# Patient Record
Sex: FEMALE | Race: BLACK OR AFRICAN AMERICAN | NOT HISPANIC OR LATINO
[De-identification: names, ages, dates, MRNs, and addresses within clinical notes are randomized per-mention and may not be internally consistent; named-entity substitution may affect disease eponyms.]

---

## 2017-04-24 ENCOUNTER — RX RENEWAL (OUTPATIENT)
Age: 52
End: 2017-04-24

## 2017-11-29 ENCOUNTER — APPOINTMENT (OUTPATIENT)
Dept: INTERNAL MEDICINE | Facility: CLINIC | Age: 52
End: 2017-11-29
Payer: SELF-PAY

## 2017-11-29 VITALS
DIASTOLIC BLOOD PRESSURE: 84 MMHG | HEIGHT: 63 IN | SYSTOLIC BLOOD PRESSURE: 122 MMHG | WEIGHT: 206 LBS | BODY MASS INDEX: 36.5 KG/M2 | TEMPERATURE: 97.6 F

## 2017-11-29 DIAGNOSIS — R81 GLYCOSURIA: ICD-10-CM

## 2017-11-29 DIAGNOSIS — I10 ESSENTIAL (PRIMARY) HYPERTENSION: ICD-10-CM

## 2017-11-29 DIAGNOSIS — Z78.0 ASYMPTOMATIC MENOPAUSAL STATE: ICD-10-CM

## 2017-11-29 DIAGNOSIS — L29.8 OTHER PRURITUS: ICD-10-CM

## 2017-11-29 PROCEDURE — 81002 URINALYSIS NONAUTO W/O SCOPE: CPT

## 2017-11-29 PROCEDURE — 93000 ELECTROCARDIOGRAM COMPLETE: CPT

## 2017-11-29 PROCEDURE — 99396 PREV VISIT EST AGE 40-64: CPT | Mod: 25

## 2017-11-29 PROCEDURE — 36415 COLL VENOUS BLD VENIPUNCTURE: CPT

## 2017-11-30 ENCOUNTER — NON-APPOINTMENT (OUTPATIENT)
Age: 52
End: 2017-11-30

## 2017-11-30 LAB
ALBUMIN SERPL ELPH-MCNC: 4.4 G/DL
ALP BLD-CCNC: 133 U/L
ALT SERPL-CCNC: 19 U/L
ANION GAP SERPL CALC-SCNC: 21 MMOL/L
AST SERPL-CCNC: 18 U/L
BASOPHILS # BLD AUTO: 0 K/UL
BASOPHILS NFR BLD AUTO: 0 %
BILIRUB SERPL-MCNC: 0.7 MG/DL
BILIRUB UR QL STRIP: NEGATIVE
BUN SERPL-MCNC: 14 MG/DL
CALCIUM SERPL-MCNC: 9.8 MG/DL
CHLORIDE SERPL-SCNC: 92 MMOL/L
CO2 SERPL-SCNC: 20 MMOL/L
CREAT SERPL-MCNC: 1.08 MG/DL
EOSINOPHIL # BLD AUTO: 0.04 K/UL
EOSINOPHIL NFR BLD AUTO: 0.6 %
GLUCOSE SERPL-MCNC: 383 MG/DL
GLUCOSE UR-MCNC: ABNORMAL
HBA1C MFR BLD HPLC: 11.4 %
HCG UR QL: 0.2 EU/DL
HCT VFR BLD CALC: 40.4 %
HGB BLD-MCNC: 13.9 G/DL
HGB UR QL STRIP.AUTO: ABNORMAL
IMM GRANULOCYTES NFR BLD AUTO: 0 %
KETONES UR-MCNC: ABNORMAL
LEUKOCYTE ESTERASE UR QL STRIP: NEGATIVE
LYMPHOCYTES # BLD AUTO: 2.57 K/UL
LYMPHOCYTES NFR BLD AUTO: 37.4 %
MAN DIFF?: NORMAL
MCHC RBC-ENTMCNC: 29.6 PG
MCHC RBC-ENTMCNC: 34.4 GM/DL
MCV RBC AUTO: 86.1 FL
MONOCYTES # BLD AUTO: 0.42 K/UL
MONOCYTES NFR BLD AUTO: 6.1 %
NEUTROPHILS NFR BLD AUTO: 55.9 %
NITRITE UR QL STRIP: NEGATIVE
PH UR STRIP: 5
PLATELET # BLD AUTO: 161 K/UL
POTASSIUM SERPL-SCNC: 3.6 MMOL/L
PROT SERPL-MCNC: 8.2 G/DL
PROT UR STRIP-MCNC: NEGATIVE
RBC # BLD: 4.69 M/UL
RBC # FLD: 13.4 %
SAVE SPECIMEN: NORMAL
SODIUM SERPL-SCNC: 133 MMOL/L
SP GR UR STRIP: 1.01
WBC # FLD AUTO: 6.87 K/UL

## 2018-03-13 ENCOUNTER — TRANSCRIPTION ENCOUNTER (OUTPATIENT)
Age: 53
End: 2018-03-13

## 2018-04-27 ENCOUNTER — RX RENEWAL (OUTPATIENT)
Age: 53
End: 2018-04-27

## 2018-09-22 ENCOUNTER — TRANSCRIPTION ENCOUNTER (OUTPATIENT)
Age: 53
End: 2018-09-22

## 2019-03-13 ENCOUNTER — LABORATORY RESULT (OUTPATIENT)
Age: 54
End: 2019-03-13

## 2019-03-13 ENCOUNTER — APPOINTMENT (OUTPATIENT)
Dept: INTERNAL MEDICINE | Facility: CLINIC | Age: 54
End: 2019-03-13
Payer: COMMERCIAL

## 2019-03-13 VITALS
WEIGHT: 221 LBS | SYSTOLIC BLOOD PRESSURE: 144 MMHG | BODY MASS INDEX: 39.16 KG/M2 | HEIGHT: 63 IN | DIASTOLIC BLOOD PRESSURE: 86 MMHG | TEMPERATURE: 97.6 F

## 2019-03-13 DIAGNOSIS — M85.89 OTHER SPECIFIED DISORDERS OF BONE DENSITY AND STRUCTURE, MULTIPLE SITES: ICD-10-CM

## 2019-03-13 DIAGNOSIS — L65.9 NONSCARRING HAIR LOSS, UNSPECIFIED: ICD-10-CM

## 2019-03-13 DIAGNOSIS — E53.8 DEFICIENCY OF OTHER SPECIFIED B GROUP VITAMINS: ICD-10-CM

## 2019-03-13 DIAGNOSIS — R92.2 INCONCLUSIVE MAMMOGRAM: ICD-10-CM

## 2019-03-13 PROCEDURE — 94010 BREATHING CAPACITY TEST: CPT

## 2019-03-13 PROCEDURE — 99396 PREV VISIT EST AGE 40-64: CPT | Mod: 25

## 2019-03-13 PROCEDURE — 36415 COLL VENOUS BLD VENIPUNCTURE: CPT

## 2019-03-13 PROCEDURE — 93000 ELECTROCARDIOGRAM COMPLETE: CPT

## 2019-03-13 RX ORDER — CALCIUM CARBONATE/VITAMIN D3 600 MG-10
TABLET ORAL
Refills: 0 | Status: ACTIVE | COMMUNITY

## 2019-03-13 NOTE — HISTORY OF PRESENT ILLNESS
[FreeTextEntry1] : Patient presents today for CPE.\par  [de-identified] : Patient presents for CPE today. She changed jobs insurances over the past 2 years and was lost to followup for some time. She switch back to her old job last year and is now switching back to her previous doctors.\par \par She recently saw her old endocrinologist -Dr. Roberts last month- and he ordered multiple blood tests.\par She has not been monitoring her diabetes for the past few months.\par \par He changed her insulin levels.\par She also was taking losartan 50 mg q.d. but was recently told by her insurance plan that the medication was the culprit. She will like to switch to a different medication. She was also told that her previous medication valsartan was also recalled.\par \par Patient also would like workup for complaints of chronic hair thinning/hair loss. She did have a dermatology evaluation several years ago but would like a new workup of possible.

## 2019-03-13 NOTE — HEALTH RISK ASSESSMENT
[Patient reported colonoscopy was normal] : Patient reported colonoscopy was normal [Good] : ~his/her~  mood as  good [Patient reported mammogram was normal] : Patient reported mammogram was normal [Patient reported PAP Smear was normal] : Patient reported PAP Smear was normal [MammogramDate] : 04/16 [PapSmearDate] : 02/19 [PapSmearComments] : with Dr. Deborah Cotter [BoneDensityComments] : never [ColonoscopyDate] : 01/10 [ColonoscopyComments] : with Dr. Killian in [place]

## 2019-03-18 LAB
24R-OH-CALCIDIOL SERPL-MCNC: 56.5 PG/ML
25(OH)D3 SERPL-MCNC: 21.7 NG/ML
ALBUMIN SERPL ELPH-MCNC: 4.4 G/DL
ALP BLD-CCNC: 111 U/L
ALT SERPL-CCNC: 17 U/L
ANA SER IF-ACNC: NEGATIVE
ANION GAP SERPL CALC-SCNC: 14 MMOL/L
APPEARANCE: CLEAR
AST SERPL-CCNC: 21 U/L
BASOPHILS # BLD AUTO: 0.01 K/UL
BASOPHILS NFR BLD AUTO: 0.2 %
BILIRUB SERPL-MCNC: 0.5 MG/DL
BILIRUBIN URINE: NEGATIVE
BLOOD URINE: NEGATIVE
BUN SERPL-MCNC: 10 MG/DL
CALCIUM SERPL-MCNC: 9.6 MG/DL
CHLORIDE SERPL-SCNC: 101 MMOL/L
CHOLEST SERPL-MCNC: 219 MG/DL
CHOLEST/HDLC SERPL: 5.8 RATIO
CO2 SERPL-SCNC: 24 MMOL/L
COLOR: NORMAL
CREAT SERPL-MCNC: 0.79 MG/DL
CRP SERPL-MCNC: 0.51 MG/DL
EOSINOPHIL # BLD AUTO: 0.15 K/UL
EOSINOPHIL NFR BLD AUTO: 2.8 %
ERYTHROCYTE [SEDIMENTATION RATE] IN BLOOD BY WESTERGREN METHOD: 42 MM/HR
FERRITIN SERPL-MCNC: 100 NG/ML
FOLATE SERPL-MCNC: 15.9 NG/ML
GLUCOSE QUALITATIVE U: NEGATIVE
GLUCOSE SERPL-MCNC: 94 MG/DL
HBA1C MFR BLD HPLC: 10.6 %
HCT VFR BLD CALC: 38.5 %
HDLC SERPL-MCNC: 38 MG/DL
HGB BLD-MCNC: 12.2 G/DL
IMM GRANULOCYTES NFR BLD AUTO: 0.4 %
IRON SATN MFR SERPL: 20 %
IRON SERPL-MCNC: 56 UG/DL
KETONES URINE: NEGATIVE
LDLC SERPL CALC-MCNC: 160 MG/DL
LDLC SERPL DIRECT ASSAY-MCNC: 177 MG/DL
LEUKOCYTE ESTERASE URINE: NEGATIVE
LYMPHOCYTES # BLD AUTO: 2.44 K/UL
LYMPHOCYTES NFR BLD AUTO: 45.4 %
MAN DIFF?: NORMAL
MCHC RBC-ENTMCNC: 28.4 PG
MCHC RBC-ENTMCNC: 31.7 GM/DL
MCV RBC AUTO: 89.5 FL
MONOCYTES # BLD AUTO: 0.29 K/UL
MONOCYTES NFR BLD AUTO: 5.4 %
NEUTROPHILS # BLD AUTO: 2.46 K/UL
NEUTROPHILS NFR BLD AUTO: 45.8 %
NITRITE URINE: NEGATIVE
PH URINE: 5.5
PLATELET # BLD AUTO: 161 K/UL
POTASSIUM SERPL-SCNC: 3.6 MMOL/L
PROT SERPL-MCNC: 7.9 G/DL
PROTEIN URINE: NEGATIVE
RBC # BLD: 4.3 M/UL
RBC # FLD: 12.9 %
SAVE SPECIMEN: NORMAL
SODIUM SERPL-SCNC: 139 MMOL/L
SPECIFIC GRAVITY URINE: 1.01
T3 SERPL-MCNC: 101 NG/DL
T3FREE SERPL-MCNC: 2.83 PG/ML
T3RU NFR SERPL: 1 TBI
T4 FREE SERPL-MCNC: 1.2 NG/DL
T4 SERPL-MCNC: 6.2 UG/DL
TIBC SERPL-MCNC: 274 UG/DL
TRIGL SERPL-MCNC: 107 MG/DL
TSH SERPL-ACNC: 2.27 UIU/ML
UIBC SERPL-MCNC: 218 UG/DL
URATE SERPL-MCNC: 7.3 MG/DL
UROBILINOGEN URINE: NORMAL
VIT B12 SERPL-MCNC: 539 PG/ML
WBC # FLD AUTO: 5.37 K/UL

## 2019-05-04 ENCOUNTER — APPOINTMENT (OUTPATIENT)
Dept: GASTROENTEROLOGY | Facility: CLINIC | Age: 54
End: 2019-05-04
Payer: COMMERCIAL

## 2019-05-04 PROCEDURE — 45378 DIAGNOSTIC COLONOSCOPY: CPT

## 2019-05-11 ENCOUNTER — APPOINTMENT (OUTPATIENT)
Dept: INTERNAL MEDICINE | Facility: CLINIC | Age: 54
End: 2019-05-11

## 2019-09-09 ENCOUNTER — APPOINTMENT (OUTPATIENT)
Dept: INTERNAL MEDICINE | Facility: CLINIC | Age: 54
End: 2019-09-09
Payer: COMMERCIAL

## 2019-09-09 VITALS
WEIGHT: 217 LBS | DIASTOLIC BLOOD PRESSURE: 82 MMHG | SYSTOLIC BLOOD PRESSURE: 136 MMHG | HEIGHT: 63 IN | BODY MASS INDEX: 38.45 KG/M2 | TEMPERATURE: 98 F

## 2019-09-09 DIAGNOSIS — J45.909 UNSPECIFIED ASTHMA, UNCOMPLICATED: ICD-10-CM

## 2019-09-09 PROCEDURE — 99214 OFFICE O/P EST MOD 30 MIN: CPT

## 2019-09-09 RX ORDER — LOSARTAN POTASSIUM 50 MG/1
50 TABLET, FILM COATED ORAL
Refills: 0 | Status: COMPLETED | COMMUNITY
End: 2019-09-09

## 2019-09-09 RX ORDER — IRBESARTAN 150 MG/1
150 TABLET ORAL DAILY
Qty: 90 | Refills: 3 | Status: COMPLETED | COMMUNITY
Start: 2019-03-13 | End: 2019-09-09

## 2019-09-09 NOTE — ASSESSMENT
[FreeTextEntry1] : pt is NOT fasting today. She will do fasting blood work in 1-2 weeks at a [facility] lab.

## 2019-09-09 NOTE — HISTORY OF PRESENT ILLNESS
[FreeTextEntry1] : Patient presents for follow up evaluation for multiple medical concerns including:\par  [de-identified] : \par \par She recently saw her old endocrinologist -Dr. Roberts several months ago.  She is now on Levemir long acting insulin 25 Units qhs and Humalog 5 Units tid before meals.  She admits she often forgets to take the pm dosage of insulin with dinner and her AM fasting glucose levels have been high.- =\par She has not been monitoring her diabetes for the past month because she has been too busy.\par \par She also was taking losartan 50 mg q.d. which was not available and was recently told by her insurance plan that the medication may be causing side effects.  She then changed to Irbersartan 150mg -but this was recently back ordered and not available at her pharmacy.  She has been off BP meds for the past few months. She wants to switch to a different medication.\par \par \par she also complains of recent allergic asthma-flare up with chronic dry cough.  No SOB or mucous or fevers or chills.She uses her rescue inhaler daily with only temporary relief of dry irritative cough.\par

## 2019-09-09 NOTE — REVIEW OF SYSTEMS
[Recent Change In Weight] : ~T recent weight change [Cough] : cough [FreeTextEntry2] : lost 6-7 lbs since last visit

## 2019-09-09 NOTE — PHYSICAL EXAM
[No Acute Distress] : no acute distress [Well Developed] : well developed [Well Nourished] : well nourished [Well-Appearing] : well-appearing [Normal Sclera/Conjunctiva] : normal sclera/conjunctiva [PERRL] : pupils equal round and reactive to light [Normal Outer Ear/Nose] : the outer ears and nose were normal in appearance [EOMI] : extraocular movements intact [Normal Oropharynx] : the oropharynx was normal [No JVD] : no jugular venous distention [No Lymphadenopathy] : no lymphadenopathy [Supple] : supple [Thyroid Normal, No Nodules] : the thyroid was normal and there were no nodules present [No Respiratory Distress] : no respiratory distress  [No Accessory Muscle Use] : no accessory muscle use [Clear to Auscultation] : lungs were clear to auscultation bilaterally [Normal Rate] : normal rate  [Regular Rhythm] : with a regular rhythm [No Murmur] : no murmur heard [Normal S1, S2] : normal S1 and S2 [No Carotid Bruits] : no carotid bruits [No Abdominal Bruit] : a ~M bruit was not heard ~T in the abdomen [No Varicosities] : no varicosities [Pedal Pulses Present] : the pedal pulses are present [No Edema] : there was no peripheral edema [No Extremity Clubbing/Cyanosis] : no extremity clubbing/cyanosis [No Palpable Aorta] : no palpable aorta [Non Tender] : non-tender [Soft] : abdomen soft [Non-distended] : non-distended [No Masses] : no abdominal mass palpated [No HSM] : no HSM [Normal Bowel Sounds] : normal bowel sounds [Normal Posterior Cervical Nodes] : no posterior cervical lymphadenopathy [Normal Anterior Cervical Nodes] : no anterior cervical lymphadenopathy [No CVA Tenderness] : no CVA  tenderness [No Spinal Tenderness] : no spinal tenderness [No Joint Swelling] : no joint swelling [Grossly Normal Strength/Tone] : grossly normal strength/tone [No Rash] : no rash [No Focal Deficits] : no focal deficits [Coordination Grossly Intact] : coordination grossly intact [Normal Gait] : normal gait [Deep Tendon Reflexes (DTR)] : deep tendon reflexes were 2+ and symmetric [Normal Affect] : the affect was normal [Normal Insight/Judgement] : insight and judgment were intact

## 2020-03-19 ENCOUNTER — RX RENEWAL (OUTPATIENT)
Age: 55
End: 2020-03-19

## 2020-03-19 RX ORDER — LANCETS 33 GAUGE
EACH MISCELLANEOUS
Qty: 100 | Refills: 5 | Status: ACTIVE | COMMUNITY
Start: 2020-03-19 | End: 1900-01-01

## 2020-09-18 ENCOUNTER — NON-APPOINTMENT (OUTPATIENT)
Age: 55
End: 2020-09-18

## 2021-01-21 ENCOUNTER — RX RENEWAL (OUTPATIENT)
Age: 56
End: 2021-01-21

## 2021-01-27 ENCOUNTER — NON-APPOINTMENT (OUTPATIENT)
Age: 56
End: 2021-01-27

## 2021-01-27 ENCOUNTER — APPOINTMENT (OUTPATIENT)
Dept: CARDIOLOGY | Facility: CLINIC | Age: 56
End: 2021-01-27
Payer: COMMERCIAL

## 2021-01-27 VITALS
HEART RATE: 74 BPM | DIASTOLIC BLOOD PRESSURE: 83 MMHG | WEIGHT: 223 LBS | TEMPERATURE: 96.5 F | HEIGHT: 63 IN | SYSTOLIC BLOOD PRESSURE: 132 MMHG | BODY MASS INDEX: 39.51 KG/M2 | OXYGEN SATURATION: 100 %

## 2021-01-27 DIAGNOSIS — M25.473 EFFUSION, UNSPECIFIED ANKLE: ICD-10-CM

## 2021-01-27 DIAGNOSIS — R94.31 ABNORMAL ELECTROCARDIOGRAM [ECG] [EKG]: ICD-10-CM

## 2021-01-27 PROCEDURE — 93306 TTE W/DOPPLER COMPLETE: CPT

## 2021-01-27 PROCEDURE — 99072 ADDL SUPL MATRL&STAF TM PHE: CPT

## 2021-01-27 PROCEDURE — 99204 OFFICE O/P NEW MOD 45 MIN: CPT

## 2021-01-27 PROCEDURE — 93000 ELECTROCARDIOGRAM COMPLETE: CPT

## 2021-01-27 NOTE — REVIEW OF SYSTEMS
[Feeling Fatigued] : feeling fatigued [Lower Ext Edema] : lower extremity edema [Joint Pain] : joint pain [Joint Stiffness] : joint stiffness [Negative] : Heme/Lymph

## 2021-01-27 NOTE — DISCUSSION/SUMMARY
[FreeTextEntry1] : In summary, Ms. Cassidy is a 55-year-old female with multiple risk factors for CAD who has been noting some right-sided foot and ankle edema during the past few months.  Her exam shows regular rhythm, normal blood pressure, a BMI of 39, clear lungs, a normal cardiac exam, and about 1-2+ edema in the right ankle and foot.  Her EKG shows some T wave flattening and is unchanged.\par \par An echo was done prior to her visit.  It showed normal systolic function, no significant valve problems, and a normal pulmonary artery systolic pressure.\par \par Ms. Cassidy's edema does not appear to be due to heart disease and her cardiac evaluation looks  unremarkable.  I told her to use a support stocking during the day for the edema.  Also, her LDL cholesterol was 160 when last measured and she is not on a statin so I begin atorvastatin 20 mg/day.  She will be following up with her internist in a month.

## 2021-01-27 NOTE — PHYSICAL EXAM
[General Appearance - Well Developed] : well developed [Normal Appearance] : normal appearance [Well Groomed] : well groomed [General Appearance - Well Nourished] : well nourished [No Deformities] : no deformities [General Appearance - In No Acute Distress] : no acute distress [Normal Conjunctiva] : the conjunctiva exhibited no abnormalities [Eyelids - No Xanthelasma] : the eyelids demonstrated no xanthelasmas [Normal Oral Mucosa] : normal oral mucosa [No Oral Pallor] : no oral pallor [No Oral Cyanosis] : no oral cyanosis [Normal Jugular Venous A Waves Present] : normal jugular venous A waves present [Normal Jugular Venous V Waves Present] : normal jugular venous V waves present [No Jugular Venous Wallis A Waves] : no jugular venous wallis A waves [Heart Rate And Rhythm] : heart rate and rhythm were normal [Heart Sounds] : normal S1 and S2 [Murmurs] : no murmurs present [Respiration, Rhythm And Depth] : normal respiratory rhythm and effort [Exaggerated Use Of Accessory Muscles For Inspiration] : no accessory muscle use [Auscultation Breath Sounds / Voice Sounds] : lungs were clear to auscultation bilaterally [Abdomen Soft] : soft [Abdomen Tenderness] : non-tender [Abdomen Mass (___ Cm)] : no abdominal mass palpated [Abnormal Walk] : normal gait [Gait - Sufficient For Exercise Testing] : the gait was sufficient for exercise testing [Nail Clubbing] : no clubbing of the fingernails [Cyanosis, Localized] : no localized cyanosis [Petechial Hemorrhages (___cm)] : no petechial hemorrhages [Skin Color & Pigmentation] : normal skin color and pigmentation [] : no rash [No Venous Stasis] : no venous stasis [Skin Lesions] : no skin lesions [No Skin Ulcers] : no skin ulcer [No Xanthoma] : no  xanthoma was observed [Oriented To Time, Place, And Person] : oriented to person, place, and time [Affect] : the affect was normal [Mood] : the mood was normal [No Anxiety] : not feeling anxious [FreeTextEntry1] : 2+ edema in the right foot and ankle, instep tender

## 2021-01-27 NOTE — HISTORY OF PRESENT ILLNESS
[FreeTextEntry1] : 55-year-old female referred for echocardiography and cardiac evaluation because of unilateral peripheral edema.  She has no prior history of heart disease, but has multiple risk factors including hypertension, hypercholesterolemia, diabetes, obesity.  For the past few months she has noted some edema involving the right foot and ankle which worsens during the day and is relieved during the night.  Her mobility is poor, but she does not notice any dyspnea with exertion or change in her exercise capacity and has no palpitations or chest pain.  She has pain in the foot and has been using a crutch to minimize weightbearing..\par \par She has never had any cardiac work-up in the past.  Her EKGs have shown some nonspecific T wave changes.  She has been seeing her endocrinologist regularly but has not been seen by her internist in 18 months.

## 2021-02-24 ENCOUNTER — APPOINTMENT (OUTPATIENT)
Dept: INTERNAL MEDICINE | Facility: CLINIC | Age: 56
End: 2021-02-24
Payer: COMMERCIAL

## 2021-02-24 ENCOUNTER — LABORATORY RESULT (OUTPATIENT)
Age: 56
End: 2021-02-24

## 2021-02-24 VITALS
SYSTOLIC BLOOD PRESSURE: 134 MMHG | DIASTOLIC BLOOD PRESSURE: 80 MMHG | BODY MASS INDEX: 37.39 KG/M2 | WEIGHT: 211 LBS | TEMPERATURE: 97.4 F | HEIGHT: 63 IN

## 2021-02-24 DIAGNOSIS — M25.562 PAIN IN LEFT KNEE: ICD-10-CM

## 2021-02-24 DIAGNOSIS — E78.5 HYPERLIPIDEMIA, UNSPECIFIED: ICD-10-CM

## 2021-02-24 DIAGNOSIS — Z11.59 ENCOUNTER FOR SCREENING FOR OTHER VIRAL DISEASES: ICD-10-CM

## 2021-02-24 DIAGNOSIS — E61.2 MAGNESIUM DEFICIENCY: ICD-10-CM

## 2021-02-24 DIAGNOSIS — E11.65 TYPE 2 DIABETES MELLITUS WITH HYPERGLYCEMIA: ICD-10-CM

## 2021-02-24 PROCEDURE — 99072 ADDL SUPL MATRL&STAF TM PHE: CPT

## 2021-02-24 PROCEDURE — 99396 PREV VISIT EST AGE 40-64: CPT | Mod: 25

## 2021-02-24 PROCEDURE — 36415 COLL VENOUS BLD VENIPUNCTURE: CPT

## 2021-02-28 NOTE — HISTORY OF PRESENT ILLNESS
[FreeTextEntry1] : Patient presents for CPE/comprehensive medical evaluation today.\par  [de-identified] : She saw ortho in 12/2020 for left knee sweliing and flare up of DJD--had Xray and had knee drained.\par and may need f/u cortisone shot.\par \par She also saw cardiologist Dr. Kendall last month for edema.\par \par She is insulin dependent type 2 diabetic-currently on Humalaog 5 U in am, 7 Units with lunch and 10 Units with dinner and Levemir 25 U qhs (was changed from Lantus 25 U).  Her glucose levels are variable. She feels Levemir is not working as well for her.\par She gets regular f/u with her endocrinologist Dr. Roberts-last visit was 1/2021.\par \par Last eye exam was more than 2 years ago--needs to find new ophthalmologist.

## 2021-02-28 NOTE — REVIEW OF SYSTEMS
[Negative] : Heme/Lymph [FreeTextEntry9] : right knee pain and swelling and diffuse arthralgias that come and go throughout all joints

## 2021-02-28 NOTE — ASSESSMENT
[FreeTextEntry1] : pt declines flu shot.\par Patient will consider the Shingrix vaccination.  A prescription for Shingrix was given to the patient to be administered by the pharmacist.\par Patient will call us and inform us of date of vaccination.\par

## 2021-02-28 NOTE — PHYSICAL EXAM
[Well Nourished] : well nourished [Well Developed] : well developed [Well-Appearing] : well-appearing [Normal Sclera/Conjunctiva] : normal sclera/conjunctiva [PERRL] : pupils equal round and reactive to light [EOMI] : extraocular movements intact [Normal Outer Ear/Nose] : the outer ears and nose were normal in appearance [No JVD] : no jugular venous distention [No Lymphadenopathy] : no lymphadenopathy [Supple] : supple [Thyroid Normal, No Nodules] : the thyroid was normal and there were no nodules present [No Respiratory Distress] : no respiratory distress  [No Accessory Muscle Use] : no accessory muscle use [Clear to Auscultation] : lungs were clear to auscultation bilaterally [Normal Rate] : normal rate  [Regular Rhythm] : with a regular rhythm [Normal S1, S2] : normal S1 and S2 [No Murmur] : no murmur heard [No Carotid Bruits] : no carotid bruits [No Abdominal Bruit] : a ~M bruit was not heard ~T in the abdomen [No Varicosities] : no varicosities [Pedal Pulses Present] : the pedal pulses are present [No Edema] : there was no peripheral edema [No Palpable Aorta] : no palpable aorta [No Extremity Clubbing/Cyanosis] : no extremity clubbing/cyanosis [Normal Appearance] : normal in appearance [No Axillary Lymphadenopathy] : no axillary lymphadenopathy [Soft] : abdomen soft [Non Tender] : non-tender [Non-distended] : non-distended [No Masses] : no abdominal mass palpated [No HSM] : no HSM [Normal Bowel Sounds] : normal bowel sounds [Normal Posterior Cervical Nodes] : no posterior cervical lymphadenopathy [Normal Anterior Cervical Nodes] : no anterior cervical lymphadenopathy [No CVA Tenderness] : no CVA  tenderness [No Spinal Tenderness] : no spinal tenderness [No Joint Swelling] : no joint swelling [Grossly Normal Strength/Tone] : grossly normal strength/tone [No Rash] : no rash [Coordination Grossly Intact] : coordination grossly intact [No Focal Deficits] : no focal deficits [Normal Gait] : normal gait [Deep Tendon Reflexes (DTR)] : deep tendon reflexes were 2+ and symmetric [Normal Affect] : the affect was normal [Normal Insight/Judgement] : insight and judgment were intact [de-identified] : dense fibrocystic breasts

## 2021-02-28 NOTE — HEALTH RISK ASSESSMENT
[Patient reported PAP Smear was normal] : Patient reported PAP Smear was normal [Patient reported mammogram was normal] : Patient reported mammogram was normal [Patient reported colonoscopy was abnormal] : Patient reported colonoscopy was abnormal [Good] : ~his/her~  mood as  good [Never (0 pts)] : Never (0 points) [No] : In the past 12 months have you used drugs other than those required for medical reasons? No [0] : 2) Feeling down, depressed, or hopeless: Not at all (0) [] : No [de-identified] : orthopedic surgeon for knee pain and swelling and cardiologist [Audit-CScore] : 0 [de-identified] : no routine exercise [de-identified] : overall healthy balanced and varied diabetic diet without any exclusions  [[id number]] : 0 [MammogramDate] : 01/19 [MammogramComments] : at [facility] [PapSmearDate] : 01/19 [PapSmearComments] : with Dr. Deborah Cotter [ColonoscopyDate] : 05/19 [ColonoscopyComments] : poor prep--needs repeat

## 2021-03-01 LAB
25(OH)D3 SERPL-MCNC: 41 NG/ML
ALBUMIN SERPL ELPH-MCNC: 4.2 G/DL
ALP BLD-CCNC: 108 U/L
ALT SERPL-CCNC: 10 U/L
ANA PAT FLD IF-IMP: ABNORMAL
ANA SER IF-ACNC: ABNORMAL
ANION GAP SERPL CALC-SCNC: 15 MMOL/L
APPEARANCE: CLEAR
AST SERPL-CCNC: 17 U/L
B BURGDOR IGG+IGM SER QL IB: NORMAL
BASOPHILS # BLD AUTO: 0 K/UL
BASOPHILS NFR BLD AUTO: 0 %
BILIRUB SERPL-MCNC: 0.6 MG/DL
BILIRUBIN URINE: NEGATIVE
BLOOD URINE: NEGATIVE
BUN SERPL-MCNC: 10 MG/DL
CALCIUM SERPL-MCNC: 9.9 MG/DL
CCP AB SER IA-ACNC: <8 UNITS
CHLORIDE SERPL-SCNC: 104 MMOL/L
CHOLEST SERPL-MCNC: 220 MG/DL
CO2 SERPL-SCNC: 20 MMOL/L
COLOR: YELLOW
CREAT SERPL-MCNC: 0.85 MG/DL
CREAT SPEC-SCNC: 156 MG/DL
CRP SERPL-MCNC: 2.25 MG/DL
EOSINOPHIL # BLD AUTO: 0.1 K/UL
EOSINOPHIL NFR BLD AUTO: 1.8 %
ERYTHROCYTE [SEDIMENTATION RATE] IN BLOOD BY WESTERGREN METHOD: 63 MM/HR
ESTIMATED AVERAGE GLUCOSE: 157 MG/DL
GLUCOSE QUALITATIVE U: NEGATIVE
GLUCOSE SERPL-MCNC: 94 MG/DL
HBA1C MFR BLD HPLC: 7.1 %
HCT VFR BLD CALC: 37.9 %
HDLC SERPL-MCNC: 33 MG/DL
HGB BLD-MCNC: 12.8 G/DL
IMM GRANULOCYTES NFR BLD AUTO: 0.2 %
KETONES URINE: NEGATIVE
LDLC SERPL CALC-MCNC: 162 MG/DL
LDLC SERPL DIRECT ASSAY-MCNC: 168 MG/DL
LEUKOCYTE ESTERASE URINE: NEGATIVE
LYMPHOCYTES # BLD AUTO: 1.66 K/UL
LYMPHOCYTES NFR BLD AUTO: 29.7 %
MAGNESIUM SERPL-MCNC: 1.8 MG/DL
MAN DIFF?: NORMAL
MCHC RBC-ENTMCNC: 28.3 PG
MCHC RBC-ENTMCNC: 33.8 GM/DL
MCV RBC AUTO: 83.8 FL
MICROALBUMIN 24H UR DL<=1MG/L-MCNC: 1.2 MG/DL
MICROALBUMIN/CREAT 24H UR-RTO: NORMAL MG/G
MONOCYTES # BLD AUTO: 0.35 K/UL
MONOCYTES NFR BLD AUTO: 6.3 %
NEUTROPHILS # BLD AUTO: 3.47 K/UL
NEUTROPHILS NFR BLD AUTO: 62 %
NITRITE URINE: NEGATIVE
NONHDLC SERPL-MCNC: 187 MG/DL
PH URINE: 6
PLATELET # BLD AUTO: 205 K/UL
POTASSIUM SERPL-SCNC: 3.3 MMOL/L
PROT SERPL-MCNC: 8.4 G/DL
PROTEIN URINE: NEGATIVE
RBC # BLD: 4.52 M/UL
RBC # FLD: 12.5 %
RF+CCP IGG SER-IMP: NEGATIVE
RHEUMATOID FACT SER QL: <10 IU/ML
SARS-COV-2 IGG SERPL IA-ACNC: <0.1 INDEX
SARS-COV-2 IGG SERPL QL IA: NEGATIVE
SODIUM SERPL-SCNC: 140 MMOL/L
SPECIFIC GRAVITY URINE: 1.01
T3 SERPL-MCNC: 101 NG/DL
T3FREE SERPL-MCNC: 2.84 PG/ML
T3RU NFR SERPL: 1.1 TBI
T4 FREE SERPL-MCNC: 1.3 NG/DL
T4 SERPL-MCNC: 7.4 UG/DL
TRIGL SERPL-MCNC: 124 MG/DL
TSH SERPL-ACNC: 1.88 UIU/ML
URATE SERPL-MCNC: 8.4 MG/DL
UROBILINOGEN URINE: NORMAL
VIT B12 SERPL-MCNC: 534 PG/ML
WBC # FLD AUTO: 5.59 K/UL

## 2021-03-26 ENCOUNTER — LABORATORY RESULT (OUTPATIENT)
Age: 56
End: 2021-03-26

## 2021-03-26 ENCOUNTER — APPOINTMENT (OUTPATIENT)
Dept: RHEUMATOLOGY | Facility: CLINIC | Age: 56
End: 2021-03-26
Payer: COMMERCIAL

## 2021-03-26 VITALS
TEMPERATURE: 96.9 F | DIASTOLIC BLOOD PRESSURE: 85 MMHG | HEART RATE: 96 BPM | SYSTOLIC BLOOD PRESSURE: 127 MMHG | WEIGHT: 211 LBS | BODY MASS INDEX: 37.39 KG/M2 | RESPIRATION RATE: 16 BRPM | HEIGHT: 63 IN | OXYGEN SATURATION: 97 %

## 2021-03-26 DIAGNOSIS — R76.8 OTHER SPECIFIED ABNORMAL IMMUNOLOGICAL FINDINGS IN SERUM: ICD-10-CM

## 2021-03-26 DIAGNOSIS — M25.571 PAIN IN RIGHT ANKLE AND JOINTS OF RIGHT FOOT: ICD-10-CM

## 2021-03-26 DIAGNOSIS — E79.0 HYPERURICEMIA W/OUT SIGNS OF INFLAMMATORY ARTHRITIS AND TOPHACEOUS DISEASE: ICD-10-CM

## 2021-03-26 PROCEDURE — 99072 ADDL SUPL MATRL&STAF TM PHE: CPT

## 2021-03-26 PROCEDURE — 99204 OFFICE O/P NEW MOD 45 MIN: CPT

## 2021-04-05 LAB
ANA PAT FLD IF-IMP: ABNORMAL
ANA SER IF-ACNC: ABNORMAL
APPEARANCE: CLEAR
APTT BLD: 36.6 SEC
B2 GLYCOPROT1 AB SER QL: NEGATIVE
BACTERIA: NEGATIVE
BILIRUBIN URINE: NEGATIVE
BLOOD URINE: NEGATIVE
CARDIOLIPIN AB SER IA-ACNC: POSITIVE
CENTROMERE IGG SER-ACNC: <0.2 CD:130001892
COLOR: NORMAL
CREAT SPEC-SCNC: 103 MG/DL
CREAT/PROT UR: 0.1 RATIO
DSDNA AB SER-ACNC: <12 IU/ML
ENA RNP AB SER IA-ACNC: 0.2 AL
ENA SCL70 IGG SER IA-ACNC: <0.2 AL
ENA SM AB SER IA-ACNC: <0.2 AL
ENA SS-A AB SER IA-ACNC: <0.2 AL
ENA SS-B AB SER IA-ACNC: <0.2 AL
GLUCOSE QUALITATIVE U: NEGATIVE
HYALINE CASTS: 1 /LPF
KETONES URINE: NEGATIVE
LEUKOCYTE ESTERASE URINE: NEGATIVE
MICROSCOPIC-UA: NORMAL
NITRITE URINE: NEGATIVE
PH URINE: 6.5
PROT UR-MCNC: 9 MG/DL
PROTEIN URINE: NEGATIVE
RED BLOOD CELLS URINE: 1 /HPF
RNA POLYMERASE III IGG: 14 UNITS
SPECIFIC GRAVITY URINE: 1.02
SQUAMOUS EPITHELIAL CELLS: 1 /HPF
THYROGLOB AB SERPL-ACNC: <20 IU/ML
THYROPEROXIDASE AB SERPL IA-ACNC: 18 IU/ML
URATE SERPL-MCNC: 8.4 MG/DL
UROBILINOGEN URINE: NORMAL
WHITE BLOOD CELLS URINE: 1 /HPF

## 2021-04-12 ENCOUNTER — NON-APPOINTMENT (OUTPATIENT)
Age: 56
End: 2021-04-12

## 2021-04-17 ENCOUNTER — RX RENEWAL (OUTPATIENT)
Age: 56
End: 2021-04-17

## 2021-04-26 ENCOUNTER — APPOINTMENT (OUTPATIENT)
Dept: RHEUMATOLOGY | Facility: CLINIC | Age: 56
End: 2021-04-26

## 2022-04-11 PROBLEM — Z11.59 SCREENING FOR VIRAL DISEASE: Status: ACTIVE | Noted: 2021-02-24

## 2022-04-21 ENCOUNTER — RX RENEWAL (OUTPATIENT)
Age: 57
End: 2022-04-21

## 2022-04-21 RX ORDER — BLOOD SUGAR DIAGNOSTIC
STRIP MISCELLANEOUS
Qty: 100 | Refills: 11 | Status: ACTIVE | COMMUNITY
Start: 2021-04-17 | End: 1900-01-01

## 2022-06-15 ENCOUNTER — APPOINTMENT (OUTPATIENT)
Dept: INTERNAL MEDICINE | Facility: CLINIC | Age: 57
End: 2022-06-15

## 2022-07-22 ENCOUNTER — APPOINTMENT (OUTPATIENT)
Dept: INTERNAL MEDICINE | Facility: CLINIC | Age: 57
End: 2022-07-22

## 2022-07-22 VITALS
DIASTOLIC BLOOD PRESSURE: 80 MMHG | HEART RATE: 67 BPM | WEIGHT: 208 LBS | HEIGHT: 63 IN | OXYGEN SATURATION: 94 % | BODY MASS INDEX: 36.86 KG/M2 | SYSTOLIC BLOOD PRESSURE: 128 MMHG

## 2022-07-22 DIAGNOSIS — K62.5 HEMORRHAGE OF ANUS AND RECTUM: ICD-10-CM

## 2022-07-22 DIAGNOSIS — L66.9 CICATRICIAL ALOPECIA, UNSPECIFIED: ICD-10-CM

## 2022-07-22 PROCEDURE — 36415 COLL VENOUS BLD VENIPUNCTURE: CPT

## 2022-07-22 PROCEDURE — 99214 OFFICE O/P EST MOD 30 MIN: CPT | Mod: 25

## 2022-07-22 RX ORDER — INSULIN DETEMIR 100 [IU]/ML
100 INJECTION, SOLUTION SUBCUTANEOUS
Qty: 1 | Refills: 3 | Status: DISCONTINUED | COMMUNITY
End: 2022-07-22

## 2022-07-22 NOTE — PHYSICAL EXAM
[No Acute Distress] : no acute distress [Well Nourished] : well nourished [Well Developed] : well developed [No Respiratory Distress] : no respiratory distress  [No Accessory Muscle Use] : no accessory muscle use [Clear to Auscultation] : lungs were clear to auscultation bilaterally [Normal Rate] : normal rate  [Regular Rhythm] : with a regular rhythm [Comprehensive Foot Exam Normal] : Right and left foot were examined and both feet are normal. No ulcers in either foot. Toes are normal and with full ROM.  Normal tactile sensation with monofilament testing throughout both feet [Normal S1, S2] : normal S1 and S2

## 2022-07-22 NOTE — HISTORY OF PRESENT ILLNESS
[Post-hospitalization from ___ Hospital] : Post-hospitalization from [unfilled] Hospital [Admitted on: ___] : The patient was admitted on [unfilled] [Discharged on ___] : discharged on [unfilled] [FreeTextEntry2] : Pt was admitted to hospital for uncontrolled DM.\par Today feels better.She was seen by Dr.Sanjay Gustafson endocrinologist few days ago.On new med for DM- TOUJEO inj.\par Reports having blood on stool noticed  2 days ago.\par Has hemorrhoids. She was constipated after hospital. Last colonoscopy was 2019.  \par Also c/o alopecia progressively worse.

## 2022-07-22 NOTE — PHYSICAL EXAM
[No Acute Distress] : no acute distress [Well Nourished] : well nourished [Well Developed] : well developed [No Respiratory Distress] : no respiratory distress  [No Accessory Muscle Use] : no accessory muscle use [Clear to Auscultation] : lungs were clear to auscultation bilaterally [Normal Rate] : normal rate  [Regular Rhythm] : with a regular rhythm [Normal S1, S2] : normal S1 and S2 [Comprehensive Foot Exam Normal] : Right and left foot were examined and both feet are normal. No ulcers in either foot. Toes are normal and with full ROM.  Normal tactile sensation with monofilament testing throughout both feet

## 2022-08-05 LAB
ALBUMIN SERPL ELPH-MCNC: 4.1 G/DL
ALP BLD-CCNC: 98 U/L
ALT SERPL-CCNC: 10 U/L
ANION GAP SERPL CALC-SCNC: 13 MMOL/L
AST SERPL-CCNC: 15 U/L
BASOPHILS # BLD AUTO: 0.01 K/UL
BASOPHILS NFR BLD AUTO: 0.2 %
BILIRUB SERPL-MCNC: 0.5 MG/DL
BUN SERPL-MCNC: 7 MG/DL
CALCIUM SERPL-MCNC: 9.3 MG/DL
CHLORIDE SERPL-SCNC: 105 MMOL/L
CO2 SERPL-SCNC: 23 MMOL/L
CREAT SERPL-MCNC: 0.77 MG/DL
EGFR: 89 ML/MIN/1.73M2
EOSINOPHIL # BLD AUTO: 0.31 K/UL
EOSINOPHIL NFR BLD AUTO: 6.3 %
ESTIMATED AVERAGE GLUCOSE: 252 MG/DL
GLUCOSE SERPL-MCNC: 81 MG/DL
HBA1C MFR BLD HPLC: 10.4 %
HCT VFR BLD CALC: 35.5 %
HGB BLD-MCNC: 11.8 G/DL
IMM GRANULOCYTES NFR BLD AUTO: 0.2 %
LYMPHOCYTES # BLD AUTO: 1.83 K/UL
LYMPHOCYTES NFR BLD AUTO: 37 %
MAN DIFF?: NORMAL
MCHC RBC-ENTMCNC: 29.1 PG
MCHC RBC-ENTMCNC: 33.2 GM/DL
MCV RBC AUTO: 87.4 FL
MONOCYTES # BLD AUTO: 0.27 K/UL
MONOCYTES NFR BLD AUTO: 5.5 %
NEUTROPHILS # BLD AUTO: 2.51 K/UL
NEUTROPHILS NFR BLD AUTO: 50.8 %
PLATELET # BLD AUTO: 151 K/UL
POTASSIUM SERPL-SCNC: 4 MMOL/L
PROT SERPL-MCNC: 7.2 G/DL
RBC # BLD: 4.06 M/UL
RBC # FLD: 13 %
SODIUM SERPL-SCNC: 141 MMOL/L
WBC # FLD AUTO: 4.94 K/UL

## 2022-12-15 ENCOUNTER — NON-APPOINTMENT (OUTPATIENT)
Age: 57
End: 2022-12-15

## 2022-12-24 ENCOUNTER — RX RENEWAL (OUTPATIENT)
Age: 57
End: 2022-12-24

## 2023-05-06 ENCOUNTER — NON-APPOINTMENT (OUTPATIENT)
Age: 58
End: 2023-05-06

## 2023-05-22 ENCOUNTER — TRANSCRIPTION ENCOUNTER (OUTPATIENT)
Age: 58
End: 2023-05-22

## 2023-05-31 ENCOUNTER — INPATIENT (INPATIENT)
Facility: HOSPITAL | Age: 58
LOS: 8 days | Discharge: HOME HEALTH | End: 2023-06-09
Attending: STUDENT IN AN ORGANIZED HEALTH CARE EDUCATION/TRAINING PROGRAM | Admitting: STUDENT IN AN ORGANIZED HEALTH CARE EDUCATION/TRAINING PROGRAM
Payer: COMMERCIAL

## 2023-05-31 VITALS
TEMPERATURE: 98 F | DIASTOLIC BLOOD PRESSURE: 96 MMHG | OXYGEN SATURATION: 87 % | SYSTOLIC BLOOD PRESSURE: 152 MMHG | HEART RATE: 113 BPM | RESPIRATION RATE: 18 BRPM

## 2023-05-31 DIAGNOSIS — R06.00 DYSPNEA, UNSPECIFIED: ICD-10-CM

## 2023-05-31 DIAGNOSIS — J45.909 UNSPECIFIED ASTHMA, UNCOMPLICATED: ICD-10-CM

## 2023-05-31 DIAGNOSIS — I10 ESSENTIAL (PRIMARY) HYPERTENSION: ICD-10-CM

## 2023-05-31 DIAGNOSIS — J90 PLEURAL EFFUSION, NOT ELSEWHERE CLASSIFIED: ICD-10-CM

## 2023-05-31 DIAGNOSIS — Z98.89 OTHER SPECIFIED POSTPROCEDURAL STATES: Chronic | ICD-10-CM

## 2023-05-31 DIAGNOSIS — R07.89 OTHER CHEST PAIN: ICD-10-CM

## 2023-05-31 DIAGNOSIS — E11.9 TYPE 2 DIABETES MELLITUS WITHOUT COMPLICATIONS: ICD-10-CM

## 2023-05-31 DIAGNOSIS — Z29.9 ENCOUNTER FOR PROPHYLACTIC MEASURES, UNSPECIFIED: ICD-10-CM

## 2023-05-31 LAB
ALBUMIN SERPL ELPH-MCNC: 3.9 G/DL — SIGNIFICANT CHANGE UP (ref 3.3–5)
ALP SERPL-CCNC: 100 U/L — SIGNIFICANT CHANGE UP (ref 40–120)
ALT FLD-CCNC: 16 U/L — SIGNIFICANT CHANGE UP (ref 4–33)
ANION GAP SERPL CALC-SCNC: 11 MMOL/L — SIGNIFICANT CHANGE UP (ref 7–14)
APTT BLD: 31.3 SEC — SIGNIFICANT CHANGE UP (ref 27–36.3)
AST SERPL-CCNC: 26 U/L — SIGNIFICANT CHANGE UP (ref 4–32)
B PERT DNA SPEC QL NAA+PROBE: SIGNIFICANT CHANGE UP
B PERT+PARAPERT DNA PNL SPEC NAA+PROBE: SIGNIFICANT CHANGE UP
BASOPHILS # BLD AUTO: 0.01 K/UL — SIGNIFICANT CHANGE UP (ref 0–0.2)
BASOPHILS NFR BLD AUTO: 0.2 % — SIGNIFICANT CHANGE UP (ref 0–2)
BILIRUB SERPL-MCNC: 0.3 MG/DL — SIGNIFICANT CHANGE UP (ref 0.2–1.2)
BLD GP AB SCN SERPL QL: NEGATIVE — SIGNIFICANT CHANGE UP
BORDETELLA PARAPERTUSSIS (RAPRVP): SIGNIFICANT CHANGE UP
BUN SERPL-MCNC: 9 MG/DL — SIGNIFICANT CHANGE UP (ref 7–23)
C PNEUM DNA SPEC QL NAA+PROBE: SIGNIFICANT CHANGE UP
CALCIUM SERPL-MCNC: 9.5 MG/DL — SIGNIFICANT CHANGE UP (ref 8.4–10.5)
CHLORIDE SERPL-SCNC: 104 MMOL/L — SIGNIFICANT CHANGE UP (ref 98–107)
CO2 SERPL-SCNC: 27 MMOL/L — SIGNIFICANT CHANGE UP (ref 22–31)
CREAT SERPL-MCNC: 0.87 MG/DL — SIGNIFICANT CHANGE UP (ref 0.5–1.3)
EGFR: 77 ML/MIN/1.73M2 — SIGNIFICANT CHANGE UP
EOSINOPHIL # BLD AUTO: 0.12 K/UL — SIGNIFICANT CHANGE UP (ref 0–0.5)
EOSINOPHIL NFR BLD AUTO: 2 % — SIGNIFICANT CHANGE UP (ref 0–6)
FLUAV SUBTYP SPEC NAA+PROBE: SIGNIFICANT CHANGE UP
FLUBV RNA SPEC QL NAA+PROBE: SIGNIFICANT CHANGE UP
GLUCOSE SERPL-MCNC: 78 MG/DL — SIGNIFICANT CHANGE UP (ref 70–99)
HADV DNA SPEC QL NAA+PROBE: SIGNIFICANT CHANGE UP
HCOV 229E RNA SPEC QL NAA+PROBE: SIGNIFICANT CHANGE UP
HCOV HKU1 RNA SPEC QL NAA+PROBE: SIGNIFICANT CHANGE UP
HCOV NL63 RNA SPEC QL NAA+PROBE: SIGNIFICANT CHANGE UP
HCOV OC43 RNA SPEC QL NAA+PROBE: SIGNIFICANT CHANGE UP
HCT VFR BLD CALC: 38.3 % — SIGNIFICANT CHANGE UP (ref 34.5–45)
HGB BLD-MCNC: 12.4 G/DL — SIGNIFICANT CHANGE UP (ref 11.5–15.5)
HMPV RNA SPEC QL NAA+PROBE: SIGNIFICANT CHANGE UP
HPIV1 RNA SPEC QL NAA+PROBE: SIGNIFICANT CHANGE UP
HPIV2 RNA SPEC QL NAA+PROBE: SIGNIFICANT CHANGE UP
HPIV3 RNA SPEC QL NAA+PROBE: SIGNIFICANT CHANGE UP
HPIV4 RNA SPEC QL NAA+PROBE: SIGNIFICANT CHANGE UP
IANC: 4.23 K/UL — SIGNIFICANT CHANGE UP (ref 1.8–7.4)
IMM GRANULOCYTES NFR BLD AUTO: 0.3 % — SIGNIFICANT CHANGE UP (ref 0–0.9)
INR BLD: 1.17 RATIO — HIGH (ref 0.88–1.16)
LYMPHOCYTES # BLD AUTO: 1.14 K/UL — SIGNIFICANT CHANGE UP (ref 1–3.3)
LYMPHOCYTES # BLD AUTO: 19 % — SIGNIFICANT CHANGE UP (ref 13–44)
M PNEUMO DNA SPEC QL NAA+PROBE: SIGNIFICANT CHANGE UP
MCHC RBC-ENTMCNC: 27.3 PG — SIGNIFICANT CHANGE UP (ref 27–34)
MCHC RBC-ENTMCNC: 32.4 GM/DL — SIGNIFICANT CHANGE UP (ref 32–36)
MCV RBC AUTO: 84.2 FL — SIGNIFICANT CHANGE UP (ref 80–100)
MONOCYTES # BLD AUTO: 0.48 K/UL — SIGNIFICANT CHANGE UP (ref 0–0.9)
MONOCYTES NFR BLD AUTO: 8 % — SIGNIFICANT CHANGE UP (ref 2–14)
NEUTROPHILS # BLD AUTO: 4.23 K/UL — SIGNIFICANT CHANGE UP (ref 1.8–7.4)
NEUTROPHILS NFR BLD AUTO: 70.5 % — SIGNIFICANT CHANGE UP (ref 43–77)
NRBC # BLD: 0 /100 WBCS — SIGNIFICANT CHANGE UP (ref 0–0)
NRBC # FLD: 0 K/UL — SIGNIFICANT CHANGE UP (ref 0–0)
NT-PROBNP SERPL-SCNC: 60 PG/ML — SIGNIFICANT CHANGE UP
PLATELET # BLD AUTO: 250 K/UL — SIGNIFICANT CHANGE UP (ref 150–400)
POTASSIUM SERPL-MCNC: 3.8 MMOL/L — SIGNIFICANT CHANGE UP (ref 3.5–5.3)
POTASSIUM SERPL-SCNC: 3.8 MMOL/L — SIGNIFICANT CHANGE UP (ref 3.5–5.3)
PROT SERPL-MCNC: 7.9 G/DL — SIGNIFICANT CHANGE UP (ref 6–8.3)
PROTHROM AB SERPL-ACNC: 13.6 SEC — HIGH (ref 10.5–13.4)
RAPID RVP RESULT: SIGNIFICANT CHANGE UP
RBC # BLD: 4.55 M/UL — SIGNIFICANT CHANGE UP (ref 3.8–5.2)
RBC # FLD: 12.3 % — SIGNIFICANT CHANGE UP (ref 10.3–14.5)
RH IG SCN BLD-IMP: POSITIVE — SIGNIFICANT CHANGE UP
RSV RNA SPEC QL NAA+PROBE: SIGNIFICANT CHANGE UP
RV+EV RNA SPEC QL NAA+PROBE: SIGNIFICANT CHANGE UP
SARS-COV-2 RNA SPEC QL NAA+PROBE: SIGNIFICANT CHANGE UP
SODIUM SERPL-SCNC: 142 MMOL/L — SIGNIFICANT CHANGE UP (ref 135–145)
TROPONIN T, HIGH SENSITIVITY RESULT: 9 NG/L — SIGNIFICANT CHANGE UP
WBC # BLD: 6 K/UL — SIGNIFICANT CHANGE UP (ref 3.8–10.5)
WBC # FLD AUTO: 6 K/UL — SIGNIFICANT CHANGE UP (ref 3.8–10.5)

## 2023-05-31 PROCEDURE — 93010 ELECTROCARDIOGRAM REPORT: CPT

## 2023-05-31 PROCEDURE — 99223 1ST HOSP IP/OBS HIGH 75: CPT | Mod: GC

## 2023-05-31 PROCEDURE — 99285 EMERGENCY DEPT VISIT HI MDM: CPT

## 2023-05-31 PROCEDURE — G1004: CPT

## 2023-05-31 PROCEDURE — 71275 CT ANGIOGRAPHY CHEST: CPT | Mod: 26,MG

## 2023-05-31 PROCEDURE — 71046 X-RAY EXAM CHEST 2 VIEWS: CPT | Mod: 26

## 2023-05-31 RX ORDER — SODIUM CHLORIDE 9 MG/ML
1000 INJECTION, SOLUTION INTRAVENOUS
Refills: 0 | Status: DISCONTINUED | OUTPATIENT
Start: 2023-05-31 | End: 2023-06-09

## 2023-05-31 RX ORDER — INSULIN GLARGINE 100 [IU]/ML
22 INJECTION, SOLUTION SUBCUTANEOUS AT BEDTIME
Refills: 0 | Status: DISCONTINUED | OUTPATIENT
Start: 2023-05-31 | End: 2023-06-05

## 2023-05-31 RX ORDER — DEXTROSE 50 % IN WATER 50 %
15 SYRINGE (ML) INTRAVENOUS ONCE
Refills: 0 | Status: DISCONTINUED | OUTPATIENT
Start: 2023-05-31 | End: 2023-06-09

## 2023-05-31 RX ORDER — ACETAMINOPHEN 500 MG
650 TABLET ORAL EVERY 6 HOURS
Refills: 0 | Status: DISCONTINUED | OUTPATIENT
Start: 2023-05-31 | End: 2023-06-09

## 2023-05-31 RX ORDER — INSULIN LISPRO 100/ML
VIAL (ML) SUBCUTANEOUS
Refills: 0 | Status: DISCONTINUED | OUTPATIENT
Start: 2023-05-31 | End: 2023-06-06

## 2023-05-31 RX ORDER — DEXTROSE 50 % IN WATER 50 %
25 SYRINGE (ML) INTRAVENOUS ONCE
Refills: 0 | Status: DISCONTINUED | OUTPATIENT
Start: 2023-05-31 | End: 2023-06-09

## 2023-05-31 RX ORDER — INSULIN LISPRO 100/ML
VIAL (ML) SUBCUTANEOUS AT BEDTIME
Refills: 0 | Status: DISCONTINUED | OUTPATIENT
Start: 2023-05-31 | End: 2023-06-06

## 2023-05-31 RX ORDER — GLUCAGON INJECTION, SOLUTION 0.5 MG/.1ML
1 INJECTION, SOLUTION SUBCUTANEOUS ONCE
Refills: 0 | Status: DISCONTINUED | OUTPATIENT
Start: 2023-05-31 | End: 2023-06-09

## 2023-05-31 RX ORDER — DEXTROSE 50 % IN WATER 50 %
12.5 SYRINGE (ML) INTRAVENOUS ONCE
Refills: 0 | Status: DISCONTINUED | OUTPATIENT
Start: 2023-05-31 | End: 2023-06-09

## 2023-05-31 RX ORDER — LOSARTAN POTASSIUM 100 MG/1
50 TABLET, FILM COATED ORAL DAILY
Refills: 0 | Status: DISCONTINUED | OUTPATIENT
Start: 2023-05-31 | End: 2023-06-09

## 2023-05-31 RX ORDER — ASPIRIN/CALCIUM CARB/MAGNESIUM 324 MG
162 TABLET ORAL ONCE
Refills: 0 | Status: COMPLETED | OUTPATIENT
Start: 2023-05-31 | End: 2023-05-31

## 2023-05-31 RX ADMIN — INSULIN GLARGINE 22 UNIT(S): 100 INJECTION, SOLUTION SUBCUTANEOUS at 22:13

## 2023-05-31 RX ADMIN — Medication 162 MILLIGRAM(S): at 12:19

## 2023-05-31 NOTE — H&P ADULT - NSHPLABSRESULTS_GEN_ALL_CORE
.  LABS:                         12.4   6.00  )-----------( 250      ( 31 May 2023 12:20 )             38.3     05-31    142  |  104  |  9   ----------------------------<  78  3.8   |  27  |  0.87    Ca    9.5      31 May 2023 12:20    TPro  7.9  /  Alb  3.9  /  TBili  0.3  /  DBili  x   /  AST  26  /  ALT  16  /  AlkPhos  100  05-31    PT/INR - ( 31 May 2023 12:20 )   PT: 13.6 sec;   INR: 1.17 ratio         PTT - ( 31 May 2023 12:20 )  PTT:31.3 sec          RADIOLOGY, EKG & ADDITIONAL TESTS: Reviewed.

## 2023-05-31 NOTE — ED PROVIDER NOTE - PROGRESS NOTE DETAILS
REMI CHIRINOS:  CT with following impression: "No pulmonary embolism. Large right and trace left pleural effusions."  Patient is hemodynamically stable.  No oxygen requirements at rest; SpO2 mid to high 90s.  No increased work of breathing at this time.  There is indication for emergent thoracentesis at this time.  Pt accepted for admission under hospitalist at this time.  MAR text paged at this time.

## 2023-05-31 NOTE — H&P ADULT - ASSESSMENT
58-year-old female with a female with past medical history of diabetes mellitus, hypertension, asthma (no history of intubations) presents with exertional dyspnea and chest pressure x 4 weeks. CTA with no PE, large R pleural effusion, trace L pleural effusion of unclear etiology.  58-year-old female with past medical history of diabetes mellitus, hypertension, asthma (no history of intubations) presents with exertional dyspnea and chest pressure x 4 weeks. CTA with no PE, large R pleural effusion, trace L pleural effusion of unclear etiology.

## 2023-05-31 NOTE — H&P ADULT - NSHPREVIEWOFSYSTEMS_GEN_ALL_CORE
REVIEW OF SYSTEMS:    CONSTITUTIONAL: No weakness, fevers or chills  EYES/ENT: No visual changes;  No vertigo or throat pain   NECK: No pain or stiffness  RESPIRATORY: +sob with exertion  CARDIOVASCULAR: +chest pressure with exertion  GASTROINTESTINAL: No abdominal or epigastric pain. No nausea, vomiting, or hematemesis; No diarrhea or constipation. No melena or hematochezia.  GENITOURINARY: No dysuria, frequency or hematuria  NEUROLOGICAL: No numbness or weakness  SKIN: No itching, burning, rashes, or lesions

## 2023-05-31 NOTE — ED ADULT NURSE NOTE - OBJECTIVE STATEMENT
Received pt to bed 7, A+Ox4, ambulatory. C/O Dyspnia on exertion and chest pressure x 1 month. Respirations even and unlabored, normal work of breathing, no accessory muscle use, speaking in full clear uninterrupted sentences. ABD is soft, non tender, non distended. no swelling noted to extremities. Pt denies any chest pain, headache, dizziness, N+V, diarrhea, fever, chills. awaiting provider orders, will continue to monitor.

## 2023-05-31 NOTE — H&P ADULT - PROBLEM SELECTOR PLAN 2
Exertional cp that is non-radiating  ECG with no acute ischemic changes on admission, Trops negative (9)  BNP of 60- less likely CHF related  - TTE Echo for further eval especially in setting of pleural effusions

## 2023-05-31 NOTE — ED PROVIDER NOTE - OBJECTIVE STATEMENT
Patient is a 58-year-old female with a female with past medical history of diabetes mellitus, hypertension, asthma (no history of intubations) presents with dyspnea x 4 weeks.  Patient reports developing exertional dyspnea associated with exertional chest pressure for past 4 weeks, both of which improve with rest.  Patient reports chest pain is nonradiating, nonpositional, nonpleuritic.  Patient denies any fevers, chills, jaw/arm/neck/back/abdominal pain, hemoptysis, h/o dvt/pe, h/o malignancy, recent surgeries, exogenous hormone use, illicit drug use, ETOH abuse.  Patient reports + family history of heart attack in her father.

## 2023-05-31 NOTE — H&P ADULT - NSICDXPASTMEDICALHX_GEN_ALL_CORE_FT
PAST MEDICAL HISTORY:  Abnormal uterine and vaginal bleeding, unspecified     Asthma     HTN (hypertension)     Hyperlipidemia (diet control)    Morbid obesity with body mass index (BMI) of 40.0 to 44.9 in adult     Type 2 diabetes mellitus

## 2023-05-31 NOTE — ED PROVIDER NOTE - NS ED ATTENDING STATEMENT MOD
This was a shared visit with the [facility]. I reviewed and verified the documentation and independently performed the documented:

## 2023-05-31 NOTE — H&P ADULT - NSVTERISKREFERASSESS_GEN_ALL_CORE
Patient is a 11 year old male with no significant past medical history who presents with left arm weakness for over one week. Last Wednesday the patient woke up from his nap with left arm "weakness" that has been constant since. He then told mom about it again this Wednesday, who decided to call her PMD and directed them here. He states that his left arm feels heavy when he tries to lift it up. It is difficult for him to raise his left arm above his shoulder. He denies decreased sensation in his left arm. He also denies numbness or tingling in his left arm. He denies weakness in any other extremities. He denies fevers, headaches, changes in vision, sore throat, chest pain, abdominal pain, diarrhea, blood in urine or stool, rashes or bug bites. He used to play football but has not been able to this year. No history of trauma. No tick exposures or travel. Mom states her PMD directed her to [facility] who advised she present to ED for evaluation by neuro on call physician.     ED Course: CBC and CMP unremarkable. MRI of cervical spine unremarkable.    PMHx: allergies  SHx: adenoidectomy  FMHx: grandma with atypical HUS, no neurological history in family  Medications: Zyrtec and Flonase    Med 3 Course (10/30-***):  Patient arrived to the floor in stable condition. Patient is a 11 year old male with no significant past medical history who presents with left arm weakness for over one week. Last Wednesday the patient woke up from his nap with left arm "weakness" that has been constant since. He then told mom about it again this Wednesday, who decided to call her PMD and directed them here. He states that his left arm feels heavy when he tries to lift it up. It is difficult for him to raise his left arm above his shoulder. He denies decreased sensation in his left arm. He also denies numbness or tingling in his left arm. He denies weakness in any other extremities. He denies fevers, headaches, changes in vision, sore throat, chest pain, abdominal pain, diarrhea, blood in urine or stool, rashes or bug bites. He used to play football but has not been able to this year. No history of trauma. No tick exposures or travel. Mom states her PMD directed her to [facility] who advised she present to ED for evaluation by neuro on call physician.     ED Course: CBC and CMP unremarkable. MRI of cervical spine unremarkable.    PMHx: allergies  SHx: adenoidectomy  FMHx: grandma with atypical HUS, no neurological history in family  Medications: Zyrtec and Flonase    Med 3 Course (10/30-10/31)  Patient arrived to the floor in stable condition. Results of his MRI brain and spine were unremarkable with no significant findings.   His symptoms continued to improve during the course of his admission, with improvement movement and strength of his left upper extremity and good reflexes at time of discharge.   At time of discharge, he had some residual minimal weakness of the left upper extremity. He was cleared for discharge home with neurology follow up on 11/3/20. Parents also advised to follow up with pediatrician and orthopedic follow up. On day of discharge, VS reviewed and remained wnl. Child continued to tolerate PO with adequate UOP. Child remained well-appearing, with no concerning findings noted on physical exam. Case and care plan d/w PMD. No additional recommendations noted. Care plan d/w caregivers who endorsed understanding. Anticipatory guidance and strict return precautions d/w caregivers in great detail. Child deemed stable for d/c home w/ recommended PMD f/u in 1-2 days of discharge. No medications at time of discharge.      Vitals Last 24 Hrs  T(C): 93 (31 Oct 2020 10:40), Max: 93 (31 Oct 2020 10:40)  T(F): 199.4 (31 Oct 2020 10:40), Max: 199.4 (31 Oct 2020 10:40)  HR: 93 (31 Oct 2020 10:40) (75 - 93)  BP: 117/77 (31 Oct 2020 06:30) (116/77 - 131/82)  RR: 24 (31 Oct 2020 10:40) (16 - 24)  SpO2: 99% (31 Oct 2020 10:40) (98% - 100%)    Discharge Physical Examination   General: Patient alert and well appearing   HEENT: NC/AT, pupils equal, responsive, reactive to light and accomodation, no conjunctivitis or scleral icterus; no nasal discharge or congestion. OP without exudates/erythema.  TMs clear bilaterally.  Neck: FROM, supple, no cervical LAD.  Neck:  Supple, NO LAD, No meningismus  Chest: CTA b/l, no crackles/wheezes, good air entry, no tachypnea or retractions  CV: regular rate and rhythm, no murmurs   Abd: soft, nontender, nondistended, no HSM appreciated, +BS  Back: no vertebral or paraspinal tenderness along entire spine; no CVAT.   Extrem: No joint effusion or tenderness; Some limited range of motion on extension of left upper extremity but FROM of all other joints; no deformities or erythema noted. 2+ peripheral pulses,   Neuro: CN II-XII intact--did not test visual acuity. Strength in B/L LEs 5/5, RUE with 5/5 but some residual weakness of LUE 4/5 sensation intact and equal in b/l LEs and b/l UEs. Gait wnl.< 2sec CR  Skin: No rashes           Refer to the Assessment tab to view/cancel completed assessment. Patient is a 11 year old obese male with no significant past medical history who presented with one week of left arm weakness. Last Wednesday the patient woke up from his nap with left arm weakness that has been constant since. He then told mom about it again this Wednesday, who decided to call her PMD and directed them here. He states that his left arm feels heavy when he tries to lift it up. It is difficult for him to keep his left arm raised above his shoulder. He denies decreased sensation in his left arm. He also denies numbness or tingling in his left arm. He denies weakness in any other extremities.  Patient does tend to sleep on his left side. He denies fevers, headaches, changes in vision, sore throat, chest pain, abdominal pain, diarrhea, blood in urine or stool, rashes or bug bites, or any recent illnesses. He used to play football but has not been able to this year. No history of trauma. No tick exposures or travel. Mom states her PMD directed her to [facility] who advised she present to ED.    ED Course: CBC and CMP unremarkable. MRI of cervical spine w/wo contrast unremarkable.    PMHx: allergies  SHx: adenoidectomy  FMHx: grandma with atypical HUS, no neurological history in family  Medications: Zyrtec and Flonase    Med 3 Course (10/30-10/31)  Patient arrived to the floor in stable condition. Results of his MRI brain and spine were unremarkable with no significant findings. His symptoms continued to improve during the course of his admission, with improvement movement and strength of his left upper extremity. At time of discharge, he had some residual minimal weakness of the left upper extremity. He was able to lift both arms above head but had some difficulty raising left arm. Shoulder strength was 4 on Left. Biceps/triceps, wrist and hand strength 5/5 on left. He was cleared for discharge home with neurology follow up on 11/3/20. Parents also advised to follow up with pediatrician and orthopedic follow up.      Vitals Last 24 Hrs  T(C): 93 (31 Oct 2020 10:40), Max: 93 (31 Oct 2020 10:40)  T(F): 199.4 (31 Oct 2020 10:40), Max: 199.4 (31 Oct 2020 10:40)  HR: 93 (31 Oct 2020 10:40) (75 - 93)  BP: 117/77 (31 Oct 2020 06:30) (116/77 - 131/82)  RR: 24 (31 Oct 2020 10:40) (16 - 24)  SpO2: 99% (31 Oct 2020 10:40) (98% - 100%)    Discharge Physical Examination   General: Patient alert and well appearing. Obese body habitus.  HEENT: NC/AT, pupils equal, responsive, reactive to light and accomodation, no conjunctivitis or scleral icterus; no nasal discharge or congestion. OP without exudates/erythema.    Neck: FROM, supple.  No meningismus  Chest: Clear to auscultation, good air entry, no tachypnea or retractions  CV: regular rate and rhythm, no murmurs   Abd: soft, nontender  Back: no vertebral or paraspinal tenderness along entire spine; no CVAT.   Extrem: No joint effusion or tenderness; Some limited range of motion on extension of left upper extremity but FROM of all other joints; no deformities or erythema noted. 2+ peripheral pulses,     NEUROLOGIC EXAM  Mental Status:     Good eye contact; follows commands, conversant, interactive  Cranial Nerves:    EOMI, no facial asymmetry, V1-V3 intact, symmetric palate, tongue midline.   Muscle Strength:  Shoulder abduction 4/5 on L, 5/5 on right. Biceps, triceps/ wrist/ strength 5/5 on L and remainder of exam normal.   Muscle Tone:       Normal tone  DTR:                    difficult to obtain bicep reflexes bilaterally possibly due to body habitus; 2+/4  triceps, brachioradialus, Patellar, Ankle bilateral. No clonus.  Babinski:              Plantar reflexes flexion bilaterally  Sensation:            Intact to light touch, throughout.  Coordination:       No dysmetria in finger to nose test bilaterally

## 2023-05-31 NOTE — ED ADULT TRIAGE NOTE - SPO2 (%)
"COVID-19 infection    Take Zyrtec 1 tablet daily as needed for nasal congestion & sinus pressure. Use Nasacort 2 sprays in each nostril once daily as needed for nasal congestion & sinus pressure. Monitor for fevers (> 100.4 Fahrenheit) and treat with Tylenol or Motrin. Get plenty of sleep. Drink lots of fluids. Eat good nutrition. Self quarantine for at LEAST 10 days since symptoms began & until fevers have resolved for 24 hours (without the need for fever medication). Follow-up next week with primary care if symptoms are persistent and worsening. Patient Education     Coronavirus Disease 2019 (COVID-19): Overview  Coronavirus disease 2019 (COVID-19) is a respiratory illness. It's caused by a new (novel) coronavirus. There are many types of coronavirus. Coronaviruses are a very common cause of colds and bronchitis. They may sometimes cause lung infection (pneumonia). Symptoms can range from mild to severe. Some people have no symptoms. Â These viruses are also found in some animals. All 50 states in the [place] have reported cases of COVID-19. Most states report ""community spread\"" of COVID-19. This means the source of the illness is not known. Â COVID-19 is a rapidly-emerging infectious disease. This means that scientists are actively researching it. Â There are information updates regularly. Public health officials are working to find the source. How the virus spreads is not yet fully understood, but it seems to spread and infect people fairly easily. Some people who have been infected in an area may not be sure how or where they were infected. The virus may be spread through droplets of fluid that a person coughs or sneezes into the air. It may be spread if you touch a surface with the virus on it, such as a handle or object, and then touch your eyes, nose, or mouth. For the latest information, visit the CDC website at www.cdc.gov/coronavirus/2019-ncov. Or call 455-HAR-EBII (277-422-3784).    What " are the symptoms of COVID-19? Some people have no symptoms or mild symptoms. Symptoms can also vary from person to person. As experts learn more about COVID-19, other symptoms are being reported. Symptoms may appear 2 to 14 days after contact with the virus:   Â· Fever  Â· Coughing  Â· Trouble breathing or feeling short of breath  Â· Sore throat  Â· Runny nose  Â· Headache and body aches  Â· Chills or repeated shaking with chills  Â· Fatigue  Â· Loss of appetite  Â· Nausea, vomiting, diarrhea, or abdominal pain  Â· Loss of sense of smell and taste  You can check your symptoms with the [facility] Coronavirus Self-. What are possible complications from [id number]? In many cases, this virus can cause infection (pneumonia) in both lungs. In some cases, this can cause death. Certain people are at higher risk for complications. This includes older adults and people with serious chronic health conditions such as heart or lung disease, diabetes, or kidney disease. It includes people with health conditions that suppress the immune system. And it includes people taking medicines that suppress the immune system. As experts learn more about [id number], other complications are being reported that may be linked to COVID-19. Rarely, some children have developed severe complications called multisystem inflammatory syndrome in children (MIS-C). MIS-C seems to be similar to Critical access hospital disease, a rare condition causing inflammation of blood vessels and body organs. It's not yet known if MIS-C happens only in children, or if adults are also at risk. It's also not known if it's related to COVID-19, because many children, but not all, have tested positive for the virus. Experts continue to study MIS-C.  The CDC advises healthcare providers to report to local health departments any person under age 24years old who is ill enough to be in the hospital and has all of the following:   Â· A fever over 100.4Â°F (38.0Â°C) for more than 24 hours and a positive SARS-CoV-2 test or exposure to the virus in the last 4 weeks  Â· Inflammation in at least 2 organs such as the heart, lungs, or kidneys with lab tests that show inflammation  Â· No other diagnoses besides COVID-19 explain the child's symptoms  How is COVID-19 diagnosed? Your healthcare provider will ask about your symptoms. He or she will ask where you live, and about your recent travel, and any contact with sick people. If your healthcare provider thinks you may have COVID-19, he or she will consider whether to test you for COVID-19. This depends on the availability of testing in your area, and how sick you are. Follow all instructions from your healthcare provider. Guidelines for testing may change as more information about the virus becomes available. Currently, COVID-19 is diagnosed by:   Â· Nose-throat swab. A swab is wiped inside your nose to the back of your throat. This is a viral test to tell you if you have a current COVID-19 infection. If your healthcare provider thinks or confirms that you have COVID-19, you may have other tests. These tests may include:   Â· Antibody blood test.  Antibody tests are being looked at to find out if a person has previously been infected with the virus and may now have antibodies such as SARS AB IgG in their blood to give some immunity. The accuracy and availability of antibody tests vary. An antibody test may not be able to show if you have a current infection because it can take up to a few weeks after infection to make antibodies. It's not yet known how long immunity lasts after being infected with the virus. Â· Sputum culture. A small sample of mucus coughed from your lungs (sputum) may be collected if you have a moist cough. It may be checked for the virus or to look for pneumonia. Â· Imaging tests. You may have a chest X-ray or CT scan. How is COVID-19 treated? There is currently no medicine proven to prevent or treat the virus.  Some experimental medicines are being tested for COVID-19. Other medicines used to treat other conditions are being looked at for COVID-19, but these are not currently approved to treat it. The most proven treatments right now are those to help your body while it fights the virus. This is known as supportive care. Supportive care may include:   Â· Getting rest.  This helps your body fight the illness. Â· Staying hydrated. Drinking liquids is the best way to prevent dehydration. . Try to drink 6 to 8 glasses of liquids every day, or as advised by your provider. Also check with your provider about which fluids are best for you. Don't drink fluids that contain caffeine or alcohol. Â· Taking over-the-counter (OTC)Â pain medicine. These are used to help ease pain and reduce fever. Follow your healthcare provider's instructions for which OTC medicine to use. For severe illness, you may need to stay in the hospital. Care during severe illness may include:   Â· IV (intravenous) fluids. These are given through a vein to help keep your body hydrated. Â· Oxygen. You may be given supplemental oxygen or ventilation with a breathing machine (ventilator). This is done so you get enough oxygen in your body. Â· Prone positioning. Depending on how sick you are during your hospital stay, your healthcare team may turn you regularly on your stomach. This is called prone positioning. It helps increase the amount of oxygen you get to your lungs. Follow your healthcare team's instructions on position changes while you're in the hospital. Also follow their discharge advice on the best positions to help your breathing once you go home. People who have had COVID-19 and are fully recovered may be asked by their healthcare team to consider donating plasma. This is called COVID-19 convalescent plasma donation. Plasma from people fully recovered from COVID-19 may contain antibodies to help fight COVID-19 in people who are currently seriously ill with the disease.  It's not fully known if the donated plasma will work well as a treatment, but the FDA is looking at it and has asked the [address] to help with plasma donation and collection. Talk with your provider to learn more about convalescent plasma donation and whether you qualify to donate. Are you at risk for COVID-19? You are at risk for COVID-19 if you have had close contact with someone with the virus, or if you live in or traveled to an area with cases of it. Close contact means being within about 6 feet of someone, or living in the same house or visiting a person who has or may have COVID-19. Some recent studies suggest that COVID-19 may be spread by people who are not showing symptoms. Date last modified: 5/20/2020  [employer] last reviewed this educational content on 1/1/2020  Â© 3736-3675 The [address]. All rights reserved. This information is not intended as a substitute for professional medical care. Always follow your healthcare professional's instructions. 87

## 2023-05-31 NOTE — ED ADULT TRIAGE NOTE - CHIEF COMPLAINT QUOTE
Pt. c/o shortness of breath and chest pressure x few weeks. h/o asthma and states she's been out of her inhaler. Seen at urgent care and was told everything looked ok. Denies cough, chest pain, palpitations, dizziness or fevers.

## 2023-05-31 NOTE — H&P ADULT - HISTORY OF PRESENT ILLNESS
58-year-old female with a female with past medical history of diabetes mellitus, hypertension, asthma (no history of intubations) presents with dyspnea x 4 weeks.  Patient reports developing exertional dyspnea associated with exertional chest pressure for past 4 weeks, both of which improve with rest.  Patient reports chest pressure that is nonradiating, nonpositional, nonpleuritic.  Patient denies any fevers, chills, abdominal pain, or hx of malignancy. Patient reports having a cold 2-3 weeks ago. She denies weight loss, night sweats, fatigue, or history of autoimmune conditions.     In the ED, patient received aspirin 162mg.  58-year-old female with past medical history of diabetes mellitus, hypertension, asthma (no history of intubations) presents with dyspnea x 4 weeks.  Patient reports developing exertional dyspnea associated with exertional chest pressure for past 4 weeks, both of which improve with rest.  Patient reports chest pressure that is nonradiating, nonpositional, nonpleuritic.  Patient denies any fevers, chills, abdominal pain, or hx of malignancy. Patient reports having a cold 2-3 weeks ago. She denies weight loss, night sweats, fatigue, or history of autoimmune conditions.     In the ED, patient received aspirin 162mg.

## 2023-05-31 NOTE — ED ADULT NURSE NOTE - NSFALLUNIVINTERV_ED_ALL_ED
Bed/Stretcher in lowest position, wheels locked, appropriate side rails in place/Call bell, personal items and telephone in reach/Instruct patient to call for assistance before getting out of bed/chair/stretcher/Non-slip footwear applied when patient is off stretcher/[place] to call system/Physically safe environment - no spills, clutter or unnecessary equipment/Purposeful proactive rounding/Room/bathroom lighting operational, light cord in reach

## 2023-05-31 NOTE — H&P ADULT - PROBLEM SELECTOR PLAN 6
Diet: CC  DVT: SCDs for now  Dispo: pending clinical improvement Diet: CC  DVT: encourage ambulation, SCDs for now  Dispo: pending clinical improvement

## 2023-05-31 NOTE — H&P ADULT - NSHPPHYSICALEXAM_GEN_ALL_CORE
VITALS:   T(C): 36.8 (05-31-23 @ 15:25), Max: 36.9 (05-31-23 @ 10:24)  HR: 102 (05-31-23 @ 15:25) (102 - 113)  BP: 144/96 (05-31-23 @ 15:25) (144/96 - 152/96)  RR: 16 (05-31-23 @ 15:25) (16 - 18)  SpO2: 98% (05-31-23 @ 15:25) (87% - 98%)    GENERAL: NAD, lying in bed comfortably  HEAD:  Atraumatic, Normocephalic  EYES: EOMI, PERRLA, conjunctiva and sclera clear  ENT: Moist mucous membranes  NECK: Supple, No JVD  CHEST/LUNG: decreased breath sounds over right lung fields, non-wheezing, Unlabored respirations  HEART: Regular rate and rhythm; No murmurs, rubs, or gallops  ABDOMEN: BSx4; Soft, nontender, nondistended  EXTREMITIES:  2+ Peripheral Pulses, brisk capillary refill. No clubbing, cyanosis, or edema  NERVOUS SYSTEM:  A&Ox3, no focal deficits   SKIN: No rashes or lesions  psych: normal behavior, normal affect

## 2023-05-31 NOTE — ED PROVIDER NOTE - ATTENDING APP SHARED VISIT CONTRIBUTION OF CARE
I have evaluated the patient and agree with the documentation and assessment as made by the CHAI. We have discussed plan of care and work up for the patient.   This was a shared visit with the CHAI. I reviewed and verified the documentation and independently performed the documented:   History, Exam and Medical Decision Making.    58F, DM, HTN, asthma who presents with SOB. For the past 4 weeks, reports exertional dyspnea. Improves with rest. This is associated with chest discomfort - non-radiating, non-positional, not pleuritic. Denies hx of VTE. No leg swelling. No PND/orthopnea. No headaches, fevers, chills, cough, sputum, belly pain, nvd, dysuria, hematuria, recent travel, trauma, syncope. Physical: afebrile, non-toxic appearing, rrr, decreased breath sounds on the R side, abdomen soft, no le edema. Plan: labs, CXR, CTPE. Anticipate admission.

## 2023-05-31 NOTE — ED PROVIDER NOTE - CLINICAL SUMMARY MEDICAL DECISION MAKING FREE TEXT BOX
Patient is a 58-year-old female with a female with past medical history of diabetes mellitus, hypertension, asthma (no history of intubations) presents with dyspnea x 4 weeks.  Patient reports developing exertional dyspnea associated with exertional chest pressure for past 4 weeks, both of which improve with rest.  Patient reports chest pain is nonradiating, nonpositional, nonpleuritic.  Patient denies any fevers, chills, jaw/arm/neck/back/abdominal pain, hemoptysis, h/o dvt/pe, h/o malignancy, recent surgeries, exogenous hormone use, illicit drug use, ETOH abuse.  Patient reports + family history of heart attack in her father.    This is a patient with exertional chest pressure and exertional dyspnea; consider ACS, consider possible PE; very low clinical suspicion for diseases processes including but not limited to aortic dissection, tension pneumothorax, esophageal rupture.  Plan to order labs, troponin, CXR, CT angio chest.

## 2023-05-31 NOTE — H&P ADULT - NSHPSOCIALHISTORY_GEN_ALL_CORE
Patient lives with son. Denies alcohol, tobacco, or recreational drug use.     M: Colon Cancer diagnosed at 63, passed at 64  F: Diabetes, MI at 76

## 2023-05-31 NOTE — H&P ADULT - ATTENDING COMMENTS
Patient seen and examined at bedside. In brief, 58-year-old female with history of diabetes mellitus, hypertension, asthma (no history of intubations) presenting with dyspnea for four weeks. Patient found to have large R pleural effusion, trace L pleural effusion on CTA.  - Pulmonary consult for thoracentesis  - F/U TTE   - patient currently on Room air  #DM - continue basal/bolus   #HTN - continue home BP meds   #Asthma - continue inhaler  Rest of plan as above

## 2023-05-31 NOTE — H&P ADULT - NSICDXFAMILYHX_GEN_ALL_CORE_FT
FAMILY HISTORY:  Father  Still living? No  Family history of MI (myocardial infarction), Age at diagnosis: Age Unknown  Type 2 diabetes mellitus, Age at diagnosis: Age Unknown    Mother  Still living? No  Family history of colon cancer in mother, Age at diagnosis: Age Unknown

## 2023-05-31 NOTE — H&P ADULT - PROBLEM SELECTOR PLAN 3
Hx of Type 2DM, on Lantus 30U, Humalog 5-10U TID with meals  will continue 24 Lantus, ISS Hx of Type 2DM, on Lantus 28U, Humalog 5-10U TID with meals  will continue 22 Lantus, ISS

## 2023-05-31 NOTE — ED ADULT NURSE REASSESSMENT NOTE - NS ED NURSE REASSESS COMMENT FT1
Break RN: Pt is A&Ox4, resting in stretcher with no complaints at this time. Respirations even and unlabored, chest rise equal b/l. Sinus tachycardia noted on cardiac monitor. Pt denies chest pain, SOB, fever, cough, chills, abdominal pain, N/V/D, h/a, dizziness, or any other urinary symptoms at this time. VS as noted in flow sheets. Safety maintained throughout. Will continue to monitor.

## 2023-06-01 ENCOUNTER — RESULT REVIEW (OUTPATIENT)
Age: 58
End: 2023-06-01

## 2023-06-01 LAB
A1C WITH ESTIMATED AVERAGE GLUCOSE RESULT: 6.8 % — HIGH (ref 4–5.6)
ALBUMIN FLD-MCNC: 3.1 G/DL — SIGNIFICANT CHANGE UP
ALBUMIN SERPL ELPH-MCNC: 3.9 G/DL — SIGNIFICANT CHANGE UP (ref 3.3–5)
ALP SERPL-CCNC: 93 U/L — SIGNIFICANT CHANGE UP (ref 40–120)
ALT FLD-CCNC: 14 U/L — SIGNIFICANT CHANGE UP (ref 4–33)
ANION GAP SERPL CALC-SCNC: 12 MMOL/L — SIGNIFICANT CHANGE UP (ref 7–14)
APTT BLD: 24.6 SEC — LOW (ref 27–36.3)
AST SERPL-CCNC: 25 U/L — SIGNIFICANT CHANGE UP (ref 4–32)
B PERT IGG+IGM PNL SER: ABNORMAL
BASOPHILS # BLD AUTO: 0.01 K/UL — SIGNIFICANT CHANGE UP (ref 0–0.2)
BASOPHILS NFR BLD AUTO: 0.2 % — SIGNIFICANT CHANGE UP (ref 0–2)
BILIRUB SERPL-MCNC: 0.2 MG/DL — SIGNIFICANT CHANGE UP (ref 0.2–1.2)
BUN SERPL-MCNC: 9 MG/DL — SIGNIFICANT CHANGE UP (ref 7–23)
CALCIUM SERPL-MCNC: 8.9 MG/DL — SIGNIFICANT CHANGE UP (ref 8.4–10.5)
CHLORIDE SERPL-SCNC: 104 MMOL/L — SIGNIFICANT CHANGE UP (ref 98–107)
CHOLEST SERPL-MCNC: 178 MG/DL — SIGNIFICANT CHANGE UP
CO2 SERPL-SCNC: 26 MMOL/L — SIGNIFICANT CHANGE UP (ref 22–31)
COLOR FLD: SIGNIFICANT CHANGE UP
COMMENT - FLUIDS: SIGNIFICANT CHANGE UP
CREAT SERPL-MCNC: 0.81 MG/DL — SIGNIFICANT CHANGE UP (ref 0.5–1.3)
EGFR: 84 ML/MIN/1.73M2 — SIGNIFICANT CHANGE UP
EOSINOPHIL # BLD AUTO: 0.24 K/UL — SIGNIFICANT CHANGE UP (ref 0–0.5)
EOSINOPHIL # FLD: 0 % — SIGNIFICANT CHANGE UP
EOSINOPHIL NFR BLD AUTO: 4.3 % — SIGNIFICANT CHANGE UP (ref 0–6)
ESTIMATED AVERAGE GLUCOSE: 148 — SIGNIFICANT CHANGE UP
FLUID INTAKE SUBSTANCE CLASS: SIGNIFICANT CHANGE UP
FOLATE+VIT B12 SERBLD-IMP: 0 % — SIGNIFICANT CHANGE UP
GLUCOSE FLD-MCNC: 115 MG/DL — SIGNIFICANT CHANGE UP
GLUCOSE SERPL-MCNC: 125 MG/DL — HIGH (ref 70–99)
GRAM STN FLD: SIGNIFICANT CHANGE UP
HAPTOGLOB SERPL-MCNC: 320 MG/DL — HIGH (ref 34–200)
HCT VFR BLD CALC: 37.5 % — SIGNIFICANT CHANGE UP (ref 34.5–45)
HCV AB S/CO SERPL IA: 0.14 S/CO — SIGNIFICANT CHANGE UP (ref 0–0.99)
HCV AB SERPL-IMP: SIGNIFICANT CHANGE UP
HDLC SERPL-MCNC: 34 MG/DL — LOW
HGB BLD-MCNC: 11.9 G/DL — SIGNIFICANT CHANGE UP (ref 11.5–15.5)
IANC: 3.69 K/UL — SIGNIFICANT CHANGE UP (ref 1.8–7.4)
IMM GRANULOCYTES NFR BLD AUTO: 0.9 % — SIGNIFICANT CHANGE UP (ref 0–0.9)
INR BLD: 1.16 RATIO — SIGNIFICANT CHANGE UP (ref 0.88–1.16)
LDH SERPL L TO P-CCNC: 1443 U/L — SIGNIFICANT CHANGE UP
LDH SERPL L TO P-CCNC: 547 U/L — HIGH (ref 135–225)
LIPID PNL WITH DIRECT LDL SERPL: 120 MG/DL — HIGH
LYMPHOCYTES # BLD AUTO: 1.02 K/UL — SIGNIFICANT CHANGE UP (ref 1–3.3)
LYMPHOCYTES # BLD AUTO: 18.4 % — SIGNIFICANT CHANGE UP (ref 13–44)
LYMPHOCYTES # FLD: 70 % — SIGNIFICANT CHANGE UP
MAGNESIUM SERPL-MCNC: 2.2 MG/DL — SIGNIFICANT CHANGE UP (ref 1.6–2.6)
MCHC RBC-ENTMCNC: 27.4 PG — SIGNIFICANT CHANGE UP (ref 27–34)
MCHC RBC-ENTMCNC: 31.7 GM/DL — LOW (ref 32–36)
MCV RBC AUTO: 86.4 FL — SIGNIFICANT CHANGE UP (ref 80–100)
MESOTHL CELL # FLD: 4 % — SIGNIFICANT CHANGE UP
MONOCYTES # BLD AUTO: 0.54 K/UL — SIGNIFICANT CHANGE UP (ref 0–0.9)
MONOCYTES NFR BLD AUTO: 9.7 % — SIGNIFICANT CHANGE UP (ref 2–14)
MONOS+MACROS # FLD: 26 % — SIGNIFICANT CHANGE UP
NEUTROPHILS # BLD AUTO: 3.69 K/UL — SIGNIFICANT CHANGE UP (ref 1.8–7.4)
NEUTROPHILS NFR BLD AUTO: 66.5 % — SIGNIFICANT CHANGE UP (ref 43–77)
NEUTROPHILS-BODY FLUID: 0 % — SIGNIFICANT CHANGE UP
NON HDL CHOLESTEROL: 144 MG/DL — HIGH
NRBC # BLD: 0 /100 WBCS — SIGNIFICANT CHANGE UP (ref 0–0)
NRBC # FLD: 0 K/UL — SIGNIFICANT CHANGE UP (ref 0–0)
OTHER CELLS FLD MANUAL: 0 % — SIGNIFICANT CHANGE UP
PHOSPHATE SERPL-MCNC: 3.3 MG/DL — SIGNIFICANT CHANGE UP (ref 2.5–4.5)
PLATELET # BLD AUTO: 224 K/UL — SIGNIFICANT CHANGE UP (ref 150–400)
POTASSIUM SERPL-MCNC: 4 MMOL/L — SIGNIFICANT CHANGE UP (ref 3.5–5.3)
POTASSIUM SERPL-SCNC: 4 MMOL/L — SIGNIFICANT CHANGE UP (ref 3.5–5.3)
PROT FLD-MCNC: 5.8 G/DL — SIGNIFICANT CHANGE UP
PROT SERPL-MCNC: 7.6 G/DL — SIGNIFICANT CHANGE UP (ref 6–8.3)
PROTHROM AB SERPL-ACNC: 13.5 SEC — HIGH (ref 10.5–13.4)
RBC # BLD: 4.34 M/UL — SIGNIFICANT CHANGE UP (ref 3.8–5.2)
RBC # FLD: 12.4 % — SIGNIFICANT CHANGE UP (ref 10.3–14.5)
RCV VOL RI: HIGH CELLS/UL (ref 0–5)
SODIUM SERPL-SCNC: 142 MMOL/L — SIGNIFICANT CHANGE UP (ref 135–145)
SPECIMEN SOURCE: SIGNIFICANT CHANGE UP
TOTAL CELLS COUNTED, BODY FLUID: 100 CELLS — SIGNIFICANT CHANGE UP
TOTAL NUCLEATED CELL COUNT, BODY FLUID: 1569 CELLS/UL — HIGH (ref 0–5)
TRIGL SERPL-MCNC: 120 MG/DL — SIGNIFICANT CHANGE UP
TUBE TYPE: SIGNIFICANT CHANGE UP
WBC # BLD: 5.55 K/UL — SIGNIFICANT CHANGE UP (ref 3.8–10.5)
WBC # FLD AUTO: 5.55 K/UL — SIGNIFICANT CHANGE UP (ref 3.8–10.5)

## 2023-06-01 PROCEDURE — 99223 1ST HOSP IP/OBS HIGH 75: CPT | Mod: GC,25

## 2023-06-01 PROCEDURE — 99233 SBSQ HOSP IP/OBS HIGH 50: CPT | Mod: GC

## 2023-06-01 PROCEDURE — 88112 CYTOPATH CELL ENHANCE TECH: CPT | Mod: 26,59

## 2023-06-01 PROCEDURE — 88341 IMHCHEM/IMCYTCHM EA ADD ANTB: CPT | Mod: 26

## 2023-06-01 PROCEDURE — 71045 X-RAY EXAM CHEST 1 VIEW: CPT | Mod: 26

## 2023-06-01 PROCEDURE — 88189 FLOWCYTOMETRY/READ 16 & >: CPT

## 2023-06-01 PROCEDURE — 88108 CYTOPATH CONCENTRATE TECH: CPT | Mod: 26

## 2023-06-01 PROCEDURE — 88360 TUMOR IMMUNOHISTOCHEM/MANUAL: CPT | Mod: 26,59

## 2023-06-01 PROCEDURE — 76604 US EXAM CHEST: CPT | Mod: 26,GC

## 2023-06-01 PROCEDURE — 88342 IMHCHEM/IMCYTCHM 1ST ANTB: CPT | Mod: 26,59

## 2023-06-01 PROCEDURE — 93306 TTE W/DOPPLER COMPLETE: CPT | Mod: 26

## 2023-06-01 PROCEDURE — 88305 TISSUE EXAM BY PATHOLOGIST: CPT | Mod: 26

## 2023-06-01 PROCEDURE — 32555 ASPIRATE PLEURA W/ IMAGING: CPT | Mod: GC

## 2023-06-01 RX ORDER — ATORVASTATIN CALCIUM 80 MG/1
40 TABLET, FILM COATED ORAL AT BEDTIME
Refills: 0 | Status: DISCONTINUED | OUTPATIENT
Start: 2023-06-01 | End: 2023-06-09

## 2023-06-01 RX ADMIN — ATORVASTATIN CALCIUM 40 MILLIGRAM(S): 80 TABLET, FILM COATED ORAL at 21:06

## 2023-06-01 RX ADMIN — LOSARTAN POTASSIUM 50 MILLIGRAM(S): 100 TABLET, FILM COATED ORAL at 05:29

## 2023-06-01 RX ADMIN — INSULIN GLARGINE 22 UNIT(S): 100 INJECTION, SOLUTION SUBCUTANEOUS at 21:06

## 2023-06-01 NOTE — CHART NOTE - NSCHARTNOTEFT_GEN_A_CORE
: Carolina Moreland    INDICATION:    PROCEDURE:  [ ] LIMITED ECHO  [x] LIMITED CHEST  [ ] LIMITED RETROPERITONEAL  [ ] LIMITED ABDOMINAL  [ ] LIMITED DVT  [ ] NEEDLE GUIDANCE VASCULAR  [ ] NEEDLE GUIDANCE THORACENTESIS  [ ] NEEDLE GUIDANCE PARACENTESIS  [ ] NEEDLE GUIDANCE PERICARDIOCENTESIS  [ ] OTHER    FINDINGS:  large simple appearing R pleural effusion surrounding consolidated lung      INTERPRETATION:  large effusion, accessible for thoracentesis       Images uploaded on [employer]

## 2023-06-01 NOTE — PROGRESS NOTE ADULT - SUBJECTIVE AND OBJECTIVE BOX
INTERVAL HPI/OVERNIGHT EVENTS:    SUBJECTIVE: Patient seen and examined at bedside.         OBJECTIVE:    VITAL SIGNS:  ICU Vital Signs Last 24 Hrs  T(C): 36.9 (01 Jun 2023 05:29), Max: 37.2 (31 May 2023 19:56)  T(F): 98.5 (01 Jun 2023 05:29), Max: 99 (31 May 2023 19:56)  HR: 87 (01 Jun 2023 05:29) (87 - 113)  BP: 146/81 (01 Jun 2023 05:29) (124/72 - 152/96)  BP(mean): --  ABP: --  ABP(mean): --  RR: 17 (01 Jun 2023 05:29) (16 - 18)  SpO2: 98% (01 Jun 2023 05:29) (87% - 98%)    O2 Parameters below as of 01 Jun 2023 05:29  Patient On (Oxygen Delivery Method): room air            Plateau pressure:   P/F ratio:     CAPILLARY BLOOD GLUCOSE      POCT Blood Glucose.: 128 mg/dL (01 Jun 2023 07:10)    ECG:    PHYSICAL EXAM:    GENERAL: NAD, lying in bed comfortably  HEAD:  Atraumatic, Normocephalic  EYES: EOMI, PERRLA, conjunctiva and sclera clear  ENT: Moist mucous membranes  NECK: Supple, No JVD  CHEST/LUNG: decreased breath sounds over right lung fields, non-wheezing, Unlabored respirations  HEART: Regular rate and rhythm; No murmurs, rubs, or gallops  ABDOMEN: BSx4; Soft, nontender, nondistended  EXTREMITIES:  2+ Peripheral Pulses, brisk capillary refill. No clubbing, cyanosis, or edema  NERVOUS SYSTEM:  A&Ox3, no focal deficits   SKIN: No rashes or lesions  psych: normal behavior, normal affect    MEDICATIONS:  MEDICATIONS  (STANDING):  dextrose 5%. 1000 milliLiter(s) (50 mL/Hr) IV Continuous <Continuous>  dextrose 5%. 1000 milliLiter(s) (100 mL/Hr) IV Continuous <Continuous>  dextrose 50% Injectable 25 Gram(s) IV Push once  dextrose 50% Injectable 12.5 Gram(s) IV Push once  dextrose 50% Injectable 25 Gram(s) IV Push once  glucagon  Injectable 1 milliGRAM(s) IntraMuscular once  insulin glargine Injectable (LANTUS) 22 Unit(s) SubCutaneous at bedtime  insulin lispro (ADMELOG) corrective regimen sliding scale   SubCutaneous three times a day before meals  insulin lispro (ADMELOG) corrective regimen sliding scale   SubCutaneous at bedtime  losartan 50 milliGRAM(s) Oral daily    MEDICATIONS  (PRN):  acetaminophen     Tablet .. 650 milliGRAM(s) Oral every 6 hours PRN Temp greater or equal to 38C (100.4F), Mild Pain (1 - 3), Moderate Pain (4 - 6)  dextrose Oral Gel 15 Gram(s) Oral once PRN Blood Glucose LESS THAN 70 milliGRAM(s)/deciliter      LABS:                        11.9   5.55  )-----------( 224      ( 01 Jun 2023 06:40 )             37.5     05-31    142  |  104  |  9   ----------------------------<  78  3.8   |  27  |  0.87    Ca    9.5      31 May 2023 12:20    TPro  7.9  /  Alb  3.9  /  TBili  0.3  /  DBili  x   /  AST  26  /  ALT  16  /  AlkPhos  100  05-31    PT/INR - ( 01 Jun 2023 06:40 )   PT: 13.5 sec;   INR: 1.16 ratio         PTT - ( 01 Jun 2023 06:40 )  PTT:24.6 sec      RADIOLOGY & ADDITIONAL TESTS: Reviewed.     INTERVAL HPI/OVERNIGHT EVENTS:    SUBJECTIVE: Patient seen and examined at bedside. pt denies acute events. Just states exertional dyspnea. Had TTE Echo in AM.     OBJECTIVE:    VITAL SIGNS:  ICU Vital Signs Last 24 Hrs  T(C): 36.9 (01 Jun 2023 05:29), Max: 37.2 (31 May 2023 19:56)  T(F): 98.5 (01 Jun 2023 05:29), Max: 99 (31 May 2023 19:56)  HR: 87 (01 Jun 2023 05:29) (87 - 113)  BP: 146/81 (01 Jun 2023 05:29) (124/72 - 152/96)  BP(mean): --  ABP: --  ABP(mean): --  RR: 17 (01 Jun 2023 05:29) (16 - 18)  SpO2: 98% (01 Jun 2023 05:29) (87% - 98%)    O2 Parameters below as of 01 Jun 2023 05:29  Patient On (Oxygen Delivery Method): room air            Plateau pressure:   P/F ratio:     CAPILLARY BLOOD GLUCOSE      POCT Blood Glucose.: 128 mg/dL (01 Jun 2023 07:10)    ECG:    PHYSICAL EXAM:    GENERAL: NAD, lying in bed comfortably  HEAD:  Atraumatic, Normocephalic  EYES: EOMI, PERRLA, conjunctiva and sclera clear  ENT: Moist mucous membranes  NECK: Supple, No JVD  CHEST/LUNG: decreased breath sounds over right lung fields, non-wheezing, Unlabored respirations  HEART: Regular rate and rhythm; No murmurs, rubs, or gallops  ABDOMEN: BSx4; Soft, nontender, nondistended  EXTREMITIES:  2+ Peripheral Pulses, brisk capillary refill. No clubbing, cyanosis, or edema  NERVOUS SYSTEM:  A&Ox3, no focal deficits   SKIN: No rashes or lesions  psych: normal behavior, normal affect    MEDICATIONS:  MEDICATIONS  (STANDING):  dextrose 5%. 1000 milliLiter(s) (50 mL/Hr) IV Continuous <Continuous>  dextrose 5%. 1000 milliLiter(s) (100 mL/Hr) IV Continuous <Continuous>  dextrose 50% Injectable 25 Gram(s) IV Push once  dextrose 50% Injectable 12.5 Gram(s) IV Push once  dextrose 50% Injectable 25 Gram(s) IV Push once  glucagon  Injectable 1 milliGRAM(s) IntraMuscular once  insulin glargine Injectable (LANTUS) 22 Unit(s) SubCutaneous at bedtime  insulin lispro (ADMELOG) corrective regimen sliding scale   SubCutaneous three times a day before meals  insulin lispro (ADMELOG) corrective regimen sliding scale   SubCutaneous at bedtime  losartan 50 milliGRAM(s) Oral daily    MEDICATIONS  (PRN):  acetaminophen     Tablet .. 650 milliGRAM(s) Oral every 6 hours PRN Temp greater or equal to 38C (100.4F), Mild Pain (1 - 3), Moderate Pain (4 - 6)  dextrose Oral Gel 15 Gram(s) Oral once PRN Blood Glucose LESS THAN 70 milliGRAM(s)/deciliter      LABS:                        11.9   5.55  )-----------( 224      ( 01 Jun 2023 06:40 )             37.5     05-31    142  |  104  |  9   ----------------------------<  78  3.8   |  27  |  0.87    Ca    9.5      31 May 2023 12:20    TPro  7.9  /  Alb  3.9  /  TBili  0.3  /  DBili  x   /  AST  26  /  ALT  16  /  AlkPhos  100  05-31    PT/INR - ( 01 Jun 2023 06:40 )   PT: 13.5 sec;   INR: 1.16 ratio         PTT - ( 01 Jun 2023 06:40 )  PTT:24.6 sec      RADIOLOGY & ADDITIONAL TESTS: Reviewed.

## 2023-06-01 NOTE — CONSULT NOTE ADULT - ASSESSMENT
59 yo F with PMH DM, HTN, asthma who presents with several weeks of shortness of breath. Found to have R pleural effusion.     # Right pleural effusion  - plan for R thoracentesis today  - f/u pleural fluid studies (protein, ldh, glucose, ph, cytology, flow, gram stain, culture)   - incentive spirometry  - oob to chair, ambulation as tolerated   59 yo F with PMH DM, HTN, asthma who presents with several weeks of shortness of breath. Found to have R pleural effusion.     # Right pleural effusion  Unclear etiology. No signs/symptoms of malignancy autoimmune disease, cardiac disease, infection.  - plan for R thoracentesis today  - f/u pleural fluid studies (protein, ldh, glucose, ph, cytology, flow, gram stain, culture)   - incentive spirometry  - oob to chair, ambulation as tolerated  - will need repeat CT chest non con post thoracentesis to evaluate underlying lung parenchyma

## 2023-06-01 NOTE — PROGRESS NOTE ADULT - PROBLEM SELECTOR PLAN 3
Hx of Type 2DM, on Lantus 28U, Humalog 5-10U TID with meals  will continue 22 Lantus, ISS Hx of Type 2DM, on Lantus 28U, Humalog 5-10U TID with meals  A1c 6.8  will continue 22 Lantus, ISS

## 2023-06-01 NOTE — PROGRESS NOTE ADULT - ASSESSMENT
58-year-old female with past medical history of diabetes mellitus, hypertension, asthma (no history of intubations) presents with exertional dyspnea and chest pressure x 4 weeks. CTA with no PE, large R pleural effusion, trace L pleural effusion of unclear etiology.  58-year-old female with past medical history of diabetes mellitus, hypertension, asthma (no history of intubations), also is [employer], presents with exertional dyspnea and chest pressure x 4 weeks. CTA with no PE, large R pleural effusion, trace L pleural effusion of unclear etiology.

## 2023-06-01 NOTE — PATIENT PROFILE ADULT - NSPROSPHOSPCHAPLAINYN_GEN_A_NUR
No Island Pedicle Flap-Requiring Vessel Identification Text: The defect edges were debeveled with a #15 scalpel blade.  Given the location of the defect, shape of the defect and the proximity to free margins an island pedicle advancement flap was deemed most appropriate.  Using a sterile surgical marker, an appropriate advancement flap was drawn, based on the axial vessel mentioned above, incorporating the defect, outlining the appropriate donor tissue and placing the expected incisions within the relaxed skin tension lines where possible.    The area thus outlined was incised deep to adipose tissue with a #15 scalpel blade.  The skin margins were undermined to an appropriate distance in all directions around the primary defect and laterally outward around the island pedicle utilizing iris scissors.  There was minimal undermining beneath the pedicle flap. no

## 2023-06-01 NOTE — CONSULT NOTE ADULT - SUBJECTIVE AND OBJECTIVE BOX
HPI:  59 yo F with PMH DM, HTN, asthma who presents with several weeks of shortness of breath. Reports sob worsened over past 4 weeks. Previously ET was around 10 blocks, now 1-2 blocks. Reports chest pressure. No pleuritic pain, cough, hemoptysis. Felt she had a viral respiratory infection a few weeks ago. No fever/chills, weight loss, night sweats. No joint pain, rashes. No family hx autoimmune conditions.  Does not see a pulmonologist. Has not had PFTs. Believes she is up to date with mammogram and colonoscopy.   She was born in [place]. Works in medical billing. Never smoker, no secondhand smoke exposure. No known environmental exposures.      Underwent CTA chest in ED. No PE but large R pleural effusion. Pulmonary consulted for evaluation.      PAST MEDICAL & SURGICAL HISTORY:  HTN (hypertension)      Type 2 diabetes mellitus      Hyperlipidemia  (diet control)      Asthma      Morbid obesity with body mass index (BMI) of 40.0 to 44.9 in adult      Abnormal uterine and vaginal bleeding, unspecified      S/P breast biopsy, left          FAMILY HISTORY:  Family history of colon cancer in mother (Mother)    Type 2 diabetes mellitus (Father)    Family history of MI (myocardial infarction) (Father)        SOCIAL HISTORY:  Smoking: [x] Never Smoked [ ] Former Smoker (__ packs x ___ years) [ ] Current Smoker  (__ packs x ___ years)  Substance Use: [x] Never Used [ ] Used ____  EtOH Use:  Marital Status: [ ] Single [ ]  [ ]  [ ]   Sexual History:   Occupation: medical billing  Recent Travel: none  Country of Birth: [place]  Advance Directives:    Allergies    Proventil HFA (Hives)  latex (Hives)    Intolerances        HOME MEDICATIONS:  Home Medications:  acetaminophen 325 mg oral tablet: 3 tab(s) orally every 6 hours, As Needed (07 Oct 2016 15:23)  aspirin 81 mg oral tablet: 1 tab(s) orally once a day in am  last dose 9/30 (07 Oct 2016 14:00)  biotin:  orally  10,000 units orally daily (07 Oct 2016 14:00)  Cinnamon  1000 mg orally  daily  last dose  9/30:    (07 Oct 2016 14:00)  HumaLOG 100 units/mL subcutaneous solution:  subcutaneous   5-7 units 3x/day with meals (07 Oct 2016 12:57)  Lantus 100 units/mL subcutaneous solution: 28 unit(s) subcutaneous once a day (31 May 2023 17:55)  olmesartan 20 mg oral tablet: 1 tab(s) orally once a day (31 May 2023 17:56)  omega-3 polyunsaturated fatty acids oral capsule:  orally once a day  last dose 9/30 (07 Oct 2016 14:00)  Vitamin D3 2000 iu orally daily:    (07 Oct 2016 14:00)      REVIEW OF SYSTEMS:  All systems negative except as documented above.    OBJECTIVE:  ICU Vital Signs Last 24 Hrs  T(C): 36.7 (01 Jun 2023 09:43), Max: 37.2 (31 May 2023 19:56)  T(F): 98.1 (01 Jun 2023 09:43), Max: 99 (31 May 2023 19:56)  HR: 101 (01 Jun 2023 09:43) (87 - 113)  BP: 149/87 (01 Jun 2023 09:43) (124/72 - 152/96)  BP(mean): --  ABP: --  ABP(mean): --  RR: 18 (01 Jun 2023 09:43) (16 - 18)  SpO2: 96% (01 Jun 2023 09:43) (87% - 98%)    O2 Parameters below as of 01 Jun 2023 09:43  Patient On (Oxygen Delivery Method): room air              CAPILLARY BLOOD GLUCOSE      POCT Blood Glucose.: 186 mg/dL (01 Jun 2023 09:32)      PHYSICAL EXAM:  General: NAD  HEENT: EOMI, sclera anicteric  Neck: supple  Cardiovascular: RR  Respiratory: left side clear; decreased breath sounds R  Abdomen: soft  Extremities: warm and well perfused, no edema, no clubbing  Skin: no rashes  Neurological: AOx3, no focal deficits    HOSPITAL MEDICATIONS:  Standing Meds:  atorvastatin 40 milliGRAM(s) Oral at bedtime  dextrose 5%. 1000 milliLiter(s) IV Continuous <Continuous>  dextrose 5%. 1000 milliLiter(s) IV Continuous <Continuous>  dextrose 50% Injectable 25 Gram(s) IV Push once  dextrose 50% Injectable 25 Gram(s) IV Push once  dextrose 50% Injectable 12.5 Gram(s) IV Push once  glucagon  Injectable 1 milliGRAM(s) IntraMuscular once  insulin glargine Injectable (LANTUS) 22 Unit(s) SubCutaneous at bedtime  insulin lispro (ADMELOG) corrective regimen sliding scale   SubCutaneous three times a day before meals  insulin lispro (ADMELOG) corrective regimen sliding scale   SubCutaneous at bedtime  losartan 50 milliGRAM(s) Oral daily      PRN Meds:  acetaminophen     Tablet .. 650 milliGRAM(s) Oral every 6 hours PRN  dextrose Oral Gel 15 Gram(s) Oral once PRN      LABS:                        11.9   5.55  )-----------( 224      ( 01 Jun 2023 06:40 )             37.5     Hgb Trend: 11.9<--, 12.4<--  06-01    142  |  104  |  9   ----------------------------<  125<H>  4.0   |  26  |  0.81    Ca    8.9      01 Jun 2023 06:40  Phos  3.3     06-01  Mg     2.20     06-01    TPro  7.6  /  Alb  3.9  /  TBili  0.2  /  DBili  x   /  AST  25  /  ALT  14  /  AlkPhos  93  06-01    Creatinine Trend: 0.81<--, 0.87<--  PT/INR - ( 01 Jun 2023 06:40 )   PT: 13.5 sec;   INR: 1.16 ratio         PTT - ( 01 Jun 2023 06:40 )  PTT:24.6 sec          MICROBIOLOGY:       RADIOLOGY:  [x] Reviewed and interpreted by me

## 2023-06-01 NOTE — PROGRESS NOTE ADULT - PROBLEM SELECTOR PLAN 1
Presents with 4 weeks of exertional dyspnea   CTA negative for PE, but positive for large R pleural effusion, trace L pleural effusion   Pulm consult for diagnostic and therapeutic thoracentesis evaluation   Oxygen supplementation if needed Presents with 4 weeks of exertional dyspnea   CTA negative for PE, but positive for large R pleural effusion, trace L pleural effusion    very elevated, RVP negative  Pulm consult for diagnostic and therapeutic thoracentesis evaluation   Oxygen supplementation if needed

## 2023-06-01 NOTE — PROGRESS NOTE ADULT - PROBLEM SELECTOR PLAN 5
Hx of HTN on Olmesartan  will hold for now Hx of HTN on Olmesartan, c/w Losartan with hold paramters    LDL elevated, also diabetic, will start Lipitor

## 2023-06-02 ENCOUNTER — TRANSCRIPTION ENCOUNTER (OUTPATIENT)
Age: 58
End: 2023-06-02

## 2023-06-02 LAB
ALBUMIN SERPL ELPH-MCNC: 3.4 G/DL — SIGNIFICANT CHANGE UP (ref 3.3–5)
ALP SERPL-CCNC: 93 U/L — SIGNIFICANT CHANGE UP (ref 40–120)
ALT FLD-CCNC: 15 U/L — SIGNIFICANT CHANGE UP (ref 4–33)
ANION GAP SERPL CALC-SCNC: 13 MMOL/L — SIGNIFICANT CHANGE UP (ref 7–14)
AST SERPL-CCNC: 25 U/L — SIGNIFICANT CHANGE UP (ref 4–32)
BILIRUB SERPL-MCNC: 0.3 MG/DL — SIGNIFICANT CHANGE UP (ref 0.2–1.2)
BUN SERPL-MCNC: 13 MG/DL — SIGNIFICANT CHANGE UP (ref 7–23)
CALCIUM SERPL-MCNC: 9.1 MG/DL — SIGNIFICANT CHANGE UP (ref 8.4–10.5)
CHLORIDE SERPL-SCNC: 103 MMOL/L — SIGNIFICANT CHANGE UP (ref 98–107)
CO2 SERPL-SCNC: 26 MMOL/L — SIGNIFICANT CHANGE UP (ref 22–31)
CREAT SERPL-MCNC: 1.03 MG/DL — SIGNIFICANT CHANGE UP (ref 0.5–1.3)
EGFR: 63 ML/MIN/1.73M2 — SIGNIFICANT CHANGE UP
GLUCOSE SERPL-MCNC: 118 MG/DL — HIGH (ref 70–99)
HCT VFR BLD CALC: 36.2 % — SIGNIFICANT CHANGE UP (ref 34.5–45)
HGB BLD-MCNC: 11.5 G/DL — SIGNIFICANT CHANGE UP (ref 11.5–15.5)
MAGNESIUM SERPL-MCNC: 2.1 MG/DL — SIGNIFICANT CHANGE UP (ref 1.6–2.6)
MCHC RBC-ENTMCNC: 26.7 PG — LOW (ref 27–34)
MCHC RBC-ENTMCNC: 31.8 GM/DL — LOW (ref 32–36)
MCV RBC AUTO: 84 FL — SIGNIFICANT CHANGE UP (ref 80–100)
NRBC # BLD: 0 /100 WBCS — SIGNIFICANT CHANGE UP (ref 0–0)
NRBC # FLD: 0 K/UL — SIGNIFICANT CHANGE UP (ref 0–0)
PH FLD: 7.8 — SIGNIFICANT CHANGE UP
PHOSPHATE SERPL-MCNC: 3.4 MG/DL — SIGNIFICANT CHANGE UP (ref 2.5–4.5)
PLATELET # BLD AUTO: 232 K/UL — SIGNIFICANT CHANGE UP (ref 150–400)
POTASSIUM SERPL-MCNC: 4 MMOL/L — SIGNIFICANT CHANGE UP (ref 3.5–5.3)
POTASSIUM SERPL-SCNC: 4 MMOL/L — SIGNIFICANT CHANGE UP (ref 3.5–5.3)
PROT SERPL-MCNC: 7.2 G/DL — SIGNIFICANT CHANGE UP (ref 6–8.3)
RBC # BLD: 4.31 M/UL — SIGNIFICANT CHANGE UP (ref 3.8–5.2)
RBC # FLD: 12.3 % — SIGNIFICANT CHANGE UP (ref 10.3–14.5)
SODIUM SERPL-SCNC: 142 MMOL/L — SIGNIFICANT CHANGE UP (ref 135–145)
WBC # BLD: 5.7 K/UL — SIGNIFICANT CHANGE UP (ref 3.8–10.5)
WBC # FLD AUTO: 5.7 K/UL — SIGNIFICANT CHANGE UP (ref 3.8–10.5)

## 2023-06-02 PROCEDURE — 74177 CT ABD & PELVIS W/CONTRAST: CPT | Mod: 26

## 2023-06-02 PROCEDURE — 99233 SBSQ HOSP IP/OBS HIGH 50: CPT | Mod: GC

## 2023-06-02 PROCEDURE — 71250 CT THORAX DX C-: CPT | Mod: 26

## 2023-06-02 RX ORDER — INSULIN GLARGINE 100 [IU]/ML
28 INJECTION, SOLUTION SUBCUTANEOUS
Refills: 0 | DISCHARGE

## 2023-06-02 RX ORDER — ATORVASTATIN CALCIUM 80 MG/1
1 TABLET, FILM COATED ORAL
Qty: 0 | Refills: 0 | DISCHARGE
Start: 2023-06-02

## 2023-06-02 RX ORDER — INSULIN GLARGINE 100 [IU]/ML
22 INJECTION, SOLUTION SUBCUTANEOUS
Qty: 0 | Refills: 0 | DISCHARGE
Start: 2023-06-02

## 2023-06-02 RX ORDER — ASPIRIN/CALCIUM CARB/MAGNESIUM 324 MG
1 TABLET ORAL
Qty: 0 | Refills: 0 | DISCHARGE
Start: 2023-06-02

## 2023-06-02 RX ORDER — ASPIRIN/CALCIUM CARB/MAGNESIUM 324 MG
81 TABLET ORAL DAILY
Refills: 0 | Status: DISCONTINUED | OUTPATIENT
Start: 2023-06-02 | End: 2023-06-03

## 2023-06-02 RX ORDER — HEPARIN SODIUM 5000 [USP'U]/ML
5000 INJECTION INTRAVENOUS; SUBCUTANEOUS EVERY 8 HOURS
Refills: 0 | Status: DISCONTINUED | OUTPATIENT
Start: 2023-06-02 | End: 2023-06-05

## 2023-06-02 RX ADMIN — HEPARIN SODIUM 5000 UNIT(S): 5000 INJECTION INTRAVENOUS; SUBCUTANEOUS at 21:24

## 2023-06-02 RX ADMIN — INSULIN GLARGINE 22 UNIT(S): 100 INJECTION, SOLUTION SUBCUTANEOUS at 21:25

## 2023-06-02 RX ADMIN — HEPARIN SODIUM 5000 UNIT(S): 5000 INJECTION INTRAVENOUS; SUBCUTANEOUS at 12:57

## 2023-06-02 RX ADMIN — LOSARTAN POTASSIUM 50 MILLIGRAM(S): 100 TABLET, FILM COATED ORAL at 05:04

## 2023-06-02 RX ADMIN — ATORVASTATIN CALCIUM 40 MILLIGRAM(S): 80 TABLET, FILM COATED ORAL at 21:23

## 2023-06-02 NOTE — DISCHARGE NOTE PROVIDER - CARE PROVIDER_API CALL
[facility]  [facility]  [address] [place] [address], [address]  [place] [address]  Phone: [phone]  Fax: [phone]  Follow Up Time: 1 week   [facility]  [facility]  [address] [place] [address], [address]  [place] [address]  Phone: [phone]  Fax: [phone]  Follow Up Time: 1 week    Luisito Zimmer  Pulmonary Disease  [address] [place] [address]  Phone: [phone]  Fax: [phone]  Established Patient  Scheduled Appointment: 06/05/2023

## 2023-06-02 NOTE — DISCHARGE NOTE PROVIDER - NSDCFUADDINST_GEN_ALL_CORE_FT
Please use the pleurex drain up to 1L up to 3 times a week, as long as you are comfortable, have no chest pressure, shortness of breath, or dizziness

## 2023-06-02 NOTE — PROGRESS NOTE ADULT - PROBLEM SELECTOR PLAN 2
Exertional cp that is non-radiating  ECG with no acute ischemic changes on admission, Trops negative (9)  BNP of 60- less likely CHF related  - TTE Echo for further eval especially in setting of pleural effusions Exertional cp that is non-radiating  ECG with no acute ischemic changes on admission, Trops negative (9)  BNP of 59- less likely CHF related  - TTE Echo for further eval- grossly normal LVSF, effusions unlikely to be CHF related marquise bec exudative

## 2023-06-02 NOTE — PROGRESS NOTE ADULT - PROBLEM SELECTOR PLAN 1
Presents with 4 weeks of exertional dyspnea   CTA negative for PE, but positive for large R pleural effusion, trace L pleural effusion    very elevated, RVP negative  Pulm consult for diagnostic and therapeutic thoracentesis evaluation   Oxygen supplementation if needed Presents with 4 weeks of exertional dyspnea   CTA negative for PE, but positive for large R pleural effusion, trace L pleural effusion    very elevated, RVP negative  Pulm consult for diagnostic and therapeutic thoracentesis evaluation- s/p thoracentesis 6/1 with 1.2L removed, exudative effusion (LDH 1443/547, Protein 5.8/7.2), elevated TNC count and RBC  - f/u pleural fluid gram stain and culture- NGTD  - f/u cytopathology  - repeat CT chest and CTAP pending (further malignancy workup)

## 2023-06-02 NOTE — DISCHARGE NOTE PROVIDER - NSFOLLOWUPCLINICS_GEN_ALL_ED_FT
[facility]  Pulmonology  [address] [place] [address], [place] [address]  Phone: [phone]  Fax:   Follow Up Time: 1 week     [facility]  Hematology/Oncology  [address] [id number]  Phone: [phone]  Fax:   Follow Up Time: 1 week

## 2023-06-02 NOTE — DISCHARGE NOTE PROVIDER - NSDCFUSCHEDAPPT_GEN_ALL_CORE_FT
Daniela Gloria  [facility]  INTMED [address] [facility]  Scheduled Appointment: 06/08/2023     Daniela Gloria  [facility]  INTMED [address] [facility]  Scheduled Appointment: 06/08/2023    Luisito Zimmer  [facility]  PULMMED [address] [facility]  Scheduled Appointment: 06/19/2023     Stephy Mccann  [facility] [address] [facility]  Scheduled Appointment: 06/16/2023    Luisito Zimmer  [facility]  PULMMED [address] [place] [address]  Scheduled Appointment: 06/19/2023    Daniela Gloria  [facility] [address] [facility]  Scheduled Appointment: 07/13/2023     Stephy Mccann  [facility] [address] [facility]  Scheduled Appointment: 06/16/2023    Luisito Zimmer  [facility]  PULMMED [address]  Scheduled Appointment: 06/19/2023    Whit Cuellar  [facility]  [facility]  Scheduled Appointment: 06/20/2023    Daniela Gloria  [facility]  [facility] [address] [facility]  Scheduled Appointment: 07/13/2023

## 2023-06-02 NOTE — PROGRESS NOTE ADULT - PROBLEM SELECTOR PLAN 5
Hx of HTN on Olmesartan, c/w Losartan with hold paramters    LDL elevated, also diabetic, will start Lipitor

## 2023-06-02 NOTE — DISCHARGE NOTE PROVIDER - NSDCFUADDAPPT_GEN_ALL_CORE_FT
APPTS ARE READY TO BE MADE: [ x] YES    Best Family or Patient Contact (if needed):    Additional Information about above appointments (if needed):    1:   2:   3:     Other comments or requests:    APPTS ARE READY TO BE MADE: [ x] YES    Best Family or Patient Contact (if needed):    Additional Information about above appointments (if needed):    1: Please follow up with Dr. Zimmer on Monday 6/5 to discuss the need for a procedure on Wednesday 6/7.  2:   3:     Other comments or requests:    APPTS ARE READY TO BE MADE: [ x] YES    Best Family or Patient Contact (if needed):    Additional Information about above appointments (if needed):    1: Please follow up with Dr. Zimmer on Monday 6/5 to discuss the need for a procedure on Wednesday 6/7.  2:   3:     Other comments or requests:     Patient was scheduled with Dr. Luisito Zimmer on 06/19/23 at 1:00PM [place] [address] Phone: [phone] through the provider's office. Patient advised of appointment details.         APPTS ARE READY TO BE MADE: [ x] YES    Best Family or Patient Contact (if needed):    Additional Information about above appointments (if needed):    1: Please follow up with Dr. Zimmer on Monday 6/5 to discuss the need for a procedure on Wednesday 6/7.  2:   3:     Other comments or requests:     Patient was scheduled with Dr. Luisito Zimmer on 06/19/23 at 1:00PM [place] [address] Phone: [phone] through the provider's office. Patient advised of appointment details.    Patient was scheduled with ROSEMARY Simmons on 6/16/2023 4pm at [address] [facility] [address] through the provider's office. Patient advised of appointment details.         APPTS ARE READY TO BE MADE: [ x] YES    Best Family or Patient Contact (if needed):    Additional Information about above appointments (if needed):    1: Please follow up with Dr. Zimmer (pulmonary) on Monday 6/19   2: Please follow up with Dr. Cuellar (oncology) on Tue 6/20 at 9:30 AM  3:     Other comments or requests:     Patient was scheduled with Dr. Luisito Zimmer on 06/19/23 at 1:00PM at  _[address] [place] [address] Phone: [phone] through the provider's office. Patient advised of appointment details.    Patient was scheduled with ROSEMARY Simmons on 6/16/2023 4pm at [address] [facility] [address] through the provider's office. Patient advised of appointment details.         APPTS ARE READY TO BE MADE: [ x] YES    Best Family or Patient Contact (if needed):    Additional Information about above appointments (if needed):    1: Please follow up with Dr. Zimmer (pulmonary) on Monday 6/19   2: Please follow up with Dr. Cuellar (oncology) on Tue 6/20 at 9:30 AM  3:     Other comments or requests: Gynecological oncology will reach out to you for follow up.     Patient was scheduled with Dr. Luisito Zimmer on 06/19/23 at 1:00PM at  _[address] [place] [address] Phone: [phone] through the provider's office. Patient advised of appointment details.    Patient was scheduled with ROSEMARY Simmons on 6/16/2023 4pm at [address] [facility] [address] through the provider's office. Patient advised of appointment details.         APPTS ARE READY TO BE MADE: [ x] YES    Best Family or Patient Contact (if needed):    Additional Information about above appointments (if needed):    1: Please follow up with Dr. Zimmer (pulmonary) on Monday 6/19   2: Please follow up with Dr. Cuellar (oncology) on Tue 6/20 at 9:30 AM  3:     Other comments or requests: Gynecological oncology will reach out to you for follow up.     Patient was scheduled with Dr. Luisito Zimmer on 06/19/23 at 1:00PM at  [address] [place] [address] Phone: [phone] through the provider's office. Patient advised of appointment details.    Patient was scheduled with ROSEMARY Simmons on 6/16/2023 4pm at [address] [facility] [address] through the provider's office. Patient advised of appointment details.        Patient is scheduled to see Dr. Cuellar at 1:00PM on 6/20/23 at [address] [place] [id number] APPTS ARE READY TO BE MADE: [ x] YES    Best Family or Patient Contact (if needed):    Additional Information about above appointments (if needed):    1: Please follow up with Dr. Zimmer (pulmonary) on Monday 6/19   2: Please follow up with Dr. Cuellar (oncology) on Tue 6/20 at 9:30 AM  3:     Other comments or requests: Gynecological oncology will reach out to you for follow up.     Patient was scheduled with Dr. Luisito Zimmer on 06/19/23 at 1:00PM at  [address] [place] [address] Phone: [phone] through the provider's office. Patient advised of appointment details.    Patient was scheduled with ROSEMARY Simmons on 6/16/2023 4pm at [address] [facility] [address] through the provider's office. Patient advised of appointment details.    Patient is scheduled to see Dr. Cuellar at 9:30AM on 6/20/23 at [address] [place] [id number]

## 2023-06-02 NOTE — PROGRESS NOTE ADULT - SUBJECTIVE AND OBJECTIVE BOX
INTERVAL HPI/OVERNIGHT EVENTS:    SUBJECTIVE: Patient seen and examined at bedside. pt denies acute events. Just states exertional dyspnea. Had TTE Echo in AM.     OBJECTIVE:    VITAL SIGNS:  ICU Vital Signs Last 24 Hrs  T(C): 36.9 (01 Jun 2023 05:29), Max: 37.2 (31 May 2023 19:56)  T(F): 98.5 (01 Jun 2023 05:29), Max: 99 (31 May 2023 19:56)  HR: 87 (01 Jun 2023 05:29) (87 - 113)  BP: 146/81 (01 Jun 2023 05:29) (124/72 - 152/96)  BP(mean): --  ABP: --  ABP(mean): --  RR: 17 (01 Jun 2023 05:29) (16 - 18)  SpO2: 98% (01 Jun 2023 05:29) (87% - 98%)    O2 Parameters below as of 01 Jun 2023 05:29  Patient On (Oxygen Delivery Method): room air            Plateau pressure:   P/F ratio:     CAPILLARY BLOOD GLUCOSE      POCT Blood Glucose.: 128 mg/dL (01 Jun 2023 07:10)    ECG:    PHYSICAL EXAM:    GENERAL: NAD, lying in bed comfortably  HEAD:  Atraumatic, Normocephalic  EYES: EOMI, PERRLA, conjunctiva and sclera clear  ENT: Moist mucous membranes  NECK: Supple, No JVD  CHEST/LUNG: decreased breath sounds over right lung fields, non-wheezing, Unlabored respirations  HEART: Regular rate and rhythm; No murmurs, rubs, or gallops  ABDOMEN: BSx4; Soft, nontender, nondistended  EXTREMITIES:  2+ Peripheral Pulses, brisk capillary refill. No clubbing, cyanosis, or edema  NERVOUS SYSTEM:  A&Ox3, no focal deficits   SKIN: No rashes or lesions  psych: normal behavior, normal affect    MEDICATIONS:  MEDICATIONS  (STANDING):  dextrose 5%. 1000 milliLiter(s) (50 mL/Hr) IV Continuous <Continuous>  dextrose 5%. 1000 milliLiter(s) (100 mL/Hr) IV Continuous <Continuous>  dextrose 50% Injectable 25 Gram(s) IV Push once  dextrose 50% Injectable 12.5 Gram(s) IV Push once  dextrose 50% Injectable 25 Gram(s) IV Push once  glucagon  Injectable 1 milliGRAM(s) IntraMuscular once  insulin glargine Injectable (LANTUS) 22 Unit(s) SubCutaneous at bedtime  insulin lispro (ADMELOG) corrective regimen sliding scale   SubCutaneous three times a day before meals  insulin lispro (ADMELOG) corrective regimen sliding scale   SubCutaneous at bedtime  losartan 50 milliGRAM(s) Oral daily    MEDICATIONS  (PRN):  acetaminophen     Tablet .. 650 milliGRAM(s) Oral every 6 hours PRN Temp greater or equal to 38C (100.4F), Mild Pain (1 - 3), Moderate Pain (4 - 6)  dextrose Oral Gel 15 Gram(s) Oral once PRN Blood Glucose LESS THAN 70 milliGRAM(s)/deciliter      LABS:                        11.9   5.55  )-----------( 224      ( 01 Jun 2023 06:40 )             37.5     05-31    142  |  104  |  9   ----------------------------<  78  3.8   |  27  |  0.87    Ca    9.5      31 May 2023 12:20    TPro  7.9  /  Alb  3.9  /  TBili  0.3  /  DBili  x   /  AST  26  /  ALT  16  /  AlkPhos  100  05-31    PT/INR - ( 01 Jun 2023 06:40 )   PT: 13.5 sec;   INR: 1.16 ratio         PTT - ( 01 Jun 2023 06:40 )  PTT:24.6 sec      RADIOLOGY & ADDITIONAL TESTS: Reviewed.     INTERVAL HPI/OVERNIGHT EVENTS:    SUBJECTIVE: Patient seen and examined at bedside. pt denies acute events. had thoracentesis yesterday, 1.2L removed. Reports improvement in exertional dyspnea.     OBJECTIVE:    VITAL SIGNS:  ICU Vital Signs Last 24 Hrs  T(C): 36.9 (01 Jun 2023 05:29), Max: 37.2 (31 May 2023 19:56)  T(F): 98.5 (01 Jun 2023 05:29), Max: 99 (31 May 2023 19:56)  HR: 87 (01 Jun 2023 05:29) (87 - 113)  BP: 146/81 (01 Jun 2023 05:29) (124/72 - 152/96)  BP(mean): --  ABP: --  ABP(mean): --  RR: 17 (01 Jun 2023 05:29) (16 - 18)  SpO2: 98% (01 Jun 2023 05:29) (87% - 98%)    O2 Parameters below as of 01 Jun 2023 05:29  Patient On (Oxygen Delivery Method): room air            Plateau pressure:   P/F ratio:     CAPILLARY BLOOD GLUCOSE      POCT Blood Glucose.: 128 mg/dL (01 Jun 2023 07:10)    ECG:    PHYSICAL EXAM:    GENERAL: NAD, lying in bed comfortably  HEAD:  Atraumatic, Normocephalic  EYES: EOMI, PERRLA, conjunctiva and sclera clear  ENT: Moist mucous membranes  NECK: Supple, No JVD  CHEST/LUNG: decreased breath sounds over right lung fields, non-wheezing, Unlabored respirations  HEART: Regular rate and rhythm; No murmurs, rubs, or gallops  ABDOMEN: BSx4; Soft, nontender, nondistended  EXTREMITIES:  2+ Peripheral Pulses, brisk capillary refill. No clubbing, cyanosis, or edema  NERVOUS SYSTEM:  A&Ox3, no focal deficits   SKIN: No rashes or lesions  psych: normal behavior, normal affect    MEDICATIONS:  MEDICATIONS  (STANDING):  dextrose 5%. 1000 milliLiter(s) (50 mL/Hr) IV Continuous <Continuous>  dextrose 5%. 1000 milliLiter(s) (100 mL/Hr) IV Continuous <Continuous>  dextrose 50% Injectable 25 Gram(s) IV Push once  dextrose 50% Injectable 12.5 Gram(s) IV Push once  dextrose 50% Injectable 25 Gram(s) IV Push once  glucagon  Injectable 1 milliGRAM(s) IntraMuscular once  insulin glargine Injectable (LANTUS) 22 Unit(s) SubCutaneous at bedtime  insulin lispro (ADMELOG) corrective regimen sliding scale   SubCutaneous three times a day before meals  insulin lispro (ADMELOG) corrective regimen sliding scale   SubCutaneous at bedtime  losartan 50 milliGRAM(s) Oral daily    MEDICATIONS  (PRN):  acetaminophen     Tablet .. 650 milliGRAM(s) Oral every 6 hours PRN Temp greater or equal to 38C (100.4F), Mild Pain (1 - 3), Moderate Pain (4 - 6)  dextrose Oral Gel 15 Gram(s) Oral once PRN Blood Glucose LESS THAN 70 milliGRAM(s)/deciliter      LABS:                        11.9   5.55  )-----------( 224      ( 01 Jun 2023 06:40 )             37.5     05-31    142  |  104  |  9   ----------------------------<  78  3.8   |  27  |  0.87    Ca    9.5      31 May 2023 12:20    TPro  7.9  /  Alb  3.9  /  TBili  0.3  /  DBili  x   /  AST  26  /  ALT  16  /  AlkPhos  100  05-31    PT/INR - ( 01 Jun 2023 06:40 )   PT: 13.5 sec;   INR: 1.16 ratio         PTT - ( 01 Jun 2023 06:40 )  PTT:24.6 sec      RADIOLOGY & ADDITIONAL TESTS: Reviewed.

## 2023-06-02 NOTE — PROGRESS NOTE ADULT - ASSESSMENT
59 yo F with PMH DM, HTN, asthma who presents with several weeks of shortness of breath. Found to have R pleural effusion.     # Right pleural effusion  Unclear etiology. No signs/symptoms of malignancy autoimmune disease, cardiac disease, infection.  - s/p R thoracentesis with -1200 ml serosanguinous fluid on 6/1/23; exudative fluid (LDH 1440, Protein 5.8; glucose 115)  - f/u cytology, flow, final cultures  - repeat CT chest still with limited evaluation of lung parenchyma due to large effusion  - can follow up outpatient for cytology result, if negative will likely need pleuroscopy for pleural biopsy  - incentive spirometry  - oob to chair, ambulation as tolerated  - please check ambulatory spo2 on room air to assess for need for home O2

## 2023-06-02 NOTE — DISCHARGE NOTE PROVIDER - HOSPITAL COURSE
58-year-old female with past medical history of diabetes mellitus, hypertension, asthma (no history of intubations) presents with 4 weeks of exertional dyspnea and chest pressure. CTA with no PE but noted large R pleural effusion with passive atelectasis of the entire right lower and middle lobes. Trace left effusion. Patient underwent diagnostic/therapeutic thoracentesis yielding 1200 cc of serosanguinous fluid on 6/1/2023. Thora studies suggesting exudate. Repeat CT to assess underlying lung parenchyma as well as CT A/P to eval for malignancy.  Repeat CT chest showing loculated pleural effusion. Currently concern for infectious/neoplastic causes. ECG with no acute ischemic changes, and trops negative. Had TTE Echo with grossly normal Ejection Fraction, pleural effusions unlikely cardiac. Patient deemed hemodynamically stable and will need further follow up with pulmonology outpatient. 58-year-old female with past medical history of diabetes mellitus, hypertension, asthma (no history of intubations) presents with 4 weeks of exertional dyspnea and chest pressure. CTA with no PE but noted large R pleural effusion with passive atelectasis of the entire right lower and middle lobes. Trace left effusion. Patient underwent diagnostic/therapeutic thoracentesis yielding 1200 cc of serosanguinous fluid on 6/1/2023. Thora studies suggesting exudate. Repeat CT to assess underlying lung parenchyma as well as CT A/P to eval for malignancy.  Repeat CT chest showing loculated pleural effusion. Currently concern for infectious/neoplastic causes. ECG with no acute ischemic changes, and trops negative. Had TTE Echo with grossly normal Ejection Fraction, pleural effusions unlikely cardiac. Patient deemed hemodynamically stable and will need further follow up with pulmonology (Dr. Zimmer) on 6/5 outpatient to discuss need for procedure on 6/7. 58-year-old female with past medical history of diabetes mellitus, hypertension, asthma (no history of intubations) presents with 4 weeks of exertional dyspnea and chest pressure. CTA with no PE but noted large R pleural effusion with passive atelectasis of the entire right lower and middle lobes. Trace left effusion. Patient underwent diagnostic/therapeutic thoracentesis yielding 1200 cc of serosanguinous fluid on 6/1/2023. Thora studies suggesting exudate. Repeat CT to assess underlying lung parenchyma as well as CT A/P to eval for malignancy. CT A/P found to have Extensive peritoneal carcinomatosis and small to moderate ascites. Fibroid uterus with either a left subserosal fibroid or a possible adnexal mass. Pelvic ultrasound showed leiomyomatous uterus with calcified uterine myomas and calcified pedunculated left adnexal myoma. Patient underwent pleurX on 6/6 and IR omentum core biopsy x3 on 6/8. She needs Oncology and pulmonary follow up outpatient and is hemodynamically stable for discharge at this time. 58-year-old female with past medical history of diabetes mellitus, hypertension, asthma (no history of intubations) presents with 4 weeks of exertional dyspnea and chest pressure. CTA with no PE but noted large R pleural effusion with passive atelectasis of the entire right lower and middle lobes. Trace left effusion. Patient underwent diagnostic/therapeutic thoracentesis yielding 1200 cc of serosanguinous fluid on 6/1/2023. Thora studies suggesting exudate. Repeat CT to assess underlying lung parenchyma as well as CT A/P to eval for malignancy. CT A/P found to have Extensive peritoneal carcinomatosis and small to moderate ascites. Fibroid uterus with either a left subserosal fibroid or a possible adnexal mass. Pelvic ultrasound showed leiomyomatous uterus with calcified uterine myomas and calcified pedunculated left adnexal myoma. Patient underwent pleurX on 6/6 and IR omentum core biopsy x3 on 6/8. She needs Oncology, gyn onc and pulmonary follow up outpatient and is hemodynamically stable for discharge at this time.

## 2023-06-02 NOTE — DISCHARGE NOTE PROVIDER - DATE OF DISCHARGE SERVICE:
09-Jun-2023 Suturegard Intro: Intraoperative tissue expansion was performed, utilizing the SUTUREGARD device, in order to reduce wound tension.

## 2023-06-02 NOTE — PROGRESS NOTE ADULT - SUBJECTIVE AND OBJECTIVE BOX
Interval Events:  - s/p thoracentesis yesterday  - sob improved but not at baseline  - no cough, fever, chills    REVIEW OF SYSTEMS:  Negative except as documented above.      OBJECTIVE:  ICU Vital Signs Last 24 Hrs  T(C): 36.9 (02 Jun 2023 05:00), Max: 37.1 (02 Jun 2023 03:00)  T(F): 98.5 (02 Jun 2023 05:00), Max: 98.8 (02 Jun 2023 03:00)  HR: 96 (02 Jun 2023 05:00) (96 - 101)  BP: 141/94 (02 Jun 2023 05:00) (130/69 - 149/87)  BP(mean): --  ABP: --  ABP(mean): --  RR: 18 (02 Jun 2023 05:00) (18 - 18)  SpO2: 97% (02 Jun 2023 05:00) (96% - 99%)    O2 Parameters below as of 02 Jun 2023 05:00  Patient On (Oxygen Delivery Method): room air              06-01 @ 07:01  -  06-02 @ 07:00  --------------------------------------------------------  IN: 400 mL / OUT: 0 mL / NET: 400 mL      CAPILLARY BLOOD GLUCOSE      POCT Blood Glucose.: 119 mg/dL (02 Jun 2023 08:57)      PHYSICAL EXAM:  General: NAD  HEENT: PERRL, EOMI, sclera anicteric, moist mucus membranes  Neck: supple, no JVD  Cardiovascular: RRR, no murmurs, rubs, or gallops  Respiratory: L CTAB, R decreased breath sounds  Abdomen: soft, nontender, nondistended, normoactive bowel sounds  Extremities: warm and well perfused, no edema, no clubbing  Skin: no rashes  Neurological: Aox3, no focal deficits    HOSPITAL MEDICATIONS:  MEDICATIONS  (STANDING):  atorvastatin 40 milliGRAM(s) Oral at bedtime  dextrose 5%. 1000 milliLiter(s) (50 mL/Hr) IV Continuous <Continuous>  dextrose 5%. 1000 milliLiter(s) (100 mL/Hr) IV Continuous <Continuous>  dextrose 50% Injectable 25 Gram(s) IV Push once  dextrose 50% Injectable 12.5 Gram(s) IV Push once  dextrose 50% Injectable 25 Gram(s) IV Push once  glucagon  Injectable 1 milliGRAM(s) IntraMuscular once  insulin glargine Injectable (LANTUS) 22 Unit(s) SubCutaneous at bedtime  insulin lispro (ADMELOG) corrective regimen sliding scale   SubCutaneous three times a day before meals  insulin lispro (ADMELOG) corrective regimen sliding scale   SubCutaneous at bedtime  losartan 50 milliGRAM(s) Oral daily    MEDICATIONS  (PRN):  acetaminophen     Tablet .. 650 milliGRAM(s) Oral every 6 hours PRN Temp greater or equal to 38C (100.4F), Mild Pain (1 - 3), Moderate Pain (4 - 6)  dextrose Oral Gel 15 Gram(s) Oral once PRN Blood Glucose LESS THAN 70 milliGRAM(s)/deciliter      LABS:                        11.5   5.70  )-----------( 232      ( 02 Jun 2023 03:10 )             36.2     Hgb Trend: 11.5<--, 11.9<--, 12.4<--  06-02    142  |  103  |  13  ----------------------------<  118<H>  4.0   |  26  |  1.03    Ca    9.1      02 Jun 2023 03:10  Phos  3.4     06-02  Mg     2.10     06-02    TPro  7.2  /  Alb  3.4  /  TBili  0.3  /  DBili  x   /  AST  25  /  ALT  15  /  AlkPhos  93  06-02    Creatinine Trend: 1.03<--, 0.81<--, 0.87<--  PT/INR - ( 01 Jun 2023 06:40 )   PT: 13.5 sec;   INR: 1.16 ratio         PTT - ( 01 Jun 2023 06:40 )  PTT:24.6 sec          MICROBIOLOGY:     Culture - Fungal, Body Fluid (collected 01 Jun 2023 18:34)  Source: Pleural Fl Pleural Fluid  Preliminary Report (02 Jun 2023 07:53):    Testing in progress    Culture - Body Fluid with Gram Stain (collected 01 Jun 2023 18:34)  Source: Pleural Fl Pleural Fluid  Gram Stain (01 Jun 2023 23:24):    polymorphonuclear leukocytes seen    No organisms seen    by cytocentrifuge        RADIOLOGY:  [x] Reviewed and interpreted by me

## 2023-06-02 NOTE — DISCHARGE NOTE PROVIDER - NSDCMRMEDTOKEN_GEN_ALL_CORE_FT
aspirin 81 mg oral delayed release tablet: 1 tab(s) orally once a day  atorvastatin 40 mg oral tablet: 1 tab(s) orally once a day (at bedtime)  biotin:  orally  10,000 units orally daily  Cinnamon  1000 mg orally  daily  last dose  9/30:     insulin glargine 100 units/mL subcutaneous solution: 22 unit(s) subcutaneous once a day (at bedtime)  Insulin Sliding Scale: Sliding Scale three times a day before meals:  1 Unit if Glucose 151-200  2 Units if Glucose 201-250  3 Units if Glucose 251-300  4 Units if glucose 301-350  5 units if glucose 350-400  Contact MD if Glucose 400+, three times a day before meals    Sliding scale before bedtime  0 units if glucose 0-250  1 Unit if Glucose 250-300  2 unit if glucose 301-350  3 units if glucose 351-400  Contact MD if Glucose 400+, at bedtime  olmesartan 20 mg oral tablet: 1 tab(s) orally once a day  omega-3 polyunsaturated fatty acids oral capsule:  orally once a day  last dose 9/30  Vitamin D3 2000 iu orally daily:      Admelog 100 units/mL injectable solution: 1 unit(s) injectable 3 times a day (with meals) Please follow sliding scale protocol detailed below and only take if eating  alcohol swabs: Apply topically to affected area 4 times a day  aspirin 81 mg oral delayed release tablet: 1 tab(s) orally once a day  atorvastatin 40 mg oral tablet: 1 tab(s) orally once a day (at bedtime)  biotin:  orally  10,000 units orally daily  Cinnamon  1000 mg orally  daily  last dose  9/30:     Freestyle [place] 2 [place]: Dispense 1 [place]  glucometer (per patient&#x27;s insurance): Test blood sugars four times a day. Dispense #1 glucometer.  Insulin Pen Needles, 4mm: 1 application subcutaneously 4 times a day. ** Use with insulin pen **  Insulin Sliding Scale: Sliding Scale three times a day before meals:  1 Unit if Glucose 151-200  2 Units if Glucose 201-250  3 Units if Glucose 251-300  4 Units if glucose 301-350  5 units if glucose 350-400  Contact MD if Glucose 400+, three times a day before meals    Sliding scale before bedtime  0 units if glucose 0-250  1 Unit if Glucose 250-300  2 unit if glucose 301-350  3 units if glucose 351-400  Contact MD if Glucose 400+, at bedtime  lancets: 1 application subcutaneously 4 times a day  olmesartan 20 mg oral tablet: 1 tab(s) orally once a day  omega-3 polyunsaturated fatty acids oral capsule:  orally once a day  last dose 9/30  polyethylene glycol 3350 oral powder for reconstitution: 17 gram(s) orally once a day  senna leaf extract oral tablet: 2 tab(s) orally once a day (at bedtime)  test strips (per patient&#x27;s insurance): 1 application subcutaneously 4 times a day. ** Compatible with patient&#x27;s glucometer **  Vitamin D3 2000 iu orally daily:      Admelog 100 units/mL injectable solution: 1 unit(s) injectable 3 times a day (with meals) Please follow sliding scale protocol detailed below and only take if eating  alcohol swabs: Apply topically to affected area 4 times a day  aspirin 81 mg oral delayed release tablet: 1 tab(s) orally once a day  atorvastatin 40 mg oral tablet: 1 tab(s) orally once a day (at bedtime)  biotin:  orally  10,000 units orally daily  Cinnamon  1000 mg orally  daily  last dose  9/30:     glucometer (per patient&#x27;s insurance): Test blood sugars four times a day. Dispense #1 glucometer.  Insulin Pen Needles, 4mm: 1 application subcutaneously 4 times a day. ** Use with insulin pen **  Insulin Sliding Scale: Sliding Scale three times a day before meals:  1 Unit if Glucose 151-200  2 Units if Glucose 201-250  3 Units if Glucose 251-300  4 Units if glucose 301-350  5 units if glucose 350-400  Contact MD if Glucose 400+, three times a day before meals    Sliding scale before bedtime  0 units if glucose 0-250  1 Unit if Glucose 250-300  2 unit if glucose 301-350  3 units if glucose 351-400  Contact MD if Glucose 400+, at bedtime  lancets: 1 application subcutaneously 4 times a day  Lantus Solostar Pen 100 units/mL subcutaneous solution: 22 unit(s) subcutaneous once a day (at bedtime)  olmesartan 20 mg oral tablet: 1 tab(s) orally once a day  omega-3 polyunsaturated fatty acids oral capsule:  orally once a day  last dose 9/30  polyethylene glycol 3350 oral powder for reconstitution: 17 gram(s) orally once a day  senna leaf extract oral tablet: 2 tab(s) orally once a day (at bedtime)  test strips (per patient&#x27;s insurance): 1 application subcutaneously 4 times a day. ** Compatible with patient&#x27;s glucometer **  Vitamin D3 2000 iu orally daily:

## 2023-06-02 NOTE — DISCHARGE NOTE PROVIDER - NSDCCPCAREPLAN_GEN_ALL_CORE_FT
PRINCIPAL DISCHARGE DIAGNOSIS  Diagnosis: Pleural effusion, right  Assessment and Plan of Treatment: You presented with 4 weeks of exertional dyspnea and chest pressure. CTA showed no PE but noted large right pleural effusion over the entire right lower and middle lobes. You underwent diagnostic/therapeutic thoracentesis yielding 1200 cc of serosanguinous fluid on 6/1/2023. The studies showed an exudate. Repeat CT chest showing loculated pleural effusion. CT abdoemn is pending. Currently, there is concern for infectious/neoplastic causes for this effusion. You had an Echocardiogram showing a mostly normal heart function meaning the pleural effusion is unlikely cardiac. You need to follow up with pulmonary for the rest of the results from this thoracentesis and also may need additional imaging and procedures outpatient as deemed by the pulmonary team.      SECONDARY DISCHARGE DIAGNOSES  Diagnosis: Type 2 diabetes mellitus  Assessment and Plan of Treatment: Please continue your diabetes medications and check your sugars. Please follow up with your primary doctor outpatient.     PRINCIPAL DISCHARGE DIAGNOSIS  Diagnosis: Pleural effusion, right  Assessment and Plan of Treatment: You presented with 4 weeks of exertional dyspnea and chest pressure. CTA showed no PE but noted large right pleural effusion over the entire right lower and middle lobes. You underwent diagnostic/therapeutic thoracentesis yielding 1200 cc of serosanguinous fluid on 6/1/2023. The studies showed an exudate. Repeat CT chest showing loculated pleural effusion. CT abdoemn is pending. Currently, there is concern for infectious/neoplastic causes for this effusion. You had an Echocardiogram showing a mostly normal heart function meaning the pleural effusion is unlikely cardiac. You need to follow up with Dr. Zimmer on Monday 6/5 to discuss the need for a procedure on Wednesday 6/7, for the rest of the results from this thoracentesis and also may need additional imaging and procedures outpatient as deemed by the pulmonary team.   Please follow up with your primary care doctor to follow up with the results from your repeat CT chest image taken while you were hospitalized.      SECONDARY DISCHARGE DIAGNOSES  Diagnosis: Type 2 diabetes mellitus  Assessment and Plan of Treatment: Please continue your diabetes medications and check your sugars. Please follow up with your primary doctor outpatient.     PRINCIPAL DISCHARGE DIAGNOSIS  Diagnosis: Pleural effusion, right  Assessment and Plan of Treatment: You presented with 4 weeks of exertional dyspnea and chest pressure. CTA showed no PE but noted large right pleural effusion over the entire right lower and middle lobes. You underwent diagnostic/therapeutic thoracentesis yielding 1200 cc of serosanguinous fluid on 6/1/2023. The studies showed an exudate and malignant cells. Repeat CT chest showing loculated pleural effusion. CT abdoemn showed extensive periotoneal carcinomatosis. You had an Echocardiogram showing a mostly normal heart function meaning the pleural effusion is unlikely cardiac. You need to follow up with Dr. Zimmer your lung doctor and continue with the plerex drainage up to 3x/weekly and up to 1L as long as you're comfortable and not dizzy.  You had an omental mass core biopsy done by the interventional radiology team on 6/8. Please follow up with the oncology team for the [facility] for further workup and treatment.      SECONDARY DISCHARGE DIAGNOSES  Diagnosis: Peritoneal carcinomatosis  Assessment and Plan of Treatment: You presented with 4 weeks of exertional dyspnea and chest pressure. CTA showed no PE but noted large right pleural effusion over the entire right lower and middle lobes. You underwent diagnostic/therapeutic thoracentesis yielding 1200 cc of serosanguinous fluid on 6/1/2023. The studies showed an exudate and malignant cells. Repeat CT chest showing loculated pleural effusion. CT abdoemn showed extensive periotoneal carcinomatosis. You had an Echocardiogram showing a mostly normal heart function meaning the pleural effusion is unlikely cardiac. You need to follow up with Dr. Zimmer your lung doctor and continue with the plerex drainage up to 3x/weekly and up to 1L as long as you're comfortable and not dizzy.  You had an omental mass core biopsy done by the interventional radiology team on 6/8. Please follow up with the oncology team for the [facility] for further workup and treatment.    Diagnosis: Type 2 diabetes mellitus  Assessment and Plan of Treatment: Please continue your diabetes medications and check your sugars. Please follow up with your primary doctor outpatient.     PRINCIPAL DISCHARGE DIAGNOSIS  Diagnosis: Pleural effusion, right  Assessment and Plan of Treatment: You presented with 4 weeks of exertional dyspnea and chest pressure. CTA showed no PE but noted large right pleural effusion over the entire right lower and middle lobes. You underwent diagnostic/therapeutic thoracentesis yielding 1200 cc of serosanguinous fluid on 6/1/2023. The studies showed an exudate and malignant cells. Repeat CT chest showing loculated pleural effusion. CT abdoemn showed extensive periotoneal carcinomatosis. You had an Echocardiogram showing a mostly normal heart function meaning the pleural effusion is unlikely cardiac. You need to follow up with Dr. Zimmer your lung doctor and continue with the plerex drainage up to 3x/weekly and up to 1L as long as you're comfortable and not dizzy.  You had an omental mass core biopsy done by the interventional radiology team on 6/8. Please follow up with the oncology team for the [facility] for further workup and treatment.      SECONDARY DISCHARGE DIAGNOSES  Diagnosis: Peritoneal carcinomatosis  Assessment and Plan of Treatment: You presented with 4 weeks of exertional dyspnea and chest pressure. CTA showed no PE but noted large right pleural effusion over the entire right lower and middle lobes. You underwent diagnostic/therapeutic thoracentesis yielding 1200 cc of serosanguinous fluid on 6/1/2023. The studies showed an exudate and malignant cells. Repeat CT chest showing loculated pleural effusion. CT abdoemn showed extensive periotoneal carcinomatosis. You had an Echocardiogram showing a mostly normal heart function meaning the pleural effusion is unlikely cardiac. You need to follow up with Dr. Zimmer your lung doctor and continue with the plerex drainage up to 3x/weekly and up to 1L as long as you're comfortable and not dizzy.  You had an omental mass core biopsy done by the interventional radiology team on 6/8. Please follow up with the oncology team for the [facility] for further workup and treatment.    Diagnosis: Type 2 diabetes mellitus  Assessment and Plan of Treatment: Please continue your diabetes medications and check your sugars. Please continue 22 units of lantus at bedtime and insulin sliding scale only as needed with meals, [facility] Please follow up with your primary doctor outpatient.

## 2023-06-02 NOTE — PROGRESS NOTE ADULT - ASSESSMENT
58-year-old female with past medical history of diabetes mellitus, hypertension, asthma (no history of intubations), also is Jehova's Witness, presents with exertional dyspnea and chest pressure x 4 weeks. CTA with no PE, large R pleural effusion, trace L pleural effusion of unclear etiology.  58-year-old female with past medical history of diabetes mellitus, hypertension, asthma (no history of intubations), also is Jehova's Witness, presents with exertional dyspnea and chest pressure x 4 weeks. CTA with no PE, large R pleural effusion, trace L pleural effusion of unclear etiology. S/p thoracentesis 6/1 with studies showing exudative effusion, elevated TNC and RBC count. Pending malignant/infectious workup

## 2023-06-02 NOTE — DISCHARGE NOTE PROVIDER - PROVIDER TOKENS
PROVIDER:[TOKEN:[[id number]],FOLLOWUP:[1 week]] PROVIDER:[TOKEN:[[id number]],FOLLOWUP:[1 week]],PROVIDER:[TOKEN:[[id number]],SCHEDULEDAPPT:[06/05/2023],ESTABLISHEDPATIENT:[T]]

## 2023-06-02 NOTE — DISCHARGE NOTE PROVIDER - NSDCCPTREATMENT_GEN_ALL_CORE_FT
PRINCIPAL PROCEDURE  Procedure: CT chest wo con  Findings and Treatment: NO PE visualized,   Moderate loculated right pleural effusion decreased from 5/31/2023.  Small left pleural effusion unchanged.       PRINCIPAL PROCEDURE  Procedure: CT chest wo con  Findings and Treatment: NO PE visualized,   Moderate loculated right pleural effusion decreased from 5/31/2023.  Small left pleural effusion unchanged.        SECONDARY PROCEDURE  Procedure: Abdomen CT  Findings and Treatment: Extensive peritoneal carcinomatosis and small to moderate ascites.  Fibroid uterus with either a left subserosal fibroid or a possible   adnexal mass.  Loculated moderate right pleural effusion and small left pleural   effusion, unchanged.      Procedure: Ultrasound of pelvic vessels  Findings and Treatment: Leiomyomatous uterus with calcified uterine myomas and calcified   pedunculated left adnexal myoma.  The endometrium and left ovary are not visualized.

## 2023-06-03 LAB
ALBUMIN SERPL ELPH-MCNC: 3.4 G/DL — SIGNIFICANT CHANGE UP (ref 3.3–5)
ALP SERPL-CCNC: 94 U/L — SIGNIFICANT CHANGE UP (ref 40–120)
ALT FLD-CCNC: 14 U/L — SIGNIFICANT CHANGE UP (ref 4–33)
ANION GAP SERPL CALC-SCNC: 12 MMOL/L — SIGNIFICANT CHANGE UP (ref 7–14)
AST SERPL-CCNC: 24 U/L — SIGNIFICANT CHANGE UP (ref 4–32)
BILIRUB SERPL-MCNC: 0.3 MG/DL — SIGNIFICANT CHANGE UP (ref 0.2–1.2)
BUN SERPL-MCNC: 13 MG/DL — SIGNIFICANT CHANGE UP (ref 7–23)
CALCIUM SERPL-MCNC: 9.2 MG/DL — SIGNIFICANT CHANGE UP (ref 8.4–10.5)
CHLORIDE SERPL-SCNC: 102 MMOL/L — SIGNIFICANT CHANGE UP (ref 98–107)
CO2 SERPL-SCNC: 24 MMOL/L — SIGNIFICANT CHANGE UP (ref 22–31)
CREAT SERPL-MCNC: 0.91 MG/DL — SIGNIFICANT CHANGE UP (ref 0.5–1.3)
EGFR: 73 ML/MIN/1.73M2 — SIGNIFICANT CHANGE UP
GLUCOSE SERPL-MCNC: 110 MG/DL — HIGH (ref 70–99)
HCT VFR BLD CALC: 36.5 % — SIGNIFICANT CHANGE UP (ref 34.5–45)
HGB BLD-MCNC: 11.8 G/DL — SIGNIFICANT CHANGE UP (ref 11.5–15.5)
MAGNESIUM SERPL-MCNC: 2.1 MG/DL — SIGNIFICANT CHANGE UP (ref 1.6–2.6)
MCHC RBC-ENTMCNC: 26.8 PG — LOW (ref 27–34)
MCHC RBC-ENTMCNC: 32.3 GM/DL — SIGNIFICANT CHANGE UP (ref 32–36)
MCV RBC AUTO: 83 FL — SIGNIFICANT CHANGE UP (ref 80–100)
NRBC # BLD: 0 /100 WBCS — SIGNIFICANT CHANGE UP (ref 0–0)
NRBC # FLD: 0 K/UL — SIGNIFICANT CHANGE UP (ref 0–0)
PHOSPHATE SERPL-MCNC: 3.3 MG/DL — SIGNIFICANT CHANGE UP (ref 2.5–4.5)
PLATELET # BLD AUTO: 222 K/UL — SIGNIFICANT CHANGE UP (ref 150–400)
POTASSIUM SERPL-MCNC: 4 MMOL/L — SIGNIFICANT CHANGE UP (ref 3.5–5.3)
POTASSIUM SERPL-SCNC: 4 MMOL/L — SIGNIFICANT CHANGE UP (ref 3.5–5.3)
PROT SERPL-MCNC: 7.3 G/DL — SIGNIFICANT CHANGE UP (ref 6–8.3)
RBC # BLD: 4.4 M/UL — SIGNIFICANT CHANGE UP (ref 3.8–5.2)
RBC # FLD: 12.2 % — SIGNIFICANT CHANGE UP (ref 10.3–14.5)
SODIUM SERPL-SCNC: 138 MMOL/L — SIGNIFICANT CHANGE UP (ref 135–145)
WBC # BLD: 5.45 K/UL — SIGNIFICANT CHANGE UP (ref 3.8–10.5)
WBC # FLD AUTO: 5.45 K/UL — SIGNIFICANT CHANGE UP (ref 3.8–10.5)

## 2023-06-03 PROCEDURE — 99233 SBSQ HOSP IP/OBS HIGH 50: CPT | Mod: GC

## 2023-06-03 RX ADMIN — ATORVASTATIN CALCIUM 40 MILLIGRAM(S): 80 TABLET, FILM COATED ORAL at 22:15

## 2023-06-03 RX ADMIN — HEPARIN SODIUM 5000 UNIT(S): 5000 INJECTION INTRAVENOUS; SUBCUTANEOUS at 05:45

## 2023-06-03 RX ADMIN — INSULIN GLARGINE 22 UNIT(S): 100 INJECTION, SOLUTION SUBCUTANEOUS at 22:14

## 2023-06-03 RX ADMIN — LOSARTAN POTASSIUM 50 MILLIGRAM(S): 100 TABLET, FILM COATED ORAL at 05:45

## 2023-06-03 RX ADMIN — HEPARIN SODIUM 5000 UNIT(S): 5000 INJECTION INTRAVENOUS; SUBCUTANEOUS at 22:15

## 2023-06-03 RX ADMIN — HEPARIN SODIUM 5000 UNIT(S): 5000 INJECTION INTRAVENOUS; SUBCUTANEOUS at 14:27

## 2023-06-03 NOTE — PROGRESS NOTE ADULT - PROBLEM SELECTOR PLAN 5
Hx of HTN on Olmesartan, c/w Losartan with hold paramters    LDL elevated, also diabetic, will start Lipitor Hx of HTN on Olmesartan, c/w Losartan with hold paramters  elevated LDL     c/w lipitor

## 2023-06-03 NOTE — PROGRESS NOTE ADULT - PROBLEM SELECTOR PLAN 1
Presents with 4 weeks of exertional dyspnea   CTA negative for PE, but positive for large R pleural effusion, trace L pleural effusion    very elevated, RVP negative  Pulm consult for diagnostic and therapeutic thoracentesis evaluation- s/p thoracentesis 6/1 with 1.2L removed, exudative effusion (LDH 1443/547, Protein 5.8/7.2), elevated TNC count and RBC  - f/u pleural fluid gram stain and culture- NGTD  - f/u cytopathology  - repeat CT chest and CTAP pending (further malignancy workup) Presents with 4 weeks of exertional dyspnea   CTA negative for PE, but positive for large R pleural effusion, trace L pleural effusion    very elevated, RVP negative  Pulm consult for diagnostic and therapeutic thoracentesis evaluation- s/p thoracentesis 6/1 with 1.2L removed, exudative effusion (LDH 1443/547, Protein 5.8/7.2), elevated TNC count and RBC  CT AP: Extensive peritoneal carcinomatosis and small to moderate ascites. Fibroid uterus with either a left subserosal fibroid or a possible adnexal mass. Loculated moderate right pleural effusion and small left pleural effusion, unchanged.    - f/u pleural fluid gram stain and culture- NGTD  - f/u cytopathology

## 2023-06-03 NOTE — PHYSICAL THERAPY INITIAL EVALUATION ADULT - PERTINENT HX OF CURRENT PROBLEM, REHAB EVAL
58-year-old female with past medical history of diabetes mellitus, hypertension, asthma (no history of intubations) presents with exertional dyspnea and chest pressure x 4 weeks. CTA with no PE, large R pleural effusion, trace L pleural effusion of unclear etiology.

## 2023-06-03 NOTE — CONSULT NOTE ADULT - SUBJECTIVE AND OBJECTIVE BOX
Vascular & Interventional Radiology Consult Note    Evaluate for Procedure: omental carcinomatosis biopsy    HPI: 58y Female with shortness of breath found to have pleural effusion s/p diagnostic thoracentesis by pulmonology 6/1. patient found to have extensive omental carcinomatosis. patient on ASA 81mg.    Allergies: Proventil HFA (Hives)  latex (Hives)    Medications (Abx/Cardiac/Anticoagulation/Blood Products)    heparin   Injectable: 5000 Unit(s) SubCutaneous (06-03 @ 05:45)  losartan: 50 milliGRAM(s) Oral (06-03 @ 05:45)    Data:    T(C): 37.1  HR: 95  BP: 132/78  RR: 18  SpO2: 96%    -WBC 5.45 / HgB 11.8 / Hct 36.5 / Plt 222  -Na 138 / Cl 102 / BUN 13 / Glucose 110  -K 4.0 / CO2 24 / Cr 0.91  -ALT 14 / Alk Phos 94 / T.Bili 0.3  -INR 1.16 / PTT 24.6      Imaging: CT A/P reviewed Vascular & Interventional Radiology Consult Note    Evaluate for Procedure: omental carcinomatosis biopsy    HPI: 58y Female with shortness of breath found to have pleural effusion s/p diagnostic thoracentesis by pulmonology 6/1. Patient found to have extensive omental carcinomatosis. Patient on ASA 81mg.    Allergies: Proventil HFA (Hives)  latex (Hives)    Medications (Abx/Cardiac/Anticoagulation/Blood Products)    heparin   Injectable: 5000 Unit(s) SubCutaneous (06-03 @ 05:45)  losartan: 50 milliGRAM(s) Oral (06-03 @ 05:45)    Data:    T(C): 37.1  HR: 95  BP: 132/78  RR: 18  SpO2: 96%    -WBC 5.45 / HgB 11.8 / Hct 36.5 / Plt 222  -Na 138 / Cl 102 / BUN 13 / Glucose 110  -K 4.0 / CO2 24 / Cr 0.91  -ALT 14 / Alk Phos 94 / T.Bili 0.3  -INR 1.16 / PTT 24.6      Imaging: CT A/P reviewed

## 2023-06-03 NOTE — CONSULT NOTE ADULT - ASSESSMENT
Assessment: 58y Female with shortness of breath found to have pleural effusion s/p diagnostic thoracentesis (cytopathology pending) and found to have extensive omental carcinomatosis. patient on ASA 81mg.    Plan: IR to tentatively perform omental carcinomatosis biopsy Thursday, 6/8  -hold ASA 81mg now for 5 days preprocedurally  -f/u cytopathology results of thoracentesis. if cyto is positive for malignancy, will cancel biopsy as it will not bee needed. if cyto is negative, will proceed with planned biopsy.  -Please place order for IR Procedure, approving attending Dr. Powell  -NPO past midnight prior to procedure  -hold A/C, as discussed above  -AM CBC, BMP, and coags  -IR Pre-procedure note  -discussed with primary team    Thomas Conner MD  PGY-V, Interventional Radiology    -Available on [employer] TEAMS for all non-urgent questions  -Emergent issues: [facility]-p.[phone]; [facility]-p.[phone] ([phone])  -Non-emergent consults: Please place a [facility] order "IR Consult" with an appropriate callback number  -Scheduling questions: [facility]: [phone]; [facility]: [phone]  -Clinic/Outpatient booking: [facility]: [phone]; [facility]: [phone] Assessment: 58y Female with shortness of breath found to have pleural effusion s/p diagnostic thoracentesis (cytopathology pending) and found to have extensive omental carcinomatosis. patient on ASA 81mg. Biopsy of omental carcinomatosis was requested.     Plan: IR to tentatively perform omental carcinomatosis biopsy Thursday, 6/8  -hold ASA 81mg now for 5 days pre-procedurally.  -f/u cytopathology results of thoracentesis. If cyto is positive for malignancy, will cancel biopsy as it will not be needed. If cyto is negative, will proceed with planned biopsy.  -Please place order for IR Procedure, approving attending Dr. Powell  -NPO past midnight prior to procedure  -hold A/C morning of procedure day, as well as antiplatelets as discussed above  -AM CBC, BMP, and coags  -IR Pre-procedure note  -discussed with primary team    Thomas Conner MD  PGY-V, Interventional Radiology    -Available on [employer] TEAMS for all non-urgent questions  -Emergent issues: [facility]-p.[phone]; [facility]-p.[phone] ([phone])  -Non-emergent consults: Please place a [place] order "IR Consult" with an appropriate callback number  -Scheduling questions: [facility]: [phone]; [facility]: [phone]  -Clinic/Outpatient booking: [facility]: [phone]; [facility]: [phone]

## 2023-06-03 NOTE — PHYSICAL THERAPY INITIAL EVALUATION ADULT - NSPTDISCHREC_GEN_A_CORE
Home with no skilled PT needs. Pt is at her functional baseline and will not be placed on PT program. Please reconsult if indicated./No skilled PT needs

## 2023-06-03 NOTE — PROGRESS NOTE ADULT - PROBLEM SELECTOR PLAN 6
Diet: CC  DVT: encourage ambulation, SCDs for now  Dispo: pending clinical improvement Diet: CC  DVT: heprin subq   Dispo: pending malignancy workup

## 2023-06-03 NOTE — PROGRESS NOTE ADULT - PROBLEM SELECTOR PLAN 2
Exertional cp that is non-radiating  ECG with no acute ischemic changes on admission, Trops negative (9)  BNP of 60- less likely CHF related  - TTE Echo for further eval- grossly normal LVSF, effusions unlikely to be CHF related marquise bec exudative

## 2023-06-03 NOTE — PROGRESS NOTE ADULT - ASSESSMENT
58-year-old female with past medical history of diabetes mellitus, hypertension, asthma (no history of intubations), also is Jehova's Witness, presents with exertional dyspnea and chest pressure x 4 weeks. CTA with no PE, large R pleural effusion, trace L pleural effusion of unclear etiology. S/p thoracentesis 6/1 with studies showing exudative effusion, elevated TNC and RBC count. Pending malignant/infectious workup

## 2023-06-03 NOTE — PHYSICAL THERAPY INITIAL EVALUATION ADULT - ADDITIONAL COMMENTS
Pt states she lives alone in a house with 3 steps to enter. Pt states her son comes to visit often. Prior to admission, pt ambulated independently without any assistive devices.  Post PT evaluation, pt left seated at edge of bed, alarm on, call bell and remote within reach, all precautions maintained, NAD. RN aware.

## 2023-06-03 NOTE — PROGRESS NOTE ADULT - SUBJECTIVE AND OBJECTIVE BOX
Patient is a 58y old  Female who presents with a chief complaint of Exertional chest pressure and dyspnea (02 Jun 2023 14:17)      SUBJECTIVE :      MEDICATIONS  (STANDING):  aspirin enteric coated 81 milliGRAM(s) Oral daily  atorvastatin 40 milliGRAM(s) Oral at bedtime  dextrose 5%. 1000 milliLiter(s) (50 mL/Hr) IV Continuous <Continuous>  dextrose 5%. 1000 milliLiter(s) (100 mL/Hr) IV Continuous <Continuous>  dextrose 50% Injectable 25 Gram(s) IV Push once  dextrose 50% Injectable 12.5 Gram(s) IV Push once  dextrose 50% Injectable 25 Gram(s) IV Push once  glucagon  Injectable 1 milliGRAM(s) IntraMuscular once  heparin   Injectable 5000 Unit(s) SubCutaneous every 8 hours  insulin glargine Injectable (LANTUS) 22 Unit(s) SubCutaneous at bedtime  insulin lispro (ADMELOG) corrective regimen sliding scale   SubCutaneous three times a day before meals  insulin lispro (ADMELOG) corrective regimen sliding scale   SubCutaneous at bedtime  losartan 50 milliGRAM(s) Oral daily    MEDICATIONS  (PRN):  acetaminophen     Tablet .. 650 milliGRAM(s) Oral every 6 hours PRN Temp greater or equal to 38C (100.4F), Mild Pain (1 - 3), Moderate Pain (4 - 6)  dextrose Oral Gel 15 Gram(s) Oral once PRN Blood Glucose LESS THAN 70 milliGRAM(s)/deciliter      CAPILLARY BLOOD GLUCOSE      POCT Blood Glucose.: 183 mg/dL (02 Jun 2023 21:22)  POCT Blood Glucose.: 150 mg/dL (02 Jun 2023 17:43)  POCT Blood Glucose.: 122 mg/dL (02 Jun 2023 13:20)  POCT Blood Glucose.: 119 mg/dL (02 Jun 2023 08:57)    I&O's Summary    01 Jun 2023 07:01  -  02 Jun 2023 07:00  --------------------------------------------------------  IN: 400 mL / OUT: 0 mL / NET: 400 mL    02 Jun 2023 07:01  -  03 Jun 2023 06:28  --------------------------------------------------------  IN: 500 mL / OUT: 0 mL / NET: 500 mL        PHYSICAL EXAM:  Vital Signs Last 24 Hrs  T(C): 37.1 (03 Jun 2023 05:42), Max: 37.1 (02 Jun 2023 21:20)  T(F): 98.7 (03 Jun 2023 05:42), Max: 98.7 (02 Jun 2023 21:20)  HR: 95 (03 Jun 2023 05:42) (94 - 97)  BP: 132/78 (03 Jun 2023 05:42) (119/76 - 138/88)  BP(mean): --  RR: 18 (03 Jun 2023 05:42) (16 - 18)  SpO2: 96% (03 Jun 2023 05:42) (92% - 100%)    Parameters below as of 03 Jun 2023 05:42  Patient On (Oxygen Delivery Method): room air        GENERAL: NAD, lying in bed comfortably  HEAD:  Atraumatic, Normocephalic  EYES: EOMI, PERRLA, conjunctiva and sclera clear  ENT: Moist mucous membranes  NECK: Supple, No JVD  CHEST/LUNG: decreased breath sounds over right lung fields, non-wheezing, Unlabored respirations  HEART: Regular rate and rhythm; No murmurs, rubs, or gallops  ABDOMEN: BSx4; Soft, nontender, nondistended  EXTREMITIES:  2+ Peripheral Pulses, brisk capillary refill. No clubbing, cyanosis, or edema  NERVOUS SYSTEM:  A&Ox3, no focal deficits   SKIN: No rashes or lesions  psych: normal behavior, normal affect      LABS:                        11.8   5.45  )-----------( 222      ( 03 Jun 2023 03:40 )             36.5     06-03    138  |  102  |  13  ----------------------------<  110<H>  4.0   |  24  |  0.91    Ca    9.2      03 Jun 2023 03:40  Phos  3.3     06-03  Mg     2.10     06-03    TPro  7.3  /  Alb  3.4  /  TBili  0.3  /  DBili  x   /  AST  24  /  ALT  14  /  AlkPhos  94  06-03    PT/INR - ( 01 Jun 2023 06:40 )   PT: 13.5 sec;   INR: 1.16 ratio         PTT - ( 01 Jun 2023 06:40 )  PTT:24.6 sec          Culture - Fungal, Body Fluid (collected 01 Jun 2023 18:34)  Source: Pleural Fl Pleural Fluid  Preliminary Report (02 Jun 2023 07:53):    Testing in progress    Culture - Body Fluid with Gram Stain (collected 01 Jun 2023 18:34)  Source: Pleural Fl Pleural Fluid  Gram Stain (01 Jun 2023 23:24):    polymorphonuclear leukocytes seen    No organisms seen    by cytocentrifuge  Preliminary Report (02 Jun 2023 17:38):    No growth        RADIOLOGY & ADDITIONAL TESTS:  Results Reviewed:   Imaging Personally Reviewed:  Electrocardiogram Personally Reviewed:    COORDINATION OF CARE:  Care Discussed with Consultants/Other Providers [Y/N]:  Prior or Outpatient Records Reviewed [Y/N]:   Patient is a 58y old  Female who presents with a chief complaint of Exertional chest pressure and dyspnea (02 Jun 2023 14:17)      SUBJECTIVE : NAEO. Pt seen and examined at bedside. Reports significant improvement in symptoms. Anxious about further workup. No bm since admission -- wants to try on her own today, Denies CP, SOB, fever/chills, dysuria, hematuria, nausea/vomiting,       MEDICATIONS  (STANDING):  aspirin enteric coated 81 milliGRAM(s) Oral daily  atorvastatin 40 milliGRAM(s) Oral at bedtime  dextrose 5%. 1000 milliLiter(s) (50 mL/Hr) IV Continuous <Continuous>  dextrose 5%. 1000 milliLiter(s) (100 mL/Hr) IV Continuous <Continuous>  dextrose 50% Injectable 25 Gram(s) IV Push once  dextrose 50% Injectable 12.5 Gram(s) IV Push once  dextrose 50% Injectable 25 Gram(s) IV Push once  glucagon  Injectable 1 milliGRAM(s) IntraMuscular once  heparin   Injectable 5000 Unit(s) SubCutaneous every 8 hours  insulin glargine Injectable (LANTUS) 22 Unit(s) SubCutaneous at bedtime  insulin lispro (ADMELOG) corrective regimen sliding scale   SubCutaneous three times a day before meals  insulin lispro (ADMELOG) corrective regimen sliding scale   SubCutaneous at bedtime  losartan 50 milliGRAM(s) Oral daily    MEDICATIONS  (PRN):  acetaminophen     Tablet .. 650 milliGRAM(s) Oral every 6 hours PRN Temp greater or equal to 38C (100.4F), Mild Pain (1 - 3), Moderate Pain (4 - 6)  dextrose Oral Gel 15 Gram(s) Oral once PRN Blood Glucose LESS THAN 70 milliGRAM(s)/deciliter      CAPILLARY BLOOD GLUCOSE      POCT Blood Glucose.: 183 mg/dL (02 Jun 2023 21:22)  POCT Blood Glucose.: 150 mg/dL (02 Jun 2023 17:43)  POCT Blood Glucose.: 122 mg/dL (02 Jun 2023 13:20)  POCT Blood Glucose.: 119 mg/dL (02 Jun 2023 08:57)    I&O's Summary    01 Jun 2023 07:01  -  02 Jun 2023 07:00  --------------------------------------------------------  IN: 400 mL / OUT: 0 mL / NET: 400 mL    02 Jun 2023 07:01  -  03 Jun 2023 06:28  --------------------------------------------------------  IN: 500 mL / OUT: 0 mL / NET: 500 mL        PHYSICAL EXAM:  Vital Signs Last 24 Hrs  T(C): 37.1 (03 Jun 2023 05:42), Max: 37.1 (02 Jun 2023 21:20)  T(F): 98.7 (03 Jun 2023 05:42), Max: 98.7 (02 Jun 2023 21:20)  HR: 95 (03 Jun 2023 05:42) (94 - 97)  BP: 132/78 (03 Jun 2023 05:42) (119/76 - 138/88)  BP(mean): --  RR: 18 (03 Jun 2023 05:42) (16 - 18)  SpO2: 96% (03 Jun 2023 05:42) (92% - 100%)    Parameters below as of 03 Jun 2023 05:42  Patient On (Oxygen Delivery Method): room air        GENERAL: NAD, lying in bed comfortably  HEAD:  Atraumatic, Normocephalic  EYES: EOMI, PERRLA, conjunctiva and sclera clear  ENT: Moist mucous membranes  NECK: Supple, No JVD  CHEST/LUNG: decreased breath sounds over right lung fields, non-wheezing, Unlabored respirations  HEART: Regular rate and rhythm; No murmurs, rubs, or gallops  ABDOMEN: BSx4; Soft, nontender, nondistended  EXTREMITIES:  2+ Peripheral Pulses, brisk capillary refill. No clubbing, cyanosis, or edema  NERVOUS SYSTEM:  A&Ox3, no focal deficits   SKIN: No rashes or lesions  psych: normal behavior, normal affect      LABS:                        11.8   5.45  )-----------( 222      ( 03 Jun 2023 03:40 )             36.5     06-03    138  |  102  |  13  ----------------------------<  110<H>  4.0   |  24  |  0.91    Ca    9.2      03 Jun 2023 03:40  Phos  3.3     06-03  Mg     2.10     06-03    TPro  7.3  /  Alb  3.4  /  TBili  0.3  /  DBili  x   /  AST  24  /  ALT  14  /  AlkPhos  94  06-03    PT/INR - ( 01 Jun 2023 06:40 )   PT: 13.5 sec;   INR: 1.16 ratio         PTT - ( 01 Jun 2023 06:40 )  PTT:24.6 sec          Culture - Fungal, Body Fluid (collected 01 Jun 2023 18:34)  Source: Pleural Fl Pleural Fluid  Preliminary Report (02 Jun 2023 07:53):    Testing in progress    Culture - Body Fluid with Gram Stain (collected 01 Jun 2023 18:34)  Source: Pleural Fl Pleural Fluid  Gram Stain (01 Jun 2023 23:24):    polymorphonuclear leukocytes seen    No organisms seen    by cytocentrifuge  Preliminary Report (02 Jun 2023 17:38):    No growth        RADIOLOGY & ADDITIONAL TESTS:  Results Reviewed:   Imaging Personally Reviewed:  Electrocardiogram Personally Reviewed:    COORDINATION OF CARE:  Care Discussed with Consultants/Other Providers [Y/N]:  Prior or Outpatient Records Reviewed [Y/N]:

## 2023-06-04 LAB
ALBUMIN SERPL ELPH-MCNC: 3.3 G/DL — SIGNIFICANT CHANGE UP (ref 3.3–5)
ALP SERPL-CCNC: 96 U/L — SIGNIFICANT CHANGE UP (ref 40–120)
ALT FLD-CCNC: 14 U/L — SIGNIFICANT CHANGE UP (ref 4–33)
ANION GAP SERPL CALC-SCNC: 5 MMOL/L — LOW (ref 7–14)
AST SERPL-CCNC: 19 U/L — SIGNIFICANT CHANGE UP (ref 4–32)
BILIRUB SERPL-MCNC: 0.3 MG/DL — SIGNIFICANT CHANGE UP (ref 0.2–1.2)
BUN SERPL-MCNC: 11 MG/DL — SIGNIFICANT CHANGE UP (ref 7–23)
CALCIUM SERPL-MCNC: 9.1 MG/DL — SIGNIFICANT CHANGE UP (ref 8.4–10.5)
CHLORIDE SERPL-SCNC: 109 MMOL/L — HIGH (ref 98–107)
CO2 SERPL-SCNC: 26 MMOL/L — SIGNIFICANT CHANGE UP (ref 22–31)
CREAT SERPL-MCNC: 0.88 MG/DL — SIGNIFICANT CHANGE UP (ref 0.5–1.3)
EGFR: 76 ML/MIN/1.73M2 — SIGNIFICANT CHANGE UP
GLUCOSE SERPL-MCNC: 90 MG/DL — SIGNIFICANT CHANGE UP (ref 70–99)
HCT VFR BLD CALC: 37.6 % — SIGNIFICANT CHANGE UP (ref 34.5–45)
HGB BLD-MCNC: 12.1 G/DL — SIGNIFICANT CHANGE UP (ref 11.5–15.5)
MAGNESIUM SERPL-MCNC: 2.4 MG/DL — SIGNIFICANT CHANGE UP (ref 1.6–2.6)
MCHC RBC-ENTMCNC: 27 PG — SIGNIFICANT CHANGE UP (ref 27–34)
MCHC RBC-ENTMCNC: 32.2 GM/DL — SIGNIFICANT CHANGE UP (ref 32–36)
MCV RBC AUTO: 83.9 FL — SIGNIFICANT CHANGE UP (ref 80–100)
NRBC # BLD: 0 /100 WBCS — SIGNIFICANT CHANGE UP (ref 0–0)
NRBC # FLD: 0 K/UL — SIGNIFICANT CHANGE UP (ref 0–0)
PHOSPHATE SERPL-MCNC: 3.3 MG/DL — SIGNIFICANT CHANGE UP (ref 2.5–4.5)
PLATELET # BLD AUTO: 239 K/UL — SIGNIFICANT CHANGE UP (ref 150–400)
POTASSIUM SERPL-MCNC: 4 MMOL/L — SIGNIFICANT CHANGE UP (ref 3.5–5.3)
POTASSIUM SERPL-SCNC: 4 MMOL/L — SIGNIFICANT CHANGE UP (ref 3.5–5.3)
PROT SERPL-MCNC: 7.3 G/DL — SIGNIFICANT CHANGE UP (ref 6–8.3)
RBC # BLD: 4.48 M/UL — SIGNIFICANT CHANGE UP (ref 3.8–5.2)
RBC # FLD: 12.5 % — SIGNIFICANT CHANGE UP (ref 10.3–14.5)
SODIUM SERPL-SCNC: 140 MMOL/L — SIGNIFICANT CHANGE UP (ref 135–145)
WBC # BLD: 5.04 K/UL — SIGNIFICANT CHANGE UP (ref 3.8–10.5)
WBC # FLD AUTO: 5.04 K/UL — SIGNIFICANT CHANGE UP (ref 3.8–10.5)

## 2023-06-04 PROCEDURE — 99232 SBSQ HOSP IP/OBS MODERATE 35: CPT | Mod: GC

## 2023-06-04 PROCEDURE — 76856 US EXAM PELVIC COMPLETE: CPT | Mod: 26

## 2023-06-04 RX ORDER — POLYETHYLENE GLYCOL 3350 17 G/17G
17 POWDER, FOR SOLUTION ORAL DAILY
Refills: 0 | Status: DISCONTINUED | OUTPATIENT
Start: 2023-06-04 | End: 2023-06-09

## 2023-06-04 RX ORDER — SENNA PLUS 8.6 MG/1
2 TABLET ORAL AT BEDTIME
Refills: 0 | Status: DISCONTINUED | OUTPATIENT
Start: 2023-06-04 | End: 2023-06-09

## 2023-06-04 RX ADMIN — HEPARIN SODIUM 5000 UNIT(S): 5000 INJECTION INTRAVENOUS; SUBCUTANEOUS at 06:33

## 2023-06-04 RX ADMIN — Medication 1: at 13:00

## 2023-06-04 RX ADMIN — HEPARIN SODIUM 5000 UNIT(S): 5000 INJECTION INTRAVENOUS; SUBCUTANEOUS at 13:00

## 2023-06-04 RX ADMIN — LOSARTAN POTASSIUM 50 MILLIGRAM(S): 100 TABLET, FILM COATED ORAL at 06:33

## 2023-06-04 RX ADMIN — INSULIN GLARGINE 22 UNIT(S): 100 INJECTION, SOLUTION SUBCUTANEOUS at 21:41

## 2023-06-04 RX ADMIN — SENNA PLUS 2 TABLET(S): 8.6 TABLET ORAL at 21:41

## 2023-06-04 RX ADMIN — HEPARIN SODIUM 5000 UNIT(S): 5000 INJECTION INTRAVENOUS; SUBCUTANEOUS at 21:41

## 2023-06-04 RX ADMIN — ATORVASTATIN CALCIUM 40 MILLIGRAM(S): 80 TABLET, FILM COATED ORAL at 21:41

## 2023-06-04 NOTE — PROGRESS NOTE ADULT - PROBLEM SELECTOR PLAN 6
Diet: CC  DVT: heprin subq   Dispo: pending malignancy workup Diet: CC  DVT: heprin subq   Dispo: pending malignancy workup  PT: no skilled needs. Ambulatory oxygen 92%

## 2023-06-04 NOTE — PROGRESS NOTE ADULT - SUBJECTIVE AND OBJECTIVE BOX
Patient is a 58y old  Female who presents with a chief complaint of Exertional chest pressure and dyspnea (02 Jun 2023 14:17)      SUBJECTIVE : NAEO. Pt seen and examined at bedside. Reports significant improvement in symptoms. Anxious about further workup. No bm since admission -- wants to try on her own today, Denies CP, SOB, fever/chills, dysuria, hematuria, nausea/vomiting,       MEDICATIONS  (STANDING):  aspirin enteric coated 81 milliGRAM(s) Oral daily  atorvastatin 40 milliGRAM(s) Oral at bedtime  dextrose 5%. 1000 milliLiter(s) (50 mL/Hr) IV Continuous <Continuous>  dextrose 5%. 1000 milliLiter(s) (100 mL/Hr) IV Continuous <Continuous>  dextrose 50% Injectable 25 Gram(s) IV Push once  dextrose 50% Injectable 12.5 Gram(s) IV Push once  dextrose 50% Injectable 25 Gram(s) IV Push once  glucagon  Injectable 1 milliGRAM(s) IntraMuscular once  heparin   Injectable 5000 Unit(s) SubCutaneous every 8 hours  insulin glargine Injectable (LANTUS) 22 Unit(s) SubCutaneous at bedtime  insulin lispro (ADMELOG) corrective regimen sliding scale   SubCutaneous three times a day before meals  insulin lispro (ADMELOG) corrective regimen sliding scale   SubCutaneous at bedtime  losartan 50 milliGRAM(s) Oral daily    MEDICATIONS  (PRN):  acetaminophen     Tablet .. 650 milliGRAM(s) Oral every 6 hours PRN Temp greater or equal to 38C (100.4F), Mild Pain (1 - 3), Moderate Pain (4 - 6)  dextrose Oral Gel 15 Gram(s) Oral once PRN Blood Glucose LESS THAN 70 milliGRAM(s)/deciliter      CAPILLARY BLOOD GLUCOSE      POCT Blood Glucose.: 183 mg/dL (02 Jun 2023 21:22)  POCT Blood Glucose.: 150 mg/dL (02 Jun 2023 17:43)  POCT Blood Glucose.: 122 mg/dL (02 Jun 2023 13:20)  POCT Blood Glucose.: 119 mg/dL (02 Jun 2023 08:57)    I&O's Summary    01 Jun 2023 07:01  -  02 Jun 2023 07:00  --------------------------------------------------------  IN: 400 mL / OUT: 0 mL / NET: 400 mL    02 Jun 2023 07:01  -  03 Jun 2023 06:28  --------------------------------------------------------  IN: 500 mL / OUT: 0 mL / NET: 500 mL        PHYSICAL EXAM:  Vital Signs Last 24 Hrs  T(C): 37.1 (03 Jun 2023 05:42), Max: 37.1 (02 Jun 2023 21:20)  T(F): 98.7 (03 Jun 2023 05:42), Max: 98.7 (02 Jun 2023 21:20)  HR: 95 (03 Jun 2023 05:42) (94 - 97)  BP: 132/78 (03 Jun 2023 05:42) (119/76 - 138/88)  BP(mean): --  RR: 18 (03 Jun 2023 05:42) (16 - 18)  SpO2: 96% (03 Jun 2023 05:42) (92% - 100%)    Parameters below as of 03 Jun 2023 05:42  Patient On (Oxygen Delivery Method): room air        GENERAL: NAD, lying in bed comfortably  HEAD:  Atraumatic, Normocephalic  EYES: EOMI, PERRLA, conjunctiva and sclera clear  ENT: Moist mucous membranes  NECK: Supple, No JVD  CHEST/LUNG: decreased breath sounds over right lung fields, non-wheezing, Unlabored respirations  HEART: Regular rate and rhythm; No murmurs, rubs, or gallops  ABDOMEN: BSx4; Soft, nontender, nondistended  EXTREMITIES:  2+ Peripheral Pulses, brisk capillary refill. No clubbing, cyanosis, or edema  NERVOUS SYSTEM:  A&Ox3, no focal deficits   SKIN: No rashes or lesions  psych: normal behavior, normal affect      LABS:                        11.8   5.45  )-----------( 222      ( 03 Jun 2023 03:40 )             36.5     06-03    138  |  102  |  13  ----------------------------<  110<H>  4.0   |  24  |  0.91    Ca    9.2      03 Jun 2023 03:40  Phos  3.3     06-03  Mg     2.10     06-03    TPro  7.3  /  Alb  3.4  /  TBili  0.3  /  DBili  x   /  AST  24  /  ALT  14  /  AlkPhos  94  06-03    PT/INR - ( 01 Jun 2023 06:40 )   PT: 13.5 sec;   INR: 1.16 ratio         PTT - ( 01 Jun 2023 06:40 )  PTT:24.6 sec          Culture - Fungal, Body Fluid (collected 01 Jun 2023 18:34)  Source: Pleural Fl Pleural Fluid  Preliminary Report (02 Jun 2023 07:53):    Testing in progress    Culture - Body Fluid with Gram Stain (collected 01 Jun 2023 18:34)  Source: Pleural Fl Pleural Fluid  Gram Stain (01 Jun 2023 23:24):    polymorphonuclear leukocytes seen    No organisms seen    by cytocentrifuge  Preliminary Report (02 Jun 2023 17:38):    No growth        RADIOLOGY & ADDITIONAL TESTS:  Results Reviewed:   Imaging Personally Reviewed:  Electrocardiogram Personally Reviewed:    COORDINATION OF CARE:  Care Discussed with Consultants/Other Providers [Y/N]:  Prior or Outpatient Records Reviewed [Y/N]:   Patient is a 58y old  Female who presents with a chief complaint of Exertional chest pressure and dyspnea (02 Jun 2023 14:17)      SUBJECTIVE : NAEO. Pt seen and examined at bedside. Reports significant improvement in symptoms. Anxious about further workup. No bm since admission- started bowel regimen. Denies dyspnea, cp, abdominal pain in AM.       MEDICATIONS  (STANDING):  aspirin enteric coated 81 milliGRAM(s) Oral daily  atorvastatin 40 milliGRAM(s) Oral at bedtime  dextrose 5%. 1000 milliLiter(s) (50 mL/Hr) IV Continuous <Continuous>  dextrose 5%. 1000 milliLiter(s) (100 mL/Hr) IV Continuous <Continuous>  dextrose 50% Injectable 25 Gram(s) IV Push once  dextrose 50% Injectable 12.5 Gram(s) IV Push once  dextrose 50% Injectable 25 Gram(s) IV Push once  glucagon  Injectable 1 milliGRAM(s) IntraMuscular once  heparin   Injectable 5000 Unit(s) SubCutaneous every 8 hours  insulin glargine Injectable (LANTUS) 22 Unit(s) SubCutaneous at bedtime  insulin lispro (ADMELOG) corrective regimen sliding scale   SubCutaneous three times a day before meals  insulin lispro (ADMELOG) corrective regimen sliding scale   SubCutaneous at bedtime  losartan 50 milliGRAM(s) Oral daily    MEDICATIONS  (PRN):  acetaminophen     Tablet .. 650 milliGRAM(s) Oral every 6 hours PRN Temp greater or equal to 38C (100.4F), Mild Pain (1 - 3), Moderate Pain (4 - 6)  dextrose Oral Gel 15 Gram(s) Oral once PRN Blood Glucose LESS THAN 70 milliGRAM(s)/deciliter      CAPILLARY BLOOD GLUCOSE      POCT Blood Glucose.: 183 mg/dL (02 Jun 2023 21:22)  POCT Blood Glucose.: 150 mg/dL (02 Jun 2023 17:43)  POCT Blood Glucose.: 122 mg/dL (02 Jun 2023 13:20)  POCT Blood Glucose.: 119 mg/dL (02 Jun 2023 08:57)    I&O's Summary    01 Jun 2023 07:01  -  02 Jun 2023 07:00  --------------------------------------------------------  IN: 400 mL / OUT: 0 mL / NET: 400 mL    02 Jun 2023 07:01  -  03 Jun 2023 06:28  --------------------------------------------------------  IN: 500 mL / OUT: 0 mL / NET: 500 mL        PHYSICAL EXAM:  Vital Signs Last 24 Hrs  T(C): 37.1 (03 Jun 2023 05:42), Max: 37.1 (02 Jun 2023 21:20)  T(F): 98.7 (03 Jun 2023 05:42), Max: 98.7 (02 Jun 2023 21:20)  HR: 95 (03 Jun 2023 05:42) (94 - 97)  BP: 132/78 (03 Jun 2023 05:42) (119/76 - 138/88)  BP(mean): --  RR: 18 (03 Jun 2023 05:42) (16 - 18)  SpO2: 96% (03 Jun 2023 05:42) (92% - 100%)    Parameters below as of 03 Jun 2023 05:42  Patient On (Oxygen Delivery Method): room air        GENERAL: NAD, lying in bed comfortably  HEAD:  Atraumatic, Normocephalic  EYES: EOMI, PERRLA, conjunctiva and sclera clear  ENT: Moist mucous membranes  NECK: Supple, No JVD  CHEST/LUNG: decreased breath sounds over right lung fields, non-wheezing, Unlabored respirations  HEART: Regular rate and rhythm; No murmurs, rubs, or gallops  ABDOMEN: BSx4; Soft, nontender, nondistended  EXTREMITIES:  2+ Peripheral Pulses, brisk capillary refill. No clubbing, cyanosis, or edema  NERVOUS SYSTEM:  A&Ox3, no focal deficits   SKIN: No rashes or lesions  psych: normal behavior, normal affect      LABS:                        11.8   5.45  )-----------( 222      ( 03 Jun 2023 03:40 )             36.5     06-03    138  |  102  |  13  ----------------------------<  110<H>  4.0   |  24  |  0.91    Ca    9.2      03 Jun 2023 03:40  Phos  3.3     06-03  Mg     2.10     06-03    TPro  7.3  /  Alb  3.4  /  TBili  0.3  /  DBili  x   /  AST  24  /  ALT  14  /  AlkPhos  94  06-03    PT/INR - ( 01 Jun 2023 06:40 )   PT: 13.5 sec;   INR: 1.16 ratio         PTT - ( 01 Jun 2023 06:40 )  PTT:24.6 sec          Culture - Fungal, Body Fluid (collected 01 Jun 2023 18:34)  Source: Pleural Fl Pleural Fluid  Preliminary Report (02 Jun 2023 07:53):    Testing in progress    Culture - Body Fluid with Gram Stain (collected 01 Jun 2023 18:34)  Source: Pleural Fl Pleural Fluid  Gram Stain (01 Jun 2023 23:24):    polymorphonuclear leukocytes seen    No organisms seen    by cytocentrifuge  Preliminary Report (02 Jun 2023 17:38):    No growth        RADIOLOGY & ADDITIONAL TESTS:  Results Reviewed:   Imaging Personally Reviewed:  Electrocardiogram Personally Reviewed:    COORDINATION OF CARE:  Care Discussed with Consultants/Other Providers [Y/N]:  Prior or Outpatient Records Reviewed [Y/N]:

## 2023-06-04 NOTE — PROGRESS NOTE ADULT - PROBLEM SELECTOR PLAN 1
Presents with 4 weeks of exertional dyspnea   CTA negative for PE, but positive for large R pleural effusion, trace L pleural effusion    very elevated, RVP negative  Pulm consult for diagnostic and therapeutic thoracentesis evaluation- s/p thoracentesis 6/1 with 1.2L removed, exudative effusion (LDH 1443/547, Protein 5.8/7.2), elevated TNC count and RBC  CT AP: Extensive peritoneal carcinomatosis and small to moderate ascites. Fibroid uterus with either a left subserosal fibroid or a possible adnexal mass. Loculated moderate right pleural effusion and small left pleural effusion, unchanged.    - f/u pleural fluid gram stain and culture- NGTD  - f/u cytopathology Presents with 4 weeks of exertional dyspnea   CTA negative for PE, but positive for large R pleural effusion, trace L pleural effusion    very elevated, RVP negative  Pulm consult for diagnostic and therapeutic thoracentesis evaluation- s/p thoracentesis 6/1 with 1.2L removed, exudative effusion (LDH 1443/547, Protein 5.8/7.2), elevated TNC count and RBC  CT AP: Extensive peritoneal carcinomatosis and small to moderate ascites. Fibroid uterus with either a left subserosal fibroid or a possible adnexal mass. Loculated moderate right pleural effusion and small left pleural effusion, unchanged.    - f/u pleural fluid gram stain and culture- NGTD  - f/u cytopathology  - Onc consulted  - IR plan for omental carcinomatosis Bx 6/8 if cytopath from thoracentesis is unremarkable

## 2023-06-05 ENCOUNTER — NON-APPOINTMENT (OUTPATIENT)
Age: 58
End: 2023-06-05

## 2023-06-05 DIAGNOSIS — C78.6 SECONDARY MALIGNANT NEOPLASM OF RETROPERITONEUM AND PERITONEUM: ICD-10-CM

## 2023-06-05 LAB
ALBUMIN SERPL ELPH-MCNC: 3.4 G/DL — SIGNIFICANT CHANGE UP (ref 3.3–5)
ALP SERPL-CCNC: 96 U/L — SIGNIFICANT CHANGE UP (ref 40–120)
ALT FLD-CCNC: 12 U/L — SIGNIFICANT CHANGE UP (ref 4–33)
ANION GAP SERPL CALC-SCNC: 11 MMOL/L — SIGNIFICANT CHANGE UP (ref 7–14)
AST SERPL-CCNC: 25 U/L — SIGNIFICANT CHANGE UP (ref 4–32)
BILIRUB SERPL-MCNC: 0.3 MG/DL — SIGNIFICANT CHANGE UP (ref 0.2–1.2)
BUN SERPL-MCNC: 11 MG/DL — SIGNIFICANT CHANGE UP (ref 7–23)
CALCIUM SERPL-MCNC: 9.4 MG/DL — SIGNIFICANT CHANGE UP (ref 8.4–10.5)
CHLORIDE SERPL-SCNC: 103 MMOL/L — SIGNIFICANT CHANGE UP (ref 98–107)
CO2 SERPL-SCNC: 28 MMOL/L — SIGNIFICANT CHANGE UP (ref 22–31)
CREAT SERPL-MCNC: 0.91 MG/DL — SIGNIFICANT CHANGE UP (ref 0.5–1.3)
EGFR: 73 ML/MIN/1.73M2 — SIGNIFICANT CHANGE UP
GLUCOSE BLDC GLUCOMTR-MCNC: 114 MG/DL — HIGH (ref 70–99)
GLUCOSE BLDC GLUCOMTR-MCNC: 133 MG/DL — HIGH (ref 70–99)
GLUCOSE SERPL-MCNC: 90 MG/DL — SIGNIFICANT CHANGE UP (ref 70–99)
HAV IGM SER-ACNC: SIGNIFICANT CHANGE UP
HBV CORE IGM SER-ACNC: SIGNIFICANT CHANGE UP
HBV SURFACE AG SER-ACNC: SIGNIFICANT CHANGE UP
HCT VFR BLD CALC: 37.7 % — SIGNIFICANT CHANGE UP (ref 34.5–45)
HCV AB S/CO SERPL IA: 0.15 S/CO — SIGNIFICANT CHANGE UP (ref 0–0.99)
HCV AB SERPL-IMP: SIGNIFICANT CHANGE UP
HGB BLD-MCNC: 11.9 G/DL — SIGNIFICANT CHANGE UP (ref 11.5–15.5)
HIV 1+2 AB+HIV1 P24 AG SERPL QL IA: SIGNIFICANT CHANGE UP
MAGNESIUM SERPL-MCNC: 2.1 MG/DL — SIGNIFICANT CHANGE UP (ref 1.6–2.6)
MCHC RBC-ENTMCNC: 26.7 PG — LOW (ref 27–34)
MCHC RBC-ENTMCNC: 31.6 GM/DL — LOW (ref 32–36)
MCV RBC AUTO: 84.5 FL — SIGNIFICANT CHANGE UP (ref 80–100)
NON-GYNECOLOGICAL CYTOLOGY STUDY: SIGNIFICANT CHANGE UP
NRBC # BLD: 0 /100 WBCS — SIGNIFICANT CHANGE UP (ref 0–0)
NRBC # FLD: 0 K/UL — SIGNIFICANT CHANGE UP (ref 0–0)
PHOSPHATE SERPL-MCNC: 3.4 MG/DL — SIGNIFICANT CHANGE UP (ref 2.5–4.5)
PLATELET # BLD AUTO: 237 K/UL — SIGNIFICANT CHANGE UP (ref 150–400)
POTASSIUM SERPL-MCNC: 4.4 MMOL/L — SIGNIFICANT CHANGE UP (ref 3.5–5.3)
POTASSIUM SERPL-SCNC: 4.4 MMOL/L — SIGNIFICANT CHANGE UP (ref 3.5–5.3)
PROT SERPL-MCNC: 7.5 G/DL — SIGNIFICANT CHANGE UP (ref 6–8.3)
RBC # BLD: 4.46 M/UL — SIGNIFICANT CHANGE UP (ref 3.8–5.2)
RBC # FLD: 12.2 % — SIGNIFICANT CHANGE UP (ref 10.3–14.5)
SODIUM SERPL-SCNC: 142 MMOL/L — SIGNIFICANT CHANGE UP (ref 135–145)
WBC # BLD: 5.76 K/UL — SIGNIFICANT CHANGE UP (ref 3.8–10.5)
WBC # FLD AUTO: 5.76 K/UL — SIGNIFICANT CHANGE UP (ref 3.8–10.5)

## 2023-06-05 PROCEDURE — 99233 SBSQ HOSP IP/OBS HIGH 50: CPT | Mod: GC

## 2023-06-05 PROCEDURE — 99232 SBSQ HOSP IP/OBS MODERATE 35: CPT | Mod: GC

## 2023-06-05 RX ORDER — INSULIN GLARGINE 100 [IU]/ML
22 INJECTION, SOLUTION SUBCUTANEOUS AT BEDTIME
Refills: 0 | Status: DISCONTINUED | OUTPATIENT
Start: 2023-06-06 | End: 2023-06-07

## 2023-06-05 RX ORDER — INSULIN GLARGINE 100 [IU]/ML
11 INJECTION, SOLUTION SUBCUTANEOUS ONCE
Refills: 0 | Status: COMPLETED | OUTPATIENT
Start: 2023-06-05 | End: 2023-06-05

## 2023-06-05 RX ADMIN — ATORVASTATIN CALCIUM 40 MILLIGRAM(S): 80 TABLET, FILM COATED ORAL at 22:30

## 2023-06-05 RX ADMIN — LOSARTAN POTASSIUM 50 MILLIGRAM(S): 100 TABLET, FILM COATED ORAL at 06:52

## 2023-06-05 RX ADMIN — HEPARIN SODIUM 5000 UNIT(S): 5000 INJECTION INTRAVENOUS; SUBCUTANEOUS at 06:52

## 2023-06-05 RX ADMIN — HEPARIN SODIUM 5000 UNIT(S): 5000 INJECTION INTRAVENOUS; SUBCUTANEOUS at 13:08

## 2023-06-05 RX ADMIN — INSULIN GLARGINE 11 UNIT(S): 100 INJECTION, SOLUTION SUBCUTANEOUS at 23:57

## 2023-06-05 NOTE — PROGRESS NOTE ADULT - PROBLEM SELECTOR PLAN 3
Hx of Type 2DM, on Lantus 28U, Humalog 5-10U TID with meals  A1c 6.8  will continue 22 Lantus, ISS Exertional cp that is non-radiating  ECG with no acute ischemic changes on admission, Trops negative (9)  BNP of 60- less likely CHF related  - TTE Echo for further eval- grossly normal LVSF, effusions unlikely to be CHF related marquise bec exudative

## 2023-06-05 NOTE — PROGRESS NOTE ADULT - SUBJECTIVE AND OBJECTIVE BOX
Interval Events:  - remains on room air  - feels sob is significantly improved while ambulating  - pleural fluid cyto w/ malignant cells    REVIEW OF SYSTEMS:  Negative except as documented above.      OBJECTIVE:  ICU Vital Signs Last 24 Hrs  T(C): 36.4 (05 Jun 2023 13:15), Max: 37.3 (04 Jun 2023 22:04)  T(F): 97.5 (05 Jun 2023 13:15), Max: 99.2 (04 Jun 2023 22:04)  HR: 95 (05 Jun 2023 13:15) (79 - 95)  BP: 110/77 (05 Jun 2023 13:15) (110/77 - 144/93)  BP(mean): --  ABP: --  ABP(mean): --  RR: 17 (05 Jun 2023 13:15) (16 - 17)  SpO2: 98% (05 Jun 2023 13:15) (94% - 98%)    O2 Parameters below as of 05 Jun 2023 13:15  Patient On (Oxygen Delivery Method): room air              CAPILLARY BLOOD GLUCOSE      POCT Blood Glucose.: 125 mg/dL (05 Jun 2023 12:19)      PHYSICAL EXAM:  General: NAD  HEENT: PERRL, EOMI, sclera anicteric, moist mucus membranes  Neck: supple, no JVD  Cardiovascular: RRR, no murmurs, rubs, or gallops  Respiratory: L side clear, R decreased breath sounds  Abdomen: soft, nontender, nondistended, normoactive bowel sounds  Extremities: warm and well perfused, no edema, no clubbing  Skin: no rashes  Neurological: Aox3, no focal deficits    HOSPITAL MEDICATIONS:  MEDICATIONS  (STANDING):  atorvastatin 40 milliGRAM(s) Oral at bedtime  dextrose 5%. 1000 milliLiter(s) (100 mL/Hr) IV Continuous <Continuous>  dextrose 5%. 1000 milliLiter(s) (50 mL/Hr) IV Continuous <Continuous>  dextrose 50% Injectable 25 Gram(s) IV Push once  dextrose 50% Injectable 12.5 Gram(s) IV Push once  dextrose 50% Injectable 25 Gram(s) IV Push once  glucagon  Injectable 1 milliGRAM(s) IntraMuscular once  insulin glargine Injectable (LANTUS) 22 Unit(s) SubCutaneous at bedtime  insulin lispro (ADMELOG) corrective regimen sliding scale   SubCutaneous three times a day before meals  insulin lispro (ADMELOG) corrective regimen sliding scale   SubCutaneous at bedtime  losartan 50 milliGRAM(s) Oral daily  polyethylene glycol 3350 17 Gram(s) Oral daily  senna 2 Tablet(s) Oral at bedtime    MEDICATIONS  (PRN):  acetaminophen     Tablet .. 650 milliGRAM(s) Oral every 6 hours PRN Temp greater or equal to 38C (100.4F), Mild Pain (1 - 3), Moderate Pain (4 - 6)  dextrose Oral Gel 15 Gram(s) Oral once PRN Blood Glucose LESS THAN 70 milliGRAM(s)/deciliter      LABS:                        11.9   5.76  )-----------( 237      ( 05 Jun 2023 05:29 )             37.7     Hgb Trend: 11.9<--, 12.1<--, 11.8<--, 11.5<--, 11.9<--  06-05    142  |  103  |  11  ----------------------------<  90  4.4   |  28  |  0.91    Ca    9.4      05 Jun 2023 05:29  Phos  3.4     06-05  Mg     2.10     06-05    TPro  7.5  /  Alb  3.4  /  TBili  0.3  /  DBili  x   /  AST  25  /  ALT  12  /  AlkPhos  96  06-05    Creatinine Trend: 0.91<--, 0.88<--, 0.91<--, 1.03<--, 0.81<--, 0.87<--            MICROBIOLOGY:       RADIOLOGY:  [x] Reviewed and interpreted by me

## 2023-06-05 NOTE — PROGRESS NOTE ADULT - PROBLEM SELECTOR PLAN 6
Diet: CC  DVT: heprin subq   Dispo: pending malignancy workup  PT: no skilled needs. Ambulatory oxygen 92% Hx of HTN on Olmesartan, c/w Losartan with hold paramters  elevated LDL     c/w lipitor

## 2023-06-05 NOTE — PROGRESS NOTE ADULT - PROBLEM SELECTOR PLAN 4
- asthma on Albuterol at home  - Albuterol PRN Hx of Type 2DM, on Lantus 28U, Humalog 5-10U TID with meals  A1c 6.8  will continue 22 Lantus, ISS

## 2023-06-05 NOTE — PROGRESS NOTE ADULT - ASSESSMENT
no 58-year-old female with past medical history of diabetes mellitus, hypertension, asthma (no history of intubations), also is [employer], presents with exertional dyspnea and chest pressure x 4 weeks. CTA with no PE, large R pleural effusion, trace L pleural effusion of unclear etiology. S/p thoracentesis 6/1 with studies showing exudative effusion, elevated TNC and RBC count. Pending malignant/infectious workup

## 2023-06-05 NOTE — PROGRESS NOTE ADULT - PROBLEM SELECTOR PLAN 2
Exertional cp that is non-radiating  ECG with no acute ischemic changes on admission, Trops negative (9)  BNP of 59- less likely CHF related  - TTE Echo for further eval- grossly normal LVSF, effusions unlikely to be CHF related marquise bec exudative Presents with 4 weeks of exertional dyspnea   CTA negative for PE, but positive for large R pleural effusion, trace L pleural effusion    very elevated, RVP negative  Pulm consult for diagnostic and therapeutic thoracentesis evaluation- s/p thoracentesis 6/1 with 1.2L removed, exudative effusion (LDH 1443/547, Protein 5.8/7.2), elevated TNC count and RBC  CT AP: Extensive peritoneal carcinomatosis and small to moderate ascites. Fibroid uterus with either a left subserosal fibroid or a possible adnexal mass. Loculated moderate right pleural effusion and small left pleural effusion, unchanged.    - f/u pleural fluid gram stain and culture- NGTD  - f/u cytopathology  - Onc consulted  - IR plan for omental carcinomatosis Bx 6/8 if cytopath from thoracentesis is unremarkable

## 2023-06-05 NOTE — PROGRESS NOTE ADULT - SUBJECTIVE AND OBJECTIVE BOX
Patient is a 58y old  Female who presents with a chief complaint of Exertional chest pressure and dyspnea (02 Jun 2023 14:17)      SUBJECTIVE : NAEO. Pt seen and examined at bedside. Reports significant improvement in symptoms. Anxious about further workup. No bm since admission- started bowel regimen. Denies dyspnea, cp, abdominal pain in AM.       MEDICATIONS  (STANDING):  aspirin enteric coated 81 milliGRAM(s) Oral daily  atorvastatin 40 milliGRAM(s) Oral at bedtime  dextrose 5%. 1000 milliLiter(s) (50 mL/Hr) IV Continuous <Continuous>  dextrose 5%. 1000 milliLiter(s) (100 mL/Hr) IV Continuous <Continuous>  dextrose 50% Injectable 25 Gram(s) IV Push once  dextrose 50% Injectable 12.5 Gram(s) IV Push once  dextrose 50% Injectable 25 Gram(s) IV Push once  glucagon  Injectable 1 milliGRAM(s) IntraMuscular once  heparin   Injectable 5000 Unit(s) SubCutaneous every 8 hours  insulin glargine Injectable (LANTUS) 22 Unit(s) SubCutaneous at bedtime  insulin lispro (ADMELOG) corrective regimen sliding scale   SubCutaneous three times a day before meals  insulin lispro (ADMELOG) corrective regimen sliding scale   SubCutaneous at bedtime  losartan 50 milliGRAM(s) Oral daily    MEDICATIONS  (PRN):  acetaminophen     Tablet .. 650 milliGRAM(s) Oral every 6 hours PRN Temp greater or equal to 38C (100.4F), Mild Pain (1 - 3), Moderate Pain (4 - 6)  dextrose Oral Gel 15 Gram(s) Oral once PRN Blood Glucose LESS THAN 70 milliGRAM(s)/deciliter      CAPILLARY BLOOD GLUCOSE      POCT Blood Glucose.: 183 mg/dL (02 Jun 2023 21:22)  POCT Blood Glucose.: 150 mg/dL (02 Jun 2023 17:43)  POCT Blood Glucose.: 122 mg/dL (02 Jun 2023 13:20)  POCT Blood Glucose.: 119 mg/dL (02 Jun 2023 08:57)    I&O's Summary    01 Jun 2023 07:01  -  02 Jun 2023 07:00  --------------------------------------------------------  IN: 400 mL / OUT: 0 mL / NET: 400 mL    02 Jun 2023 07:01  -  03 Jun 2023 06:28  --------------------------------------------------------  IN: 500 mL / OUT: 0 mL / NET: 500 mL        PHYSICAL EXAM:  Vital Signs Last 24 Hrs  T(C): 37.1 (03 Jun 2023 05:42), Max: 37.1 (02 Jun 2023 21:20)  T(F): 98.7 (03 Jun 2023 05:42), Max: 98.7 (02 Jun 2023 21:20)  HR: 95 (03 Jun 2023 05:42) (94 - 97)  BP: 132/78 (03 Jun 2023 05:42) (119/76 - 138/88)  BP(mean): --  RR: 18 (03 Jun 2023 05:42) (16 - 18)  SpO2: 96% (03 Jun 2023 05:42) (92% - 100%)    Parameters below as of 03 Jun 2023 05:42  Patient On (Oxygen Delivery Method): room air        GENERAL: NAD, lying in bed comfortably  HEAD:  Atraumatic, Normocephalic  EYES: EOMI, PERRLA, conjunctiva and sclera clear  ENT: Moist mucous membranes  NECK: Supple, No JVD  CHEST/LUNG: decreased breath sounds over right lung fields, non-wheezing, Unlabored respirations  HEART: Regular rate and rhythm; No murmurs, rubs, or gallops  ABDOMEN: BSx4; Soft, nontender, nondistended  EXTREMITIES:  2+ Peripheral Pulses, brisk capillary refill. No clubbing, cyanosis, or edema  NERVOUS SYSTEM:  A&Ox3, no focal deficits   SKIN: No rashes or lesions  psych: normal behavior, normal affect      LABS:                        11.8   5.45  )-----------( 222      ( 03 Jun 2023 03:40 )             36.5     06-03    138  |  102  |  13  ----------------------------<  110<H>  4.0   |  24  |  0.91    Ca    9.2      03 Jun 2023 03:40  Phos  3.3     06-03  Mg     2.10     06-03    TPro  7.3  /  Alb  3.4  /  TBili  0.3  /  DBili  x   /  AST  24  /  ALT  14  /  AlkPhos  94  06-03    PT/INR - ( 01 Jun 2023 06:40 )   PT: 13.5 sec;   INR: 1.16 ratio         PTT - ( 01 Jun 2023 06:40 )  PTT:24.6 sec          Culture - Fungal, Body Fluid (collected 01 Jun 2023 18:34)  Source: Pleural Fl Pleural Fluid  Preliminary Report (02 Jun 2023 07:53):    Testing in progress    Culture - Body Fluid with Gram Stain (collected 01 Jun 2023 18:34)  Source: Pleural Fl Pleural Fluid  Gram Stain (01 Jun 2023 23:24):    polymorphonuclear leukocytes seen    No organisms seen    by cytocentrifuge  Preliminary Report (02 Jun 2023 17:38):    No growth        RADIOLOGY & ADDITIONAL TESTS:  Results Reviewed:   Imaging Personally Reviewed:  Electrocardiogram Personally Reviewed:    COORDINATION OF CARE:  Care Discussed with Consultants/Other Providers [Y/N]:  Prior or Outpatient Records Reviewed [Y/N]:   Patient is a 58y old  Female who presents with a chief complaint of Exertional chest pressure and dyspnea (02 Jun 2023 14:17)      SUBJECTIVE : NAEO. Pt seen and examined at bedside. Reports significant improvement in symptoms. Anxious about further workup. Denies dyspnea, cp, abdominal pain in AM.       MEDICATIONS  (STANDING):  aspirin enteric coated 81 milliGRAM(s) Oral daily  atorvastatin 40 milliGRAM(s) Oral at bedtime  dextrose 5%. 1000 milliLiter(s) (50 mL/Hr) IV Continuous <Continuous>  dextrose 5%. 1000 milliLiter(s) (100 mL/Hr) IV Continuous <Continuous>  dextrose 50% Injectable 25 Gram(s) IV Push once  dextrose 50% Injectable 12.5 Gram(s) IV Push once  dextrose 50% Injectable 25 Gram(s) IV Push once  glucagon  Injectable 1 milliGRAM(s) IntraMuscular once  heparin   Injectable 5000 Unit(s) SubCutaneous every 8 hours  insulin glargine Injectable (LANTUS) 22 Unit(s) SubCutaneous at bedtime  insulin lispro (ADMELOG) corrective regimen sliding scale   SubCutaneous three times a day before meals  insulin lispro (ADMELOG) corrective regimen sliding scale   SubCutaneous at bedtime  losartan 50 milliGRAM(s) Oral daily    MEDICATIONS  (PRN):  acetaminophen     Tablet .. 650 milliGRAM(s) Oral every 6 hours PRN Temp greater or equal to 38C (100.4F), Mild Pain (1 - 3), Moderate Pain (4 - 6)  dextrose Oral Gel 15 Gram(s) Oral once PRN Blood Glucose LESS THAN 70 milliGRAM(s)/deciliter      CAPILLARY BLOOD GLUCOSE      POCT Blood Glucose.: 183 mg/dL (02 Jun 2023 21:22)  POCT Blood Glucose.: 150 mg/dL (02 Jun 2023 17:43)  POCT Blood Glucose.: 122 mg/dL (02 Jun 2023 13:20)  POCT Blood Glucose.: 119 mg/dL (02 Jun 2023 08:57)    I&O's Summary    01 Jun 2023 07:01  -  02 Jun 2023 07:00  --------------------------------------------------------  IN: 400 mL / OUT: 0 mL / NET: 400 mL    02 Jun 2023 07:01  -  03 Jun 2023 06:28  --------------------------------------------------------  IN: 500 mL / OUT: 0 mL / NET: 500 mL        PHYSICAL EXAM:  Vital Signs Last 24 Hrs  T(C): 37.1 (03 Jun 2023 05:42), Max: 37.1 (02 Jun 2023 21:20)  T(F): 98.7 (03 Jun 2023 05:42), Max: 98.7 (02 Jun 2023 21:20)  HR: 95 (03 Jun 2023 05:42) (94 - 97)  BP: 132/78 (03 Jun 2023 05:42) (119/76 - 138/88)  BP(mean): --  RR: 18 (03 Jun 2023 05:42) (16 - 18)  SpO2: 96% (03 Jun 2023 05:42) (92% - 100%)    Parameters below as of 03 Jun 2023 05:42  Patient On (Oxygen Delivery Method): room air        GENERAL: NAD, lying in bed comfortably  HEAD:  Atraumatic, Normocephalic  EYES: EOMI, PERRLA, conjunctiva and sclera clear  ENT: Moist mucous membranes  NECK: Supple, No JVD  CHEST/LUNG: decreased breath sounds over right lung fields, non-wheezing, Unlabored respirations  HEART: Regular rate and rhythm; No murmurs, rubs, or gallops  ABDOMEN: BSx4; Soft, nontender, nondistended  EXTREMITIES:  2+ Peripheral Pulses, brisk capillary refill. No clubbing, cyanosis, or edema  NERVOUS SYSTEM:  A&Ox3, no focal deficits   SKIN: No rashes or lesions  psych: normal behavior, normal affect      LABS:                        11.8   5.45  )-----------( 222      ( 03 Jun 2023 03:40 )             36.5     06-03    138  |  102  |  13  ----------------------------<  110<H>  4.0   |  24  |  0.91    Ca    9.2      03 Jun 2023 03:40  Phos  3.3     06-03  Mg     2.10     06-03    TPro  7.3  /  Alb  3.4  /  TBili  0.3  /  DBili  x   /  AST  24  /  ALT  14  /  AlkPhos  94  06-03    PT/INR - ( 01 Jun 2023 06:40 )   PT: 13.5 sec;   INR: 1.16 ratio         PTT - ( 01 Jun 2023 06:40 )  PTT:24.6 sec          Culture - Fungal, Body Fluid (collected 01 Jun 2023 18:34)  Source: Pleural Fl Pleural Fluid  Preliminary Report (02 Jun 2023 07:53):    Testing in progress    Culture - Body Fluid with Gram Stain (collected 01 Jun 2023 18:34)  Source: Pleural Fl Pleural Fluid  Gram Stain (01 Jun 2023 23:24):    polymorphonuclear leukocytes seen    No organisms seen    by cytocentrifuge  Preliminary Report (02 Jun 2023 17:38):    No growth        RADIOLOGY & ADDITIONAL TESTS:  Results Reviewed:   Imaging Personally Reviewed:  Electrocardiogram Personally Reviewed:    COORDINATION OF CARE:  Care Discussed with Consultants/Other Providers [Y/N]:  Prior or Outpatient Records Reviewed [Y/N]:

## 2023-06-05 NOTE — CHART NOTE - NSCHARTNOTEFT_GEN_A_CORE
Patient was advised of follow up requests and timeframe on 06/05. Patient will return call when ready to schedule.

## 2023-06-05 NOTE — PROGRESS NOTE ADULT - ASSESSMENT
57 yo F with PMH DM, HTN, asthma who presents with several weeks of shortness of breath. Found to have R pleural effusion.     # Right pleural effusion  Unclear etiology. No signs/symptoms of malignancy autoimmune disease, cardiac disease, infection.  - s/p R thoracentesis with -1200 ml serosanguinous fluid on 6/1/23; exudative fluid (LDH 1440, Protein 5.8; glucose 115)  - cytology shows malignant cells;  - repeat CT chest still with limited evaluation of lung parenchyma due to large effusion  - CT abdomen shows peritoneal carcinomatosis, pending bx by IR for additional tissue  - discussed options for R effusion with patient including outpatient f/u vs pleurx placement; she wants to pursue pleurx   - incentive spirometry  - oob to chair, ambulation as tolerated  - please check ambulatory spo2 on room air to assess for need for home O2    Patient is scheduled for pleurx placement tomorrow.  - NPO at midnight  - hold DVT ppx  - please order AM cbc, coags, type and screen   - will need SW assistance for pleurx supplies at home

## 2023-06-05 NOTE — PROGRESS NOTE ADULT - PROBLEM SELECTOR PLAN 5
Hx of HTN on Olmesartan, c/w Losartan with hold paramters  elevated LDL     c/w lipitor - asthma on Albuterol at home  - Albuterol PRN

## 2023-06-05 NOTE — PROGRESS NOTE ADULT - PROBLEM SELECTOR PLAN 1
Presents with 4 weeks of exertional dyspnea   CTA negative for PE, but positive for large R pleural effusion, trace L pleural effusion    very elevated, RVP negative  Pulm consult for diagnostic and therapeutic thoracentesis evaluation- s/p thoracentesis 6/1 with 1.2L removed, exudative effusion (LDH 1443/547, Protein 5.8/7.2), elevated TNC count and RBC  CT AP: Extensive peritoneal carcinomatosis and small to moderate ascites. Fibroid uterus with either a left subserosal fibroid or a possible adnexal mass. Loculated moderate right pleural effusion and small left pleural effusion, unchanged.    - f/u pleural fluid gram stain and culture- NGTD  - f/u cytopathology  - Onc consulted  - IR plan for omental carcinomatosis Bx 6/8 if cytopath from thoracentesis is unremarkable CT AP: Extensive peritoneal carcinomatosis and small to moderate ascites. Fibroid uterus with either a left subserosal fibroid or a possible adnexal mass. Loculated moderate right pleural effusion and small left pleural effusion, unchanged.  US pelvis- fibroid uterus with calc uterine myomas, and calcified pedunculated left adnexal myoma  - HIV negative   - f/u cytopathology  - Onc consulted  - IR plan for omental carcinomatosis Bx 6/8 if cytopath from thoracentesis is unremarkable

## 2023-06-06 LAB
AFP-TM SERPL-MCNC: 2.5 NG/ML — SIGNIFICANT CHANGE UP
ALBUMIN SERPL ELPH-MCNC: 3.3 G/DL — SIGNIFICANT CHANGE UP (ref 3.3–5)
ALP SERPL-CCNC: 104 U/L — SIGNIFICANT CHANGE UP (ref 40–120)
ALT FLD-CCNC: 18 U/L — SIGNIFICANT CHANGE UP (ref 4–33)
ANION GAP SERPL CALC-SCNC: 11 MMOL/L — SIGNIFICANT CHANGE UP (ref 7–14)
APTT BLD: 31.2 SEC — SIGNIFICANT CHANGE UP (ref 27–36.3)
AST SERPL-CCNC: 39 U/L — HIGH (ref 4–32)
BASOPHILS # BLD AUTO: 0.01 K/UL — SIGNIFICANT CHANGE UP (ref 0–0.2)
BASOPHILS NFR BLD AUTO: 0.2 % — SIGNIFICANT CHANGE UP (ref 0–2)
BILIRUB SERPL-MCNC: 0.3 MG/DL — SIGNIFICANT CHANGE UP (ref 0.2–1.2)
BLD GP AB SCN SERPL QL: NEGATIVE — SIGNIFICANT CHANGE UP
BUN SERPL-MCNC: 13 MG/DL — SIGNIFICANT CHANGE UP (ref 7–23)
CALCIUM SERPL-MCNC: 8.9 MG/DL — SIGNIFICANT CHANGE UP (ref 8.4–10.5)
CANCER AG125 SERPL-ACNC: 1506 U/ML — HIGH
CANCER AG19-9 SERPL-ACNC: <2 U/ML — SIGNIFICANT CHANGE UP
CEA SERPL-MCNC: <1 NG/ML — LOW (ref 1–3.8)
CHLORIDE SERPL-SCNC: 102 MMOL/L — SIGNIFICANT CHANGE UP (ref 98–107)
CO2 SERPL-SCNC: 24 MMOL/L — SIGNIFICANT CHANGE UP (ref 22–31)
CREAT SERPL-MCNC: 0.86 MG/DL — SIGNIFICANT CHANGE UP (ref 0.5–1.3)
CULTURE RESULTS: NO GROWTH — SIGNIFICANT CHANGE UP
EGFR: 78 ML/MIN/1.73M2 — SIGNIFICANT CHANGE UP
EOSINOPHIL # BLD AUTO: 0.22 K/UL — SIGNIFICANT CHANGE UP (ref 0–0.5)
EOSINOPHIL NFR BLD AUTO: 4.1 % — SIGNIFICANT CHANGE UP (ref 0–6)
GAMMA INTERFERON BACKGROUND BLD IA-ACNC: 0.01 IU/ML — SIGNIFICANT CHANGE UP
GLUCOSE BLDC GLUCOMTR-MCNC: 104 MG/DL — HIGH (ref 70–99)
GLUCOSE BLDC GLUCOMTR-MCNC: 116 MG/DL — HIGH (ref 70–99)
GLUCOSE BLDC GLUCOMTR-MCNC: 76 MG/DL — SIGNIFICANT CHANGE UP (ref 70–99)
GLUCOSE BLDC GLUCOMTR-MCNC: 81 MG/DL — SIGNIFICANT CHANGE UP (ref 70–99)
GLUCOSE BLDC GLUCOMTR-MCNC: 86 MG/DL — SIGNIFICANT CHANGE UP (ref 70–99)
GLUCOSE SERPL-MCNC: 101 MG/DL — HIGH (ref 70–99)
HCT VFR BLD CALC: 35.1 % — SIGNIFICANT CHANGE UP (ref 34.5–45)
HGB BLD-MCNC: 11.2 G/DL — LOW (ref 11.5–15.5)
IANC: 3.68 K/UL — SIGNIFICANT CHANGE UP (ref 1.8–7.4)
IMM GRANULOCYTES NFR BLD AUTO: 0.6 % — SIGNIFICANT CHANGE UP (ref 0–0.9)
INR BLD: 1.15 RATIO — SIGNIFICANT CHANGE UP (ref 0.88–1.16)
LYMPHOCYTES # BLD AUTO: 0.95 K/UL — LOW (ref 1–3.3)
LYMPHOCYTES # BLD AUTO: 17.7 % — SIGNIFICANT CHANGE UP (ref 13–44)
M TB IFN-G BLD-IMP: NEGATIVE — SIGNIFICANT CHANGE UP
M TB IFN-G CD4+ BCKGRND COR BLD-ACNC: 0 IU/ML — SIGNIFICANT CHANGE UP
M TB IFN-G CD4+CD8+ BCKGRND COR BLD-ACNC: 0 IU/ML — SIGNIFICANT CHANGE UP
MAGNESIUM SERPL-MCNC: 2.1 MG/DL — SIGNIFICANT CHANGE UP (ref 1.6–2.6)
MCHC RBC-ENTMCNC: 26.3 PG — LOW (ref 27–34)
MCHC RBC-ENTMCNC: 31.9 GM/DL — LOW (ref 32–36)
MCV RBC AUTO: 82.4 FL — SIGNIFICANT CHANGE UP (ref 80–100)
MONOCYTES # BLD AUTO: 0.49 K/UL — SIGNIFICANT CHANGE UP (ref 0–0.9)
MONOCYTES NFR BLD AUTO: 9.1 % — SIGNIFICANT CHANGE UP (ref 2–14)
NEUTROPHILS # BLD AUTO: 3.68 K/UL — SIGNIFICANT CHANGE UP (ref 1.8–7.4)
NEUTROPHILS NFR BLD AUTO: 68.3 % — SIGNIFICANT CHANGE UP (ref 43–77)
NRBC # BLD: 0 /100 WBCS — SIGNIFICANT CHANGE UP (ref 0–0)
NRBC # FLD: 0 K/UL — SIGNIFICANT CHANGE UP (ref 0–0)
PHOSPHATE SERPL-MCNC: 3.1 MG/DL — SIGNIFICANT CHANGE UP (ref 2.5–4.5)
PLATELET # BLD AUTO: 235 K/UL — SIGNIFICANT CHANGE UP (ref 150–400)
POTASSIUM SERPL-MCNC: 3.9 MMOL/L — SIGNIFICANT CHANGE UP (ref 3.5–5.3)
POTASSIUM SERPL-SCNC: 3.9 MMOL/L — SIGNIFICANT CHANGE UP (ref 3.5–5.3)
PROT SERPL-MCNC: 7.1 G/DL — SIGNIFICANT CHANGE UP (ref 6–8.3)
PROTHROM AB SERPL-ACNC: 13.4 SEC — SIGNIFICANT CHANGE UP (ref 10.5–13.4)
QUANT TB PLUS MITOGEN MINUS NIL: 7.39 IU/ML — SIGNIFICANT CHANGE UP
RBC # BLD: 4.26 M/UL — SIGNIFICANT CHANGE UP (ref 3.8–5.2)
RBC # FLD: 12.2 % — SIGNIFICANT CHANGE UP (ref 10.3–14.5)
RH IG SCN BLD-IMP: POSITIVE — SIGNIFICANT CHANGE UP
SODIUM SERPL-SCNC: 137 MMOL/L — SIGNIFICANT CHANGE UP (ref 135–145)
SPECIMEN SOURCE: SIGNIFICANT CHANGE UP
WBC # BLD: 5.38 K/UL — SIGNIFICANT CHANGE UP (ref 3.8–10.5)
WBC # FLD AUTO: 5.38 K/UL — SIGNIFICANT CHANGE UP (ref 3.8–10.5)

## 2023-06-06 PROCEDURE — 71045 X-RAY EXAM CHEST 1 VIEW: CPT | Mod: 26

## 2023-06-06 PROCEDURE — 76604 US EXAM CHEST: CPT | Mod: 26,GC

## 2023-06-06 PROCEDURE — 99232 SBSQ HOSP IP/OBS MODERATE 35: CPT | Mod: GC

## 2023-06-06 PROCEDURE — 71045 X-RAY EXAM CHEST 1 VIEW: CPT | Mod: 26,77

## 2023-06-06 PROCEDURE — 99233 SBSQ HOSP IP/OBS HIGH 50: CPT | Mod: GC,25

## 2023-06-06 PROCEDURE — 32550 INSERT PLEURAL CATH: CPT | Mod: GC

## 2023-06-06 PROCEDURE — 75989 ABSCESS DRAINAGE UNDER X-RAY: CPT | Mod: 26,GC

## 2023-06-06 RX ORDER — INSULIN LISPRO 100/ML
VIAL (ML) SUBCUTANEOUS
Refills: 0 | Status: DISCONTINUED | OUTPATIENT
Start: 2023-06-06 | End: 2023-06-09

## 2023-06-06 RX ORDER — INSULIN LISPRO 100/ML
VIAL (ML) SUBCUTANEOUS AT BEDTIME
Refills: 0 | Status: DISCONTINUED | OUTPATIENT
Start: 2023-06-06 | End: 2023-06-09

## 2023-06-06 RX ORDER — INSULIN LISPRO 100/ML
VIAL (ML) SUBCUTANEOUS EVERY 6 HOURS
Refills: 0 | Status: DISCONTINUED | OUTPATIENT
Start: 2023-06-06 | End: 2023-06-06

## 2023-06-06 RX ADMIN — INSULIN GLARGINE 22 UNIT(S): 100 INJECTION, SOLUTION SUBCUTANEOUS at 21:33

## 2023-06-06 RX ADMIN — SENNA PLUS 2 TABLET(S): 8.6 TABLET ORAL at 21:23

## 2023-06-06 RX ADMIN — LOSARTAN POTASSIUM 50 MILLIGRAM(S): 100 TABLET, FILM COATED ORAL at 05:28

## 2023-06-06 RX ADMIN — ATORVASTATIN CALCIUM 40 MILLIGRAM(S): 80 TABLET, FILM COATED ORAL at 21:24

## 2023-06-06 NOTE — PROGRESS NOTE ADULT - PROBLEM SELECTOR PLAN 1
CT AP: Extensive peritoneal carcinomatosis and small to moderate ascites. Fibroid uterus with either a left subserosal fibroid or a possible adnexal mass. Loculated moderate right pleural effusion and small left pleural effusion, unchanged.  US pelvis- fibroid uterus with calc uterine myomas, and calcified pedunculated left adnexal myoma  - HIV negative   - f/u cytopathology  - Onc consulted  - IR plan for omental carcinomatosis Bx 6/8 if cytopath from thoracentesis is unremarkable CT AP: Extensive peritoneal carcinomatosis and small to moderate ascites. Fibroid uterus with either a left subserosal fibroid or a possible adnexal mass. Loculated moderate right pleural effusion and small left pleural effusion, unchanged.  US pelvis- fibroid uterus with calc uterine myomas, and calcified pedunculated left adnexal myoma  - HIV negative   - f/u cytopathology: positive for malignant cells in pleural fluid  - Onc consulted  - onc requested bx given need for molecular profiling and testing  - CEA <1.0, AFP, CA-19, other tumor markers pending  - IR plan for omental carcinomatosis Bx 6/8

## 2023-06-06 NOTE — PROGRESS NOTE ADULT - SUBJECTIVE AND OBJECTIVE BOX
Patient is a 58y old  Female who presents with a chief complaint of Exertional chest pressure and dyspnea (02 Jun 2023 14:17)      SUBJECTIVE : NAEO. Pt seen and examined at bedside. Reports significant improvement in symptoms. Anxious about further workup. Denies dyspnea, cp, abdominal pain in AM.       MEDICATIONS  (STANDING):  aspirin enteric coated 81 milliGRAM(s) Oral daily  atorvastatin 40 milliGRAM(s) Oral at bedtime  dextrose 5%. 1000 milliLiter(s) (50 mL/Hr) IV Continuous <Continuous>  dextrose 5%. 1000 milliLiter(s) (100 mL/Hr) IV Continuous <Continuous>  dextrose 50% Injectable 25 Gram(s) IV Push once  dextrose 50% Injectable 12.5 Gram(s) IV Push once  dextrose 50% Injectable 25 Gram(s) IV Push once  glucagon  Injectable 1 milliGRAM(s) IntraMuscular once  heparin   Injectable 5000 Unit(s) SubCutaneous every 8 hours  insulin glargine Injectable (LANTUS) 22 Unit(s) SubCutaneous at bedtime  insulin lispro (ADMELOG) corrective regimen sliding scale   SubCutaneous three times a day before meals  insulin lispro (ADMELOG) corrective regimen sliding scale   SubCutaneous at bedtime  losartan 50 milliGRAM(s) Oral daily    MEDICATIONS  (PRN):  acetaminophen     Tablet .. 650 milliGRAM(s) Oral every 6 hours PRN Temp greater or equal to 38C (100.4F), Mild Pain (1 - 3), Moderate Pain (4 - 6)  dextrose Oral Gel 15 Gram(s) Oral once PRN Blood Glucose LESS THAN 70 milliGRAM(s)/deciliter      CAPILLARY BLOOD GLUCOSE      POCT Blood Glucose.: 183 mg/dL (02 Jun 2023 21:22)  POCT Blood Glucose.: 150 mg/dL (02 Jun 2023 17:43)  POCT Blood Glucose.: 122 mg/dL (02 Jun 2023 13:20)  POCT Blood Glucose.: 119 mg/dL (02 Jun 2023 08:57)    I&O's Summary    01 Jun 2023 07:01  -  02 Jun 2023 07:00  --------------------------------------------------------  IN: 400 mL / OUT: 0 mL / NET: 400 mL    02 Jun 2023 07:01  -  03 Jun 2023 06:28  --------------------------------------------------------  IN: 500 mL / OUT: 0 mL / NET: 500 mL        PHYSICAL EXAM:  Vital Signs Last 24 Hrs  T(C): 37.1 (03 Jun 2023 05:42), Max: 37.1 (02 Jun 2023 21:20)  T(F): 98.7 (03 Jun 2023 05:42), Max: 98.7 (02 Jun 2023 21:20)  HR: 95 (03 Jun 2023 05:42) (94 - 97)  BP: 132/78 (03 Jun 2023 05:42) (119/76 - 138/88)  BP(mean): --  RR: 18 (03 Jun 2023 05:42) (16 - 18)  SpO2: 96% (03 Jun 2023 05:42) (92% - 100%)    Parameters below as of 03 Jun 2023 05:42  Patient On (Oxygen Delivery Method): room air        GENERAL: NAD, lying in bed comfortably  HEAD:  Atraumatic, Normocephalic  EYES: EOMI, PERRLA, conjunctiva and sclera clear  ENT: Moist mucous membranes  NECK: Supple, No JVD  CHEST/LUNG: decreased breath sounds over right lung fields, non-wheezing, Unlabored respirations  HEART: Regular rate and rhythm; No murmurs, rubs, or gallops  ABDOMEN: BSx4; Soft, nontender, nondistended  EXTREMITIES:  2+ Peripheral Pulses, brisk capillary refill. No clubbing, cyanosis, or edema  NERVOUS SYSTEM:  A&Ox3, no focal deficits   SKIN: No rashes or lesions  psych: normal behavior, normal affect      LABS:                        11.8   5.45  )-----------( 222      ( 03 Jun 2023 03:40 )             36.5     06-03    138  |  102  |  13  ----------------------------<  110<H>  4.0   |  24  |  0.91    Ca    9.2      03 Jun 2023 03:40  Phos  3.3     06-03  Mg     2.10     06-03    TPro  7.3  /  Alb  3.4  /  TBili  0.3  /  DBili  x   /  AST  24  /  ALT  14  /  AlkPhos  94  06-03    PT/INR - ( 01 Jun 2023 06:40 )   PT: 13.5 sec;   INR: 1.16 ratio         PTT - ( 01 Jun 2023 06:40 )  PTT:24.6 sec          Culture - Fungal, Body Fluid (collected 01 Jun 2023 18:34)  Source: Pleural Fl Pleural Fluid  Preliminary Report (02 Jun 2023 07:53):    Testing in progress    Culture - Body Fluid with Gram Stain (collected 01 Jun 2023 18:34)  Source: Pleural Fl Pleural Fluid  Gram Stain (01 Jun 2023 23:24):    polymorphonuclear leukocytes seen    No organisms seen    by cytocentrifuge  Preliminary Report (02 Jun 2023 17:38):    No growth        RADIOLOGY & ADDITIONAL TESTS:  Results Reviewed:   Imaging Personally Reviewed:  Electrocardiogram Personally Reviewed:    COORDINATION OF CARE:  Care Discussed with Consultants/Other Providers [Y/N]:  Prior or Outpatient Records Reviewed [Y/N]:   Patient is a 58y old  Female who presents with a chief complaint of Exertional chest pressure and dyspnea (02 Jun 2023 14:17)      SUBJECTIVE : NAEO. Pt seen and examined at bedside. Reports significant improvement in symptoms. Mild exertional dyspnea reported. Pending Pleurex by pulm.       MEDICATIONS  (STANDING):  aspirin enteric coated 81 milliGRAM(s) Oral daily  atorvastatin 40 milliGRAM(s) Oral at bedtime  dextrose 5%. 1000 milliLiter(s) (50 mL/Hr) IV Continuous <Continuous>  dextrose 5%. 1000 milliLiter(s) (100 mL/Hr) IV Continuous <Continuous>  dextrose 50% Injectable 25 Gram(s) IV Push once  dextrose 50% Injectable 12.5 Gram(s) IV Push once  dextrose 50% Injectable 25 Gram(s) IV Push once  glucagon  Injectable 1 milliGRAM(s) IntraMuscular once  heparin   Injectable 5000 Unit(s) SubCutaneous every 8 hours  insulin glargine Injectable (LANTUS) 22 Unit(s) SubCutaneous at bedtime  insulin lispro (ADMELOG) corrective regimen sliding scale   SubCutaneous three times a day before meals  insulin lispro (ADMELOG) corrective regimen sliding scale   SubCutaneous at bedtime  losartan 50 milliGRAM(s) Oral daily    MEDICATIONS  (PRN):  acetaminophen     Tablet .. 650 milliGRAM(s) Oral every 6 hours PRN Temp greater or equal to 38C (100.4F), Mild Pain (1 - 3), Moderate Pain (4 - 6)  dextrose Oral Gel 15 Gram(s) Oral once PRN Blood Glucose LESS THAN 70 milliGRAM(s)/deciliter      CAPILLARY BLOOD GLUCOSE      POCT Blood Glucose.: 183 mg/dL (02 Jun 2023 21:22)  POCT Blood Glucose.: 150 mg/dL (02 Jun 2023 17:43)  POCT Blood Glucose.: 122 mg/dL (02 Jun 2023 13:20)  POCT Blood Glucose.: 119 mg/dL (02 Jun 2023 08:57)    I&O's Summary    01 Jun 2023 07:01  -  02 Jun 2023 07:00  --------------------------------------------------------  IN: 400 mL / OUT: 0 mL / NET: 400 mL    02 Jun 2023 07:01  -  03 Jun 2023 06:28  --------------------------------------------------------  IN: 500 mL / OUT: 0 mL / NET: 500 mL        PHYSICAL EXAM:  Vital Signs Last 24 Hrs  T(C): 37.1 (03 Jun 2023 05:42), Max: 37.1 (02 Jun 2023 21:20)  T(F): 98.7 (03 Jun 2023 05:42), Max: 98.7 (02 Jun 2023 21:20)  HR: 95 (03 Jun 2023 05:42) (94 - 97)  BP: 132/78 (03 Jun 2023 05:42) (119/76 - 138/88)  BP(mean): --  RR: 18 (03 Jun 2023 05:42) (16 - 18)  SpO2: 96% (03 Jun 2023 05:42) (92% - 100%)    Parameters below as of 03 Jun 2023 05:42  Patient On (Oxygen Delivery Method): room air        GENERAL: NAD, lying in bed comfortably  HEAD:  Atraumatic, Normocephalic  EYES: EOMI, PERRLA, conjunctiva and sclera clear  ENT: Moist mucous membranes  NECK: Supple, No JVD  CHEST/LUNG: decreased breath sounds over right lung fields, non-wheezing, Unlabored respirations  HEART: Regular rate and rhythm; No murmurs, rubs, or gallops  ABDOMEN: BSx4; Soft, nontender, nondistended  EXTREMITIES:  2+ Peripheral Pulses, brisk capillary refill. No clubbing, cyanosis, or edema  NERVOUS SYSTEM:  A&Ox3, no focal deficits   SKIN: No rashes or lesions  psych: normal behavior, normal affect      LABS:                        11.8   5.45  )-----------( 222      ( 03 Jun 2023 03:40 )             36.5     06-03    138  |  102  |  13  ----------------------------<  110<H>  4.0   |  24  |  0.91    Ca    9.2      03 Jun 2023 03:40  Phos  3.3     06-03  Mg     2.10     06-03    TPro  7.3  /  Alb  3.4  /  TBili  0.3  /  DBili  x   /  AST  24  /  ALT  14  /  AlkPhos  94  06-03    PT/INR - ( 01 Jun 2023 06:40 )   PT: 13.5 sec;   INR: 1.16 ratio         PTT - ( 01 Jun 2023 06:40 )  PTT:24.6 sec          Culture - Fungal, Body Fluid (collected 01 Jun 2023 18:34)  Source: Pleural Fl Pleural Fluid  Preliminary Report (02 Jun 2023 07:53):    Testing in progress    Culture - Body Fluid with Gram Stain (collected 01 Jun 2023 18:34)  Source: Pleural Fl Pleural Fluid  Gram Stain (01 Jun 2023 23:24):    polymorphonuclear leukocytes seen    No organisms seen    by cytocentrifuge  Preliminary Report (02 Jun 2023 17:38):    No growth        RADIOLOGY & ADDITIONAL TESTS:  Results Reviewed:   Imaging Personally Reviewed:  Electrocardiogram Personally Reviewed:    COORDINATION OF CARE:  Care Discussed with Consultants/Other Providers [Y/N]:  Prior or Outpatient Records Reviewed [Y/N]:

## 2023-06-06 NOTE — CONSULT NOTE ADULT - ASSESSMENT
58F with PMH DM, HTN, asthma who presents with several weeks of shortness of breath, found to have large right pleural effusion s/p thoracentesis showing exudative fluid and cytology + for malignancy, with reaccumulation of fluid. IP consulted for Pleurx.  58F with PMH DM, HTN, asthma who presents with several weeks of shortness of breath, found to have large right pleural effusion s/p thoracentesis showing exudative fluid and cytology + for malignancy, with reaccumulation of fluid. IP consulted for Pleurx.     #Large right pleural effusion:  - patient with large right and small left pleural effusion; s/p thoracentesis of right effusion with 1200cc removal  - cytopath + for malignant cells  - patient with continued dyspnea and reaccumulation of fluid, now s/p pleurx placement with 1200cc of serosanguinous fluid drained during procedure    Recommendations:  - follow up post operative CXR to evaluate for pneumothorax and placement of pleurx catheter  - while inpatient, please drain pleurx daily up to 1L or patient discomfort  - follow up cytopath    Pleurx catheter supply and instruction form placed in patient's chart. Please follow up with social work to ensure ongoing pleurx care after discharge.    This patient will need to follow up with the pulmonary team after discharge:  Please email: [email or web address] to setup an appointment prior to discharge with Dr. Zimmer. The appointment should be within 3-4 weeks of pleurx placement. Include the patient's name, , MRN and contact information in the email.      [facility]  [address] [place] [address]  [phone]    Please discuss the appointment details with the patient and include the appointment details in the patients discharge summary.

## 2023-06-06 NOTE — CONSULT NOTE ADULT - ATTENDING COMMENTS
Large right pleural effusion s/p diagnostic/therapeutic thoracentesis yielding 1200 cc of serosanguinous fluid.  There is additional fluid in the pleural space but procedure stopped due to chest pressure and concern for non-expandable lung.  Check post-procedural CXR and also repeat CT chest to assess underling lung parenchyma.  Differential included infectious, inflammatory, and malignancy.   Follow up pleural fluid studies.
Patient with recurrent malignant pleural effusion, undergoing diagnostic work up. Underwent large volume thoracentesis last week, with rapid recurrence of the right effusion with symptoms. Imaging reviewed, again has large pleural effusion and thus will need intervention. Interventional Pulmonology consulted for pleurx catheter placement. Alternatives including pleurodesis discussed, patient prefers pleurx catheter at this time. Risks, benefits, indications, contraindications also discussed in detail. Patient agreeable to proceed.

## 2023-06-06 NOTE — PROCEDURE NOTE - NSUS ED ADDITIONAL DETAIL1 FT
Large right pleural effusion. Site identified for pleurx placement. Guidewire visualized during procedure in pleural space. Patient is s/p pleurx placement.

## 2023-06-06 NOTE — CONSULT NOTE ADULT - SUBJECTIVE AND OBJECTIVE BOX
58-year-old female with past medical history of diabetes mellitus, hypertension, asthma (no history of intubations) presents with dyspnea x 4 weeks.  Patient reports developing exertional dyspnea associated with exertional chest pressure for past 4 weeks, both of which improve with rest.  Patient reports chest pressure that is nonradiating, nonpositional, nonpleuritic.  Patient denies any fevers, chills, abdominal pain, or hx of malignancy. Patient reports having a cold 2-3 weeks ago. She denies weight loss, night sweats, fatigue, or history of autoimmune conditions.     Patient found to have large right pleural effusion and peritoneal carcinomatosis. She is s/p thoracentesis with 1200cc of fluid removed, and IP consulted for pleurx placement.     CHIEF COMPLAINT:    HPI:    PAST MEDICAL & SURGICAL HISTORY:  HTN (hypertension)      Type 2 diabetes mellitus      Hyperlipidemia  (diet control)      Asthma      Morbid obesity with body mass index (BMI) of 40.0 to 44.9 in adult      Abnormal uterine and vaginal bleeding, unspecified      S/P breast biopsy, left          FAMILY HISTORY:  Family history of colon cancer in mother (Mother)    Type 2 diabetes mellitus (Father)    Family history of MI (myocardial infarction) (Father)        SOCIAL HISTORY:  Smoking: [ ] Never Smoked [ ] Former Smoker (__ packs x ___ years) [ ] Current Smoker  (__ packs x ___ years)  Substance Use: [ ] Never Used [ ] Used ____  EtOH Use:  Marital Status: [ ] Single [ ]  [ ]  [ ]   Sexual History:   Occupation:  Recent Travel:  Country of Birth:  Advance Directives:    Allergies    Proventil HFA (Hives)  latex (Hives)    Intolerances        HOME MEDICATIONS:    REVIEW OF SYSTEMS:  Constitutional: [X ] negative [ ] fevers [ ] chills [ ] weight loss [ ] weight gain  HEENT: [X ] negative [ ] dry eyes [ ] eye irritation [ ] postnasal drip [ ] nasal congestion  CV: [X ] negative  [ ] chest pain [ ] orthopnea [ ] palpitations [ ] murmur  Resp: [ ] negative [ ] cough [X ] shortness of breath [ ] dyspnea [ ] wheezing [ ] sputum [ ] hemoptysis  GI: X ] negative [ ] nausea [ ] vomiting [ ] diarrhea [ ] constipation [ ] abd pain [ ] dysphagia   : [X ] negative [ ] dysuria [ ] nocturia [ ] hematuria [ ] increased urinary frequency  Musculoskeletal: [ ] negative [ ] back pain [ ] myalgias [ ] arthralgias [ ] fracture  Skin: [ ] negative [ ] rash [ ] itch  Neurological: [ ] negative [ ] headache [ ] dizziness [ ] syncope [ ] weakness [ ] numbness  Psychiatric: [ ] negative [ ] anxiety [ ] depression  Endocrine: [ ] negative [ ] diabetes [ ] thyroid problem  Hematologic/Lymphatic: [ ] negative [ ] anemia [ ] bleeding problem  Allergic/Immunologic: [ ] negative [ ] itchy eyes [ ] nasal discharge [ ] hives [ ] angioedema  [ ] All other systems negative  [ ] Unable to assess ROS because ________    OBJECTIVE:  ICU Vital Signs Last 24 Hrs  T(C): 36.8 (06 Jun 2023 05:01), Max: 37.4 (05 Jun 2023 20:00)  T(F): 98.3 (06 Jun 2023 05:01), Max: 99.4 (05 Jun 2023 20:00)  HR: 75 (06 Jun 2023 05:01) (75 - 100)  BP: 110/62 (06 Jun 2023 05:01) (109/86 - 130/76)  BP(mean): --  ABP: --  ABP(mean): --  RR: 16 (06 Jun 2023 05:01) (16 - 18)  SpO2: 97% (06 Jun 2023 05:01) (95% - 98%)    O2 Parameters below as of 06 Jun 2023 05:01  Patient On (Oxygen Delivery Method): room air              CAPILLARY BLOOD GLUCOSE      POCT Blood Glucose.: 86 mg/dL (06 Jun 2023 11:49)      PHYSICAL EXAM:  General: NAD, sitting in bed  HEENT: no icterus  Neck: supple  Respiratory: decreased breath sounds on rigth  Cardiovascular: s1/s2  Abdomen: soft, nontender  Extremities: no LE edema  Skin: no rashes  Neurological: AxOx3  Psychiatry: normal mood and affect    HOSPITAL MEDICATIONS:      losartan 50 milliGRAM(s) Oral daily    atorvastatin 40 milliGRAM(s) Oral at bedtime  dextrose 50% Injectable 25 Gram(s) IV Push once  dextrose 50% Injectable 12.5 Gram(s) IV Push once  dextrose 50% Injectable 25 Gram(s) IV Push once  dextrose Oral Gel 15 Gram(s) Oral once PRN  glucagon  Injectable 1 milliGRAM(s) IntraMuscular once  insulin glargine Injectable (LANTUS) 22 Unit(s) SubCutaneous at bedtime  insulin lispro (ADMELOG) corrective regimen sliding scale   SubCutaneous every 6 hours      acetaminophen     Tablet .. 650 milliGRAM(s) Oral every 6 hours PRN    polyethylene glycol 3350 17 Gram(s) Oral daily  senna 2 Tablet(s) Oral at bedtime        dextrose 5%. 1000 milliLiter(s) IV Continuous <Continuous>  dextrose 5%. 1000 milliLiter(s) IV Continuous <Continuous>            LABS:                        11.2   5.38  )-----------( 235      ( 06 Jun 2023 03:30 )             35.1     Hgb Trend: 11.2<--, 11.9<--, 12.1<--, 11.8<--, 11.5<--  06-06    137  |  102  |  13  ----------------------------<  101<H>  3.9   |  24  |  0.86    Ca    8.9      06 Jun 2023 03:30  Phos  3.1     06-06  Mg     2.10     06-06    TPro  7.1  /  Alb  3.3  /  TBili  0.3  /  DBili  x   /  AST  39<H>  /  ALT  18  /  AlkPhos  104  06-06    Creatinine Trend: 0.86<--, 0.91<--, 0.88<--, 0.91<--, 1.03<--, 0.81<--  PT/INR - ( 06 Jun 2023 03:30 )   PT: 13.4 sec;   INR: 1.15 ratio         PTT - ( 06 Jun 2023 03:30 )  PTT:31.2 sec          MICROBIOLOGY:     RADIOLOGY:  [X ] Reviewed and interpreted by me    PULMONARY FUNCTION TESTS:    EKG:

## 2023-06-06 NOTE — PROGRESS NOTE ADULT - PROBLEM SELECTOR PLAN 2
Presents with 4 weeks of exertional dyspnea   CTA negative for PE, but positive for large R pleural effusion, trace L pleural effusion    very elevated, RVP negative  Pulm consult for diagnostic and therapeutic thoracentesis evaluation- s/p thoracentesis 6/1 with 1.2L removed, exudative effusion (LDH 1443/547, Protein 5.8/7.2), elevated TNC count and RBC  CT AP: Extensive peritoneal carcinomatosis and small to moderate ascites. Fibroid uterus with either a left subserosal fibroid or a possible adnexal mass. Loculated moderate right pleural effusion and small left pleural effusion, unchanged.    - f/u pleural fluid gram stain and culture- NGTD  - f/u cytopathology  - Onc consulted  - IR plan for omental carcinomatosis Bx 6/8 if cytopath from thoracentesis is unremarkable Presents with 4 weeks of exertional dyspnea   CTA negative for PE, but positive for large R pleural effusion, trace L pleural effusion    very elevated, RVP negative  Pulm consult for diagnostic and therapeutic thoracentesis evaluation- s/p thoracentesis 6/1 with 1.2L removed, exudative effusion (LDH 1443/547, Protein 5.8/7.2), elevated TNC count and RBC  CT AP: Extensive peritoneal carcinomatosis and small to moderate ascites. Fibroid uterus with either a left subserosal fibroid or a possible adnexal mass. Loculated moderate right pleural effusion and small left pleural effusion, unchanged.    - f/u pleural fluid gram stain and culture- NGTD  - cytopathology= positive for malignant cells  - pleurex placed 6/6,  assistance for home supplies

## 2023-06-06 NOTE — CHART NOTE - NSCHARTNOTEFT_GEN_A_CORE
Patient tentatively scheduled for omental carcinomatosis biopsy on Thursday, 6/8, pending cytology from thoracentesis. Though cytology resulted positive for malignancy, [facility] requesting IR to proceed with tissue biopsy for molecular profiling and testing.  -- IR will plan for omental carcinomatosis biopsy on Thursday, 6/8  -- Continue holding ASA 81mg for 5 days pre-procedurally  -- NPO after midnight Wed 6/7  -- hold additional A/C morning of 6/8, as well as antiplatelets as previously discussed   -- Routine morning labs plus coags (PT/PTT/INR) on 6/8  -- Please write IR Pre-procedure note  -- Discussed with provider Dr. Michaels

## 2023-06-07 LAB
ALBUMIN SERPL ELPH-MCNC: 3.4 G/DL — SIGNIFICANT CHANGE UP (ref 3.3–5)
ALP SERPL-CCNC: 107 U/L — SIGNIFICANT CHANGE UP (ref 40–120)
ALT FLD-CCNC: 25 U/L — SIGNIFICANT CHANGE UP (ref 4–33)
ANION GAP SERPL CALC-SCNC: 14 MMOL/L — SIGNIFICANT CHANGE UP (ref 7–14)
AST SERPL-CCNC: 41 U/L — HIGH (ref 4–32)
BASOPHILS # BLD AUTO: 0.02 K/UL — SIGNIFICANT CHANGE UP (ref 0–0.2)
BASOPHILS NFR BLD AUTO: 0.3 % — SIGNIFICANT CHANGE UP (ref 0–2)
BILIRUB SERPL-MCNC: 0.4 MG/DL — SIGNIFICANT CHANGE UP (ref 0.2–1.2)
BUN SERPL-MCNC: 11 MG/DL — SIGNIFICANT CHANGE UP (ref 7–23)
CALCIUM SERPL-MCNC: 9.1 MG/DL — SIGNIFICANT CHANGE UP (ref 8.4–10.5)
CHLORIDE SERPL-SCNC: 100 MMOL/L — SIGNIFICANT CHANGE UP (ref 98–107)
CO2 SERPL-SCNC: 23 MMOL/L — SIGNIFICANT CHANGE UP (ref 22–31)
CREAT SERPL-MCNC: 0.81 MG/DL — SIGNIFICANT CHANGE UP (ref 0.5–1.3)
EGFR: 84 ML/MIN/1.73M2 — SIGNIFICANT CHANGE UP
EOSINOPHIL # BLD AUTO: 0.12 K/UL — SIGNIFICANT CHANGE UP (ref 0–0.5)
EOSINOPHIL NFR BLD AUTO: 1.8 % — SIGNIFICANT CHANGE UP (ref 0–6)
GLUCOSE BLDC GLUCOMTR-MCNC: 106 MG/DL — HIGH (ref 70–99)
GLUCOSE BLDC GLUCOMTR-MCNC: 109 MG/DL — HIGH (ref 70–99)
GLUCOSE BLDC GLUCOMTR-MCNC: 121 MG/DL — HIGH (ref 70–99)
GLUCOSE BLDC GLUCOMTR-MCNC: 93 MG/DL — SIGNIFICANT CHANGE UP (ref 70–99)
GLUCOSE SERPL-MCNC: 150 MG/DL — HIGH (ref 70–99)
HCT VFR BLD CALC: 37.3 % — SIGNIFICANT CHANGE UP (ref 34.5–45)
HGB BLD-MCNC: 12.4 G/DL — SIGNIFICANT CHANGE UP (ref 11.5–15.5)
IANC: 5.09 K/UL — SIGNIFICANT CHANGE UP (ref 1.8–7.4)
IMM GRANULOCYTES NFR BLD AUTO: 0.2 % — SIGNIFICANT CHANGE UP (ref 0–0.9)
LYMPHOCYTES # BLD AUTO: 0.77 K/UL — LOW (ref 1–3.3)
LYMPHOCYTES # BLD AUTO: 11.8 % — LOW (ref 13–44)
MAGNESIUM SERPL-MCNC: 2.1 MG/DL — SIGNIFICANT CHANGE UP (ref 1.6–2.6)
MCHC RBC-ENTMCNC: 27.3 PG — SIGNIFICANT CHANGE UP (ref 27–34)
MCHC RBC-ENTMCNC: 33.2 GM/DL — SIGNIFICANT CHANGE UP (ref 32–36)
MCV RBC AUTO: 82 FL — SIGNIFICANT CHANGE UP (ref 80–100)
MONOCYTES # BLD AUTO: 0.49 K/UL — SIGNIFICANT CHANGE UP (ref 0–0.9)
MONOCYTES NFR BLD AUTO: 7.5 % — SIGNIFICANT CHANGE UP (ref 2–14)
NEUTROPHILS # BLD AUTO: 5.09 K/UL — SIGNIFICANT CHANGE UP (ref 1.8–7.4)
NEUTROPHILS NFR BLD AUTO: 78.4 % — HIGH (ref 43–77)
NRBC # BLD: 0 /100 WBCS — SIGNIFICANT CHANGE UP (ref 0–0)
NRBC # FLD: 0 K/UL — SIGNIFICANT CHANGE UP (ref 0–0)
PHOSPHATE SERPL-MCNC: 3.1 MG/DL — SIGNIFICANT CHANGE UP (ref 2.5–4.5)
PHOSPHATE SERPL-MCNC: 3.1 MG/DL — SIGNIFICANT CHANGE UP (ref 2.5–4.5)
PLATELET # BLD AUTO: 246 K/UL — SIGNIFICANT CHANGE UP (ref 150–400)
POTASSIUM SERPL-MCNC: 4.2 MMOL/L — SIGNIFICANT CHANGE UP (ref 3.5–5.3)
POTASSIUM SERPL-SCNC: 4.2 MMOL/L — SIGNIFICANT CHANGE UP (ref 3.5–5.3)
PROT SERPL-MCNC: 7.2 G/DL — SIGNIFICANT CHANGE UP (ref 6–8.3)
RBC # BLD: 4.55 M/UL — SIGNIFICANT CHANGE UP (ref 3.8–5.2)
RBC # FLD: 12.4 % — SIGNIFICANT CHANGE UP (ref 10.3–14.5)
SODIUM SERPL-SCNC: 137 MMOL/L — SIGNIFICANT CHANGE UP (ref 135–145)
WBC # BLD: 6.5 K/UL — SIGNIFICANT CHANGE UP (ref 3.8–10.5)
WBC # FLD AUTO: 6.5 K/UL — SIGNIFICANT CHANGE UP (ref 3.8–10.5)

## 2023-06-07 PROCEDURE — 99232 SBSQ HOSP IP/OBS MODERATE 35: CPT | Mod: GC

## 2023-06-07 PROCEDURE — 99233 SBSQ HOSP IP/OBS HIGH 50: CPT | Mod: GC

## 2023-06-07 RX ORDER — INSULIN GLARGINE 100 [IU]/ML
11 INJECTION, SOLUTION SUBCUTANEOUS AT BEDTIME
Refills: 0 | Status: DISCONTINUED | OUTPATIENT
Start: 2023-06-07 | End: 2023-06-08

## 2023-06-07 RX ADMIN — ATORVASTATIN CALCIUM 40 MILLIGRAM(S): 80 TABLET, FILM COATED ORAL at 22:34

## 2023-06-07 RX ADMIN — INSULIN GLARGINE 11 UNIT(S): 100 INJECTION, SOLUTION SUBCUTANEOUS at 22:34

## 2023-06-07 RX ADMIN — LOSARTAN POTASSIUM 50 MILLIGRAM(S): 100 TABLET, FILM COATED ORAL at 05:28

## 2023-06-07 NOTE — PROGRESS NOTE ADULT - PROBLEM SELECTOR PLAN 2
Presents with 4 weeks of exertional dyspnea   CTA negative for PE, but positive for large R pleural effusion, trace L pleural effusion    very elevated, RVP negative  Pulm consult for diagnostic and therapeutic thoracentesis evaluation- s/p thoracentesis 6/1 with 1.2L removed, exudative effusion (LDH 1443/547, Protein 5.8/7.2), elevated TNC count and RBC  CT AP: Extensive peritoneal carcinomatosis and small to moderate ascites. Fibroid uterus with either a left subserosal fibroid or a possible adnexal mass. Loculated moderate right pleural effusion and small left pleural effusion, unchanged.    - f/u pleural fluid gram stain and culture- NGTD  - cytopathology= positive for malignant cells  - pleurex placed 6/6,  assistance for home supplies Presents with 4 weeks of exertional dyspnea   CTA negative for PE, but positive for large R pleural effusion, trace L pleural effusion    very elevated, RVP negative  Pulm consult for diagnostic and therapeutic thoracentesis evaluation- s/p thoracentesis 6/1 with 1.2L removed, exudative effusion (LDH 1443/547, Protein 5.8/7.2), elevated TNC count and RBC  CT AP: Extensive peritoneal carcinomatosis and small to moderate ascites. Fibroid uterus with either a left subserosal fibroid or a possible adnexal mass. Loculated moderate right pleural effusion and small left pleural effusion, unchanged.    - f/u pleural fluid gram stain and culture- NGTD  - cytopathology= positive for malignant cells  - pleurex placed 6/6, SW assistance for home supplies  - drain up to 1L daily while inpatient, Cxray improved

## 2023-06-07 NOTE — PROGRESS NOTE ADULT - ASSESSMENT
57 yo F with PMH DM, HTN, asthma who presents with several weeks of shortness of breath. Found to have R pleural effusion.     # Right pleural effusion  Unclear etiology. No signs/symptoms of malignancy autoimmune disease, cardiac disease, infection.  - s/p R thoracentesis with -1200 ml serosanguinous fluid on 6/1/23; exudative fluid (LDH 1440, Protein 5.8; glucose 115)  - cytology shows malignant cells;  - repeat CT chest still with limited evaluation of lung parenchyma due to large effusion  - CT abdomen shows peritoneal carcinomatosis, pending bx by IR for additional tissue  - s/p pleurx placement 6/6 with Dr. Zimmer with 1200 cc drained  - continue to drain pleurx daily, up to 1L (stop if any chest pain), can drain 3x weekly at home (pleurx form for supplies is in chart)   - incentive spirometry  - oob to chair, ambulation as tolerated  - please check ambulatory spo2 on room air to assess for need for home O2

## 2023-06-07 NOTE — PROGRESS NOTE ADULT - SUBJECTIVE AND OBJECTIVE BOX
Patient is a 58y old  Female who presents with a chief complaint of Exertional chest pressure and dyspnea (02 Jun 2023 14:17)      SUBJECTIVE : NAEO. Pt seen and examined at bedside. Reports significant improvement in symptoms. Mild exertional dyspnea reported. Pending Pleurex by pulm.       MEDICATIONS  (STANDING):  aspirin enteric coated 81 milliGRAM(s) Oral daily  atorvastatin 40 milliGRAM(s) Oral at bedtime  dextrose 5%. 1000 milliLiter(s) (50 mL/Hr) IV Continuous <Continuous>  dextrose 5%. 1000 milliLiter(s) (100 mL/Hr) IV Continuous <Continuous>  dextrose 50% Injectable 25 Gram(s) IV Push once  dextrose 50% Injectable 12.5 Gram(s) IV Push once  dextrose 50% Injectable 25 Gram(s) IV Push once  glucagon  Injectable 1 milliGRAM(s) IntraMuscular once  heparin   Injectable 5000 Unit(s) SubCutaneous every 8 hours  insulin glargine Injectable (LANTUS) 22 Unit(s) SubCutaneous at bedtime  insulin lispro (ADMELOG) corrective regimen sliding scale   SubCutaneous three times a day before meals  insulin lispro (ADMELOG) corrective regimen sliding scale   SubCutaneous at bedtime  losartan 50 milliGRAM(s) Oral daily    MEDICATIONS  (PRN):  acetaminophen     Tablet .. 650 milliGRAM(s) Oral every 6 hours PRN Temp greater or equal to 38C (100.4F), Mild Pain (1 - 3), Moderate Pain (4 - 6)  dextrose Oral Gel 15 Gram(s) Oral once PRN Blood Glucose LESS THAN 70 milliGRAM(s)/deciliter      CAPILLARY BLOOD GLUCOSE      POCT Blood Glucose.: 183 mg/dL (02 Jun 2023 21:22)  POCT Blood Glucose.: 150 mg/dL (02 Jun 2023 17:43)  POCT Blood Glucose.: 122 mg/dL (02 Jun 2023 13:20)  POCT Blood Glucose.: 119 mg/dL (02 Jun 2023 08:57)    I&O's Summary    01 Jun 2023 07:01  -  02 Jun 2023 07:00  --------------------------------------------------------  IN: 400 mL / OUT: 0 mL / NET: 400 mL    02 Jun 2023 07:01  -  03 Jun 2023 06:28  --------------------------------------------------------  IN: 500 mL / OUT: 0 mL / NET: 500 mL        PHYSICAL EXAM:  Vital Signs Last 24 Hrs  T(C): 37.1 (03 Jun 2023 05:42), Max: 37.1 (02 Jun 2023 21:20)  T(F): 98.7 (03 Jun 2023 05:42), Max: 98.7 (02 Jun 2023 21:20)  HR: 95 (03 Jun 2023 05:42) (94 - 97)  BP: 132/78 (03 Jun 2023 05:42) (119/76 - 138/88)  BP(mean): --  RR: 18 (03 Jun 2023 05:42) (16 - 18)  SpO2: 96% (03 Jun 2023 05:42) (92% - 100%)    Parameters below as of 03 Jun 2023 05:42  Patient On (Oxygen Delivery Method): room air        GENERAL: NAD, lying in bed comfortably  HEAD:  Atraumatic, Normocephalic  EYES: EOMI, PERRLA, conjunctiva and sclera clear  ENT: Moist mucous membranes  NECK: Supple, No JVD  CHEST/LUNG: decreased breath sounds over right lung fields, non-wheezing, Unlabored respirations  HEART: Regular rate and rhythm; No murmurs, rubs, or gallops  ABDOMEN: BSx4; Soft, nontender, nondistended  EXTREMITIES:  2+ Peripheral Pulses, brisk capillary refill. No clubbing, cyanosis, or edema  NERVOUS SYSTEM:  A&Ox3, no focal deficits   SKIN: No rashes or lesions  psych: normal behavior, normal affect      LABS:                        11.8   5.45  )-----------( 222      ( 03 Jun 2023 03:40 )             36.5     06-03    138  |  102  |  13  ----------------------------<  110<H>  4.0   |  24  |  0.91    Ca    9.2      03 Jun 2023 03:40  Phos  3.3     06-03  Mg     2.10     06-03    TPro  7.3  /  Alb  3.4  /  TBili  0.3  /  DBili  x   /  AST  24  /  ALT  14  /  AlkPhos  94  06-03    PT/INR - ( 01 Jun 2023 06:40 )   PT: 13.5 sec;   INR: 1.16 ratio         PTT - ( 01 Jun 2023 06:40 )  PTT:24.6 sec          Culture - Fungal, Body Fluid (collected 01 Jun 2023 18:34)  Source: Pleural Fl Pleural Fluid  Preliminary Report (02 Jun 2023 07:53):    Testing in progress    Culture - Body Fluid with Gram Stain (collected 01 Jun 2023 18:34)  Source: Pleural Fl Pleural Fluid  Gram Stain (01 Jun 2023 23:24):    polymorphonuclear leukocytes seen    No organisms seen    by cytocentrifuge  Preliminary Report (02 Jun 2023 17:38):    No growth        RADIOLOGY & ADDITIONAL TESTS:  Results Reviewed:   Imaging Personally Reviewed:  Electrocardiogram Personally Reviewed:    COORDINATION OF CARE:  Care Discussed with Consultants/Other Providers [Y/N]:  Prior or Outpatient Records Reviewed [Y/N]:   Patient is a 58y old  Female who presents with a chief complaint of Exertional chest pressure and dyspnea (02 Jun 2023 14:17)      SUBJECTIVE : NAEO. Pt seen and examined at bedside. Reports significant improvement in symptoms. Mild exertional dyspnea reported. Denies other acute symptoms.     MEDICATIONS  (STANDING):  aspirin enteric coated 81 milliGRAM(s) Oral daily  atorvastatin 40 milliGRAM(s) Oral at bedtime  dextrose 5%. 1000 milliLiter(s) (50 mL/Hr) IV Continuous <Continuous>  dextrose 5%. 1000 milliLiter(s) (100 mL/Hr) IV Continuous <Continuous>  dextrose 50% Injectable 25 Gram(s) IV Push once  dextrose 50% Injectable 12.5 Gram(s) IV Push once  dextrose 50% Injectable 25 Gram(s) IV Push once  glucagon  Injectable 1 milliGRAM(s) IntraMuscular once  heparin   Injectable 5000 Unit(s) SubCutaneous every 8 hours  insulin glargine Injectable (LANTUS) 22 Unit(s) SubCutaneous at bedtime  insulin lispro (ADMELOG) corrective regimen sliding scale   SubCutaneous three times a day before meals  insulin lispro (ADMELOG) corrective regimen sliding scale   SubCutaneous at bedtime  losartan 50 milliGRAM(s) Oral daily    MEDICATIONS  (PRN):  acetaminophen     Tablet .. 650 milliGRAM(s) Oral every 6 hours PRN Temp greater or equal to 38C (100.4F), Mild Pain (1 - 3), Moderate Pain (4 - 6)  dextrose Oral Gel 15 Gram(s) Oral once PRN Blood Glucose LESS THAN 70 milliGRAM(s)/deciliter      CAPILLARY BLOOD GLUCOSE      POCT Blood Glucose.: 183 mg/dL (02 Jun 2023 21:22)  POCT Blood Glucose.: 150 mg/dL (02 Jun 2023 17:43)  POCT Blood Glucose.: 122 mg/dL (02 Jun 2023 13:20)  POCT Blood Glucose.: 119 mg/dL (02 Jun 2023 08:57)    I&O's Summary    01 Jun 2023 07:01  -  02 Jun 2023 07:00  --------------------------------------------------------  IN: 400 mL / OUT: 0 mL / NET: 400 mL    02 Jun 2023 07:01  -  03 Jun 2023 06:28  --------------------------------------------------------  IN: 500 mL / OUT: 0 mL / NET: 500 mL        PHYSICAL EXAM:  Vital Signs Last 24 Hrs  T(C): 37.1 (03 Jun 2023 05:42), Max: 37.1 (02 Jun 2023 21:20)  T(F): 98.7 (03 Jun 2023 05:42), Max: 98.7 (02 Jun 2023 21:20)  HR: 95 (03 Jun 2023 05:42) (94 - 97)  BP: 132/78 (03 Jun 2023 05:42) (119/76 - 138/88)  BP(mean): --  RR: 18 (03 Jun 2023 05:42) (16 - 18)  SpO2: 96% (03 Jun 2023 05:42) (92% - 100%)    Parameters below as of 03 Jun 2023 05:42  Patient On (Oxygen Delivery Method): room air        GENERAL: NAD, lying in bed comfortably  HEAD:  Atraumatic, Normocephalic  EYES: EOMI, PERRLA, conjunctiva and sclera clear  ENT: Moist mucous membranes  NECK: Supple, No JVD  CHEST/LUNG: decreased breath sounds over right lung fields, non-wheezing, Unlabored respirations  HEART: Regular rate and rhythm; No murmurs, rubs, or gallops  ABDOMEN: BSx4; Soft, nontender, nondistended  EXTREMITIES:  2+ Peripheral Pulses, brisk capillary refill. No clubbing, cyanosis, or edema  NERVOUS SYSTEM:  A&Ox3, no focal deficits   SKIN: No rashes or lesions  psych: normal behavior, normal affect      LABS:                        11.8   5.45  )-----------( 222      ( 03 Jun 2023 03:40 )             36.5     06-03    138  |  102  |  13  ----------------------------<  110<H>  4.0   |  24  |  0.91    Ca    9.2      03 Jun 2023 03:40  Phos  3.3     06-03  Mg     2.10     06-03    TPro  7.3  /  Alb  3.4  /  TBili  0.3  /  DBili  x   /  AST  24  /  ALT  14  /  AlkPhos  94  06-03    PT/INR - ( 01 Jun 2023 06:40 )   PT: 13.5 sec;   INR: 1.16 ratio         PTT - ( 01 Jun 2023 06:40 )  PTT:24.6 sec          Culture - Fungal, Body Fluid (collected 01 Jun 2023 18:34)  Source: Pleural Fl Pleural Fluid  Preliminary Report (02 Jun 2023 07:53):    Testing in progress    Culture - Body Fluid with Gram Stain (collected 01 Jun 2023 18:34)  Source: Pleural Fl Pleural Fluid  Gram Stain (01 Jun 2023 23:24):    polymorphonuclear leukocytes seen    No organisms seen    by cytocentrifuge  Preliminary Report (02 Jun 2023 17:38):    No growth        RADIOLOGY & ADDITIONAL TESTS:  Results Reviewed:   Imaging Personally Reviewed:  Electrocardiogram Personally Reviewed:    COORDINATION OF CARE:  Care Discussed with Consultants/Other Providers [Y/N]:  Prior or Outpatient Records Reviewed [Y/N]:

## 2023-06-07 NOTE — PROGRESS NOTE ADULT - SUBJECTIVE AND OBJECTIVE BOX
Interval Events:  - pleurx placed yesterday  - planned for IR omental bx   - no sob, fever, chills, chest pain  - has some soreness at pleurx site     REVIEW OF SYSTEMS:  Negative except as documented above.      OBJECTIVE:  ICU Vital Signs Last 24 Hrs  T(C): 36.7 (07 Jun 2023 05:06), Max: 37 (06 Jun 2023 20:39)  T(F): 98.1 (07 Jun 2023 05:06), Max: 98.6 (06 Jun 2023 20:39)  HR: 95 (07 Jun 2023 05:06) (85 - 97)  BP: 123/76 (07 Jun 2023 05:06) (101/84 - 124/62)  BP(mean): 86 (06 Jun 2023 17:30) (69 - 91)  ABP: --  ABP(mean): --  RR: 17 (07 Jun 2023 05:06) (17 - 25)  SpO2: 95% (07 Jun 2023 05:06) (92% - 97%)    O2 Parameters below as of 07 Jun 2023 05:06  Patient On (Oxygen Delivery Method): room air              06-06 @ 07:01  -  06-07 @ 07:00  --------------------------------------------------------  IN: 90 mL / OUT: 0 mL / NET: 90 mL      CAPILLARY BLOOD GLUCOSE      POCT Blood Glucose.: 109 mg/dL (07 Jun 2023 08:26)      PHYSICAL EXAM:  General: NAD  HEENT:  EOMI, sclera anicteric, moist mucus membranes  Neck: supple  Cardiovascular: RRR  Respiratory: decreased R side breath sounds  Abdomen: soft, nontender  Extremities: warm and well perfused, no edema, no clubbing  Skin: no rashes  Neurological: no focal deficits    HOSPITAL MEDICATIONS:  MEDICATIONS  (STANDING):  atorvastatin 40 milliGRAM(s) Oral at bedtime  dextrose 5%. 1000 milliLiter(s) (50 mL/Hr) IV Continuous <Continuous>  dextrose 5%. 1000 milliLiter(s) (100 mL/Hr) IV Continuous <Continuous>  dextrose 50% Injectable 25 Gram(s) IV Push once  dextrose 50% Injectable 12.5 Gram(s) IV Push once  dextrose 50% Injectable 25 Gram(s) IV Push once  glucagon  Injectable 1 milliGRAM(s) IntraMuscular once  insulin glargine Injectable (LANTUS) 22 Unit(s) SubCutaneous at bedtime  insulin lispro (ADMELOG) corrective regimen sliding scale   SubCutaneous three times a day before meals  insulin lispro (ADMELOG) corrective regimen sliding scale   SubCutaneous at bedtime  losartan 50 milliGRAM(s) Oral daily  polyethylene glycol 3350 17 Gram(s) Oral daily  senna 2 Tablet(s) Oral at bedtime    MEDICATIONS  (PRN):  acetaminophen     Tablet .. 650 milliGRAM(s) Oral every 6 hours PRN Temp greater or equal to 38C (100.4F), Mild Pain (1 - 3), Moderate Pain (4 - 6)  dextrose Oral Gel 15 Gram(s) Oral once PRN Blood Glucose LESS THAN 70 milliGRAM(s)/deciliter      LABS:                        12.4   6.50  )-----------( 246      ( 07 Jun 2023 06:45 )             37.3     Hgb Trend: 12.4<--, 11.2<--, 11.9<--, 12.1<--, 11.8<--  06-06    137  |  102  |  13  ----------------------------<  101<H>  3.9   |  24  |  0.86    Ca    8.9      06 Jun 2023 03:30  Phos  3.1     06-07  Mg     2.10     06-06    TPro  7.1  /  Alb  3.3  /  TBili  0.3  /  DBili  x   /  AST  39<H>  /  ALT  18  /  AlkPhos  104  06-06    Creatinine Trend: 0.86<--, 0.91<--, 0.88<--, 0.91<--, 1.03<--, 0.81<--  PT/INR - ( 06 Jun 2023 03:30 )   PT: 13.4 sec;   INR: 1.15 ratio         PTT - ( 06 Jun 2023 03:30 )  PTT:31.2 sec          MICROBIOLOGY:       RADIOLOGY:  [x] Reviewed and interpreted by me

## 2023-06-07 NOTE — CHART NOTE - NSCHARTNOTEFT_GEN_A_CORE
PRE-INTERVENTIONAL RADIOLOGY PROCEDURE NOTE      Patient Age: 58y    Patient Gender: Female    Procedure: Omental mass biopsy by IR    Diagnosis/Indication: Peritoneal Carcionomatosis, pleural effusion s/p pleurex    Interventional Radiology Attending Physician: Dr. Powell    Ordering Attending Physician: Dr. Perales    Pertinent Medical History: DM, HTN, Asthma    Pertinent labs:                      12.4   6.50  )-----------( 246      ( 07 Jun 2023 06:45 )             37.3       06-06    137  |  102  |  13  ----------------------------<  101<H>  3.9   |  24  |  0.86    Ca    8.9      06 Jun 2023 03:30  Phos  3.1     06-06  Mg     2.10     06-06    TPro  7.1  /  Alb  3.3  /  TBili  0.3  /  DBili  x   /  AST  39<H>  /  ALT  18  /  AlkPhos  104  06-06      PT/INR - ( 06 Jun 2023 03:30 )   PT: 13.4 sec;   INR: 1.15 ratio         PTT - ( 06 Jun 2023 03:30 )  PTT:31.2 sec        Patient and Family Aware ? Yes

## 2023-06-07 NOTE — PROGRESS NOTE ADULT - PROBLEM SELECTOR PLAN 1
CT AP: Extensive peritoneal carcinomatosis and small to moderate ascites. Fibroid uterus with either a left subserosal fibroid or a possible adnexal mass. Loculated moderate right pleural effusion and small left pleural effusion, unchanged.  US pelvis- fibroid uterus with calc uterine myomas, and calcified pedunculated left adnexal myoma  - HIV negative   - f/u cytopathology: positive for malignant cells in pleural fluid  - Onc consulted  - onc requested bx given need for molecular profiling and testing  - CEA <1.0, AFP, CA-19, other tumor markers pending  - IR plan for omental carcinomatosis Bx 6/8 CT AP: Extensive peritoneal carcinomatosis and small to moderate ascites. Fibroid uterus with either a left subserosal fibroid or a possible adnexal mass. Loculated moderate right pleural effusion and small left pleural effusion, unchanged.  US pelvis- fibroid uterus with calc uterine myomas, and calcified pedunculated left adnexal myoma  - HIV negative   - f/u cytopathology: positive for malignant cells in pleural fluid  - Onc consulted  - onc requested bx given need for molecular profiling and testing  - CEA <1.0, AFP 2.5, CA-19-9 <2, CA-125 very elevated at 1506, other tumor markers pending  - IR plan for omental carcinomatosis Bx 6/8

## 2023-06-08 LAB
ALBUMIN SERPL ELPH-MCNC: 3.2 G/DL — LOW (ref 3.3–5)
ALP SERPL-CCNC: 99 U/L — SIGNIFICANT CHANGE UP (ref 40–120)
ALT FLD-CCNC: 21 U/L — SIGNIFICANT CHANGE UP (ref 4–33)
ANION GAP SERPL CALC-SCNC: 11 MMOL/L — SIGNIFICANT CHANGE UP (ref 7–14)
APTT BLD: 31.5 SEC — SIGNIFICANT CHANGE UP (ref 27–36.3)
AST SERPL-CCNC: 32 U/L — SIGNIFICANT CHANGE UP (ref 4–32)
BILIRUB SERPL-MCNC: 0.3 MG/DL — SIGNIFICANT CHANGE UP (ref 0.2–1.2)
BLD GP AB SCN SERPL QL: NEGATIVE — SIGNIFICANT CHANGE UP
BUN SERPL-MCNC: 9 MG/DL — SIGNIFICANT CHANGE UP (ref 7–23)
CALCIUM SERPL-MCNC: 9 MG/DL — SIGNIFICANT CHANGE UP (ref 8.4–10.5)
CHLORIDE SERPL-SCNC: 100 MMOL/L — SIGNIFICANT CHANGE UP (ref 98–107)
CO2 SERPL-SCNC: 25 MMOL/L — SIGNIFICANT CHANGE UP (ref 22–31)
CREAT SERPL-MCNC: 0.82 MG/DL — SIGNIFICANT CHANGE UP (ref 0.5–1.3)
EGFR: 82 ML/MIN/1.73M2 — SIGNIFICANT CHANGE UP
GLUCOSE BLDC GLUCOMTR-MCNC: 113 MG/DL — HIGH (ref 70–99)
GLUCOSE BLDC GLUCOMTR-MCNC: 120 MG/DL — HIGH (ref 70–99)
GLUCOSE BLDC GLUCOMTR-MCNC: 129 MG/DL — HIGH (ref 70–99)
GLUCOSE BLDC GLUCOMTR-MCNC: 81 MG/DL — SIGNIFICANT CHANGE UP (ref 70–99)
GLUCOSE BLDC GLUCOMTR-MCNC: 92 MG/DL — SIGNIFICANT CHANGE UP (ref 70–99)
GLUCOSE SERPL-MCNC: 113 MG/DL — HIGH (ref 70–99)
HCT VFR BLD CALC: 36.4 % — SIGNIFICANT CHANGE UP (ref 34.5–45)
HGB BLD-MCNC: 11.7 G/DL — SIGNIFICANT CHANGE UP (ref 11.5–15.5)
INR BLD: 1.2 RATIO — HIGH (ref 0.88–1.16)
MAGNESIUM SERPL-MCNC: 2.1 MG/DL — SIGNIFICANT CHANGE UP (ref 1.6–2.6)
MCHC RBC-ENTMCNC: 26.2 PG — LOW (ref 27–34)
MCHC RBC-ENTMCNC: 32.1 GM/DL — SIGNIFICANT CHANGE UP (ref 32–36)
MCV RBC AUTO: 81.4 FL — SIGNIFICANT CHANGE UP (ref 80–100)
NON-GYNECOLOGICAL CYTOLOGY STUDY: SIGNIFICANT CHANGE UP
NRBC # BLD: 0 /100 WBCS — SIGNIFICANT CHANGE UP (ref 0–0)
NRBC # FLD: 0 K/UL — SIGNIFICANT CHANGE UP (ref 0–0)
PHOSPHATE SERPL-MCNC: 2.8 MG/DL — SIGNIFICANT CHANGE UP (ref 2.5–4.5)
PLATELET # BLD AUTO: 260 K/UL — SIGNIFICANT CHANGE UP (ref 150–400)
POTASSIUM SERPL-MCNC: 4 MMOL/L — SIGNIFICANT CHANGE UP (ref 3.5–5.3)
POTASSIUM SERPL-SCNC: 4 MMOL/L — SIGNIFICANT CHANGE UP (ref 3.5–5.3)
PROT SERPL-MCNC: 6.9 G/DL — SIGNIFICANT CHANGE UP (ref 6–8.3)
PROTHROM AB SERPL-ACNC: 13.9 SEC — HIGH (ref 10.5–13.4)
RBC # BLD: 4.47 M/UL — SIGNIFICANT CHANGE UP (ref 3.8–5.2)
RBC # FLD: 12.1 % — SIGNIFICANT CHANGE UP (ref 10.3–14.5)
RH IG SCN BLD-IMP: POSITIVE — SIGNIFICANT CHANGE UP
SODIUM SERPL-SCNC: 136 MMOL/L — SIGNIFICANT CHANGE UP (ref 135–145)
WBC # BLD: 6.34 K/UL — SIGNIFICANT CHANGE UP (ref 3.8–10.5)
WBC # FLD AUTO: 6.34 K/UL — SIGNIFICANT CHANGE UP (ref 3.8–10.5)

## 2023-06-08 PROCEDURE — 49180 BIOPSY ABDOMINAL MASS: CPT

## 2023-06-08 PROCEDURE — 99232 SBSQ HOSP IP/OBS MODERATE 35: CPT | Mod: GC

## 2023-06-08 PROCEDURE — 88173 CYTOPATH EVAL FNA REPORT: CPT | Mod: 26

## 2023-06-08 PROCEDURE — 88342 IMHCHEM/IMCYTCHM 1ST ANTB: CPT | Mod: 26

## 2023-06-08 PROCEDURE — 88307 TISSUE EXAM BY PATHOLOGIST: CPT | Mod: 26

## 2023-06-08 PROCEDURE — 88341 IMHCHEM/IMCYTCHM EA ADD ANTB: CPT | Mod: 26

## 2023-06-08 PROCEDURE — 88360 TUMOR IMMUNOHISTOCHEM/MANUAL: CPT | Mod: 26,59

## 2023-06-08 PROCEDURE — 77012 CT SCAN FOR NEEDLE BIOPSY: CPT | Mod: 26

## 2023-06-08 RX ORDER — INSULIN GLARGINE 100 [IU]/ML
22 INJECTION, SOLUTION SUBCUTANEOUS AT BEDTIME
Refills: 0 | Status: DISCONTINUED | OUTPATIENT
Start: 2023-06-08 | End: 2023-06-09

## 2023-06-08 RX ADMIN — INSULIN GLARGINE 22 UNIT(S): 100 INJECTION, SOLUTION SUBCUTANEOUS at 22:27

## 2023-06-08 RX ADMIN — ATORVASTATIN CALCIUM 40 MILLIGRAM(S): 80 TABLET, FILM COATED ORAL at 22:28

## 2023-06-08 RX ADMIN — LOSARTAN POTASSIUM 50 MILLIGRAM(S): 100 TABLET, FILM COATED ORAL at 06:31

## 2023-06-08 NOTE — PRE PROCEDURE NOTE - PRE PROCEDURE EVALUATION
Interventional Radiology Pre-Procedure Note    Diagnosis/Indication: Patient is a 58-year-old female with past medical history of diabetes mellitus, hypertension, asthma (no history of intubations), also is [employer], who was found to have peritoneal carcinomatosis. Biopsy of peritoneal mass was requested.      PAST MEDICAL & SURGICAL HISTORY:  HTN (hypertension)  Type 2 diabetes mellitus  Hyperlipidemia  (diet control)  Asthma  Morbid obesity with body mass index (BMI) of 40.0 to 44.9 in adult  Abnormal uterine and vaginal bleeding, unspecified  S/P breast biopsy, left    Allergies: Proventil HFA (Hives)  latex (Hives)    LABS:  CBC Full  -  ( 08 Jun 2023 03:12 )  WBC Count : 6.34 K/uL  RBC Count : 4.47 M/uL  Hemoglobin : 11.7 g/dL  Hematocrit : 36.4 %  Platelet Count - Automated : 260 K/uL  Mean Cell Volume : 81.4 fL  Mean Cell Hemoglobin : 26.2 pg  Mean Cell Hemoglobin Concentration : 32.1 gm/dL    06-08    136  |  100  |  9   ----------------------------<  113<H>  4.0   |  25  |  0.82    Ca    9.0      08 Jun 2023 03:12  Phos  2.8     06-08  Mg     2.10     06-08    TPro  6.9  /  Alb  3.2<L>  /  TBili  0.3  /  DBili  x   /  AST  32  /  ALT  21  /  AlkPhos  99  06-08    PT/INR - ( 08 Jun 2023 03:12 )   PT: 13.9 sec;   INR: 1.20 ratio       PTT - ( 08 Jun 2023 03:12 )  PTT:31.5 sec    Procedure/ risks/ benefits/ alternatives were discussed with the patient, including but not limited to risks of bleeding, infection, and possible injury to bowel. The patient verbalizes understanding, and witnessed informed consent was obtained. A separate consent for Blood Avoidance, Blood Refusal and Blood Management was obtained and placed into patient's chart.

## 2023-06-08 NOTE — DIETITIAN INITIAL EVALUATION ADULT - PROBLEM SELECTOR PLAN 1
Presents with 4 weeks of exertional dyspnea   CTA negative for PE, but positive for large R pleural effusion, trace L pleural effusion   Pulm consult for diagnostic and therapeutic thoracentesis evaluation   Oxygen supplementation if needed

## 2023-06-08 NOTE — PROGRESS NOTE ADULT - PROBLEM SELECTOR PLAN 1
CT AP: Extensive peritoneal carcinomatosis and small to moderate ascites. Fibroid uterus with either a left subserosal fibroid or a possible adnexal mass. Loculated moderate right pleural effusion and small left pleural effusion, unchanged.  US pelvis- fibroid uterus with calc uterine myomas, and calcified pedunculated left adnexal myoma  - HIV negative   - f/u cytopathology: positive for malignant cells in pleural fluid  - Onc consulted  - onc requested bx given need for molecular profiling and testing  - CEA <1.0, AFP 2.5, CA-19-9 <2, CA-125 very elevated at 1506, other tumor markers pending  - IR plan for omental carcinomatosis Bx 6/8

## 2023-06-08 NOTE — PROGRESS NOTE ADULT - SUBJECTIVE AND OBJECTIVE BOX
Patient is a 58y old  Female who presents with a chief complaint of Exertional chest pressure and dyspnea (02 Jun 2023 14:17)      SUBJECTIVE : NAEO. Pt seen and examined at bedside. Reports significant improvement in symptoms. Mild exertional dyspnea reported. Denies other acute symptoms.     MEDICATIONS  (STANDING):  aspirin enteric coated 81 milliGRAM(s) Oral daily  atorvastatin 40 milliGRAM(s) Oral at bedtime  dextrose 5%. 1000 milliLiter(s) (50 mL/Hr) IV Continuous <Continuous>  dextrose 5%. 1000 milliLiter(s) (100 mL/Hr) IV Continuous <Continuous>  dextrose 50% Injectable 25 Gram(s) IV Push once  dextrose 50% Injectable 12.5 Gram(s) IV Push once  dextrose 50% Injectable 25 Gram(s) IV Push once  glucagon  Injectable 1 milliGRAM(s) IntraMuscular once  heparin   Injectable 5000 Unit(s) SubCutaneous every 8 hours  insulin glargine Injectable (LANTUS) 22 Unit(s) SubCutaneous at bedtime  insulin lispro (ADMELOG) corrective regimen sliding scale   SubCutaneous three times a day before meals  insulin lispro (ADMELOG) corrective regimen sliding scale   SubCutaneous at bedtime  losartan 50 milliGRAM(s) Oral daily    MEDICATIONS  (PRN):  acetaminophen     Tablet .. 650 milliGRAM(s) Oral every 6 hours PRN Temp greater or equal to 38C (100.4F), Mild Pain (1 - 3), Moderate Pain (4 - 6)  dextrose Oral Gel 15 Gram(s) Oral once PRN Blood Glucose LESS THAN 70 milliGRAM(s)/deciliter      CAPILLARY BLOOD GLUCOSE      POCT Blood Glucose.: 183 mg/dL (02 Jun 2023 21:22)  POCT Blood Glucose.: 150 mg/dL (02 Jun 2023 17:43)  POCT Blood Glucose.: 122 mg/dL (02 Jun 2023 13:20)  POCT Blood Glucose.: 119 mg/dL (02 Jun 2023 08:57)    I&O's Summary    01 Jun 2023 07:01  -  02 Jun 2023 07:00  --------------------------------------------------------  IN: 400 mL / OUT: 0 mL / NET: 400 mL    02 Jun 2023 07:01  -  03 Jun 2023 06:28  --------------------------------------------------------  IN: 500 mL / OUT: 0 mL / NET: 500 mL        PHYSICAL EXAM:  Vital Signs Last 24 Hrs  T(C): 37.1 (03 Jun 2023 05:42), Max: 37.1 (02 Jun 2023 21:20)  T(F): 98.7 (03 Jun 2023 05:42), Max: 98.7 (02 Jun 2023 21:20)  HR: 95 (03 Jun 2023 05:42) (94 - 97)  BP: 132/78 (03 Jun 2023 05:42) (119/76 - 138/88)  BP(mean): --  RR: 18 (03 Jun 2023 05:42) (16 - 18)  SpO2: 96% (03 Jun 2023 05:42) (92% - 100%)    Parameters below as of 03 Jun 2023 05:42  Patient On (Oxygen Delivery Method): room air        GENERAL: NAD, lying in bed comfortably  HEAD:  Atraumatic, Normocephalic  EYES: EOMI, PERRLA, conjunctiva and sclera clear  ENT: Moist mucous membranes  NECK: Supple, No JVD  CHEST/LUNG: decreased breath sounds over right lung fields, non-wheezing, Unlabored respirations  HEART: Regular rate and rhythm; No murmurs, rubs, or gallops  ABDOMEN: BSx4; Soft, nontender, nondistended  EXTREMITIES:  2+ Peripheral Pulses, brisk capillary refill. No clubbing, cyanosis, or edema  NERVOUS SYSTEM:  A&Ox3, no focal deficits   SKIN: No rashes or lesions  psych: normal behavior, normal affect      LABS:                        11.8   5.45  )-----------( 222      ( 03 Jun 2023 03:40 )             36.5     06-03    138  |  102  |  13  ----------------------------<  110<H>  4.0   |  24  |  0.91    Ca    9.2      03 Jun 2023 03:40  Phos  3.3     06-03  Mg     2.10     06-03    TPro  7.3  /  Alb  3.4  /  TBili  0.3  /  DBili  x   /  AST  24  /  ALT  14  /  AlkPhos  94  06-03    PT/INR - ( 01 Jun 2023 06:40 )   PT: 13.5 sec;   INR: 1.16 ratio         PTT - ( 01 Jun 2023 06:40 )  PTT:24.6 sec          Culture - Fungal, Body Fluid (collected 01 Jun 2023 18:34)  Source: Pleural Fl Pleural Fluid  Preliminary Report (02 Jun 2023 07:53):    Testing in progress    Culture - Body Fluid with Gram Stain (collected 01 Jun 2023 18:34)  Source: Pleural Fl Pleural Fluid  Gram Stain (01 Jun 2023 23:24):    polymorphonuclear leukocytes seen    No organisms seen    by cytocentrifuge  Preliminary Report (02 Jun 2023 17:38):    No growth        RADIOLOGY & ADDITIONAL TESTS:  Results Reviewed:   Imaging Personally Reviewed:  Electrocardiogram Personally Reviewed:    COORDINATION OF CARE:  Care Discussed with Consultants/Other Providers [Y/N]:  Prior or Outpatient Records Reviewed [Y/N]:   Patient is a 58y old  Female who presents with a chief complaint of Exertional chest pressure and dyspnea (02 Jun 2023 14:17)      SUBJECTIVE : NAEO. Pt seen and examined at bedside. Reports significant improvement in symptoms. Mild exertional dyspnea reported. Reports no pleurex drainage yesterday.     MEDICATIONS  (STANDING):  aspirin enteric coated 81 milliGRAM(s) Oral daily  atorvastatin 40 milliGRAM(s) Oral at bedtime  dextrose 5%. 1000 milliLiter(s) (50 mL/Hr) IV Continuous <Continuous>  dextrose 5%. 1000 milliLiter(s) (100 mL/Hr) IV Continuous <Continuous>  dextrose 50% Injectable 25 Gram(s) IV Push once  dextrose 50% Injectable 12.5 Gram(s) IV Push once  dextrose 50% Injectable 25 Gram(s) IV Push once  glucagon  Injectable 1 milliGRAM(s) IntraMuscular once  heparin   Injectable 5000 Unit(s) SubCutaneous every 8 hours  insulin glargine Injectable (LANTUS) 22 Unit(s) SubCutaneous at bedtime  insulin lispro (ADMELOG) corrective regimen sliding scale   SubCutaneous three times a day before meals  insulin lispro (ADMELOG) corrective regimen sliding scale   SubCutaneous at bedtime  losartan 50 milliGRAM(s) Oral daily    MEDICATIONS  (PRN):  acetaminophen     Tablet .. 650 milliGRAM(s) Oral every 6 hours PRN Temp greater or equal to 38C (100.4F), Mild Pain (1 - 3), Moderate Pain (4 - 6)  dextrose Oral Gel 15 Gram(s) Oral once PRN Blood Glucose LESS THAN 70 milliGRAM(s)/deciliter      CAPILLARY BLOOD GLUCOSE      POCT Blood Glucose.: 183 mg/dL (02 Jun 2023 21:22)  POCT Blood Glucose.: 150 mg/dL (02 Jun 2023 17:43)  POCT Blood Glucose.: 122 mg/dL (02 Jun 2023 13:20)  POCT Blood Glucose.: 119 mg/dL (02 Jun 2023 08:57)    I&O's Summary    01 Jun 2023 07:01  -  02 Jun 2023 07:00  --------------------------------------------------------  IN: 400 mL / OUT: 0 mL / NET: 400 mL    02 Jun 2023 07:01  -  03 Jun 2023 06:28  --------------------------------------------------------  IN: 500 mL / OUT: 0 mL / NET: 500 mL        PHYSICAL EXAM:  Vital Signs Last 24 Hrs  T(C): 37.1 (03 Jun 2023 05:42), Max: 37.1 (02 Jun 2023 21:20)  T(F): 98.7 (03 Jun 2023 05:42), Max: 98.7 (02 Jun 2023 21:20)  HR: 95 (03 Jun 2023 05:42) (94 - 97)  BP: 132/78 (03 Jun 2023 05:42) (119/76 - 138/88)  BP(mean): --  RR: 18 (03 Jun 2023 05:42) (16 - 18)  SpO2: 96% (03 Jun 2023 05:42) (92% - 100%)    Parameters below as of 03 Jun 2023 05:42  Patient On (Oxygen Delivery Method): room air        GENERAL: NAD, lying in bed comfortably  HEAD:  Atraumatic, Normocephalic  EYES: EOMI, PERRLA, conjunctiva and sclera clear  ENT: Moist mucous membranes  NECK: Supple, No JVD  CHEST/LUNG: decreased breath sounds over right lung fields, non-wheezing, Unlabored respirations  HEART: Regular rate and rhythm; No murmurs, rubs, or gallops  ABDOMEN: BSx4; Soft, nontender, nondistended  EXTREMITIES:  2+ Peripheral Pulses, brisk capillary refill. No clubbing, cyanosis, or edema  NERVOUS SYSTEM:  A&Ox3, no focal deficits   SKIN: No rashes or lesions  psych: normal behavior, normal affect      LABS:                        11.8   5.45  )-----------( 222      ( 03 Jun 2023 03:40 )             36.5     06-03    138  |  102  |  13  ----------------------------<  110<H>  4.0   |  24  |  0.91    Ca    9.2      03 Jun 2023 03:40  Phos  3.3     06-03  Mg     2.10     06-03    TPro  7.3  /  Alb  3.4  /  TBili  0.3  /  DBili  x   /  AST  24  /  ALT  14  /  AlkPhos  94  06-03    PT/INR - ( 01 Jun 2023 06:40 )   PT: 13.5 sec;   INR: 1.16 ratio         PTT - ( 01 Jun 2023 06:40 )  PTT:24.6 sec          Culture - Fungal, Body Fluid (collected 01 Jun 2023 18:34)  Source: Pleural Fl Pleural Fluid  Preliminary Report (02 Jun 2023 07:53):    Testing in progress    Culture - Body Fluid with Gram Stain (collected 01 Jun 2023 18:34)  Source: Pleural Fl Pleural Fluid  Gram Stain (01 Jun 2023 23:24):    polymorphonuclear leukocytes seen    No organisms seen    by cytocentrifuge  Preliminary Report (02 Jun 2023 17:38):    No growth        RADIOLOGY & ADDITIONAL TESTS:  Results Reviewed:   Imaging Personally Reviewed:  Electrocardiogram Personally Reviewed:    COORDINATION OF CARE:  Care Discussed with Consultants/Other Providers [Y/N]:  Prior or Outpatient Records Reviewed [Y/N]:

## 2023-06-08 NOTE — DIETITIAN INITIAL EVALUATION ADULT - PERTINENT MEDS FT
MEDICATIONS  (STANDING):  atorvastatin 40 milliGRAM(s) Oral at bedtime  dextrose 5%. 1000 milliLiter(s) (50 mL/Hr) IV Continuous <Continuous>  dextrose 5%. 1000 milliLiter(s) (100 mL/Hr) IV Continuous <Continuous>  dextrose 50% Injectable 25 Gram(s) IV Push once  dextrose 50% Injectable 12.5 Gram(s) IV Push once  dextrose 50% Injectable 25 Gram(s) IV Push once  glucagon  Injectable 1 milliGRAM(s) IntraMuscular once  insulin glargine Injectable (LANTUS) 11 Unit(s) SubCutaneous at bedtime  insulin lispro (ADMELOG) corrective regimen sliding scale   SubCutaneous three times a day before meals  insulin lispro (ADMELOG) corrective regimen sliding scale   SubCutaneous at bedtime  losartan 50 milliGRAM(s) Oral daily  polyethylene glycol 3350 17 Gram(s) Oral daily  senna 2 Tablet(s) Oral at bedtime    MEDICATIONS  (PRN):  acetaminophen     Tablet .. 650 milliGRAM(s) Oral every 6 hours PRN Temp greater or equal to 38C (100.4F), Mild Pain (1 - 3), Moderate Pain (4 - 6)  dextrose Oral Gel 15 Gram(s) Oral once PRN Blood Glucose LESS THAN 70 milliGRAM(s)/deciliter

## 2023-06-08 NOTE — PRE-ANESTHESIA EVALUATION ADULT - NSRADCARDRESULTSFT_GEN_ALL_CORE
< from: TTE with Doppler (w/Cont) (06.01.23 @ 12:28) >    CONCLUSIONS:  Technically very difficult study.  1. Normal mitral valve. Minimal mitral regurgitation.  2. Endocardium not well visualized; grossly normal left  ventricular systolic function.  Endocardial visualization  enhanced with intravenous injection of echo contrast  (Definity).  3. Mild diastolic dysfunction (Stage I).  4. The right ventricle is not well visualized.    < end of copied text >

## 2023-06-08 NOTE — DIETITIAN INITIAL EVALUATION ADULT - ADD RECOMMEND
When medically feasible rec advance diet to consistent carbohydrate, DASH/TLC. Defer consistencies to team. Document PO intake to monitor trend.

## 2023-06-08 NOTE — DIETITIAN INITIAL EVALUATION ADULT - ORAL INTAKE PTA/DIET HISTORY
Patient seen for assessment. Reports decreased PO intake x 1 month PTA 2/2 dyspnea. Tries to follow a well balanced diet at home for glycemic control. A1c is 6.8%.

## 2023-06-08 NOTE — DIETITIAN INITIAL EVALUATION ADULT - PERTINENT LABORATORY DATA
06-08    136  |  100  |  9   ----------------------------<  113<H>  4.0   |  25  |  0.82    Ca    9.0      08 Jun 2023 03:12  Phos  2.8     06-08  Mg     2.10     06-08    TPro  6.9  /  Alb  3.2<L>  /  TBili  0.3  /  DBili  x   /  AST  32  /  ALT  21  /  AlkPhos  99  06-08  POCT Blood Glucose.: 120 mg/dL (06-08-23 @ 08:32)  A1C with Estimated Average Glucose Result: 6.8 % (06-01-23 @ 06:40)

## 2023-06-08 NOTE — PRE-OP CHECKLIST - TEMPERATURE IN FAHRENHEIT (DEGREES F)
98.5
Recent Labs     01/27/22  0849 01/27/22  1421 01/28/22  0832 01/28/22  0832 01/28/22  0940 01/29/22  0416 01/30/22  0421   NA   < > 140 143  141  --   --  138 136   K   < > 3.9 3.8  3.7   < >  --  4.4 4.0  4.0   CL   < > 101 103  102  --   --  103 98*   CO2   < > 24 27  28  --   --  22 27   BUN   < > 33* 40*  40*  --   --  16 30*   CREATININE   < > 6.0* 6.8*  6.6*  --   --  4.1* 6.1*   GLUCOSE   < > 190* 90  91  --   --  87 96   CALCIUM   < > 9.8 9.4  9.4  --   --  9.4 9.8   LABALBU   < > 3.6 3.0*   < > 2.9* 2.7* 3.3*   PHOS  --  3.7  --   --   --   --  3.0   MG  --  2.10  --   --   --   --  1.90   ALKPHOS   < > 112 82   < > 85 82 83   ALT   < > 731* 438*   < > 440* 339* 220*   AST   < > 831* 266*   < > 276* 122* 56*    < > = values in this interval not displayed. CBC / Coags Recent Labs     01/27/22  0849 01/27/22  0849 01/28/22  0932 01/28/22  0939 01/28/22  0939 01/28/22  1334 01/29/22  0416 01/30/22  0421   WBC 9.5   < >  --  7.7  --   --  8.3 8.2   RBC 2.47*   < >  --  2.10*  --   --  2.58* 2.47*   HGB 7.4*   < >  --  6.2*   < > 7.8* 7.7* 7.3*   HCT 22.5*   < >  --  18.6*   < > 23.7* 24.4* 21.8*      < >  --  149  --   --  156 150   INR 1.20*  --  1.32*  --   --   --   --   --     < > = values in this interval not displayed.       Other Blood culture #1 gram + cocci   Blood culture #2 gram + cocci       CURRENT SCHEDULED MEDICATIONS   Inpatient Medications     amLODIPine, 10 mg, Oral, Daily    ampicillin-sulbactam, 1,500 mg, IntraVENous, Q12H    vancomycin (VANCOCIN) intermittent dosing (placeholder), , Other, See Admin Instructions    aspirin, 81 mg, Oral, Daily    ferrous sulfate, 325 mg, Oral, Daily with breakfast    pravastatin, 20 mg, Oral, Daily    sodium chloride flush, 5-40 mL, IntraVENous, 2 times per day    famotidine (PEPCID) injection, 20 mg, IntraVENous, Daily    Venelex, , Topical, BID   Infusions    fentaNYL Stopped (01/29/22 1400)    sodium chloride     
norepinephrine Stopped (01/27/22 1115)    sodium chloride      dextrose      heparin (PORCINE) Infusion 15 Units/kg/hr (01/30/22 0600)      Antibiotics   Recent Abx Admin                   ampicillin-sulbactam (UNASYN) 1500 mg IVPB minibag (mg) 1,500 mg New Bag 01/30/22 0434     1,500 mg New Bag 01/29/22 1813    Vancomycin HCl 2,000 mg in dextrose 5 % 500 mL IVPB (mg) 2,000 mg New Bag 01/29/22 1120                  DIAGNOSTICS   IMAGES:  Images personally reviewed and agree w/ radiology interpretation. Head CT w/o Contrast 1/27/22  Atrophy and superimposed small vessel ischemic disease with remote infarct in the lateral left occipital region. No recent infarct, hemorrhage or mass detected. CT-PA: left sided pulmonary emboli    CvEEG:  START: 01/29/2022 @ 17:23pm  END: 01/29/2022 @ 21:00pm        Electroencephalographer: Uli Doyle MD PhD      CLINICAL DETAILS:  This EEG was performed on this 67 y. o.yo male admitted for Altered mental Status and concer for subclinical seizures      TECHNICAL DETAILS:  Continuous video-EEG monitoring was performed with 27 surface scalp electrodes placed according to the International 10-20 electrode placement system, using a 32-channel [employer] headbox. All EEG and video information was acquired digitally, including the use of automated spike and seizure detection software to detect epileptiform activity. An event button was also available to be depressed during clinical events. 01/29/2022 17:23pm - 21:00pm   SEIZURES:  None  INTERICTAL:  None  PUSHBUTTONS:  None  BACKGROUND:  Abundant generalized 7.5-8 Hz theta slowing of the background with frequent superimposed EMG artifact that at times obscures the background.   EKG:  regular     CLINICAL INTERPRETATION:  This was an abnormal tracing for age and state due to a mild generalized slow wave abnormality indicating diffuse cerebral dysfunction which can be of multiple causes, including structural, or vascular
abnormalities, toxic/metabolic conditions, hydrocephalus, or postictal conditions. No epileptiform discharge, focal slowing, or seizures were seen during this recording. Clinical correlation is advised. PHYSICAL EXAMINATION     PHYSICAL EXAM:  Vitals:    01/30/22 0630 01/30/22 0640 01/30/22 0700 01/30/22 0710   BP: (!) 161/86 (!) 170/71 (!) 163/81    Pulse: 88 107 108 100   Resp: 20 19 18 16   Temp:       TempSrc:       SpO2: 97% 98% 97% 98%   Weight:       Height:           General: Unresponsive. Neurologic  Mental status: opens eyes to pain  Cranial nerves:   CN2: Does not blink to threat  CN 3,4,6: Pupils equal, round, minimally (if at all) reactive, pinpoint  EOMs intact with doll's maneuver   CN7: Face symmetric On rest    Motor Exam:  RUE: no response to pain  LUE: no response to pain  RLE: weak withdrawal from proximal skin pinch  LLE: weak withdrawal from proximal skin pinch     OTHER SYSTEMS:  Cardiovascular: Warm, appears well perfused   Respiratory: Easy, non-labored respiratory pattern   Abdominal: Abdomen is without distention   Extremities: Bilateral below knee amputations     ASSESSMENT & RECOMMENDATIONS     Mr. Lorinda Opitz is a 67year old who presented with encephalopathy following 40 minute cardiac arrest and pulmonary embolism. He was on fentanyl for several days and this could be clouding his neurologic exam. Will have better idea of his true exam after he undergoes dialysis tomorrow.     Recommendations   - Treatment of underlying medical and metabolic issues, as you are  - Dialysis as needed  -Keep up on cvEEG until tomorrow  - MRI brain Monday if not waking up    [address] [facility]   Neurology & Neurocritical Care   Neurology Line: [phone]  [employer]: [facility] NPs  1/30/2022 8:48 AM
98.2

## 2023-06-08 NOTE — PRE-ANESTHESIA EVALUATION ADULT - O2 FLOW (L/MIN)
[facility]  [address], [address]  [place], [place]  [address]                                            TEL:[phone]  FAX:[phone]      May 3, 2018    Connor Srinivasan  [address]  [facility] [address]          Dear Connor Srinivasan    We have been unable to reach you by phone. Our records indicate that you are due for a colonoscopy.  Please call our office at [phone] between the hours of 8:00am and 5:00 pm for a message.      Sincerely,          Roverto Edwards MD   Internal Medicine               2

## 2023-06-08 NOTE — DIETITIAN INITIAL EVALUATION ADULT - NS FNS DIET ORDER
Diet, NPO after Midnight:      NPO Start Date: 07-Jun-2023,   NPO Start Time: 23:59  Except Medications (06-07-23 @ 18:42)

## 2023-06-08 NOTE — PROGRESS NOTE ADULT - PROBLEM SELECTOR PLAN 2
Presents with 4 weeks of exertional dyspnea   CTA negative for PE, but positive for large R pleural effusion, trace L pleural effusion    very elevated, RVP negative  Pulm consult for diagnostic and therapeutic thoracentesis evaluation- s/p thoracentesis 6/1 with 1.2L removed, exudative effusion (LDH 1443/547, Protein 5.8/7.2), elevated TNC count and RBC  CT AP: Extensive peritoneal carcinomatosis and small to moderate ascites. Fibroid uterus with either a left subserosal fibroid or a possible adnexal mass. Loculated moderate right pleural effusion and small left pleural effusion, unchanged.    - f/u pleural fluid gram stain and culture- NGTD  - cytopathology= positive for malignant cells  - pleurex placed 6/6, SW assistance for home supplies  - drain up to 1L daily while inpatient, Cxray improved Presents with 4 weeks of exertional dyspnea   CTA negative for PE, but positive for large R pleural effusion, trace L pleural effusion    very elevated, RVP negative  Pulm consult for diagnostic and therapeutic thoracentesis evaluation- s/p thoracentesis 6/1 with 1.2L removed, exudative effusion (LDH 1443/547, Protein 5.8/7.2), elevated TNC count and RBC  CT AP: Extensive peritoneal carcinomatosis and small to moderate ascites. Fibroid uterus with either a left subserosal fibroid or a possible adnexal mass. Loculated moderate right pleural effusion and small left pleural effusion, unchanged.    - f/u pleural fluid gram stain and culture- NGTD  - cytopathology= positive for malignant cells  - pleurex placed 6/6, SW assistance for home supplies  - drain up to 1L daily while inpatient, 3x/week OP, Cxray improved

## 2023-06-08 NOTE — PROCEDURE NOTE - PROCEDURE FINDINGS AND DETAILS
Peritoneal carcinomatosis again identified. Core biopsy of peritoneal mass performed. Core x3 obtained and deemed adequate by the cytopathology technician in attendance. Official report to follow.

## 2023-06-09 ENCOUNTER — TRANSCRIPTION ENCOUNTER (OUTPATIENT)
Age: 58
End: 2023-06-09

## 2023-06-09 VITALS
TEMPERATURE: 98 F | HEART RATE: 96 BPM | RESPIRATION RATE: 17 BRPM | OXYGEN SATURATION: 100 % | DIASTOLIC BLOOD PRESSURE: 84 MMHG | SYSTOLIC BLOOD PRESSURE: 100 MMHG

## 2023-06-09 LAB
ALBUMIN SERPL ELPH-MCNC: 3.2 G/DL — LOW (ref 3.3–5)
ALP SERPL-CCNC: 94 U/L — SIGNIFICANT CHANGE UP (ref 40–120)
ALT FLD-CCNC: 20 U/L — SIGNIFICANT CHANGE UP (ref 4–33)
ANION GAP SERPL CALC-SCNC: 10 MMOL/L — SIGNIFICANT CHANGE UP (ref 7–14)
AST SERPL-CCNC: 35 U/L — HIGH (ref 4–32)
BILIRUB SERPL-MCNC: 0.3 MG/DL — SIGNIFICANT CHANGE UP (ref 0.2–1.2)
BUN SERPL-MCNC: 10 MG/DL — SIGNIFICANT CHANGE UP (ref 7–23)
CALCIUM SERPL-MCNC: 8.7 MG/DL — SIGNIFICANT CHANGE UP (ref 8.4–10.5)
CHLORIDE SERPL-SCNC: 101 MMOL/L — SIGNIFICANT CHANGE UP (ref 98–107)
CO2 SERPL-SCNC: 26 MMOL/L — SIGNIFICANT CHANGE UP (ref 22–31)
CREAT SERPL-MCNC: 0.86 MG/DL — SIGNIFICANT CHANGE UP (ref 0.5–1.3)
EGFR: 78 ML/MIN/1.73M2 — SIGNIFICANT CHANGE UP
GLUCOSE BLDC GLUCOMTR-MCNC: 103 MG/DL — HIGH (ref 70–99)
GLUCOSE BLDC GLUCOMTR-MCNC: 105 MG/DL — HIGH (ref 70–99)
GLUCOSE BLDC GLUCOMTR-MCNC: 91 MG/DL — SIGNIFICANT CHANGE UP (ref 70–99)
GLUCOSE SERPL-MCNC: 91 MG/DL — SIGNIFICANT CHANGE UP (ref 70–99)
HCT VFR BLD CALC: 37.3 % — SIGNIFICANT CHANGE UP (ref 34.5–45)
HGB BLD-MCNC: 11.9 G/DL — SIGNIFICANT CHANGE UP (ref 11.5–15.5)
MAGNESIUM SERPL-MCNC: 2 MG/DL — SIGNIFICANT CHANGE UP (ref 1.6–2.6)
MCHC RBC-ENTMCNC: 26.9 PG — LOW (ref 27–34)
MCHC RBC-ENTMCNC: 31.9 GM/DL — LOW (ref 32–36)
MCV RBC AUTO: 84.4 FL — SIGNIFICANT CHANGE UP (ref 80–100)
NRBC # BLD: 0 /100 WBCS — SIGNIFICANT CHANGE UP (ref 0–0)
NRBC # FLD: 0 K/UL — SIGNIFICANT CHANGE UP (ref 0–0)
PHOSPHATE SERPL-MCNC: 2.9 MG/DL — SIGNIFICANT CHANGE UP (ref 2.5–4.5)
PLATELET # BLD AUTO: 270 K/UL — SIGNIFICANT CHANGE UP (ref 150–400)
POTASSIUM SERPL-MCNC: 4.2 MMOL/L — SIGNIFICANT CHANGE UP (ref 3.5–5.3)
POTASSIUM SERPL-SCNC: 4.2 MMOL/L — SIGNIFICANT CHANGE UP (ref 3.5–5.3)
PROT SERPL-MCNC: 7.2 G/DL — SIGNIFICANT CHANGE UP (ref 6–8.3)
RBC # BLD: 4.42 M/UL — SIGNIFICANT CHANGE UP (ref 3.8–5.2)
RBC # FLD: 12.3 % — SIGNIFICANT CHANGE UP (ref 10.3–14.5)
SODIUM SERPL-SCNC: 137 MMOL/L — SIGNIFICANT CHANGE UP (ref 135–145)
WBC # BLD: 5.48 K/UL — SIGNIFICANT CHANGE UP (ref 3.8–10.5)
WBC # FLD AUTO: 5.48 K/UL — SIGNIFICANT CHANGE UP (ref 3.8–10.5)

## 2023-06-09 PROCEDURE — 99232 SBSQ HOSP IP/OBS MODERATE 35: CPT

## 2023-06-09 PROCEDURE — 99233 SBSQ HOSP IP/OBS HIGH 50: CPT | Mod: GC

## 2023-06-09 PROCEDURE — 99239 HOSP IP/OBS DSCHRG MGMT >30: CPT

## 2023-06-09 RX ORDER — INSULIN GLARGINE 100 [IU]/ML
22 INJECTION, SOLUTION SUBCUTANEOUS
Qty: 3 | Refills: 2
Start: 2023-06-09 | End: 2023-09-06

## 2023-06-09 RX ORDER — ISOPROPYL ALCOHOL, BENZOCAINE .7; .06 ML/ML; ML/ML
0 SWAB TOPICAL
Qty: 100 | Refills: 1
Start: 2023-06-09

## 2023-06-09 RX ORDER — SENNA PLUS 8.6 MG/1
2 TABLET ORAL
Qty: 0 | Refills: 0 | DISCHARGE
Start: 2023-06-09

## 2023-06-09 RX ORDER — INSULIN GLARGINE 100 [IU]/ML
22 INJECTION, SOLUTION SUBCUTANEOUS
Qty: 1 | Refills: 2
Start: 2023-06-09 | End: 2023-09-06

## 2023-06-09 RX ORDER — INSULIN GLARGINE 100 [IU]/ML
22 INJECTION, SOLUTION SUBCUTANEOUS
Qty: 3 | Refills: 0
Start: 2023-06-09 | End: 2023-07-08

## 2023-06-09 RX ORDER — INSULIN GLARGINE 100 [IU]/ML
22 INJECTION, SOLUTION SUBCUTANEOUS
Qty: 2 | Refills: 2
Start: 2023-06-09 | End: 2023-09-06

## 2023-06-09 RX ORDER — INSULIN LISPRO 100/ML
1 VIAL (ML) SUBCUTANEOUS
Qty: 1 | Refills: 2
Start: 2023-06-09 | End: 2023-09-06

## 2023-06-09 RX ORDER — POLYETHYLENE GLYCOL 3350 17 G/17G
17 POWDER, FOR SOLUTION ORAL
Qty: 0 | Refills: 0 | DISCHARGE
Start: 2023-06-09

## 2023-06-09 RX ADMIN — LOSARTAN POTASSIUM 50 MILLIGRAM(S): 100 TABLET, FILM COATED ORAL at 06:05

## 2023-06-09 NOTE — CONSULT NOTE ADULT - REASON FOR ADMISSION
Exertional chest pressure and dyspnea

## 2023-06-09 NOTE — CONSULT NOTE ADULT - PROBLEM SELECTOR RECOMMENDATION 9
GYN ONC Fellow Addendum:    Pt seen and examined at bedside. Agree with above. 57yo w/ newly dx peritoneal carcinomatosis w/ malignant pleural effusions, awaiting results IR bx carcinomatosis.    VS reviewed  Labs reviewed    Await bx results, will confirm dx and determine definitive treatment planning  Given pleural effusions renders dx stage 4 dz. Likely candidate for up front chemotherapy given extent of disease w/ evaluation during tx for interval debulking  Rest of care per primary team  DVT ppx    Colleen Jimenez MD  D/w Dr. Matthews GYN ONC Fellow Addendum:    Pt seen and examined at bedside. Agree with above. 59yo w/ newly dx peritoneal carcinomatosis w/ malignant pleural effusions, awaiting results IR bx carcinomatosis.    VS reviewed  Labs reviewed    Await bx results, will confirm dx and determine definitive treatment planning  Given pleural effusions renders dx stage 4 dz. Likely candidate for up front chemotherapy given extent of disease w/ evaluation during tx for interval debulking  Rest of care per primary team  DVT ppx    Colleen Jimenez MD  D/w Dr. Cassie Jimenez informed me of this patient and her clinical course (she was already discharged prior to me being aware of this consult). Our team will assure she has outpatient follow up with a provider from our Gyn Onc team.     LUISA

## 2023-06-09 NOTE — PROGRESS NOTE ADULT - ATTENDING COMMENTS
57 yo F with PMH DM, HTN, asthma who presents with several weeks of shortness of breath. Found to have R pleural effusion s/p thoracentesis with 1200cc removed, symptomatic improvement but persistent effusion - procedure limited by chest pressure likely due to trapped lung. Exudative effusion with cytology returning positive for malignancy. Abd imaging with adnexal mass and peritoneal carcinomatosis.  Plan for PleurX as difficult thoracentesis, persistent moderate effusion and known to be malignant. Recommend IR guided biopsy of omental soft tissue mass for best yield.    Minerva Yu MD
59 yo F with PMH DM, HTN, asthma who presents with several weeks of shortness of breath. Found to have R pleural effusion s/p thoracentesis with 1200cc removed, symptomatic improvement but persistent effusion - procedure limited by chest pressure likely due to trapped lung. Exudative effusion with cytology returning positive for malignancy. Abd imaging with adnexal mass and peritoneal carcinomatosis. S/p PleurX placement with removal of 1.2L with symptomatic improvement. Another 250cc drained - stopped due to chest pain/discomfort.  S/p omental biopsy.  Can follow-up with Dr. Zimmer as outpatient.    Minerva Yu MD .
Large right pleural effusion s/p diagnostic/therapeutic thoracentesis yielding 1200 cc of serosanguinous fluid which is exudative.  Significant effusion remains but stopped drainage due to chest pressure.  She is reporting symptomatic relief. Follow up repeat CT chest to assess underlying lung parenchyma.  Check ambulatory room air saturation.
57 yo F with PMH DM, HTN, asthma who presents with several weeks of shortness of breath. Found to have R pleural effusion s/p thoracentesis with 1200cc removed, symptomatic improvement but persistent effusion - procedure limited by chest pressure likely due to trapped lung. Exudative effusion with cytology returning positive for malignancy. Abd imaging with adnexal mass and peritoneal carcinomatosis. S/p PleurX placement with removal of 1.2L with symptomatic improvement. Mild soreness today but overall improved.    Minerva Yu MD .
Patient seen and examined at bedside. In brief, 58-year-old female with history of diabetes mellitus, hypertension, asthma (no history of intubations) presenting with dyspnea for four weeks. Patient found to have large R pleural effusion, trace L pleural effusion on CTA. s/p thora- exudative c/f malignanacy. On CT A/P Found to have Extensive peritoneal carcinomatosis and small to moderate ascites. Fibroid uterus with either a left subserosal fibroid or a possible adnexal mass.   #Unclear etiology of pleural effusion concern for malignancy   - Pulmonary consult for thoracentesis - appreciate recs diagnostic/therapeutic ; repeat CT chest post thoracentesis - studies consistent with exudative - unclear etiology will follow up cytology outpatient - uptodate with screening colonoscopy and mammogram   - On CT A/P Found to have Extensive peritoneal carcinomatosis and small to moderate ascites. Fibroid uterus with either a left subserosal fibroid or a possible adnexal mass.   [ ] IR consult for biopsy - tentative plan for 6/8   [ ] Oncology emailed   - D/c tele   - F/U TTE - reviewed and overall normal   - patient currently on Room air - awaiting ambulatory sat   #DM - continue basal/bolus   #HTN - continue home BP meds   #Asthma - continue inhaler  Rest of plan as above .     Dispo: pending IR biopsy .
Patient seen and examined at bedside. In brief, 58-year-old female with history of diabetes mellitus, hypertension, asthma (no history of intubations) presenting with dyspnea for four weeks. Patient found to have large R pleural effusion, trace L pleural effusion on CTA.  #Unclear etiology of pleural effusion  - Pulmonary consult for thoracentesis - appreciate recs diagnostic/therapeutic ; repeat CT chest post thoracentesis - studies consistent with exudative - unclear etiology will follow up cytology outpatient - uptodate with screening colonoscopy and mammogram   - F/U TTE - reviewed and overall normal   - patient currently on Room air - awaiting ambulatory sat   #DM - continue basal/bolus   #HTN - continue home BP meds   #Asthma - continue inhaler  Rest of plan as above .     Dispo: pending ambulatory sat
Patient seen and examined at bedside. In brief, 58-year-old female with history of diabetes mellitus, hypertension, asthma (no history of intubations) presenting with dyspnea for four weeks. Patient found to have large R pleural effusion, trace L pleural effusion on CTA. s/p thora- exudative c/f malignanacy. On CT A/P Found to have Extensive peritoneal carcinomatosis and small to moderate ascites. Fibroid uterus with either a left subserosal fibroid or a possible adnexal mass.   #Unclear etiology of pleural effusion  - Pulmonary consult for thoracentesis - appreciate recs diagnostic/therapeutic ; repeat CT chest post thoracentesis - studies consistent with exudative - unclear etiology will follow up cytology outpatient - uptodate with screening colonoscopy and mammogram   - On CT A/P Found to have Extensive peritoneal carcinomatosis and small to moderate ascites. Fibroid uterus with either a left subserosal fibroid or a possible adnexal mass.   [ ] IR consult for biopsy   [ ] Oncology emailed   - D/c tele   - F/U TTE - reviewed and overall normal   - patient currently on Room air - awaiting ambulatory sat   #DM - continue basal/bolus   #HTN - continue home BP meds   #Asthma - continue inhaler  Rest of plan as above .     Dispo: pending IR biopsy
Patient seen and examined at bedside. In brief, 58-year-old female with history of diabetes mellitus, hypertension, asthma (no history of intubations) presenting with dyspnea for four weeks. Patient found to have large R pleural effusion, trace L pleural effusion on CTA.  #Unclear etiology of pleural effusion  - Pulmonary consult for thoracentesis - diagnostic/therapeutic ; repeat CT chest post thoracentesis  - F/U TTE   - patient currently on Room air  #DM - continue basal/bolus   #HTN - continue home BP meds   #Asthma - continue inhaler  Rest of plan as above
Patient seen and examined at bedside. In brief, 58-year-old female with history of diabetes mellitus, hypertension, asthma (no history of intubations) presenting with dyspnea for four weeks. Patient found to have large R pleural effusion, trace L pleural effusion on CTA. s/p thora- exudative c/f malignancy. On CT A/P Found to have Extensive peritoneal carcinomatosis and small to moderate ascites. Fibroid uterus with either a left subserosal fibroid or a possible adnexal mass.   #Unclear etiology of pleural effusion concern for malignancy   - Pulmonary consult for thoracentesis - appreciate recs diagnostic/therapeutic ; repeat CT chest post thoracentesis - studies consistent with exudative - unclear etiology will follow up cytology outpatient - uptodate with screening colonoscopy and mammogram   - On CT A/P Found to have Extensive peritoneal carcinomatosis and small to moderate ascites. Fibroid uterus with either a left subserosal fibroid or a possible adnexal mass.   [ ] IR consult for omental  biopsy - plan for 6/8   [ ] Oncology emailed - will likely follow up outpatient - will give recs after biopsy   - Plan for pleurex on 6/6,  drain pleurx daily, up to 1L (stop if any chest pain), can drain 3x weekly at home  - D/c tele   - F/U TTE - reviewed and overall normal   - patient currently on Room air - no desating with ambulation   #DM - continue basal/bolus   #HTN - continue home BP meds   #Asthma - continue inhaler  Rest of plan as above .     Dispo: pending IR biopsy . likely friday and saturday pending outpatient services set up       Time-based billing (NON-critical care).     48 minutes spent on total encounter. The necessity of the time spent during the encounter on this date of service was due to:     Review of laboratory data, radiology results, consultants' recommendations, documentation in [place], discussion with patient/house staff and interdisciplinary staff (such as , social workers, etc). Interventions were performed as documented above.
Patient seen and examined at bedside. In brief, 58-year-old female with history of diabetes mellitus, hypertension, asthma (no history of intubations) presenting with dyspnea for four weeks. Patient found to have large R pleural effusion, trace L pleural effusion on CTA. s/p thora- exudative c/f malignancy. On CT A/P Found to have Extensive peritoneal carcinomatosis and small to moderate ascites. Fibroid uterus with either a left subserosal fibroid or a possible adnexal mass.   #Unclear etiology of pleural effusion concern for malignancy   - Pulmonary consult for thoracentesis - appreciate recs diagnostic/therapeutic ; repeat CT chest post thoracentesis - studies consistent with exudative - unclear etiology will follow up cytology outpatient - uptodate with screening colonoscopy and mammogram   - On CT A/P Found to have Extensive peritoneal carcinomatosis and small to moderate ascites. Fibroid uterus with either a left subserosal fibroid or a possible adnexal mass.   [ ] IR consult for biopsy - tentative plan for 6/8   [ ] Oncology emailed - will likely follow up outpatient   - Plan for pleurex on 6/6   - D/c tele   - F/U TTE - reviewed and overall normal   - patient currently on Room air - no desating with ambulation   #DM - continue basal/bolus   #HTN - continue home BP meds   #Asthma - continue inhaler  Rest of plan as above .     Dispo: pending IR biopsy .      Time-based billing (NON-critical care).     48 minutes spent on total encounter. The necessity of the time spent during the encounter on this date of service was due to:     Review of laboratory data, radiology results, consultants' recommendations, documentation in [place], discussion with patient/house staff and interdisciplinary staff (such as , social workers, etc). Interventions were performed as documented above.
Patient seen and examined at bedside. In brief, 58-year-old female with history of diabetes mellitus, hypertension, asthma (no history of intubations) presenting with dyspnea for four weeks. Patient found to have large R pleural effusion, trace L pleural effusion on CTA. s/p thora- exudative c/f malignancy. On CT A/P Found to have Extensive peritoneal carcinomatosis and small to moderate ascites. Fibroid uterus with either a left subserosal fibroid or a possible adnexal mass.   #Unclear etiology of pleural effusion concern for malignancy   - Pulmonary consult for thoracentesis - appreciate recs diagnostic/therapeutic ; repeat CT chest post thoracentesis - studies consistent with exudative - unclear etiology will follow up cytology outpatient - uptodate with screening colonoscopy and mammogram   - On CT A/P Found to have Extensive peritoneal carcinomatosis and small to moderate ascites. Fibroid uterus with either a left subserosal fibroid or a possible adnexal mass.   [ ] IR consult for biopsy - plan for 6/8   [ ] Oncology emailed - will likely follow up outpatient - will give recs after biopsy   - Plan for pleurex on 6/6,  drain pleurx daily, up to 1L (stop if any chest pain), can drain 3x weekly at home  - D/c tele   - F/U TTE - reviewed and overall normal   - patient currently on Room air - no desating with ambulation   #DM - continue basal/bolus   #HTN - continue home BP meds   #Asthma - continue inhaler  Rest of plan as above .     Dispo: pending IR biopsy . likely friday and saturday pending outpatient services set up       Time-based billing (NON-critical care).     48 minutes spent on total encounter. The necessity of the time spent during the encounter on this date of service was due to:     Review of laboratory data, radiology results, consultants' recommendations, documentation in [place], discussion with patient/house staff and interdisciplinary staff (such as , social workers, etc). Interventions were performed as documented above.
Patient seen and examined at bedside. In brief, 58-year-old female with history of diabetes mellitus, hypertension, asthma (no history of intubations) presenting with dyspnea for four weeks. Patient found to have large R pleural effusion, trace L pleural effusion on CTA. s/p thora- exudative c/f malignanacy. On CT A/P Found to have Extensive peritoneal carcinomatosis and small to moderate ascites. Fibroid uterus with either a left subserosal fibroid or a possible adnexal mass.   #Unclear etiology of pleural effusion concern for malignancy   - Pulmonary consult for thoracentesis - appreciate recs diagnostic/therapeutic ; repeat CT chest post thoracentesis - studies consistent with exudative - unclear etiology will follow up cytology outpatient - uptodate with screening colonoscopy and mammogram   - On CT A/P Found to have Extensive peritoneal carcinomatosis and small to moderate ascites. Fibroid uterus with either a left subserosal fibroid or a possible adnexal mass.   [ ] IR consult for biopsy - tentative plan for 6/8   [ ] Oncology emailed   - D/c tele   - F/U TTE - reviewed and overall normal   - patient currently on Room air - no desating with ambulation   #DM - continue basal/bolus   #HTN - continue home BP meds   #Asthma - continue inhaler  Rest of plan as above .     Dispo: pending IR biopsy .

## 2023-06-09 NOTE — PROGRESS NOTE ADULT - PROBLEM SELECTOR PLAN 2
Presents with 4 weeks of exertional dyspnea   CTA negative for PE, but positive for large R pleural effusion, trace L pleural effusion    very elevated, RVP negative  Pulm consult for diagnostic and therapeutic thoracentesis evaluation- s/p thoracentesis 6/1 with 1.2L removed, exudative effusion (LDH 1443/547, Protein 5.8/7.2), elevated TNC count and RBC  CT AP: Extensive peritoneal carcinomatosis and small to moderate ascites. Fibroid uterus with either a left subserosal fibroid or a possible adnexal mass. Loculated moderate right pleural effusion and small left pleural effusion, unchanged.    - f/u pleural fluid gram stain and culture- NGTD  - cytopathology= positive for malignant cells  - pleurex placed 6/6, SW assistance for home supplies  - drain up to 1L daily while inpatient, 3x/week OP, Cxray improved

## 2023-06-09 NOTE — CONSULT NOTE ADULT - ASSESSMENT
59yo  with past medical history of diabetes mellitus, hypertension, asthma (no history of intubations) presents with dyspnea x 4 week. Workup during stay shows elevated , peritoneal carcinomatosis, right pleural effusion w/ +malignant cells. 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 Workup during stay shows elevated , peritoneal carcinomatosis, right pleural effusion w/ +malignant cells. 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  - Patient needs neoadjuvant chemotherapy and will later be evaluated for interval debulking surgery    seen with Dr.Katcher Juarez Malin PGY4

## 2023-06-09 NOTE — PROGRESS NOTE ADULT - SUBJECTIVE AND OBJECTIVE BOX
Interval Events:  - pleurx drained w/ 250 cs yesterday  - no sob, on room air     REVIEW OF SYSTEMS:  Negative except as documented above.      OBJECTIVE:  ICU Vital Signs Last 24 Hrs  T(C): 36.8 (09 Jun 2023 12:43), Max: 37 (09 Jun 2023 05:27)  T(F): 98.3 (09 Jun 2023 12:43), Max: 98.6 (09 Jun 2023 05:27)  HR: 96 (09 Jun 2023 12:43) (92 - 103)  BP: 100/84 (09 Jun 2023 12:43) (100/84 - 115/73)  BP(mean): --  ABP: --  ABP(mean): --  RR: 17 (09 Jun 2023 12:43) (17 - 17)  SpO2: 100% (09 Jun 2023 12:43) (94% - 100%)    O2 Parameters below as of 09 Jun 2023 12:43  Patient On (Oxygen Delivery Method): room air              CAPILLARY BLOOD GLUCOSE      POCT Blood Glucose.: 105 mg/dL (09 Jun 2023 12:19)      PHYSICAL EXAM:  General: NAD  HEENT:  EOMI, sclera anicteric, moist mucus membranes  Neck: supple  Cardiovascular: RRR  Respiratory: dec R side breath sounds  Abdomen: soft, nontender  Extremities: warm and well perfused, no edema, no clubbing  Skin: no rashes  Neurological: no focal deficits    HOSPITAL MEDICATIONS:  MEDICATIONS  (STANDING):  atorvastatin 40 milliGRAM(s) Oral at bedtime  dextrose 5%. 1000 milliLiter(s) (100 mL/Hr) IV Continuous <Continuous>  dextrose 5%. 1000 milliLiter(s) (50 mL/Hr) IV Continuous <Continuous>  dextrose 50% Injectable 25 Gram(s) IV Push once  dextrose 50% Injectable 12.5 Gram(s) IV Push once  dextrose 50% Injectable 25 Gram(s) IV Push once  glucagon  Injectable 1 milliGRAM(s) IntraMuscular once  insulin glargine Injectable (LANTUS) 22 Unit(s) SubCutaneous at bedtime  insulin lispro (ADMELOG) corrective regimen sliding scale   SubCutaneous three times a day before meals  insulin lispro (ADMELOG) corrective regimen sliding scale   SubCutaneous at bedtime  losartan 50 milliGRAM(s) Oral daily  polyethylene glycol 3350 17 Gram(s) Oral daily  senna 2 Tablet(s) Oral at bedtime    MEDICATIONS  (PRN):  acetaminophen     Tablet .. 650 milliGRAM(s) Oral every 6 hours PRN Temp greater or equal to 38C (100.4F), Mild Pain (1 - 3), Moderate Pain (4 - 6)  dextrose Oral Gel 15 Gram(s) Oral once PRN Blood Glucose LESS THAN 70 milliGRAM(s)/deciliter      LABS:                        11.9   5.48  )-----------( 270      ( 09 Jun 2023 07:12 )             37.3     Hgb Trend: 11.9<--, 11.7<--, 12.4<--, 11.2<--, 11.9<--  06-09    137  |  101  |  10  ----------------------------<  91  4.2   |  26  |  0.86    Ca    8.7      09 Jun 2023 07:12  Phos  2.9     06-09  Mg     2.00     06-09    TPro  7.2  /  Alb  3.2<L>  /  TBili  0.3  /  DBili  x   /  AST  35<H>  /  ALT  20  /  AlkPhos  94  06-09    Creatinine Trend: 0.86<--, 0.82<--, 0.81<--, 0.86<--, 0.91<--, 0.88<--  PT/INR - ( 08 Jun 2023 03:12 )   PT: 13.9 sec;   INR: 1.20 ratio         PTT - ( 08 Jun 2023 03:12 )  PTT:31.5 sec          MICROBIOLOGY:       RADIOLOGY:  [x] Reviewed and interpreted by me

## 2023-06-09 NOTE — PROGRESS NOTE ADULT - PROBLEM SELECTOR PLAN 1
CT AP: Extensive peritoneal carcinomatosis and small to moderate ascites. Fibroid uterus with either a left subserosal fibroid or a possible adnexal mass. Loculated moderate right pleural effusion and small left pleural effusion, unchanged.  US pelvis- fibroid uterus with calc uterine myomas, and calcified pedunculated left adnexal myoma  - HIV negative   - f/u cytopathology: positive for malignant cells in pleural fluid  - Onc consulted  - onc requested bx given need for molecular profiling and testing  - CEA <1.0, AFP 2.5, CA-19-9 <2, CA-125 very elevated at 1506, other tumor markers pending  - IR plan for omental carcinomatosis Bx 6/8 CT AP: Extensive peritoneal carcinomatosis and small to moderate ascites. Fibroid uterus with either a left subserosal fibroid or a possible adnexal mass. Loculated moderate right pleural effusion and small left pleural effusion, unchanged.  US pelvis- fibroid uterus with calc uterine myomas, and calcified pedunculated left adnexal myoma  - HIV negative   - f/u cytopathology: positive for malignant cells in pleural fluid  - Onc consulted  - onc requested bx given need for molecular profiling and testing  - CEA <1.0, AFP 2.5, CA-19-9 <2, CA-125 very elevated at 1506, other tumor markers pending  - IR plan for omental carcinomatosis Bx 6/8- obtained multiple core bx, f/u OP with Onc for results and further management

## 2023-06-09 NOTE — PROGRESS NOTE ADULT - PROVIDER SPECIALTY LIST ADULT
Pulmonology
Internal Medicine

## 2023-06-09 NOTE — CHART NOTE - NSCHARTNOTEFT_GEN_A_CORE
I was contacted by oncology this afternoon and advised to obtain gyn/onc eval as imaging and labs were concerning for ovarian/cancer of Mullerian origin given highly elevated  and adnexal mass on CT specifically whether patient would need debulking inpatient. I consulted gyn/onc and patient agreeable to stay for eval.

## 2023-06-09 NOTE — PROGRESS NOTE ADULT - TIME BILLING
Review of medical record and coordination of care. TIme spent did not include teaching.
Review of medical record and coordination of care. Time spent did not include teaching.
Review of medical records and coordination of care. Time spent did not include teaching.
Review of laboratory data, radiology results, consultants' recommendations, documentation in [place], discussion with patient/house staff and interdisciplinary staff (such as , social workers, etc). Interventions were performed as documented above.

## 2023-06-09 NOTE — DISCHARGE NOTE NURSING/CASE MANAGEMENT/SOCIAL WORK - PATIENT PORTAL LINK FT
You can access the FollowMyHealth Patient Portal offered by [facility] by registering at the following website: [email or web address]. By joining [employer]’s FollowMyHealth portal, you will also be able to view your health information using other applications (apps) compatible with our system.

## 2023-06-09 NOTE — DISCHARGE NOTE NURSING/CASE MANAGEMENT/SOCIAL WORK - NSDCPEFALRISK_GEN_ALL_CORE
For information on Fall & Injury Prevention, visit: [email or web address] OR  [email or web address] OR  https://www.cdc.gov/steadi/patient.html

## 2023-06-09 NOTE — CONSULT NOTE ADULT - SUBJECTIVE AND OBJECTIVE BOX
ALO RAMIREZ  58y  Female [id number]    HPI: 57yo  with past medical history of diabetes mellitus, hypertension, asthma (no history of intubations) presents with dyspnea x 4 weeks.  Patient reports developing exertional dyspnea associated with exertional chest pressure for past 4 weeks, both of which improve with rest. Currently denies fevers, chills, CP, SOB, nausea, vomiting or vaginal bleeding. Patient denies any night sweats, unintentional weight loss, or abdominal bloating in recent past.    Name of GYN Physician: Dr.Carmen Cotter  POB:  [id number] ()    [facility] Meds:   MEDICATIONS  (STANDING):  atorvastatin 40 milliGRAM(s) Oral at bedtime  dextrose 5%. 1000 milliLiter(s) (100 mL/Hr) IV Continuous <Continuous>  dextrose 5%. 1000 milliLiter(s) (50 mL/Hr) IV Continuous <Continuous>  dextrose 50% Injectable 25 Gram(s) IV Push once  dextrose 50% Injectable 12.5 Gram(s) IV Push once  dextrose 50% Injectable 25 Gram(s) IV Push once  glucagon  Injectable 1 milliGRAM(s) IntraMuscular once  insulin glargine Injectable (LANTUS) 22 Unit(s) SubCutaneous at bedtime  insulin lispro (ADMELOG) corrective regimen sliding scale   SubCutaneous at bedtime  insulin lispro (ADMELOG) corrective regimen sliding scale   SubCutaneous three times a day before meals  losartan 50 milliGRAM(s) Oral daily  polyethylene glycol 3350 17 Gram(s) Oral daily  senna 2 Tablet(s) Oral at bedtime    MEDICATIONS  (PRN):  acetaminophen     Tablet .. 650 milliGRAM(s) Oral every 6 hours PRN Temp greater or equal to 38C (100.4F), Mild Pain (1 - 3), Moderate Pain (4 - 6)  dextrose Oral Gel 15 Gram(s) Oral once PRN Blood Glucose LESS THAN 70 milliGRAM(s)/deciliter    Allergies  Proventil HFA (Hives)  latex (Hives)      PAST MEDICAL & SURGICAL HISTORY:  HTN (hypertension)  Type 2 diabetes mellitus  Hyperlipidemia  Asthma  Morbid obesity with body mass index (BMI) of 40.0 to 44.9 in adult  S/P breast biopsy, left    Vital Signs Last 24 Hrs  T(C): 36.8 (2023 12:43), Max: 37 (2023 05:27)  T(F): 98.3 (2023 12:43), Max: 98.6 (2023 05:27)  HR: 96 (2023 12:43) (92 - 103)  BP: 100/84 (2023 12:43) (100/84 - 115/73)  BP(mean): --  RR: 17 (2023 12:43) (17 - 17)  SpO2: 100% (2023 12:43) (94% - 100%)    Parameters below as of 2023 12:43  Patient On (Oxygen Delivery Method): room air    Physical Exam:   General: sitting comfortably in bed, NAD   HEENT: neck supple, full ROM  CV: RR S1S2 no m/r/g  Lungs: CTA b/l, good air flow b/l   Back: No CVA tenderness  Abd: Soft, non-tender, non-distended.  Bowel sounds present.    Ext: non-tender b/l, no edema     LABS:             11.9   5.48  )-----------( 270      ( 2023 07:12 )             37.3     06-09    137  |  101  |  10  ----------------------------<  91  4.2   |  26  |  0.86    Ca    8.7      2023 07:12  Phos  2.9     06-09  Mg     2.00     06-09    TPro  7.2  /  Alb  3.2<L>  /  TBili  0.3  /  DBili  x   /  AST  35<H>  /  ALT  20  /  AlkPhos  94  06-09    PT/INR - ( 2023 03:12 )   PT: 13.9 sec;   INR: 1.20 ratio    PTT - ( 2023 03:12 )  PTT:31.5 sec      RADIOLOGY & ADDITIONAL STUDIES:  < from: CT Abdomen and Pelvis w/ IV Cont (23 @ 15:28) >  ACC: [id number] EXAM:  CT ABDOMEN AND PELVIS IC   ORDERED BY: SHEILA JOHNSON     PROCEDURE DATE:  2023          INTERPRETATION:  CLINICAL INFORMATION: Evaluation for malignancy.    COMPARISON: CT chest 2023.    CONTRAST/COMPLICATIONS:  IV Contrast: Omnipaque 350  90 cc administered   10 cc discarded  Oral Contrast: NONE  Complications: None reported at time of study completion    PROCEDURE:  CT of the Abdomen and Pelvis was performed.  Sagittal and coronal reformats were performed.    FINDINGS:  LOWER CHEST: Loculated moderate right pleural effusion and small left   pleural effusion are unchanged.    LIVER: Soft tissue nodularity along the inferior right hepatic lobe.  BILE DUCTS: Normal caliber.  GALLBLADDER: Cholelithiasis and contracted.  SPLEEN: Within normal limits.  PANCREAS: Within normal limits.  ADRENALS: Within normal limits.  KIDNEYS/URETERS: Bilateral cortical scarring.    BLADDER: Within normal limits.  REPRODUCTIVE ORGANS: Likely 2 intramural calcified fibroids. Another  partially calcified mass in the left lower abdomen may represent a   subserosal fibroid or an adnexal mass. The adnexa are not well visualized.    BOWEL: No bowel obstruction. Appendix is normal. Colonic diverticulosis.  PERITONEUM: Small to moderate ascites. Large amount of soft tissue in the   greater omentum  VESSELS: Within normal limits.  RETROPERITONEUM/LYMPH NODES: No lymphadenopathy.  ABDOMINAL WALL: Within normal limits.  BONES: Degenerative changes.    IMPRESSION:  Extensive peritoneal carcinomatosis and small to moderate ascites.  Fibroid uterus with either a left subserosal fibroid or a possible   adnexal mass.  Loculated moderate right pleural effusion and small left pleural   effusion, unchanged.    --- End of Report ---      RAJEEV BURKETT MD; Attending Radiologist  This document has been electronically signed. 2023  4:05PM    < end of copied text >  < from: CT Chest No Cont (23 @ 09:34) >    ACC: [id number] EXAM:  CT CHEST   ORDERED BY: SHEILA JOHNSON     PROCEDURE DATE:  2023          INTERPRETATION:  CLINICAL INFORMATION: Presented with shortness of breath   and chest pain. Status post thoracentesis of large right pleural effusion.    COMPARISON: CTA chest 2023.    CONTRAST/COMPLICATIONS:  IV Contrast: None  Oral Contrast: None  Complications: None reported    PROCEDURE:  CT of the Chest was performed.  Sagittal and coronal reformats were performed.    FINDINGS:    LUNGS/AIRWAYS/PLEURA: Patent central airways. Moderate loculated right   pleural effusion, decreased from 2023, with associated complete   atelectasis of the right middle and lower lobes and partial atelectasis   of the right upper lobe. Small left pleuraleffusion unchanged. 4 mm   subpleural nodule in the left upper lobe unchanged (2:33).  MEDIASTINUM AND MELVINA: No lymphadenopathy.  VESSELS: Within normal limits.  HEART: Heart size is normal. No pericardial effusion.  CHEST WALL AND LOWER NECK: Withinnormal limits.  VISUALIZED UPPER ABDOMEN: Small volume ascites.  BONES: Degenerative changes.    IMPRESSION:  Moderate loculated right pleural effusion decreased from 2023.    Small left pleural effusion unchanged.    --- End of Report ---    ANA CLEMONS MD; Resident Radiologist  This document has been electronically signed.  GEREMIAS LYLE MD; Attending Radiologist  This document has been electronically signed. 2023 11:11AM    < end of copied text >

## 2023-06-09 NOTE — PROGRESS NOTE ADULT - SUBJECTIVE AND OBJECTIVE BOX
Patient is a 58y old  Female who presents with a chief complaint of Exertional chest pressure and dyspnea (02 Jun 2023 14:17)      SUBJECTIVE : NAEO. Pt seen and examined at bedside. Reports significant improvement in symptoms. Mild exertional dyspnea reported. Reports no pleurex drainage yesterday.     MEDICATIONS  (STANDING):  aspirin enteric coated 81 milliGRAM(s) Oral daily  atorvastatin 40 milliGRAM(s) Oral at bedtime  dextrose 5%. 1000 milliLiter(s) (50 mL/Hr) IV Continuous <Continuous>  dextrose 5%. 1000 milliLiter(s) (100 mL/Hr) IV Continuous <Continuous>  dextrose 50% Injectable 25 Gram(s) IV Push once  dextrose 50% Injectable 12.5 Gram(s) IV Push once  dextrose 50% Injectable 25 Gram(s) IV Push once  glucagon  Injectable 1 milliGRAM(s) IntraMuscular once  heparin   Injectable 5000 Unit(s) SubCutaneous every 8 hours  insulin glargine Injectable (LANTUS) 22 Unit(s) SubCutaneous at bedtime  insulin lispro (ADMELOG) corrective regimen sliding scale   SubCutaneous three times a day before meals  insulin lispro (ADMELOG) corrective regimen sliding scale   SubCutaneous at bedtime  losartan 50 milliGRAM(s) Oral daily    MEDICATIONS  (PRN):  acetaminophen     Tablet .. 650 milliGRAM(s) Oral every 6 hours PRN Temp greater or equal to 38C (100.4F), Mild Pain (1 - 3), Moderate Pain (4 - 6)  dextrose Oral Gel 15 Gram(s) Oral once PRN Blood Glucose LESS THAN 70 milliGRAM(s)/deciliter      CAPILLARY BLOOD GLUCOSE      POCT Blood Glucose.: 183 mg/dL (02 Jun 2023 21:22)  POCT Blood Glucose.: 150 mg/dL (02 Jun 2023 17:43)  POCT Blood Glucose.: 122 mg/dL (02 Jun 2023 13:20)  POCT Blood Glucose.: 119 mg/dL (02 Jun 2023 08:57)    I&O's Summary    01 Jun 2023 07:01  -  02 Jun 2023 07:00  --------------------------------------------------------  IN: 400 mL / OUT: 0 mL / NET: 400 mL    02 Jun 2023 07:01  -  03 Jun 2023 06:28  --------------------------------------------------------  IN: 500 mL / OUT: 0 mL / NET: 500 mL        PHYSICAL EXAM:  Vital Signs Last 24 Hrs  T(C): 37.1 (03 Jun 2023 05:42), Max: 37.1 (02 Jun 2023 21:20)  T(F): 98.7 (03 Jun 2023 05:42), Max: 98.7 (02 Jun 2023 21:20)  HR: 95 (03 Jun 2023 05:42) (94 - 97)  BP: 132/78 (03 Jun 2023 05:42) (119/76 - 138/88)  BP(mean): --  RR: 18 (03 Jun 2023 05:42) (16 - 18)  SpO2: 96% (03 Jun 2023 05:42) (92% - 100%)    Parameters below as of 03 Jun 2023 05:42  Patient On (Oxygen Delivery Method): room air        GENERAL: NAD, lying in bed comfortably  HEAD:  Atraumatic, Normocephalic  EYES: EOMI, PERRLA, conjunctiva and sclera clear  ENT: Moist mucous membranes  NECK: Supple, No JVD  CHEST/LUNG: decreased breath sounds over right lung fields, non-wheezing, Unlabored respirations  HEART: Regular rate and rhythm; No murmurs, rubs, or gallops  ABDOMEN: BSx4; Soft, nontender, nondistended  EXTREMITIES:  2+ Peripheral Pulses, brisk capillary refill. No clubbing, cyanosis, or edema  NERVOUS SYSTEM:  A&Ox3, no focal deficits   SKIN: No rashes or lesions  psych: normal behavior, normal affect      LABS:                        11.8   5.45  )-----------( 222      ( 03 Jun 2023 03:40 )             36.5     06-03    138  |  102  |  13  ----------------------------<  110<H>  4.0   |  24  |  0.91    Ca    9.2      03 Jun 2023 03:40  Phos  3.3     06-03  Mg     2.10     06-03    TPro  7.3  /  Alb  3.4  /  TBili  0.3  /  DBili  x   /  AST  24  /  ALT  14  /  AlkPhos  94  06-03    PT/INR - ( 01 Jun 2023 06:40 )   PT: 13.5 sec;   INR: 1.16 ratio         PTT - ( 01 Jun 2023 06:40 )  PTT:24.6 sec          Culture - Fungal, Body Fluid (collected 01 Jun 2023 18:34)  Source: Pleural Fl Pleural Fluid  Preliminary Report (02 Jun 2023 07:53):    Testing in progress    Culture - Body Fluid with Gram Stain (collected 01 Jun 2023 18:34)  Source: Pleural Fl Pleural Fluid  Gram Stain (01 Jun 2023 23:24):    polymorphonuclear leukocytes seen    No organisms seen    by cytocentrifuge  Preliminary Report (02 Jun 2023 17:38):    No growth        RADIOLOGY & ADDITIONAL TESTS:  Results Reviewed:   Imaging Personally Reviewed:  Electrocardiogram Personally Reviewed:    COORDINATION OF CARE:  Care Discussed with Consultants/Other Providers [Y/N]:  Prior or Outpatient Records Reviewed [Y/N]:   Patient is a 58y old  Female who presents with a chief complaint of Exertional chest pressure and dyspnea (02 Jun 2023 14:17)      SUBJECTIVE: Pt seen and examined at bedside. Reports significant improvement in symptoms. Mild exertional dyspnea reported. Reports pleurex drainage yesterday.     MEDICATIONS  (STANDING):  aspirin enteric coated 81 milliGRAM(s) Oral daily  atorvastatin 40 milliGRAM(s) Oral at bedtime  dextrose 5%. 1000 milliLiter(s) (50 mL/Hr) IV Continuous <Continuous>  dextrose 5%. 1000 milliLiter(s) (100 mL/Hr) IV Continuous <Continuous>  dextrose 50% Injectable 25 Gram(s) IV Push once  dextrose 50% Injectable 12.5 Gram(s) IV Push once  dextrose 50% Injectable 25 Gram(s) IV Push once  glucagon  Injectable 1 milliGRAM(s) IntraMuscular once  heparin   Injectable 5000 Unit(s) SubCutaneous every 8 hours  insulin glargine Injectable (LANTUS) 22 Unit(s) SubCutaneous at bedtime  insulin lispro (ADMELOG) corrective regimen sliding scale   SubCutaneous three times a day before meals  insulin lispro (ADMELOG) corrective regimen sliding scale   SubCutaneous at bedtime  losartan 50 milliGRAM(s) Oral daily    MEDICATIONS  (PRN):  acetaminophen     Tablet .. 650 milliGRAM(s) Oral every 6 hours PRN Temp greater or equal to 38C (100.4F), Mild Pain (1 - 3), Moderate Pain (4 - 6)  dextrose Oral Gel 15 Gram(s) Oral once PRN Blood Glucose LESS THAN 70 milliGRAM(s)/deciliter      CAPILLARY BLOOD GLUCOSE      POCT Blood Glucose.: 183 mg/dL (02 Jun 2023 21:22)  POCT Blood Glucose.: 150 mg/dL (02 Jun 2023 17:43)  POCT Blood Glucose.: 122 mg/dL (02 Jun 2023 13:20)  POCT Blood Glucose.: 119 mg/dL (02 Jun 2023 08:57)    I&O's Summary    01 Jun 2023 07:01  -  02 Jun 2023 07:00  --------------------------------------------------------  IN: 400 mL / OUT: 0 mL / NET: 400 mL    02 Jun 2023 07:01  -  03 Jun 2023 06:28  --------------------------------------------------------  IN: 500 mL / OUT: 0 mL / NET: 500 mL        PHYSICAL EXAM:  Vital Signs Last 24 Hrs  T(C): 37.1 (03 Jun 2023 05:42), Max: 37.1 (02 Jun 2023 21:20)  T(F): 98.7 (03 Jun 2023 05:42), Max: 98.7 (02 Jun 2023 21:20)  HR: 95 (03 Jun 2023 05:42) (94 - 97)  BP: 132/78 (03 Jun 2023 05:42) (119/76 - 138/88)  BP(mean): --  RR: 18 (03 Jun 2023 05:42) (16 - 18)  SpO2: 96% (03 Jun 2023 05:42) (92% - 100%)    Parameters below as of 03 Jun 2023 05:42  Patient On (Oxygen Delivery Method): room air        GENERAL: NAD, lying in bed comfortably  HEAD:  Atraumatic, Normocephalic  EYES: EOMI, PERRLA, conjunctiva and sclera clear  ENT: Moist mucous membranes  NECK: Supple, No JVD  CHEST/LUNG: slightly decreased breath sounds over right lung fields, non-wheezing, Unlabored respirations  HEART: Regular rate and rhythm; No murmurs, rubs, or gallops  ABDOMEN: BSx4; Soft, nontender, nondistended  EXTREMITIES:  2+ Peripheral Pulses, brisk capillary refill. No clubbing, cyanosis, or edema  NERVOUS SYSTEM:  A&Ox3, no focal deficits   SKIN: No rashes or lesions  psych: normal behavior, normal affect      LABS:                        11.8   5.45  )-----------( 222      ( 03 Jun 2023 03:40 )             36.5     06-03    138  |  102  |  13  ----------------------------<  110<H>  4.0   |  24  |  0.91    Ca    9.2      03 Jun 2023 03:40  Phos  3.3     06-03  Mg     2.10     06-03    TPro  7.3  /  Alb  3.4  /  TBili  0.3  /  DBili  x   /  AST  24  /  ALT  14  /  AlkPhos  94  06-03    PT/INR - ( 01 Jun 2023 06:40 )   PT: 13.5 sec;   INR: 1.16 ratio         PTT - ( 01 Jun 2023 06:40 )  PTT:24.6 sec          Culture - Fungal, Body Fluid (collected 01 Jun 2023 18:34)  Source: Pleural Fl Pleural Fluid  Preliminary Report (02 Jun 2023 07:53):    Testing in progress    Culture - Body Fluid with Gram Stain (collected 01 Jun 2023 18:34)  Source: Pleural Fl Pleural Fluid  Gram Stain (01 Jun 2023 23:24):    polymorphonuclear leukocytes seen    No organisms seen    by cytocentrifuge  Preliminary Report (02 Jun 2023 17:38):    No growth        RADIOLOGY & ADDITIONAL TESTS:  Results Reviewed:   Imaging Personally Reviewed:  Electrocardiogram Personally Reviewed:    COORDINATION OF CARE:  Care Discussed with Consultants/Other Providers [Y/N]:  Prior or Outpatient Records Reviewed [Y/N]:

## 2023-06-09 NOTE — DISCHARGE NOTE NURSING/CASE MANAGEMENT/SOCIAL WORK - NSDCFUADDAPPT_GEN_ALL_CORE_FT
APPTS ARE READY TO BE MADE: [ x] YES    Best Family or Patient Contact (if needed):    Additional Information about above appointments (if needed):    1: Please follow up with Dr. Zimmer on Monday 6/5 to discuss the need for a procedure on Wednesday 6/7.  2:   3:     Other comments or requests:     Patient was scheduled with Dr. Luisito Zimmer on 06/19/23 at 1:00PM [place] [address] Phone: [phone] through the provider's office. Patient advised of appointment details.    Patient was scheduled with ROSEMARY Simmons on 6/16/2023 4pm at [address] [facility] [address] through the provider's office. Patient advised of appointment details.

## 2023-06-09 NOTE — PROGRESS NOTE ADULT - ASSESSMENT
57 yo F with PMH DM, HTN, asthma who presents with several weeks of shortness of breath. Found to have R pleural effusion.     # Right pleural effusion  Unclear etiology. No signs/symptoms of malignancy autoimmune disease, cardiac disease, infection.  - s/p R thoracentesis with -1200 ml serosanguinous fluid on 23; exudative fluid (LDH 1440, Protein 5.8; glucose 115)  - cytology shows malignant cells;  - repeat CT chest still with limited evaluation of lung parenchyma due to large effusion  - CT abdomen shows peritoneal carcinomatosis, pending bx by IR for additional tissue  - s/p pleurx placement  with Dr. Zimmer with 1200 cc drained  - continue to drain pleurx daily, up to 1L (stop if any chest pain), can drain 3x weekly at home (pleurx form for supplies is in chart)   - incentive spirometry  - oob to chair, ambulation as tolerated    For Home:  Please drain Pleurx 3x weekly, up to 1L (stop if any chest discomfort, chest pain).        Prior to discharge:  Please email: [email or web address] to setup an appointment prior to discharge. The appointment should be within 1-2 weeks of discharge from the hospital. Include the patient's name, , MRN and contact information in the email.      [facility]  [address] [place] [id number] [phone]

## 2023-06-09 NOTE — PROGRESS NOTE ADULT - PROBLEM SELECTOR PLAN 7
Diet: CC  DVT: heprin subq   Dispo: pending malignancy workup  PT: no skilled needs. Ambulatory oxygen 92%

## 2023-06-09 NOTE — PROGRESS NOTE ADULT - REASON FOR ADMISSION
Exertional chest pressure and dyspnea

## 2023-06-10 ENCOUNTER — TRANSCRIPTION ENCOUNTER (OUTPATIENT)
Age: 58
End: 2023-06-10

## 2023-06-12 ENCOUNTER — NON-APPOINTMENT (OUTPATIENT)
Age: 58
End: 2023-06-12

## 2023-06-12 PROBLEM — R19.00 PELVIC MASS: Status: ACTIVE | Noted: 2023-06-12

## 2023-06-13 ENCOUNTER — APPOINTMENT (OUTPATIENT)
Dept: GYNECOLOGIC ONCOLOGY | Facility: CLINIC | Age: 58
End: 2023-06-13
Payer: COMMERCIAL

## 2023-06-13 VITALS
SYSTOLIC BLOOD PRESSURE: 140 MMHG | WEIGHT: 204 LBS | DIASTOLIC BLOOD PRESSURE: 89 MMHG | HEIGHT: 60 IN | BODY MASS INDEX: 40.05 KG/M2 | HEART RATE: 114 BPM

## 2023-06-13 DIAGNOSIS — R19.00 INTRA-ABDOMINAL AND PELVIC SWELLING, MASS AND LUMP, UNSPECIFIED SITE: ICD-10-CM

## 2023-06-13 PROCEDURE — 99205 OFFICE O/P NEW HI 60 MIN: CPT

## 2023-06-13 RX ORDER — MELOXICAM 15 MG/1
15 TABLET ORAL
Qty: 30 | Refills: 1 | Status: DISCONTINUED | COMMUNITY
Start: 2021-02-24 | End: 2023-06-13

## 2023-06-14 LAB — HPV HIGH+LOW RISK DNA PNL CVX: NOT DETECTED

## 2023-06-15 ENCOUNTER — OUTPATIENT (OUTPATIENT)
Dept: OUTPATIENT SERVICES | Facility: HOSPITAL | Age: 58
LOS: 1 days | Discharge: HOME | End: 2023-06-15

## 2023-06-15 DIAGNOSIS — C57.4 MALIGNANT NEOPLASM OF UTERINE ADNEXA, UNSPECIFIED: ICD-10-CM

## 2023-06-15 DIAGNOSIS — Z98.89 OTHER SPECIFIED POSTPROCEDURAL STATES: Chronic | ICD-10-CM

## 2023-06-15 LAB — NON-GYNECOLOGICAL CYTOLOGY STUDY: SIGNIFICANT CHANGE UP

## 2023-06-16 ENCOUNTER — APPOINTMENT (OUTPATIENT)
Dept: INTERNAL MEDICINE | Facility: CLINIC | Age: 58
End: 2023-06-16
Payer: COMMERCIAL

## 2023-06-16 VITALS
SYSTOLIC BLOOD PRESSURE: 132 MMHG | OXYGEN SATURATION: 97 % | WEIGHT: 205 LBS | DIASTOLIC BLOOD PRESSURE: 76 MMHG | HEART RATE: 92 BPM | TEMPERATURE: 98.1 F | HEIGHT: 60 IN | BODY MASS INDEX: 40.25 KG/M2

## 2023-06-16 PROCEDURE — 99213 OFFICE O/P EST LOW 20 MIN: CPT

## 2023-06-16 NOTE — PHYSICAL EXAM
[No Acute Distress] : no acute distress [Well Nourished] : well nourished [Well Developed] : well developed [No Respiratory Distress] : no respiratory distress  [No Accessory Muscle Use] : no accessory muscle use [Clear to Auscultation] : lungs were clear to auscultation bilaterally [Normal Rate] : normal rate  [Regular Rhythm] : with a regular rhythm

## 2023-06-16 NOTE — HISTORY OF PRESENT ILLNESS
[Post-hospitalization from ___ Hospital] : Post-hospitalization from [unfilled] Hospital [Admitted on: ___] : The patient was admitted on [unfilled] [Discharged on ___] : discharged on [unfilled] [Discharge Summary] : discharge summary [Pertinent Labs] : pertinent labs [Radiology Findings] : radiology findings [Discharge Med List] : discharge medication list [Med Reconciliation] : medication reconciliation has been completed [FreeTextEntry2] : Pt was hospitalized for exertional chest pressure,dyspnea and diagnosed with ovarian cancer.\par Pt had fluid in lungs that was drained.\par Today pt feels better. Pt will start chemotherapy next week.\par No SOB,c/p,pal.\par

## 2023-06-19 ENCOUNTER — APPOINTMENT (OUTPATIENT)
Dept: PULMONOLOGY | Facility: CLINIC | Age: 58
End: 2023-06-19
Payer: COMMERCIAL

## 2023-06-19 VITALS
OXYGEN SATURATION: 94 % | HEIGHT: 60 IN | RESPIRATION RATE: 15 BRPM | TEMPERATURE: 97.1 F | WEIGHT: 206 LBS | SYSTOLIC BLOOD PRESSURE: 129 MMHG | HEART RATE: 105 BPM | BODY MASS INDEX: 40.44 KG/M2 | DIASTOLIC BLOOD PRESSURE: 85 MMHG

## 2023-06-19 DIAGNOSIS — R05.3 CHRONIC COUGH: ICD-10-CM

## 2023-06-19 DIAGNOSIS — J91.0 MALIGNANT PLEURAL EFFUSION: ICD-10-CM

## 2023-06-19 LAB — CYTOLOGY CVX/VAG DOC THIN PREP: NORMAL

## 2023-06-19 PROCEDURE — 76604 US EXAM CHEST: CPT

## 2023-06-19 PROCEDURE — 99214 OFFICE O/P EST MOD 30 MIN: CPT | Mod: 25

## 2023-06-19 RX ORDER — OLMESARTAN MEDOXOMIL 20 MG/1
20 TABLET, FILM COATED ORAL
Qty: 90 | Refills: 0 | Status: ACTIVE | COMMUNITY
Start: 2019-03-13 | End: 1900-01-01

## 2023-06-20 ENCOUNTER — NON-APPOINTMENT (OUTPATIENT)
Age: 58
End: 2023-06-20

## 2023-06-20 ENCOUNTER — APPOINTMENT (OUTPATIENT)
Dept: HEMATOLOGY ONCOLOGY | Facility: CLINIC | Age: 58
End: 2023-06-20
Payer: COMMERCIAL

## 2023-06-20 ENCOUNTER — RESULT REVIEW (OUTPATIENT)
Age: 58
End: 2023-06-20

## 2023-06-20 VITALS
OXYGEN SATURATION: 96 % | SYSTOLIC BLOOD PRESSURE: 127 MMHG | HEART RATE: 103 BPM | DIASTOLIC BLOOD PRESSURE: 77 MMHG | WEIGHT: 206 LBS | BODY MASS INDEX: 40.44 KG/M2 | RESPIRATION RATE: 16 BRPM | HEIGHT: 60 IN

## 2023-06-20 LAB
BASOPHILS # BLD AUTO: 0.01 K/UL — SIGNIFICANT CHANGE UP (ref 0–0.2)
BASOPHILS NFR BLD AUTO: 0.2 % — SIGNIFICANT CHANGE UP (ref 0–2)
EOSINOPHIL # BLD AUTO: 0.13 K/UL — SIGNIFICANT CHANGE UP (ref 0–0.5)
EOSINOPHIL NFR BLD AUTO: 2.7 % — SIGNIFICANT CHANGE UP (ref 0–6)
HCT VFR BLD CALC: 35.7 % — SIGNIFICANT CHANGE UP (ref 34.5–45)
HGB BLD-MCNC: 11.8 G/DL — SIGNIFICANT CHANGE UP (ref 11.5–15.5)
IMM GRANULOCYTES NFR BLD AUTO: 0.2 % — SIGNIFICANT CHANGE UP (ref 0–0.9)
LYMPHOCYTES # BLD AUTO: 0.69 K/UL — LOW (ref 1–3.3)
LYMPHOCYTES # BLD AUTO: 14.3 % — SIGNIFICANT CHANGE UP (ref 13–44)
MCHC RBC-ENTMCNC: 27.4 PG — SIGNIFICANT CHANGE UP (ref 27–34)
MCHC RBC-ENTMCNC: 33.1 G/DL — SIGNIFICANT CHANGE UP (ref 32–36)
MCV RBC AUTO: 82.8 FL — SIGNIFICANT CHANGE UP (ref 80–100)
MONOCYTES # BLD AUTO: 0.42 K/UL — SIGNIFICANT CHANGE UP (ref 0–0.9)
MONOCYTES NFR BLD AUTO: 8.7 % — SIGNIFICANT CHANGE UP (ref 2–14)
NEUTROPHILS # BLD AUTO: 3.56 K/UL — SIGNIFICANT CHANGE UP (ref 1.8–7.4)
NEUTROPHILS NFR BLD AUTO: 73.9 % — SIGNIFICANT CHANGE UP (ref 43–77)
NRBC # BLD: 0 /100 WBCS — SIGNIFICANT CHANGE UP (ref 0–0)
PLATELET # BLD AUTO: 235 K/UL — SIGNIFICANT CHANGE UP (ref 150–400)
RBC # BLD: 4.31 M/UL — SIGNIFICANT CHANGE UP (ref 3.8–5.2)
RBC # FLD: 12.6 % — SIGNIFICANT CHANGE UP (ref 10.3–14.5)
WBC # BLD: 4.82 K/UL — SIGNIFICANT CHANGE UP (ref 3.8–10.5)
WBC # FLD AUTO: 4.82 K/UL — SIGNIFICANT CHANGE UP (ref 3.8–10.5)

## 2023-06-20 PROCEDURE — 99205 OFFICE O/P NEW HI 60 MIN: CPT

## 2023-06-20 RX ORDER — PROCHLORPERAZINE MALEATE 10 MG/1
10 TABLET ORAL EVERY 6 HOURS
Qty: 20 | Refills: 6 | Status: ACTIVE | COMMUNITY
Start: 2023-06-20 | End: 1900-01-01

## 2023-06-20 RX ORDER — LIDOCAINE AND PRILOCAINE 25; 25 MG/G; MG/G
2.5-2.5 CREAM TOPICAL
Qty: 1 | Refills: 3 | Status: ACTIVE | COMMUNITY
Start: 2023-06-20 | End: 1900-01-01

## 2023-06-20 NOTE — PHYSICAL EXAM
[Fully active, able to carry on all pre-disease performance without restriction] : Status 0 - Fully active, able to carry on all pre-disease performance without restriction [Normal] : affect appropriate [de-identified] : b/l decreased BS, R >> L [de-identified] : Distended, ? ascites present. NT, BS+. peritoneal carcinomatosis palpable.

## 2023-06-20 NOTE — CONSULT LETTER
[Dear  ___] : Dear  [unfilled], [Consult Letter:] : I had the pleasure of evaluating your patient, [unfilled]. [Consult Closing:] : Thank you very much for allowing me to participate in the care of this patient.  If you have any questions, please do not hesitate to contact me. [Sincerely,] : Sincerely, [DrSharifa  ___] : Dr. ZIMMERMAN

## 2023-06-20 NOTE — HISTORY OF PRESENT ILLNESS
[Disease: _____________________] : Disease: [unfilled] [AJCC Stage: ____] : AJCC Stage: [unfilled] [de-identified] : Ms. Cassidy is a 59 yo  postmenopausal female who presents for initial consultation for NACT  for presumed GYN cancer. She reports a >5 year lapse in GYN care. She presented to [facility] ED on 5/31/23 c/o RECIO a 4 weeks. Imaging revealed a right pleural effusion (s/p thoracentesis positive for malignant cells with IHC of likely Mullerian origin), carcinomatosis, adnexal mass and a markedly elevated CA-125. IR guided core biopsy of peritoneal mass is pending.\par \par Patient is a Religion and does not accept blood products.\par \par LABS on 6/6/23:\par CA-125 was 1,506 (ref<38)\par  was <2 (ref<35)\par CEA was <1.0 (ref<3.8)\par \par CYTOLOGY/PATHOLOGY:\par 1) Right pleural thoracentesis, 6/1/23, To\par  a) positive for malignant cells, PAX-8 +, ER +, CK7 + - favor Mullerian origin\par 2) Core biopsy of peritoneal mass, 6/8/23, To\par  OMENTUM, CT GUIDED CORE BIOPSY\par POSITIVE FOR MALIGNANT CELLS.\par Adenocarcinoma, immunophenotypically consistent with primary mullerian\par origin, favor High Grade Serous Carcinoma.\par The neoplastic cells are positive for Moc31, [place]-8, CK7,\par P53, P16,  WT1 and ER. P40 and CK 20 are negative\par \par IMAGING:\par TVUS on 6/4/23:\par uterus 11 x 5.0 x 7.6 cm. Leiomyomatous uterus with scattered calcified uterine myomas including a 3.5 cm right intramural myoma and 5.4 cm posterior intramural myoma. EML not visualized. \par RTO - 3.2 x 2.2 x 1.8 cm. \par LTO - there is a 7.0 x 5.8 x 5.5 cm calcified mass in the left adnexa likely representing calcified pedunculated myoma. \par Small FF. \par \par \par CT C/A/P on 6/2/23:\par LOWER CHEST: Loculated moderate right pleural effusion and small left pleural effusion are unchanged.\par LIVER: Soft tissue nodularity along the inferior right hepatic lobe.\par BILE DUCTS: Normal caliber.\par GALLBLADDER: Cholelithiasis and contracted.\par SPLEEN: Within normal limits.\par PANCREAS: Within normal limits.\par ADRENALS: Within normal limits.\par KIDNEYS/URETERS: Bilateral cortical scarring.\par BLADDER: Within normal limits.\par REPRODUCTIVE ORGANS: Likely 2 intramural calcified fibroids. Another partially calcified mass in the left lower abdomen may represent a subserosal fibroid or an adnexal mass. The adnexa are not well visualized.\par BOWEL: No bowel obstruction. Appendix is normal. Colonic diverticulosis.\par PERITONEUM: Small to moderate ascites. Large amount of soft tissue in the greater omentum\par VESSELS: Within normal limits.\par RETROPERITONEUM/LYMPH NODES: No lymphadenopathy.\par ABDOMINAL WALL: Within normal limits.\par BONES: Degenerative changes.\par IMPRESSION:\par Extensive peritoneal carcinomatosis and small to moderate ascites.\par Fibroid uterus with either a left subserosal fibroid or a possible adnexal mass.\par Loculated moderate right pleural effusion and small left pleural effusion, unchanged.\par \par Patient saw Dr. Huizar and had right pleurex catheter placed.\par \par \par PMHx: DM, HTN, HLD, asthma\par PSHx: N/A\par Fam Hx: mother (colon cancer)\par \par \par HCM:\par Mammogram: unsure\par Colonoscopy: 5/2019\par  [de-identified] : [place]ze [de-identified] : No bowel or bladder issues, no pain, appetite is good.\par She is on Insulin. for DM.\par Her son and his family lives with the patient.

## 2023-06-21 LAB
ALBUMIN SERPL ELPH-MCNC: 3.6 G/DL
ALP BLD-CCNC: 97 U/L
ALT SERPL-CCNC: 10 U/L
ANION GAP SERPL CALC-SCNC: 13 MMOL/L
APTT BLD: 33.2 SEC
AST SERPL-CCNC: 21 U/L
BILIRUB SERPL-MCNC: 0.3 MG/DL
BUN SERPL-MCNC: 9 MG/DL
CALCIUM SERPL-MCNC: 9.3 MG/DL
CANCER AG125 SERPL-ACNC: 1237 U/ML
CHLORIDE SERPL-SCNC: 103 MMOL/L
CO2 SERPL-SCNC: 26 MMOL/L
CREAT SERPL-MCNC: 0.89 MG/DL
EGFR: 75 ML/MIN/1.73M2
GLUCOSE SERPL-MCNC: 127 MG/DL
HAV IGM SER QL: NONREACTIVE
HBV CORE IGM SER QL: NONREACTIVE
HBV SURFACE AG SER QL: NONREACTIVE
HCV AB SER QL: NONREACTIVE
HCV S/CO RATIO: 0.13 S/CO
INR PPP: 1.08 RATIO
MAGNESIUM SERPL-MCNC: 2 MG/DL
POTASSIUM SERPL-SCNC: 4 MMOL/L
PROT SERPL-MCNC: 7.2 G/DL
PT BLD: 12.7 SEC
SODIUM SERPL-SCNC: 143 MMOL/L

## 2023-06-22 PROBLEM — E61.2 MAGNESIUM DEFICIENCY: Chronic | Status: ACTIVE | Noted: 2023-06-21

## 2023-06-22 PROBLEM — M25.562 PAIN IN LEFT KNEE: Chronic | Status: ACTIVE | Noted: 2023-06-21

## 2023-06-22 PROBLEM — M85.80 OTHER SPECIFIED DISORDERS OF BONE DENSITY AND STRUCTURE, UNSPECIFIED SITE: Chronic | Status: ACTIVE | Noted: 2023-06-21

## 2023-06-22 PROBLEM — E66.9 OBESITY, UNSPECIFIED: Chronic | Status: ACTIVE | Noted: 2023-06-21

## 2023-06-27 ENCOUNTER — RESULT REVIEW (OUTPATIENT)
Age: 58
End: 2023-06-27

## 2023-06-27 ENCOUNTER — NON-APPOINTMENT (OUTPATIENT)
Age: 58
End: 2023-06-27

## 2023-06-27 ENCOUNTER — APPOINTMENT (OUTPATIENT)
Dept: INFUSION THERAPY | Facility: HOSPITAL | Age: 58
End: 2023-06-27

## 2023-06-27 ENCOUNTER — APPOINTMENT (OUTPATIENT)
Dept: HEMATOLOGY ONCOLOGY | Facility: CLINIC | Age: 58
End: 2023-06-27

## 2023-06-27 DIAGNOSIS — R11.2 NAUSEA WITH VOMITING, UNSPECIFIED: ICD-10-CM

## 2023-06-27 DIAGNOSIS — Z51.11 ENCOUNTER FOR ANTINEOPLASTIC CHEMOTHERAPY: ICD-10-CM

## 2023-06-27 PROBLEM — L65.9 NONSCARRING HAIR LOSS, UNSPECIFIED: Chronic | Status: ACTIVE | Noted: 2023-06-21

## 2023-06-27 PROBLEM — Z87.09 PERSONAL HISTORY OF OTHER DISEASES OF THE RESPIRATORY SYSTEM: Chronic | Status: ACTIVE | Noted: 2023-06-21

## 2023-06-27 PROBLEM — J91.0 MALIGNANT PLEURAL EFFUSION: Chronic | Status: ACTIVE | Noted: 2023-06-21

## 2023-06-27 LAB
BASOPHILS # BLD AUTO: 0.01 K/UL — SIGNIFICANT CHANGE UP (ref 0–0.2)
BASOPHILS NFR BLD AUTO: 0.1 % — SIGNIFICANT CHANGE UP (ref 0–2)
EOSINOPHIL # BLD AUTO: 0 K/UL — SIGNIFICANT CHANGE UP (ref 0–0.5)
EOSINOPHIL NFR BLD AUTO: 0 % — SIGNIFICANT CHANGE UP (ref 0–6)
HCT VFR BLD CALC: 35.7 % — SIGNIFICANT CHANGE UP (ref 34.5–45)
HGB BLD-MCNC: 12 G/DL — SIGNIFICANT CHANGE UP (ref 11.5–15.5)
IMM GRANULOCYTES NFR BLD AUTO: 0.8 % — SIGNIFICANT CHANGE UP (ref 0–0.9)
LYMPHOCYTES # BLD AUTO: 0.47 K/UL — LOW (ref 1–3.3)
LYMPHOCYTES # BLD AUTO: 5.9 % — LOW (ref 13–44)
MCHC RBC-ENTMCNC: 27 PG — SIGNIFICANT CHANGE UP (ref 27–34)
MCHC RBC-ENTMCNC: 33.6 G/DL — SIGNIFICANT CHANGE UP (ref 32–36)
MCV RBC AUTO: 80.4 FL — SIGNIFICANT CHANGE UP (ref 80–100)
MONOCYTES # BLD AUTO: 0.05 K/UL — SIGNIFICANT CHANGE UP (ref 0–0.9)
MONOCYTES NFR BLD AUTO: 0.6 % — LOW (ref 2–14)
NEUTROPHILS # BLD AUTO: 7.41 K/UL — HIGH (ref 1.8–7.4)
NEUTROPHILS NFR BLD AUTO: 92.6 % — HIGH (ref 43–77)
NRBC # BLD: 0 /100 WBCS — SIGNIFICANT CHANGE UP (ref 0–0)
PLATELET # BLD AUTO: 237 K/UL — SIGNIFICANT CHANGE UP (ref 150–400)
RBC # BLD: 4.44 M/UL — SIGNIFICANT CHANGE UP (ref 3.8–5.2)
RBC # FLD: 12.6 % — SIGNIFICANT CHANGE UP (ref 10.3–14.5)
WBC # BLD: 8 K/UL — SIGNIFICANT CHANGE UP (ref 3.8–10.5)
WBC # FLD AUTO: 8 K/UL — SIGNIFICANT CHANGE UP (ref 3.8–10.5)

## 2023-06-27 RX ORDER — OLMESARTAN MEDOXOMIL 5 MG/1
1 TABLET, FILM COATED ORAL
Refills: 0 | DISCHARGE

## 2023-06-28 ENCOUNTER — APPOINTMENT (OUTPATIENT)
Dept: ULTRASOUND IMAGING | Facility: IMAGING CENTER | Age: 58
End: 2023-06-28
Payer: COMMERCIAL

## 2023-06-28 ENCOUNTER — RESULT REVIEW (OUTPATIENT)
Age: 58
End: 2023-06-28

## 2023-06-28 ENCOUNTER — OUTPATIENT (OUTPATIENT)
Dept: OUTPATIENT SERVICES | Facility: HOSPITAL | Age: 58
LOS: 1 days | End: 2023-06-28
Payer: COMMERCIAL

## 2023-06-28 DIAGNOSIS — Z98.89 OTHER SPECIFIED POSTPROCEDURAL STATES: Chronic | ICD-10-CM

## 2023-06-28 DIAGNOSIS — C56.9 MALIGNANT NEOPLASM OF UNSPECIFIED OVARY: ICD-10-CM

## 2023-06-28 DIAGNOSIS — Z98.890 OTHER SPECIFIED POSTPROCEDURAL STATES: Chronic | ICD-10-CM

## 2023-06-28 PROCEDURE — 88305 TISSUE EXAM BY PATHOLOGIST: CPT

## 2023-06-28 PROCEDURE — 88112 CYTOPATH CELL ENHANCE TECH: CPT | Mod: 26

## 2023-06-28 PROCEDURE — 49083 ABD PARACENTESIS W/IMAGING: CPT

## 2023-06-28 PROCEDURE — 88112 CYTOPATH CELL ENHANCE TECH: CPT

## 2023-06-28 PROCEDURE — 88305 TISSUE EXAM BY PATHOLOGIST: CPT | Mod: 26

## 2023-06-30 LAB — NON-GYNECOLOGICAL CYTOLOGY STUDY: SIGNIFICANT CHANGE UP

## 2023-07-01 PROBLEM — R05.3 CHRONIC COUGH: Status: ACTIVE | Noted: 2019-09-09

## 2023-07-01 PROBLEM — J91.0 MALIGNANT PLEURAL EFFUSION: Status: ACTIVE | Noted: 2023-06-12

## 2023-07-01 LAB
CULTURE RESULTS: SIGNIFICANT CHANGE UP
SPECIMEN SOURCE: SIGNIFICANT CHANGE UP

## 2023-07-01 NOTE — PROCEDURE
[Thoracic Ultrasound] : Thoracic Ultrasound [Simple] : simple [Pleural Effusion] : Pleural Effusion: No [FreeTextEntry2] : Trace right pleural effusion. [de-identified] : Malignancy

## 2023-07-01 NOTE — PHYSICAL EXAM
[No Acute Distress] : no acute distress [Normal Oropharynx] : normal oropharynx [Normal Appearance] : normal appearance [Normal Rate/Rhythm] : normal rate/rhythm [Normal S1, S2] : normal s1, s2 [No Murmurs] : no murmurs [No Resp Distress] : no resp distress [Clear to Auscultation Bilaterally] : clear to auscultation bilaterally [Not Tender] : not tender [Soft] : soft [No Edema] : no edema [No Focal Deficits] : no focal deficits [TextBox_125] : Pleurx catheter c/d/i. S/p suture removal.

## 2023-07-01 NOTE — QUALITY MEASURES
[Patient has reason to be excluded] : patient has reason to be excluded from screening for tobacco use (eg: limited life expectancy, other medical reason)

## 2023-07-01 NOTE — REASON FOR VISIT
[Follow-Up - From Hospitalization] : a follow-up visit after a recent hospitalization [TextBox_44] : leticia baron.

## 2023-07-01 NOTE — HISTORY OF PRESENT ILLNESS
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 Here for post pleurX management. The patient reports that she is draining about 50 cc TIW which is markedly improved. Prior it was about 150 per drainage. She does not have any chest pain with drainage. Denies fever, chills, cough, or pain at the pleurX site. \par

## 2023-07-01 NOTE — ASSESSMENT
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 Here for post pleurX management. The patient reports that she is draining about 50 cc TIW which is markedly improved. Prior it was about 150 per drainage. She does not have any chest pain with drainage. Denies fever, chills, cough, or pain at the pleurX site. \par \par #Malignant pleural effusion (R). \par -Sutures removed in clinic 6/19\par -Draining 50 cc TIW at this time. Can decrease drainage to daily now. \par -RTC in 3 months.  \par \par Continue drainage once a week. Catheter protocols reviewed. All information provided. Adequate equipment available. Advised to call if experiencing any pain with drainage. Advised to call if any change in color of fluid or erythema or swelling at catheter site or experiencing systemic signs and symptoms of infection such as fever. Advised to call if any sudden worsening of shortness of breath. Catheter care instructions provided.\par

## 2023-07-06 ENCOUNTER — RESULT REVIEW (OUTPATIENT)
Age: 58
End: 2023-07-06

## 2023-07-06 ENCOUNTER — TRANSCRIPTION ENCOUNTER (OUTPATIENT)
Age: 58
End: 2023-07-06

## 2023-07-06 ENCOUNTER — OUTPATIENT (OUTPATIENT)
Dept: OUTPATIENT SERVICES | Facility: HOSPITAL | Age: 58
LOS: 1 days | End: 2023-07-06
Payer: COMMERCIAL

## 2023-07-06 VITALS
HEART RATE: 102 BPM | RESPIRATION RATE: 16 BRPM | SYSTOLIC BLOOD PRESSURE: 137 MMHG | WEIGHT: 203.05 LBS | TEMPERATURE: 98 F | DIASTOLIC BLOOD PRESSURE: 83 MMHG | HEIGHT: 62 IN | OXYGEN SATURATION: 96 %

## 2023-07-06 VITALS
RESPIRATION RATE: 17 BRPM | DIASTOLIC BLOOD PRESSURE: 82 MMHG | SYSTOLIC BLOOD PRESSURE: 133 MMHG | HEART RATE: 86 BPM | OXYGEN SATURATION: 98 %

## 2023-07-06 DIAGNOSIS — C56.9 MALIGNANT NEOPLASM OF UNSPECIFIED OVARY: ICD-10-CM

## 2023-07-06 DIAGNOSIS — Z98.890 OTHER SPECIFIED POSTPROCEDURAL STATES: Chronic | ICD-10-CM

## 2023-07-06 DIAGNOSIS — Z98.89 OTHER SPECIFIED POSTPROCEDURAL STATES: Chronic | ICD-10-CM

## 2023-07-06 LAB — GLUCOSE BLDC GLUCOMTR-MCNC: 171 MG/DL — HIGH (ref 70–99)

## 2023-07-06 PROCEDURE — 77001 FLUOROGUIDE FOR VEIN DEVICE: CPT | Mod: 26

## 2023-07-06 PROCEDURE — 82962 GLUCOSE BLOOD TEST: CPT

## 2023-07-06 PROCEDURE — C1788: CPT

## 2023-07-06 PROCEDURE — 76937 US GUIDE VASCULAR ACCESS: CPT

## 2023-07-06 PROCEDURE — C1769: CPT

## 2023-07-06 PROCEDURE — 76937 US GUIDE VASCULAR ACCESS: CPT | Mod: 26

## 2023-07-06 PROCEDURE — 77001 FLUOROGUIDE FOR VEIN DEVICE: CPT

## 2023-07-06 PROCEDURE — C1894: CPT

## 2023-07-06 PROCEDURE — 36561 INSERT TUNNELED CV CATH: CPT

## 2023-07-06 NOTE — ASU DISCHARGE PLAN (ADULT/PEDIATRIC) - ASU DC SPECIAL INSTRUCTIONSFT
Chest Port Placement    Discharge Instructions  - You have had a chest port implanted in your chest.   - The port is ready for use.  - You may shower in 48 hours. No soaking or swimming for 2 weeks or until the site is completely healed.  - Keep the area covered and dry for the next 2days. It may be removed by a chemotherapy nurse as needed for treatment.  - Do not perform any heavy lifting or put tension on the area for the next week or until the site is healed.  - You may resume your normal diet.  - You may resume your normal medications however you should wait 48 hours before restarting aspirin, plavix, or blood thinners.  - It is normal to experience some pain over the site for the next few days. You may take Tylenol for that pain. Do not take more frequently than every 6 hours and do not exceed more than 3000mg of Tylenol in a 24 hour period.  - You were given conscious sedation which may make you drowsy, therefore you need someone to stay with you until the morning following the procedure.  - Do not drive, engage in heavy lifting or strenuous activity, or drink any alcoholic beverages for the next 24 hours.   - You may resume normal activity in 24 hours.    Notify your primary physician and/or Interventional Radiology IMMEDIATELY if you experience any of the following       - Fever of 101F or 38C       - Chills or Rigors/ Shakes       - Swelling and/or Redness in the area around the port       - Worsening Pain       - Blood soaked bandages or worsening bleeding       - Lightheadedness and/or dizziness upon standing       - Chest Pain/ Tightness       - Shortness of Breath       - Difficulty walking    If you have a problem that you believe requires IMMEDIATE attention, please go to your NEAREST Emergency Room. If you believe your problem can safely wait until you speak to a physician, please call Interventional Radiology for any concerns.        Please feel free to contact us at [phone] if any problems arise. After 6PM, Monday through Friday, on weekends and on holidays, please call [phone] and ask for the radiology resident on call to be paged.

## 2023-07-06 NOTE — PRE PROCEDURE NOTE - PRE PROCEDURE EVALUATION
Interventional Radiology    HPI: 58y Female with past medical history of diabetes mellitus, hypertension, asthma (no history of intubations), large right pleural effusion s/p pleurx catheter placement presents with for scheduled chest wall port placement for chemotherapy patient reports 1st session of chemotherapy was 6/27/23, next session on 7/18/23. Denies SOB, chest pain, fever, chills, n/v.     Denies use of anticoagulation.   Patient refuses blood products for Restorationism reasons.   NPO since midnight last night.     Review of Systems: All negative unless otherwise specified above.     Allergies: latex (Hives)  Proventil HFA (Hives)    Medications (Abx/Cardiac/Anticoagulation/Blood Products)      Data:  157.5  92.1  T(C): 36.8  HR: 102  BP: 137/83  RR: 16  SpO2: 96%      Physical Exam  General: No acute distress, nontoxic, A&Ox3  Chest: Non labored breathing      RADIOLOGY & ADDITIONAL TESTS:    Imaging Reviewed    H & P Note Reviewed from:     Plan: 58y Female presents for Chest wall port placement  -Risks/Benefits/alternatives explained with the patient and/or healthcare proxy and witnessed informed consent obtained.

## 2023-07-06 NOTE — ASU PATIENT PROFILE, ADULT - NSICDXPASTMEDICALHX_GEN_ALL_CORE_FT
PAST MEDICAL HISTORY:  Abnormal uterine and vaginal bleeding, unspecified     Asthma     Hair loss     History of hay fever     HTN (hypertension)     Hyperlipidemia (diet control)    Knee pain, left     Magnesium deficiency     Malignant pleural effusion     Morbid obesity with body mass index (BMI) of 40.0 to 44.9 in adult     Obesity     Osteopenia     Type 2 diabetes mellitus

## 2023-07-11 ENCOUNTER — APPOINTMENT (OUTPATIENT)
Dept: HEMATOLOGY ONCOLOGY | Facility: CLINIC | Age: 58
End: 2023-07-11
Payer: COMMERCIAL

## 2023-07-11 ENCOUNTER — RESULT REVIEW (OUTPATIENT)
Age: 58
End: 2023-07-11

## 2023-07-11 VITALS
DIASTOLIC BLOOD PRESSURE: 84 MMHG | BODY MASS INDEX: 37 KG/M2 | OXYGEN SATURATION: 98 % | HEART RATE: 90 BPM | RESPIRATION RATE: 17 BRPM | SYSTOLIC BLOOD PRESSURE: 124 MMHG | WEIGHT: 188.47 LBS | HEIGHT: 60 IN

## 2023-07-11 LAB
ALBUMIN SERPL ELPH-MCNC: 3.5 G/DL
ALP BLD-CCNC: 95 U/L
ALT SERPL-CCNC: 10 U/L
ANION GAP SERPL CALC-SCNC: 12 MMOL/L
AST SERPL-CCNC: 24 U/L
BASOPHILS # BLD AUTO: 0.01 K/UL — SIGNIFICANT CHANGE UP (ref 0–0.2)
BASOPHILS NFR BLD AUTO: 0.3 % — SIGNIFICANT CHANGE UP (ref 0–2)
BILIRUB SERPL-MCNC: 0.4 MG/DL
BUN SERPL-MCNC: 10 MG/DL
CALCIUM SERPL-MCNC: 9.1 MG/DL
CHLORIDE SERPL-SCNC: 98 MMOL/L
CO2 SERPL-SCNC: 27 MMOL/L
CREAT SERPL-MCNC: 0.87 MG/DL
EGFR: 77 ML/MIN/1.73M2
EOSINOPHIL # BLD AUTO: 0.02 K/UL — SIGNIFICANT CHANGE UP (ref 0–0.5)
EOSINOPHIL NFR BLD AUTO: 0.7 % — SIGNIFICANT CHANGE UP (ref 0–6)
GLUCOSE SERPL-MCNC: 186 MG/DL
HCT VFR BLD CALC: 31.8 % — LOW (ref 34.5–45)
HGB BLD-MCNC: 10.3 G/DL — LOW (ref 11.5–15.5)
IMM GRANULOCYTES NFR BLD AUTO: 2 % — HIGH (ref 0–0.9)
INR PPP: 1.27 RATIO
LYMPHOCYTES # BLD AUTO: 0.84 K/UL — LOW (ref 1–3.3)
LYMPHOCYTES # BLD AUTO: 27.5 % — SIGNIFICANT CHANGE UP (ref 13–44)
MAGNESIUM SERPL-MCNC: 1.6 MG/DL
MCHC RBC-ENTMCNC: 25.8 PG — LOW (ref 27–34)
MCHC RBC-ENTMCNC: 32.4 G/DL — SIGNIFICANT CHANGE UP (ref 32–36)
MCV RBC AUTO: 79.7 FL — LOW (ref 80–100)
MONOCYTES # BLD AUTO: 0.47 K/UL — SIGNIFICANT CHANGE UP (ref 0–0.9)
MONOCYTES NFR BLD AUTO: 15.4 % — HIGH (ref 2–14)
NEUTROPHILS # BLD AUTO: 1.66 K/UL — LOW (ref 1.8–7.4)
NEUTROPHILS NFR BLD AUTO: 54.1 % — SIGNIFICANT CHANGE UP (ref 43–77)
NRBC # BLD: 0 /100 WBCS — SIGNIFICANT CHANGE UP (ref 0–0)
PLATELET # BLD AUTO: 155 K/UL — SIGNIFICANT CHANGE UP (ref 150–400)
POTASSIUM SERPL-SCNC: 3.8 MMOL/L
PROT SERPL-MCNC: 8.1 G/DL
PT BLD: 15 SEC
RBC # BLD: 3.99 M/UL — SIGNIFICANT CHANGE UP (ref 3.8–5.2)
RBC # FLD: 12.4 % — SIGNIFICANT CHANGE UP (ref 10.3–14.5)
SODIUM SERPL-SCNC: 137 MMOL/L
WBC # BLD: 3.06 K/UL — LOW (ref 3.8–10.5)
WBC # FLD AUTO: 3.06 K/UL — LOW (ref 3.8–10.5)

## 2023-07-11 PROCEDURE — 99213 OFFICE O/P EST LOW 20 MIN: CPT

## 2023-07-11 NOTE — HISTORY OF PRESENT ILLNESS
[Disease: _____________________] : Disease: [unfilled] [AJCC Stage: ____] : AJCC Stage: [unfilled] [de-identified] : Ms. Cassidy is a 57 yo  postmenopausal female who presents for initial consultation for NACT  for presumed GYN cancer. She reports a >5 year lapse in GYN care. She presented to [facility] ED on 5/31/23 c/o RECIO a 4 weeks. Imaging revealed a right pleural effusion (s/p thoracentesis positive for malignant cells with IHC of likely Mullerian origin), carcinomatosis, adnexal mass and a markedly elevated CA-125. 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.\par \par LABS on 6/6/23:\par CA-125 was 1,506 (ref<38)\par  was <2 (ref<35)\par CEA was <1.0 (ref<3.8)\par \par CYTOLOGY/PATHOLOGY:\par 1) Right pleural thoracentesis, 6/1/23, To\par  a) positive for malignant cells, PAX-8 +, ER +, CK7 + - favor Mullerian origin\par 2) Core biopsy of peritoneal mass, 6/8/23, To\par  OMENTUM, CT GUIDED CORE BIOPSY\par POSITIVE FOR MALIGNANT CELLS.\par Adenocarcinoma, immunophenotypically consistent with primary mullerian\par origin, favor High Grade Serous Carcinoma.\par The neoplastic cells are positive for Moc31, [place]-8, CK7,\par P53, P16,  WT1 and ER. P40 and CK 20 are negative\par \par IMAGING:\par TVUS on 6/4/23:\par uterus 11 x 5.0 x 7.6 cm. Leiomyomatous uterus with scattered calcified uterine myomas including a 3.5 cm right intramural myoma and 5.4 cm posterior intramural myoma. EML not visualized. \par RTO - 3.2 x 2.2 x 1.8 cm. \par LTO - there is a 7.0 x 5.8 x 5.5 cm calcified mass in the left adnexa likely representing calcified pedunculated myoma. \par Small FF. \par \par \par CT C/A/P on 6/2/23:\par LOWER CHEST: Loculated moderate right pleural effusion and small left pleural effusion are unchanged.\par LIVER: Soft tissue nodularity along the inferior right hepatic lobe.\par BILE DUCTS: Normal caliber.\par GALLBLADDER: Cholelithiasis and contracted.\par SPLEEN: Within normal limits.\par PANCREAS: Within normal limits.\par ADRENALS: Within normal limits.\par KIDNEYS/URETERS: Bilateral cortical scarring.\par BLADDER: Within normal limits.\par REPRODUCTIVE ORGANS: Likely 2 intramural calcified fibroids. Another partially calcified mass in the left lower abdomen may represent a subserosal fibroid or an adnexal mass. The adnexa are not well visualized.\par BOWEL: No bowel obstruction. Appendix is normal. Colonic diverticulosis.\par PERITONEUM: Small to moderate ascites. Large amount of soft tissue in the greater omentum\par VESSELS: Within normal limits.\par RETROPERITONEUM/LYMPH NODES: No lymphadenopathy.\par ABDOMINAL WALL: Within normal limits.\par BONES: Degenerative changes.\par IMPRESSION:\par Extensive peritoneal carcinomatosis and small to moderate ascites.\par Fibroid uterus with either a left subserosal fibroid or a possible adnexal mass.\par Loculated moderate right pleural effusion and small left pleural effusion, unchanged.\par \par Patient saw Dr. Huizar and had right pleurex catheter placed.\par \par \par PMHx: DM, HTN, HLD, asthma\par PSHx: N/A\par Fam Hx: mother (colon cancer)\par \par \par HCM:\par Mammogram: unsure\par Colonoscopy: 5/2019\par  [de-identified] : Zoroastrian\par Josh Hampton germline -negative\par HRD pending [FreeTextEntry1] : Carboplatin and Taxol\par C1 - 6/27/23 [de-identified] : Patient is here for follow up after her first cycle of chemo.\par Overall she tolerated the treatment well.\par She had fatigue and weakness on day 4 but otherwise has been well.\par Appetite has still been lower but she is trying to eat more soft/liquid foods.\par She had a para after her chemo which relieved her abdominal bloating and fullness.\par She has had intermittent nausea, no vomiting, taking Compazine with relief.\par Pleurex is being drained once per week with very little fluid, Dr. Zimmer ordered cxr and she may have catheter removed after cxr.

## 2023-07-11 NOTE — PHYSICAL EXAM
[Fully active, able to carry on all pre-disease performance without restriction] : Status 0 - Fully active, able to carry on all pre-disease performance without restriction [Normal] : affect appropriate [de-identified] : pleurex catheter right lower chest - dressing c/d/i [de-identified] : Distended, soft, nontender

## 2023-07-12 LAB — CANCER AG125 SERPL-ACNC: 1451 U/ML

## 2023-07-13 ENCOUNTER — APPOINTMENT (OUTPATIENT)
Dept: INTERNAL MEDICINE | Facility: CLINIC | Age: 58
End: 2023-07-13
Payer: COMMERCIAL

## 2023-07-13 ENCOUNTER — NON-APPOINTMENT (OUTPATIENT)
Age: 58
End: 2023-07-13

## 2023-07-13 ENCOUNTER — LABORATORY RESULT (OUTPATIENT)
Age: 58
End: 2023-07-13

## 2023-07-13 VITALS
BODY MASS INDEX: 37.3 KG/M2 | DIASTOLIC BLOOD PRESSURE: 70 MMHG | WEIGHT: 190 LBS | HEIGHT: 60 IN | SYSTOLIC BLOOD PRESSURE: 130 MMHG

## 2023-07-13 DIAGNOSIS — Z13.820 ENCOUNTER FOR SCREENING FOR OSTEOPOROSIS: ICD-10-CM

## 2023-07-13 DIAGNOSIS — Z00.00 ENCOUNTER FOR GENERAL ADULT MEDICAL EXAMINATION W/OUT ABNORMAL FINDINGS: ICD-10-CM

## 2023-07-13 PROCEDURE — 93000 ELECTROCARDIOGRAM COMPLETE: CPT

## 2023-07-13 PROCEDURE — 99396 PREV VISIT EST AGE 40-64: CPT | Mod: 25

## 2023-07-13 PROCEDURE — 36415 COLL VENOUS BLD VENIPUNCTURE: CPT

## 2023-07-13 RX ORDER — FLASH GLUCOSE SCANNING READER
EACH MISCELLANEOUS
Qty: 1 | Refills: 0 | Status: ACTIVE | COMMUNITY
Start: 2023-06-09

## 2023-07-13 RX ORDER — DEXAMETHASONE 4 MG/1
4 TABLET ORAL
Qty: 10 | Refills: 0 | Status: COMPLETED | COMMUNITY
Start: 2023-06-20 | End: 2023-07-13

## 2023-07-13 RX ORDER — ISOPROPYL ALCOHOL 0.75 G/1
SWAB TOPICAL
Qty: 100 | Refills: 0 | Status: ACTIVE | COMMUNITY
Start: 2023-06-09

## 2023-07-13 RX ORDER — FLASH GLUCOSE SENSOR
KIT MISCELLANEOUS
Qty: 1 | Refills: 0 | Status: ACTIVE | COMMUNITY
Start: 2023-06-09

## 2023-07-13 RX ORDER — PEN NEEDLE, DIABETIC 32GX 5/32"
32G X 4 MM NEEDLE, DISPOSABLE MISCELLANEOUS
Qty: 100 | Refills: 0 | Status: ACTIVE | COMMUNITY
Start: 2023-06-09

## 2023-07-13 RX ORDER — ATORVASTATIN CALCIUM 20 MG/1
20 TABLET, FILM COATED ORAL DAILY
Qty: 30 | Refills: 9 | Status: COMPLETED | COMMUNITY
Start: 2021-01-27 | End: 2023-07-13

## 2023-07-13 RX ORDER — BLOOD-GLUCOSE METER
W/DEVICE KIT MISCELLANEOUS
Qty: 1 | Refills: 0 | Status: ACTIVE | COMMUNITY
Start: 2023-06-09

## 2023-07-13 RX ORDER — FLUTICASONE PROPIONATE AND SALMETEROL 250; 50 UG/1; UG/1
250-50 POWDER RESPIRATORY (INHALATION)
Qty: 1 | Refills: 1 | Status: COMPLETED | COMMUNITY
Start: 2019-09-09 | End: 2023-07-13

## 2023-07-13 NOTE — DATA REVIEWED
[FreeTextEntry1] : EKG from today revealed NSR 97 BPM with nonspecific  t wave changes and evidence of LVH-no change from previous EKG from 01/27/2021\par \par \par

## 2023-07-13 NOTE — ASSESSMENT
[FreeTextEntry1] : Blood tests were ordered and venipuncture was performed today in the office.\par \par \par Patient is interested in the Shingrix vaccination.She will discuss with heme/oncologist whether she should wait until she completes her chemorx for the vaccine.

## 2023-07-13 NOTE — HEALTH RISK ASSESSMENT
[HIV test declined] : HIV test declined [Patient reported mammogram was normal] : Patient reported mammogram was normal [Patient reported PAP Smear was normal] : Patient reported PAP Smear was normal [Patient reported colonoscopy was normal] : Patient reported colonoscopy was normal [Hepatitis C test declined] : Hepatitis C test declined [Never] : Never [MammogramDate] : 04/16 [PapSmearDate] : 06/23 [PapSmearComments] : with gyn/onc [ColonoscopyDate] : 05/19 [ColonoscopyComments] : ws poor prep--needs to schedule a repeat colonoscopy

## 2023-07-14 ENCOUNTER — APPOINTMENT (OUTPATIENT)
Dept: RADIOLOGY | Facility: CLINIC | Age: 58
End: 2023-07-14
Payer: COMMERCIAL

## 2023-07-14 ENCOUNTER — OUTPATIENT (OUTPATIENT)
Dept: OUTPATIENT SERVICES | Facility: HOSPITAL | Age: 58
LOS: 1 days | End: 2023-07-14
Payer: COMMERCIAL

## 2023-07-14 DIAGNOSIS — Z98.89 OTHER SPECIFIED POSTPROCEDURAL STATES: Chronic | ICD-10-CM

## 2023-07-14 DIAGNOSIS — J91.0 MALIGNANT PLEURAL EFFUSION: ICD-10-CM

## 2023-07-14 DIAGNOSIS — Z98.890 OTHER SPECIFIED POSTPROCEDURAL STATES: Chronic | ICD-10-CM

## 2023-07-14 PROCEDURE — 71046 X-RAY EXAM CHEST 2 VIEWS: CPT | Mod: 26

## 2023-07-14 PROCEDURE — 71046 X-RAY EXAM CHEST 2 VIEWS: CPT

## 2023-07-17 ENCOUNTER — NON-APPOINTMENT (OUTPATIENT)
Age: 58
End: 2023-07-17

## 2023-07-17 DIAGNOSIS — Z45.2 ENCOUNTER FOR ADJUSTMENT AND MANAGEMENT OF VASCULAR ACCESS DEVICE: ICD-10-CM

## 2023-07-17 DIAGNOSIS — C56.9 MALIGNANT NEOPLASM OF UNSPECIFIED OVARY: ICD-10-CM

## 2023-07-17 LAB
25(OH)D3 SERPL-MCNC: 16.9 NG/ML
ALBUMIN SERPL ELPH-MCNC: 3.4 G/DL
ALP BLD-CCNC: 94 U/L
ALT SERPL-CCNC: 7 U/L
ANION GAP SERPL CALC-SCNC: 12 MMOL/L
APPEARANCE: CLEAR
AST SERPL-CCNC: 17 U/L
BILIRUB SERPL-MCNC: 0.2 MG/DL
BILIRUBIN URINE: NEGATIVE
BLOOD URINE: NEGATIVE
BUN SERPL-MCNC: 9 MG/DL
CALCIUM SERPL-MCNC: 9.4 MG/DL
CHLORIDE SERPL-SCNC: 99 MMOL/L
CHOLEST SERPL-MCNC: 175 MG/DL
CO2 SERPL-SCNC: 25 MMOL/L
COLOR: YELLOW
CREAT SERPL-MCNC: 0.8 MG/DL
CREAT SPEC-SCNC: 212 MG/DL
EGFR: 85 ML/MIN/1.73M2
ESTIMATED AVERAGE GLUCOSE: 163 MG/DL
GLUCOSE QUALITATIVE U: NEGATIVE MG/DL
GLUCOSE SERPL-MCNC: 129 MG/DL
HBA1C MFR BLD HPLC: 7.3 %
HDLC SERPL-MCNC: 27 MG/DL
KETONES URINE: NEGATIVE MG/DL
LDLC SERPL CALC-MCNC: 125 MG/DL
LEUKOCYTE ESTERASE URINE: NEGATIVE
MAGNESIUM SERPL-MCNC: 1.6 MG/DL
MICROALBUMIN 24H UR DL<=1MG/L-MCNC: <1.2 MG/DL
MICROALBUMIN/CREAT 24H UR-RTO: NORMAL MG/G
NITRITE URINE: NEGATIVE
NONHDLC SERPL-MCNC: 149 MG/DL
PH URINE: 5.5
POTASSIUM SERPL-SCNC: 3.8 MMOL/L
PROT SERPL-MCNC: 8.6 G/DL
PROTEIN URINE: NORMAL MG/DL
SODIUM SERPL-SCNC: 136 MMOL/L
SPECIFIC GRAVITY URINE: 1.02
T3 SERPL-MCNC: 98 NG/DL
T3FREE SERPL-MCNC: 2.36 PG/ML
T3RU NFR SERPL: 0.9 TBI
T4 FREE SERPL-MCNC: 1.2 NG/DL
T4 SERPL-MCNC: 7.3 UG/DL
TRIGL SERPL-MCNC: 130 MG/DL
TSH SERPL-ACNC: 1.18 UIU/ML
URATE SERPL-MCNC: 7.4 MG/DL
UROBILINOGEN URINE: 1 MG/DL
VIT B12 SERPL-MCNC: 443 PG/ML

## 2023-07-18 ENCOUNTER — INPATIENT (INPATIENT)
Facility: HOSPITAL | Age: 58
LOS: 6 days | Discharge: HOME | End: 2023-07-25
Attending: STUDENT IN AN ORGANIZED HEALTH CARE EDUCATION/TRAINING PROGRAM | Admitting: STUDENT IN AN ORGANIZED HEALTH CARE EDUCATION/TRAINING PROGRAM
Payer: COMMERCIAL

## 2023-07-18 ENCOUNTER — RESULT REVIEW (OUTPATIENT)
Age: 58
End: 2023-07-18

## 2023-07-18 ENCOUNTER — APPOINTMENT (OUTPATIENT)
Dept: HEMATOLOGY ONCOLOGY | Facility: CLINIC | Age: 58
End: 2023-07-18

## 2023-07-18 ENCOUNTER — APPOINTMENT (OUTPATIENT)
Dept: INFUSION THERAPY | Facility: HOSPITAL | Age: 58
End: 2023-07-18

## 2023-07-18 VITALS
HEART RATE: 99 BPM | DIASTOLIC BLOOD PRESSURE: 92 MMHG | TEMPERATURE: 98 F | SYSTOLIC BLOOD PRESSURE: 148 MMHG | RESPIRATION RATE: 16 BRPM | HEIGHT: 62 IN | OXYGEN SATURATION: 98 %

## 2023-07-18 DIAGNOSIS — Z98.890 OTHER SPECIFIED POSTPROCEDURAL STATES: Chronic | ICD-10-CM

## 2023-07-18 DIAGNOSIS — Z98.89 OTHER SPECIFIED POSTPROCEDURAL STATES: Chronic | ICD-10-CM

## 2023-07-18 DIAGNOSIS — T85.9XXA UNSPECIFIED COMPLICATION OF INTERNAL PROSTHETIC DEVICE, IMPLANT AND GRAFT, INITIAL ENCOUNTER: ICD-10-CM

## 2023-07-18 LAB
ALBUMIN SERPL ELPH-MCNC: 3.3 G/DL — SIGNIFICANT CHANGE UP (ref 3.3–5)
ALP SERPL-CCNC: 91 U/L — SIGNIFICANT CHANGE UP (ref 40–120)
ALT FLD-CCNC: 6 U/L — SIGNIFICANT CHANGE UP (ref 4–33)
ANION GAP SERPL CALC-SCNC: 11 MMOL/L — SIGNIFICANT CHANGE UP (ref 7–14)
APPEARANCE UR: CLEAR — SIGNIFICANT CHANGE UP
APTT BLD: 30.2 SEC — SIGNIFICANT CHANGE UP (ref 27–36.3)
AST SERPL-CCNC: 13 U/L — SIGNIFICANT CHANGE UP (ref 4–32)
BASE EXCESS BLDV CALC-SCNC: 5 MMOL/L — HIGH (ref -2–3)
BASOPHILS # BLD AUTO: 0.01 K/UL — SIGNIFICANT CHANGE UP (ref 0–0.2)
BASOPHILS # BLD AUTO: 0.01 K/UL — SIGNIFICANT CHANGE UP (ref 0–0.2)
BASOPHILS NFR BLD AUTO: 0.2 % — SIGNIFICANT CHANGE UP (ref 0–2)
BASOPHILS NFR BLD AUTO: 0.2 % — SIGNIFICANT CHANGE UP (ref 0–2)
BILIRUB SERPL-MCNC: 0.2 MG/DL — SIGNIFICANT CHANGE UP (ref 0.2–1.2)
BILIRUB UR-MCNC: NEGATIVE — SIGNIFICANT CHANGE UP
BLD GP AB SCN SERPL QL: NEGATIVE — SIGNIFICANT CHANGE UP
BLOOD GAS VENOUS COMPREHENSIVE RESULT: SIGNIFICANT CHANGE UP
BUN SERPL-MCNC: 8 MG/DL — SIGNIFICANT CHANGE UP (ref 7–23)
CALCIUM SERPL-MCNC: 9.4 MG/DL — SIGNIFICANT CHANGE UP (ref 8.4–10.5)
CHLORIDE BLDV-SCNC: 103 MMOL/L — SIGNIFICANT CHANGE UP (ref 96–108)
CHLORIDE SERPL-SCNC: 100 MMOL/L — SIGNIFICANT CHANGE UP (ref 98–107)
CO2 BLDV-SCNC: 32.5 MMOL/L — HIGH (ref 22–26)
CO2 SERPL-SCNC: 27 MMOL/L — SIGNIFICANT CHANGE UP (ref 22–31)
COLOR SPEC: YELLOW — SIGNIFICANT CHANGE UP
CREAT SERPL-MCNC: 0.78 MG/DL — SIGNIFICANT CHANGE UP (ref 0.5–1.3)
DIFF PNL FLD: NEGATIVE — SIGNIFICANT CHANGE UP
EGFR: 87 ML/MIN/1.73M2 — SIGNIFICANT CHANGE UP
EOSINOPHIL # BLD AUTO: 0.01 K/UL — SIGNIFICANT CHANGE UP (ref 0–0.5)
EOSINOPHIL # BLD AUTO: 0.01 K/UL — SIGNIFICANT CHANGE UP (ref 0–0.5)
EOSINOPHIL NFR BLD AUTO: 0.2 % — SIGNIFICANT CHANGE UP (ref 0–6)
EOSINOPHIL NFR BLD AUTO: 0.2 % — SIGNIFICANT CHANGE UP (ref 0–6)
GAS PNL BLDV: 138 MMOL/L — SIGNIFICANT CHANGE UP (ref 136–145)
GLUCOSE BLDC GLUCOMTR-MCNC: 114 MG/DL — HIGH (ref 70–99)
GLUCOSE BLDC GLUCOMTR-MCNC: 136 MG/DL — HIGH (ref 70–99)
GLUCOSE BLDV-MCNC: 133 MG/DL — HIGH (ref 70–99)
GLUCOSE SERPL-MCNC: 132 MG/DL — HIGH (ref 70–99)
GLUCOSE UR QL: NEGATIVE MG/DL — SIGNIFICANT CHANGE UP
HCO3 BLDV-SCNC: 31 MMOL/L — HIGH (ref 22–29)
HCT VFR BLD CALC: 30.4 % — LOW (ref 34.5–45)
HCT VFR BLD CALC: 31.7 % — LOW (ref 34.5–45)
HCT VFR BLDA CALC: 33 % — LOW (ref 34.5–46.5)
HGB BLD CALC-MCNC: 10.9 G/DL — LOW (ref 11.7–16.1)
HGB BLD-MCNC: 10.2 G/DL — LOW (ref 11.5–15.5)
HGB BLD-MCNC: 9.8 G/DL — LOW (ref 11.5–15.5)
IANC: 3.86 K/UL — SIGNIFICANT CHANGE UP (ref 1.8–7.4)
IMM GRANULOCYTES NFR BLD AUTO: 0.9 % — SIGNIFICANT CHANGE UP (ref 0–0.9)
IMM GRANULOCYTES NFR BLD AUTO: 0.9 % — SIGNIFICANT CHANGE UP (ref 0–0.9)
INR BLD: 1.21 RATIO — HIGH (ref 0.88–1.16)
KETONES UR-MCNC: NEGATIVE MG/DL — SIGNIFICANT CHANGE UP
LACTATE BLDV-MCNC: 0.9 MMOL/L — SIGNIFICANT CHANGE UP (ref 0.5–2)
LEUKOCYTE ESTERASE UR-ACNC: NEGATIVE — SIGNIFICANT CHANGE UP
LYMPHOCYTES # BLD AUTO: 1.04 K/UL — SIGNIFICANT CHANGE UP (ref 1–3.3)
LYMPHOCYTES # BLD AUTO: 1.1 K/UL — SIGNIFICANT CHANGE UP (ref 1–3.3)
LYMPHOCYTES # BLD AUTO: 20.4 % — SIGNIFICANT CHANGE UP (ref 13–44)
LYMPHOCYTES # BLD AUTO: 22.7 % — SIGNIFICANT CHANGE UP (ref 13–44)
MCHC RBC-ENTMCNC: 25.4 PG — LOW (ref 27–34)
MCHC RBC-ENTMCNC: 25.6 PG — LOW (ref 27–34)
MCHC RBC-ENTMCNC: 32.2 G/DL — SIGNIFICANT CHANGE UP (ref 32–36)
MCHC RBC-ENTMCNC: 32.2 GM/DL — SIGNIFICANT CHANGE UP (ref 32–36)
MCV RBC AUTO: 78.9 FL — LOW (ref 80–100)
MCV RBC AUTO: 79.4 FL — LOW (ref 80–100)
MONOCYTES # BLD AUTO: 0.37 K/UL — SIGNIFICANT CHANGE UP (ref 0–0.9)
MONOCYTES # BLD AUTO: 0.46 K/UL — SIGNIFICANT CHANGE UP (ref 0–0.9)
MONOCYTES NFR BLD AUTO: 10 % — SIGNIFICANT CHANGE UP (ref 2–14)
MONOCYTES NFR BLD AUTO: 6.9 % — SIGNIFICANT CHANGE UP (ref 2–14)
NEUTROPHILS # BLD AUTO: 3.03 K/UL — SIGNIFICANT CHANGE UP (ref 1.8–7.4)
NEUTROPHILS # BLD AUTO: 3.86 K/UL — SIGNIFICANT CHANGE UP (ref 1.8–7.4)
NEUTROPHILS NFR BLD AUTO: 66 % — SIGNIFICANT CHANGE UP (ref 43–77)
NEUTROPHILS NFR BLD AUTO: 71.4 % — SIGNIFICANT CHANGE UP (ref 43–77)
NITRITE UR-MCNC: NEGATIVE — SIGNIFICANT CHANGE UP
NRBC # BLD: 0 /100 WBCS — SIGNIFICANT CHANGE UP (ref 0–0)
NRBC # BLD: 0 /100 WBCS — SIGNIFICANT CHANGE UP (ref 0–0)
NRBC # FLD: 0 K/UL — SIGNIFICANT CHANGE UP (ref 0–0)
PCO2 BLDV: 51 MMHG — SIGNIFICANT CHANGE UP (ref 39–52)
PH BLDV: 7.39 — SIGNIFICANT CHANGE UP (ref 7.32–7.43)
PH UR: 6 — SIGNIFICANT CHANGE UP (ref 5–8)
PLATELET # BLD AUTO: 170 K/UL — SIGNIFICANT CHANGE UP (ref 150–400)
PLATELET # BLD AUTO: 199 K/UL — SIGNIFICANT CHANGE UP (ref 150–400)
PO2 BLDV: 25 MMHG — SIGNIFICANT CHANGE UP (ref 25–45)
POTASSIUM BLDV-SCNC: 3.6 MMOL/L — SIGNIFICANT CHANGE UP (ref 3.5–5.1)
POTASSIUM SERPL-MCNC: 3.5 MMOL/L — SIGNIFICANT CHANGE UP (ref 3.5–5.3)
POTASSIUM SERPL-SCNC: 3.5 MMOL/L — SIGNIFICANT CHANGE UP (ref 3.5–5.3)
PROT SERPL-MCNC: 9 G/DL — HIGH (ref 6–8.3)
PROT UR-MCNC: SIGNIFICANT CHANGE UP MG/DL
PROTHROM AB SERPL-ACNC: 14.1 SEC — HIGH (ref 10.5–13.4)
RBC # BLD: 3.83 M/UL — SIGNIFICANT CHANGE UP (ref 3.8–5.2)
RBC # BLD: 4.02 M/UL — SIGNIFICANT CHANGE UP (ref 3.8–5.2)
RBC # FLD: 13 % — SIGNIFICANT CHANGE UP (ref 10.3–14.5)
RBC # FLD: 13.2 % — SIGNIFICANT CHANGE UP (ref 10.3–14.5)
RH IG SCN BLD-IMP: POSITIVE — SIGNIFICANT CHANGE UP
SAO2 % BLDV: 24.9 % — LOW (ref 67–88)
SODIUM SERPL-SCNC: 138 MMOL/L — SIGNIFICANT CHANGE UP (ref 135–145)
SP GR SPEC: 1.02 — SIGNIFICANT CHANGE UP (ref 1–1.03)
UROBILINOGEN FLD QL: 1 MG/DL — SIGNIFICANT CHANGE UP (ref 0.2–1)
WBC # BLD: 4.59 K/UL — SIGNIFICANT CHANGE UP (ref 3.8–10.5)
WBC # BLD: 5.4 K/UL — SIGNIFICANT CHANGE UP (ref 3.8–10.5)
WBC # FLD AUTO: 4.59 K/UL — SIGNIFICANT CHANGE UP (ref 3.8–10.5)
WBC # FLD AUTO: 5.4 K/UL — SIGNIFICANT CHANGE UP (ref 3.8–10.5)

## 2023-07-18 PROCEDURE — 71045 X-RAY EXAM CHEST 1 VIEW: CPT | Mod: 26

## 2023-07-18 PROCEDURE — 99223 1ST HOSP IP/OBS HIGH 75: CPT

## 2023-07-18 PROCEDURE — 99285 EMERGENCY DEPT VISIT HI MDM: CPT

## 2023-07-18 PROCEDURE — 99223 1ST HOSP IP/OBS HIGH 75: CPT | Mod: GC

## 2023-07-18 RX ORDER — PIPERACILLIN AND TAZOBACTAM 4; .5 G/20ML; G/20ML
3.38 INJECTION, POWDER, LYOPHILIZED, FOR SOLUTION INTRAVENOUS EVERY 8 HOURS
Refills: 0 | Status: DISCONTINUED | OUTPATIENT
Start: 2023-07-18 | End: 2023-07-21

## 2023-07-18 RX ORDER — ONDANSETRON 8 MG/1
4 TABLET, FILM COATED ORAL EVERY 8 HOURS
Refills: 0 | Status: DISCONTINUED | OUTPATIENT
Start: 2023-07-18 | End: 2023-07-25

## 2023-07-18 RX ORDER — VANCOMYCIN HCL 1 G
1000 VIAL (EA) INTRAVENOUS ONCE
Refills: 0 | Status: COMPLETED | OUTPATIENT
Start: 2023-07-18 | End: 2023-07-18

## 2023-07-18 RX ORDER — PIPERACILLIN AND TAZOBACTAM 4; .5 G/20ML; G/20ML
3.38 INJECTION, POWDER, LYOPHILIZED, FOR SOLUTION INTRAVENOUS ONCE
Refills: 0 | Status: COMPLETED | OUTPATIENT
Start: 2023-07-18 | End: 2023-07-18

## 2023-07-18 RX ORDER — SODIUM CHLORIDE 9 MG/ML
1000 INJECTION, SOLUTION INTRAVENOUS
Refills: 0 | Status: DISCONTINUED | OUTPATIENT
Start: 2023-07-18 | End: 2023-07-25

## 2023-07-18 RX ORDER — VANCOMYCIN HCL 1 G
1250 VIAL (EA) INTRAVENOUS EVERY 12 HOURS
Refills: 0 | Status: DISCONTINUED | OUTPATIENT
Start: 2023-07-19 | End: 2023-07-20

## 2023-07-18 RX ORDER — DEXTROSE 50 % IN WATER 50 %
25 SYRINGE (ML) INTRAVENOUS ONCE
Refills: 0 | Status: DISCONTINUED | OUTPATIENT
Start: 2023-07-18 | End: 2023-07-25

## 2023-07-18 RX ORDER — ASCORBIC ACID 60 MG
500 TABLET,CHEWABLE ORAL DAILY
Refills: 0 | Status: DISCONTINUED | OUTPATIENT
Start: 2023-07-18 | End: 2023-07-25

## 2023-07-18 RX ORDER — DEXTROSE 50 % IN WATER 50 %
15 SYRINGE (ML) INTRAVENOUS ONCE
Refills: 0 | Status: DISCONTINUED | OUTPATIENT
Start: 2023-07-18 | End: 2023-07-25

## 2023-07-18 RX ORDER — OMEGA-3 ACID ETHYL ESTERS 1 G
2 CAPSULE ORAL
Refills: 0 | Status: DISCONTINUED | OUTPATIENT
Start: 2023-07-18 | End: 2023-07-25

## 2023-07-18 RX ORDER — INSULIN LISPRO 100/ML
VIAL (ML) SUBCUTANEOUS EVERY 6 HOURS
Refills: 0 | Status: DISCONTINUED | OUTPATIENT
Start: 2023-07-18 | End: 2023-07-20

## 2023-07-18 RX ORDER — ACETAMINOPHEN 500 MG
650 TABLET ORAL EVERY 6 HOURS
Refills: 0 | Status: DISCONTINUED | OUTPATIENT
Start: 2023-07-18 | End: 2023-07-25

## 2023-07-18 RX ORDER — CHOLECALCIFEROL (VITAMIN D3) 125 MCG
2000 CAPSULE ORAL DAILY
Refills: 0 | Status: DISCONTINUED | OUTPATIENT
Start: 2023-07-18 | End: 2023-07-25

## 2023-07-18 RX ORDER — INSULIN GLARGINE 100 [IU]/ML
8 INJECTION, SOLUTION SUBCUTANEOUS AT BEDTIME
Refills: 0 | Status: DISCONTINUED | OUTPATIENT
Start: 2023-07-18 | End: 2023-07-19

## 2023-07-18 RX ORDER — DEXTROSE 50 % IN WATER 50 %
12.5 SYRINGE (ML) INTRAVENOUS ONCE
Refills: 0 | Status: DISCONTINUED | OUTPATIENT
Start: 2023-07-18 | End: 2023-07-25

## 2023-07-18 RX ORDER — FERROUS SULFATE 325(65) MG
325 TABLET ORAL DAILY
Refills: 0 | Status: DISCONTINUED | OUTPATIENT
Start: 2023-07-18 | End: 2023-07-25

## 2023-07-18 RX ORDER — GLUCAGON INJECTION, SOLUTION 0.5 MG/.1ML
1 INJECTION, SOLUTION SUBCUTANEOUS ONCE
Refills: 0 | Status: DISCONTINUED | OUTPATIENT
Start: 2023-07-18 | End: 2023-07-25

## 2023-07-18 RX ORDER — LANOLIN ALCOHOL/MO/W.PET/CERES
3 CREAM (GRAM) TOPICAL AT BEDTIME
Refills: 0 | Status: DISCONTINUED | OUTPATIENT
Start: 2023-07-18 | End: 2023-07-25

## 2023-07-18 RX ADMIN — Medication 250 MILLIGRAM(S): at 16:10

## 2023-07-18 RX ADMIN — PIPERACILLIN AND TAZOBACTAM 200 GRAM(S): 4; .5 INJECTION, POWDER, LYOPHILIZED, FOR SOLUTION INTRAVENOUS at 15:36

## 2023-07-18 NOTE — H&P ADULT - PROBLEM SELECTOR PLAN 3
-pt on 22 units of lantus at bedtime, ISS before meals  -c/w 8 units of lantus as pt will be NPO, low ISS q6h  -trend FS
PAST SURGICAL HISTORY:  No significant past surgical history

## 2023-07-18 NOTE — ED PROVIDER NOTE - OBJECTIVE STATEMENT
58-year-old female with past medical history of diabetes, hypertension, asthma (no history of intubations), ovarian cancer (on chemo, last chemo session was 3 weeks ago), presenting with purulent discharge in her Pleurx catheter noticed today.  Patient states that she was going to her chemo appointment when her team there noticed that there was what appears to be purulent fluid in the catheter.  Patient denies fever, nausea/vomiting, chest pain, difficulty breathing.  Patient does report some soreness around the site but no erythema surrounding the site.

## 2023-07-18 NOTE — H&P ADULT - NSHPPHYSICALEXAM_GEN_ALL_CORE
GENERAL APPEARANCE: Well developed, alert and cooperative. NAD.   HEENT:  PERRL, EOMI. External auditory canals normal, hearing grossly intact.  NECK: Neck supple, non-tender without lymphadenopathy, masses or thyromegaly.  CARDIAC: Normal S1 and S2. No S3, S4 or murmurs. Rhythm is regular.  LUNGS: Clear to auscultation and percussion without rales, rhonchi, wheezing or diminished breath sounds. Pleurx cath to suction, draining scant straw color fluid   ABDOMEN: Positive bowel sounds. Soft, distended, nontender. No guarding or rebound.   MUSCULOSKELETAL: ROM intact spine and extremities. No joint erythema or tenderness.   BACK: normal posture, no spinal deformity, symmetry of spinal muscles, without tenderness, decreased range of motion.  EXTREMITIES: No significant deformity or joint abnormality. No edema. Peripheral pulses intact.  NEUROLOGICAL: CN II-XII intact. Strength and sensation symmetric and intact throughout.   SKIN: Skin normal color, texture and turgor with no lesions or eruptions.  PSYCHIATRIC: AOx3.Normal affect and behavior.

## 2023-07-18 NOTE — ED PROVIDER NOTE - CLINICAL SUMMARY MEDICAL DECISION MAKING FREE TEXT BOX
58-year-old female with past medical history of diabetes, hypertension, asthma (no history of intubations), ovarian cancer (on chemo, last chemo session was 3 weeks ago), presenting with purulent discharge in her Pleurx catheter noticed today. Vitals nonactionable.  Physical exam with heart regular rate and rhythm, lungs clear to auscultation, abdomen soft/nondistended nontender.  The differential diagnosis includes but is not limited to infected catheter, empyema, pleural effusion.  Will get labs, chest x-ray, will consult IP.

## 2023-07-18 NOTE — H&P ADULT - HISTORY OF PRESENT ILLNESS
57 y/o female PMH diabetes, hypertension, asthma (no history of intubations), ovarian cancer (on chemo, last chemo session was 3 weeks ago) w/ peritoneal carcinomatosis and c/b malignant pleural effusion s/p pleurx catheter 1 month ago now presenting with purulent discharge from around and in her Pleurx catheter today.  Patient states that she was going to her chemo appointment when her team there noticed that there was what appears to be purulent fluid in the catheter.  She reports that the frequency and amount of drainage has been decreasing over last 2 weeks and team was evaluating for removing the catheter. She reports a nurse has been helping with management of the catheter and had not noted any purulence in the last week. She reports mild soreness around catheter insertion site and a new dry cough, otherwise denies fevers, chills, chest pain, shortness of breath, abd pain, N/V or LE edema.

## 2023-07-18 NOTE — ED PROVIDER NOTE - PHYSICAL EXAMINATION
Const: not in acute distress  Eyes: no conjunctival injection  HEENT: Head NCAT, Moist MM.  Neck: Trachea midline.   CVS: +S1/S2, Peripheral pulses 2+ and equal in all extremities.  RESP: Unlabored respiratory effort. Clear to auscultation bilaterally. Catheter on right lower chest wall, no erythema surrounding site, purulent discharge appearing in the catheter.  GI: Nontender/Nondistended, No CVA tenderness b/l.   MSK: Normocephalic/Atraumatic, No Lower Extremities edema b/l.   Skin: Intact.   Neuro: CNs II-XII grossly intact. Motor & Sensation grossly intact.  Psych: Awake, Alert, & Cooperative

## 2023-07-18 NOTE — H&P ADULT - NSHPREVIEWOFSYSTEMS_GEN_ALL_CORE
CONSTITUTIONAL:  No weight loss, fever, chills, weakness or fatigue.  HEENT:  Eyes:  No visual loss, blurred vision, double vision or yellow sclerae. Ears, Nose, Throat:  No hearing loss, sneezing, congestion, runny nose or sore throat.  SKIN:  No rash or itching.  CARDIOVASCULAR:  +soreness around catheter site No chest pain, chest pressure or chest discomfort. No palpitations or edema.  RESPIRATORY:  +cough No shortness of breath or sputum.  GASTROINTESTINAL:  No anorexia, nausea, vomiting or diarrhea. No abdominal pain or blood.  GENITOURINARY:  Denies hematuria, dysuria.   NEUROLOGICAL:  No headache, dizziness, syncope, paralysis, ataxia, numbness or tingling in the extremities. No change in bowel or bladder control.  MUSCULOSKELETAL:  No muscle, back pain, joint pain or stiffness.  PSYCHIATRIC:  No history of depression or anxiety.  ENDOCRINOLOGIC:  No reports of sweating, cold or heat intolerance. No polyuria or polydipsia.  ALLERGIES:  No history of asthma, hives, eczema or rhinitis.

## 2023-07-18 NOTE — ED ADULT NURSE REASSESSMENT NOTE - NS ED NURSE REASSESS COMMENT FT1
pt A&ox4, denies chest pain and SOB. no complaints of pain. denies headache, dizziness, lightheadedness, radiating chest pain. breathing is spontaneous and unlabored. bed in lowest position, call bell within reach, siderails up x2. will continue to monitor.

## 2023-07-18 NOTE — H&P ADULT - NSHPLABSRESULTS_GEN_ALL_CORE
( @ 12:33)                      10.2  5.40 )-----------( 199                 31.7    Neutrophils = 3.86 (71.4%)  Lymphocytes = 1.10 (20.4%)  Eosinophils = 0.01 (0.2%)  Basophils = 0.01 (0.2%)  Monocytes = 0.37 (6.9%)  Bands = --%        138  |  100  |  8   ----------------------------<  132<H>  3.5   |  27  |  0.78    Ca    9.4      2023 12:33    TPro  9.0<H>  /  Alb  3.3  /  TBili  0.2  /  DBili  x   /  AST  13  /  ALT  6   /  AlkPhos  91      ( 2023 12:33 )   PT: 14.1 sec;   INR: 1.21 ratio;       PTT:30.2 sec      Venous Blood Gas:   @ 12:33  7.39/51/25/31/24.9  VBG Lactate: 0.9        Urinalysis Basic - ( 2023 16:27 )    Color: Yellow / Appearance: Clear / S.016 / pH: x  Gluc: x / Ketone: Negative mg/dL  / Bili: Negative / Urobili: 1.0 mg/dL   Blood: x / Protein: Trace mg/dL / Nitrite: Negative   Leuk Esterase: Negative / RBC: x / WBC x   Sq Epi: x / Non Sq Epi: x / Bacteria: x    < from: Xray Chest 1 View- PORTABLE-Urgent (23 @ 12:58) >    IMPRESSION: Right-sided central venous catheter as before. Right effusion   with compressive atelectatic change as before. Smaller left effusion with   some compressive atelectatic change. Heart is at the upper limits of   normal in its transthoracic diameter. Regional osseous structures   appropriate for age    < end of copied text >        Labs personally reviewed  Imaging reviewed

## 2023-07-18 NOTE — ED PROVIDER NOTE - ATTENDING CONTRIBUTION TO CARE
The patient is a 58y Obese  Female who has a past medical and surgery history of HTN DM2 HLD Asthma  OsteopeniaAbnormal uterine and vaginal bleeding, unspecified Malignant pleural effusion/ newly dx peritoneal carcinomatosis w/ malignant pleural effusions, awaiting results IR bx carcinomatosis mullerian origin (likely ovarian).breast biopsy, left myomectomy  PTED with pus draining from  left lung pleurovac used to drain hemothorax in June, she was told that she needs to have it cultured and/or removed. Pt has no complaints of new denies SOB, chest pain, cough sputum or fevers.   Vital Signs Last 24 Hrs  T(F): 98.2 HR: 99 BP: 148/92 (18 Jul 2023 11:02) (148/92 - 148/92)  PE: as described; my additions and exceptions are noted in the chart    DATA:  EKG: pending at time of evaluation  LAB: Pending at time of evaluation                        9.8    4.59  )-----------( 170      ( 18 Jul 2023 07:13 )             30.4   IMPRESSION/RISK:  Dx= Infected drain catheter  Consideration include: Seen by pulmonary recommend CTScan to r/o deeper collection or empyema   Plan  as above   Patient TBA

## 2023-07-18 NOTE — ED ADULT NURSE NOTE - OBJECTIVE STATEMENT
pt A&ox4, coming to ED from cancer center after reporting puss like drainage from right chest wall drainage site. pt receiving chemo for ovarian cancer. last treatment was 3 weeks ago. drainage in right chest wall tube appears white and cloudy. pt denies fevers or chills at home. pt denies Chest pain and SOB. pt denies H/A , Dizziness , lightheadedness , and radiating chest pain. breathing is spontaneous and unlabored. sating 99% on RA.  bilateral pedal and radial pulses palpable and strong. right ac 20g IV placed Labs drawn and sent as per ordered. Bed in lowest position, call bell within reach, all other safety and comfort measures provided. awaiting labs results and Xr

## 2023-07-18 NOTE — CONSULT NOTE ADULT - ASSESSMENT
58F ovarian ca c/b malignant pleural effusion s/p pleurx presenting with purulent drainage from around and inside pleurx.    Pulmonary team looks Pleurx up to suction cannister and drained about 10 to 15 cc of purulent and bloody fluid.  Afterwards attached to Pleur-evac for overnight.    most concerned for skin/soft tissue infection however given purulent drainage from within the catheter will have to consider empyema.    Recommendations:  -Chest CT  -keep pleurX to -[id number] suction overnight  -empiric antibiotics  -plan for pleurX removal tomorrow (bedside vs OR) with interventional pulm  -please keep NPO after midnight tonight and obtain preop labs (cbc, cmp, coags, t&s x2) at 4AM    Discussed with Dr Yeung and Dr Karl Everett MD  PGY4 Fellow PCCM  TEAMS preferred  Pager: TIA [phone]/[facility] [phone]  [facility] Pulm consult spectra [id number] (7a-5p)  [facility] MICU consult spectra [id number] (24hrs)  58F asthma, ovarian ca c/b malignant pleural effusion s/p pleurx presenting with purulent drainage from around and inside pleurx.     Pulmonary team hooked Pleurx up to suction cannister and drained about 10 to 15 cc of purulent and bloody fluid.  On POCUS very minimal effusion present afterwards. Attached to Pleur-evac for overnight.    most concerned for skin/soft tissue infection however given purulent drainage from within the catheter will have to consider empyema.    Recommendations:  -Chest CT  -keep pleurX to -[id number] suction overnight  -empiric antibiotics  -plan for pleurX removal tomorrow (bedside vs OR) with interventional pulm  -please keep NPO after midnight tonight and obtain preop labs (cbc, cmp, coags, t&s x2) at 4AM    Discussed with Dr Yeung and Dr Karl Everett MD  PGY4 Fellow PCCM  TEAMS preferred  Pager: TIA [phone]/[facility] [phone]  [facility] Pulm consult spectra [id number] (7a-5p)  [facility] MICU consult spectra [id number] (24hrs)

## 2023-07-18 NOTE — ED ADULT TRIAGE NOTE - CHIEF COMPLAINT QUOTE
Pt with left lung drain, states she had a hemothorax in June, was told the drain had some puss coming out of it today and it needs to be cultured and/or removed. Pt denies SOB, denies pain, denies fevers.

## 2023-07-18 NOTE — ED ADULT NURSE NOTE - NS ED NURSE REPORT GIVEN TO FT
Chest pain started around 2 pm, patient took a muscle relaxer. Pain 9/10.  Patient states she feels its jyothi muscle pain than cardiac
DIDIER Issa

## 2023-07-18 NOTE — ED ADULT NURSE NOTE - NSFALLUNIVINTERV_ED_ALL_ED
Bed/Stretcher in lowest position, wheels locked, appropriate side rails in place/Call bell, personal items and telephone in reach/Instruct patient to call for assistance before getting out of bed/chair/stretcher/Non-slip footwear applied when patient is off stretcher/[place] to call system/Physically safe environment - no spills, clutter or unnecessary equipment/Purposeful proactive rounding/Room/bathroom lighting operational, light cord in reach

## 2023-07-18 NOTE — ED ADULT NURSE REASSESSMENT NOTE - NS ED NURSE REASSESS COMMENT FT1
pt A&ox4, awake and alert, no c/o pain. breathing is spontaneous and unlabored. pt has right chest tube connected to wall suction. no drainage noted from tube. report given CHETAN Issa. pt awaiting transport to unit.

## 2023-07-18 NOTE — H&P ADULT - ASSESSMENT
59 y/o female PMH diabetes, hypertension, asthma (no history of intubations), ovarian cancer (on chemo, last chemo session was 3 weeks ago) w/ peritoneal carcinomatosis and c/b malignant pleural effusion s/p pleurx catheter p/w drainage from around and inside pleurx c/f skin/soft tissue infection vs empyema

## 2023-07-18 NOTE — H&P ADULT - TIME BILLING
I have spent a total of 78 minutes or greater to prepare to see the patient, obtaining and reviewing history, physical examination, explaining the diagnosis, prognosis and treatment plan with the patient/family/caregiver. I also have spent the time ordering studies and testing, interpreting results, medicine reconciliation, subspecialty consultation and documentation as above.

## 2023-07-18 NOTE — H&P ADULT - PROBLEM SELECTOR PLAN 1
-Pt p/w purulent drainage from pleurx catheter in the setting of a right pleural effusion. +cough however no fevers or systemic signs of infection  -appreciate Pulm recs. C/f catheter site infection vs empyema  - Chest CT pending to further evaluate   -keep pleurX to -[id number] suction overnight  -will treat with zosyn and vanc for now given c/f empyema, recent hospitalization   -f/u pleural fluid cx, blood cx   -plan for pleurX removal tomorrow (bedside vs OR) with interventional pulm  -NPO after midnight tonight and obtain preop labs (cbc, cmp, coags, t&s x2) at 4AM

## 2023-07-19 ENCOUNTER — TRANSCRIPTION ENCOUNTER (OUTPATIENT)
Age: 58
End: 2023-07-19

## 2023-07-19 DIAGNOSIS — J90 PLEURAL EFFUSION, NOT ELSEWHERE CLASSIFIED: ICD-10-CM

## 2023-07-19 DIAGNOSIS — Z29.9 ENCOUNTER FOR PROPHYLACTIC MEASURES, UNSPECIFIED: ICD-10-CM

## 2023-07-19 DIAGNOSIS — I10 ESSENTIAL (PRIMARY) HYPERTENSION: ICD-10-CM

## 2023-07-19 DIAGNOSIS — C56.9 MALIGNANT NEOPLASM OF UNSPECIFIED OVARY: ICD-10-CM

## 2023-07-19 DIAGNOSIS — E11.9 TYPE 2 DIABETES MELLITUS WITHOUT COMPLICATIONS: ICD-10-CM

## 2023-07-19 LAB
ALBUMIN SERPL ELPH-MCNC: 2.9 G/DL — LOW (ref 3.3–5)
ALP SERPL-CCNC: 79 U/L — SIGNIFICANT CHANGE UP (ref 40–120)
ALT FLD-CCNC: 7 U/L — SIGNIFICANT CHANGE UP (ref 4–33)
ANION GAP SERPL CALC-SCNC: 12 MMOL/L — SIGNIFICANT CHANGE UP (ref 7–14)
APTT BLD: 30.9 SEC — SIGNIFICANT CHANGE UP (ref 27–36.3)
AST SERPL-CCNC: 11 U/L — SIGNIFICANT CHANGE UP (ref 4–32)
BILIRUB SERPL-MCNC: 0.3 MG/DL — SIGNIFICANT CHANGE UP (ref 0.2–1.2)
BLD GP AB SCN SERPL QL: NEGATIVE — SIGNIFICANT CHANGE UP
BUN SERPL-MCNC: 8 MG/DL — SIGNIFICANT CHANGE UP (ref 7–23)
CALCIUM SERPL-MCNC: 9 MG/DL — SIGNIFICANT CHANGE UP (ref 8.4–10.5)
CHLORIDE SERPL-SCNC: 101 MMOL/L — SIGNIFICANT CHANGE UP (ref 98–107)
CO2 SERPL-SCNC: 24 MMOL/L — SIGNIFICANT CHANGE UP (ref 22–31)
CREAT SERPL-MCNC: 0.82 MG/DL — SIGNIFICANT CHANGE UP (ref 0.5–1.3)
CULTURE RESULTS: NO GROWTH — SIGNIFICANT CHANGE UP
EGFR: 82 ML/MIN/1.73M2 — SIGNIFICANT CHANGE UP
GLUCOSE BLDC GLUCOMTR-MCNC: 112 MG/DL — HIGH (ref 70–99)
GLUCOSE BLDC GLUCOMTR-MCNC: 112 MG/DL — HIGH (ref 70–99)
GLUCOSE BLDC GLUCOMTR-MCNC: 113 MG/DL — HIGH (ref 70–99)
GLUCOSE BLDC GLUCOMTR-MCNC: 130 MG/DL — HIGH (ref 70–99)
GLUCOSE BLDC GLUCOMTR-MCNC: 147 MG/DL — HIGH (ref 70–99)
GLUCOSE BLDC GLUCOMTR-MCNC: 155 MG/DL — HIGH (ref 70–99)
GLUCOSE SERPL-MCNC: 124 MG/DL — HIGH (ref 70–99)
HCT VFR BLD CALC: 27 % — LOW (ref 34.5–45)
HGB BLD-MCNC: 8.8 G/DL — LOW (ref 11.5–15.5)
INR BLD: 1.26 RATIO — HIGH (ref 0.88–1.16)
MAGNESIUM SERPL-MCNC: 1.6 MG/DL — SIGNIFICANT CHANGE UP (ref 1.6–2.6)
MCHC RBC-ENTMCNC: 25.8 PG — LOW (ref 27–34)
MCHC RBC-ENTMCNC: 32.6 GM/DL — SIGNIFICANT CHANGE UP (ref 32–36)
MCV RBC AUTO: 79.2 FL — LOW (ref 80–100)
NRBC # BLD: 0 /100 WBCS — SIGNIFICANT CHANGE UP (ref 0–0)
NRBC # FLD: 0 K/UL — SIGNIFICANT CHANGE UP (ref 0–0)
PHOSPHATE SERPL-MCNC: 3.2 MG/DL — SIGNIFICANT CHANGE UP (ref 2.5–4.5)
PLATELET # BLD AUTO: 159 K/UL — SIGNIFICANT CHANGE UP (ref 150–400)
POTASSIUM SERPL-MCNC: 3.7 MMOL/L — SIGNIFICANT CHANGE UP (ref 3.5–5.3)
POTASSIUM SERPL-SCNC: 3.7 MMOL/L — SIGNIFICANT CHANGE UP (ref 3.5–5.3)
PROCALCITONIN SERPL-MCNC: 0.08 NG/ML — SIGNIFICANT CHANGE UP (ref 0.02–0.1)
PROT SERPL-MCNC: 7.9 G/DL — SIGNIFICANT CHANGE UP (ref 6–8.3)
PROTHROM AB SERPL-ACNC: 14.6 SEC — HIGH (ref 10.5–13.4)
RBC # BLD: 3.41 M/UL — LOW (ref 3.8–5.2)
RBC # FLD: 13.3 % — SIGNIFICANT CHANGE UP (ref 10.3–14.5)
RH IG SCN BLD-IMP: POSITIVE — SIGNIFICANT CHANGE UP
SODIUM SERPL-SCNC: 137 MMOL/L — SIGNIFICANT CHANGE UP (ref 135–145)
SPECIMEN SOURCE: SIGNIFICANT CHANGE UP
WBC # BLD: 4.39 K/UL — SIGNIFICANT CHANGE UP (ref 3.8–10.5)
WBC # FLD AUTO: 4.39 K/UL — SIGNIFICANT CHANGE UP (ref 3.8–10.5)

## 2023-07-19 PROCEDURE — 32552 REMOVE LUNG CATHETER: CPT | Mod: GC

## 2023-07-19 PROCEDURE — 99233 SBSQ HOSP IP/OBS HIGH 50: CPT | Mod: GC

## 2023-07-19 PROCEDURE — 71250 CT THORAX DX C-: CPT | Mod: 26

## 2023-07-19 PROCEDURE — 99221 1ST HOSP IP/OBS SF/LOW 40: CPT | Mod: 25

## 2023-07-19 RX ORDER — ENOXAPARIN SODIUM 100 MG/ML
40 INJECTION SUBCUTANEOUS EVERY 24 HOURS
Refills: 0 | Status: DISCONTINUED | OUTPATIENT
Start: 2023-07-19 | End: 2023-07-20

## 2023-07-19 RX ORDER — INSULIN GLARGINE 100 [IU]/ML
16 INJECTION, SOLUTION SUBCUTANEOUS AT BEDTIME
Refills: 0 | Status: DISCONTINUED | OUTPATIENT
Start: 2023-07-19 | End: 2023-07-25

## 2023-07-19 RX ADMIN — Medication 166.67 MILLIGRAM(S): at 06:48

## 2023-07-19 RX ADMIN — Medication 2 GRAM(S): at 18:52

## 2023-07-19 RX ADMIN — Medication 325 MILLIGRAM(S): at 11:45

## 2023-07-19 RX ADMIN — Medication 166.67 MILLIGRAM(S): at 18:52

## 2023-07-19 RX ADMIN — PIPERACILLIN AND TAZOBACTAM 25 GRAM(S): 4; .5 INJECTION, POWDER, LYOPHILIZED, FOR SOLUTION INTRAVENOUS at 00:51

## 2023-07-19 RX ADMIN — Medication 1: at 13:27

## 2023-07-19 RX ADMIN — ENOXAPARIN SODIUM 40 MILLIGRAM(S): 100 INJECTION SUBCUTANEOUS at 11:45

## 2023-07-19 RX ADMIN — Medication 500 MILLIGRAM(S): at 11:45

## 2023-07-19 RX ADMIN — PIPERACILLIN AND TAZOBACTAM 25 GRAM(S): 4; .5 INJECTION, POWDER, LYOPHILIZED, FOR SOLUTION INTRAVENOUS at 23:02

## 2023-07-19 RX ADMIN — PIPERACILLIN AND TAZOBACTAM 25 GRAM(S): 4; .5 INJECTION, POWDER, LYOPHILIZED, FOR SOLUTION INTRAVENOUS at 09:34

## 2023-07-19 RX ADMIN — Medication 2000 UNIT(S): at 11:45

## 2023-07-19 RX ADMIN — INSULIN GLARGINE 8 UNIT(S): 100 INJECTION, SOLUTION SUBCUTANEOUS at 00:51

## 2023-07-19 RX ADMIN — INSULIN GLARGINE 16 UNIT(S): 100 INJECTION, SOLUTION SUBCUTANEOUS at 22:01

## 2023-07-19 RX ADMIN — PIPERACILLIN AND TAZOBACTAM 25 GRAM(S): 4; .5 INJECTION, POWDER, LYOPHILIZED, FOR SOLUTION INTRAVENOUS at 17:22

## 2023-07-19 RX ADMIN — Medication 2 GRAM(S): at 08:40

## 2023-07-19 NOTE — PATIENT PROFILE ADULT - FALL HARM RISK - HARM RISK INTERVENTIONS

## 2023-07-19 NOTE — PROGRESS NOTE ADULT - SUBJECTIVE AND OBJECTIVE BOX
HPI:    58-year-old female with past medical history of diabetes, hypertension, asthma (no history of intubations), ovarian cancer (on chemo, last chemo session was 3 weeks ago) c/b malignant pleural effusion s/p pleurx catheter 1 month ago now presenting with purulent discharge from around and in her Pleurx catheter today.  Patient states that she was going to her chemo appointment when her team there noticed that there was what appears to be purulent fluid in the catheter.  She reports that the frequency and amount of drainage has been decreasing over last 2 weeks and team was evaluating for removing the catheter.      New Events:    Pleurx catheter removed      PAST MEDICAL & SURGICAL HISTORY:  HTN (hypertension)      Type 2 diabetes mellitus      Hyperlipidemia  (diet control)      Asthma      Morbid obesity with body mass index (BMI) of 40.0 to 44.9 in adult      Abnormal uterine and vaginal bleeding, unspecified      Hair loss      Obesity      Magnesium deficiency      Malignant pleural effusion      Osteopenia      Knee pain, left      History of hay fever      S/P breast biopsy, left      History of myomectomy          FAMILY HISTORY:  Family history of colon cancer in mother (Mother)    Type 2 diabetes mellitus (Father)    Family history of MI (myocardial infarction) (Father)       REVIEW OF SYSTEMS:  Constitutional: [x ] negative [ ] fevers [ ] chills [ ] weight loss [ ] weight gain  HEENT: [ ] negative [ ] dry eyes [ ] eye irritation [ ] postnasal drip [ ] nasal congestion  CV: [ x] negative  [ ] chest pain [ ] orthopnea [ ] palpitations [ ] murmur  Resp: [x ] negative [ ] cough [ ] shortness of breath [ ] dyspnea [ ] wheezing [ ] sputum [ ] hemoptysis  GI: [x ] negative [ ] nausea [ ] vomiting [ ] diarrhea [ ] constipation [ ] abd pain [ ] dysphagia   : [ ] negative [ ] dysuria [ ] nocturia [ ] hematuria [ ] increased urinary frequency  Musculoskeletal: [ ] negative [ ] back pain [ ] myalgias [ ] arthralgias [ ] fracture  Skin: [ ] negative [ ] rash [ ] itch  Neurological: [ ] negative [ ] headache [ ] dizziness [ ] syncope [ ] weakness [ ] numbness  Psychiatric: [ ] negative [ ] anxiety [ ] depression  Endocrine: [ ] negative [ ] diabetes [ ] thyroid problem  Hematologic/Lymphatic: [ ] negative [ ] anemia [ ] bleeding problem  Allergic/Immunologic: [ ] negative [ ] itchy eyes [ ] nasal discharge [ ] hives [ ] angioedema  [ x] All other systems negative  [ ] Unable to assess ROS because ________    OBJECTIVE:    Vital Signs Last 24 Hrs  T(C): 36.8 (19 Jul 2023 06:45), Max: 37.3 (18 Jul 2023 16:15)  T(F): 98.3 (19 Jul 2023 06:45), Max: 99.1 (18 Jul 2023 16:15)  HR: 94 (19 Jul 2023 06:45) (84 - 94)  BP: 129/81 (19 Jul 2023 06:45) (123/67 - 129/81)  BP(mean): --  ABP: --  ABP(mean): --  RR: 18 (19 Jul 2023 06:45) (16 - 18)  SpO2: 98% (19 Jul 2023 06:45) (97% - 99%)    O2 Parameters below as of 19 Jul 2023 06:45  Patient On (Oxygen Delivery Method): room air      PHYSICAL EXAM:  General:   HEENT:   Lymph Nodes:  Neck:   Respiratory:   Cardiovascular:   Abdomen:   Extremities:   Skin:   Neurological:  Psychiatry:     :     HOSPITAL MEDICATIONS:  Standing Meds:  ascorbic acid 500 milliGRAM(s) Oral daily  cholecalciferol 2000 Unit(s) Oral daily  dextrose 5%. 1000 milliLiter(s) IV Continuous <Continuous>  dextrose 5%. 1000 milliLiter(s) IV Continuous <Continuous>  dextrose 50% Injectable 25 Gram(s) IV Push once  dextrose 50% Injectable 12.5 Gram(s) IV Push once  dextrose 50% Injectable 25 Gram(s) IV Push once  enoxaparin Injectable 40 milliGRAM(s) SubCutaneous every 24 hours  ferrous    sulfate 325 milliGRAM(s) Oral daily  glucagon  Injectable 1 milliGRAM(s) IntraMuscular once  insulin glargine Injectable (LANTUS) 8 Unit(s) SubCutaneous at bedtime  insulin lispro (ADMELOG) corrective regimen sliding scale   SubCutaneous every 6 hours  omega-3-Acid Ethyl Esters 2 Gram(s) Oral two times a day  piperacillin/tazobactam IVPB.. 3.375 Gram(s) IV Intermittent every 8 hours  vancomycin  IVPB 1250 milliGRAM(s) IV Intermittent every 12 hours      PRN Meds:  acetaminophen     Tablet .. 650 milliGRAM(s) Oral every 6 hours PRN  aluminum hydroxide/magnesium hydroxide/simethicone Suspension 30 milliLiter(s) Oral every 4 hours PRN  dextrose Oral Gel 15 Gram(s) Oral once PRN  melatonin 3 milliGRAM(s) Oral at bedtime PRN  ondansetron Injectable 4 milliGRAM(s) IV Push every 8 hours PRN      LABS:                        8.8    4.39  )-----------( 159      ( 19 Jul 2023 03:45 )             27.0     Hgb Trend: 8.8<--, 10.2<--, 9.8<--  07-19    137  |  101  |  8   ----------------------------<  124<H>  3.7   |  24  |  0.82    Ca    9.0      19 Jul 2023 03:45  Phos  3.2     07-19  Mg     1.60     07-19    TPro  7.9  /  Alb  2.9<L>  /  TBili  0.3  /  DBili  x   /  AST  11  /  ALT  7   /  AlkPhos  79  07-19    Creatinine Trend: 0.82<--, 0.78<--  PT/INR - ( 19 Jul 2023 03:45 )   PT: 14.6 sec;   INR: 1.26 ratio         PTT - ( 19 Jul 2023 03:45 )  PTT:30.9 sec  Urinalysis Basic - ( 19 Jul 2023 03:45 )    Color: x / Appearance: x / SG: x / pH: x  Gluc: 124 mg/dL / Ketone: x  / Bili: x / Urobili: x   Blood: x / Protein: x / Nitrite: x   Leuk Esterase: x / RBC: x / WBC x   Sq Epi: x / Non Sq Epi: x / Bacteria: x        Venous Blood Gas:  07-18 @ 12:33  7.39/51/25/31/24.9  VBG Lactate: 0.9     < from: Xray Chest 1 View- PORTABLE-Urgent (07.18.23 @ 12:58) >    ACC: [id number] EXAM:  XR CHEST PORTABLE URGENT 1V   ORDERED BY: VAL BLACKBURN     PROCEDURE DATE:  07/18/2023          INTERPRETATION:  EXAM: XR CHEST URGENT    INDICATION: purulent port site drainage LXR    COMPARISON: July 14 at 9:47 AM    FINDINGS: See below    IMPRESSION: Right-sided central venous catheter as before. Right effusion   with compressive atelectatic change as before. Smaller left effusion with   some compressive atelectatic change. Heart is at the upper limits of   normal in its transthoracic diameter. Regional osseous structures   appropriate for age    --- End of Report ---            NÉSTOR BIANCHI   This document has been electronically signed. Jul 18 2023  1:09PM    < end of copied text >

## 2023-07-19 NOTE — PROGRESS NOTE ADULT - PROBLEM SELECTOR PLAN 1
-Pt p/w purulent drainage from pleurx catheter in the setting of a right pleural effusion. +cough however no fevers or systemic signs of infection  -appreciate Pulm recs. C/f catheter site infection vs empyema  - Chest CT pending to further evaluate   -keep pleurX to -[id number] suction overnight  -will treat with zosyn and vanc for now given c/f empyema, recent hospitalization   -f/u pleural fluid cx, blood cx   -plan for pleurX removal tomorrow (bedside vs OR) with interventional pulm  -NPO after midnight tonight and obtain preop labs (cbc, cmp, coags, t&s x2) at 4AM p/w purulent drainage from pleurx catheter in the setting of a right pleural effusion. +cough however no fevers or systemic signs of infection. C/f catheter site infection vs empyema  - pulm removed pleurx 7/19: rec cont abx, repeat chest CT now and in 4 weeks, pulm f/u OP  - Chest CT pending  - will treat with zosyn and vanc for now given c/f empyema, recent hospitalization: if neg cultures, can maybe transition to oral such as augmentin or clinda  - f/u pleural fluid cx, blood cx

## 2023-07-19 NOTE — DISCHARGE NOTE PROVIDER - CARE PROVIDER_API CALL
Daniela Gloria  Internal Medicine  [address] [place] [address], [address]  [place] [address]  Phone: [phone]  Fax: [phone]  Established Patient  Follow Up Time: 1-3 days

## 2023-07-19 NOTE — CONSULT NOTE ADULT - ASSESSMENT
59 y/o female PMH diabetes, hypertension, asthma (no history of intubations), ovarian cancer (on chemo, last chemo session was 3 weeks ago) w/ peritoneal carcinomatosis and c/b malignant pleural effusion s/p pleurx catheter 1 month ago here with concern for infected tunnelled intrapleural catheter.     RECS  -Concern for soft tissue infection. Lower concern for infected pleural space.   -Pleurevac attached with -10 cm H2O suction.  -Minimal output (5 cc) overnight from pleurX.   -Agree with broad spectrum abx. Bcx at lab.   -Plan for bedside right pleurx catheter removal today 7:30 AM.    Dr. Celso Marte, DO  Pulmonary and Critical Care Medicine Fellow   Available via Microsoft Teams - **Preferred**  Pulmonary Spectra #[id number] ([facility]) / [phone] ([facility])  Pager:  [phone] 59 y/o female PMH diabetes, hypertension, asthma (no history of intubations), ovarian cancer (on chemo, last chemo session was 3 weeks ago) w/ peritoneal carcinomatosis and c/b malignant pleural effusion s/p pleurx catheter 1 month ago here with concern for infected tunnelled intrapleural catheter.     RECS  -Bedside US done on 7/19 with small volume loculated pleural fluid on the right.   -Decision made to remove right pleurx catheter given concern for soft tissue infection and infected pleural space. Done on 7/19.  -Would provide antibiotics for 4 week total course.   -Please obtain CT chest now and in 4 weeks.    -Monitor for reaccumulation of pleural fluid and/or worsening infection. If there is concern for lack of source control, would place percutaneous chest tube.   -Can use dry gauze dressing. Change every 1-2 days or sooner PRN. Can shower but would dry site after and replace dressing.  -Can resume chemical dvt ppx if no contraindication.   -Pleurx catheter site to heal by secondary intention. No sutures present.  -Outpatient follow up with Dr. Zimmer.      Dr. Celso Marte DO  Pulmonary and Critical Care Medicine Fellow   Available via Microsoft Teams - **Preferred**  Pulmonary [facility]ra #[id number] ([facility]) / [phone] ([facility])  Pager:  [phone] 59 y/o female PMH diabetes, hypertension, asthma (no history of intubations), ovarian cancer (on chemo, last chemo session was 3 weeks ago) w/ peritoneal carcinomatosis and c/b malignant pleural effusion s/p pleurx catheter 1 month ago here with concern for infected tunnelled intrapleural catheter.     RECS  -Bedside US done on 7/19 with small volume loculated pleural fluid on the right.   -Decision made to remove right pleurx catheter given concern for soft tissue infection and infected pleural space. Done on 7/19.  -Would provide antibiotics for 4 week total course.   -Please obtain CT chest now and in 4 weeks.    -Monitor for reaccumulation of pleural fluid and/or worsening infection. If there is concern for lack of source control, would place percutaneous chest tube.   -Can use dry gauze dressing. Change every 1-2 days or sooner PRN.   -Can resume chemical dvt ppx if no contraindication.   -Pleurx catheter site to heal by secondary intention. No sutures present.  -Outpatient follow up with Dr. Zimmer.      Dr. Celso Marte, DO  Pulmonary and Critical Care Medicine Fellow   Available via Microsoft Teams - **Preferred**  Pulmonary [facility]ra #[id number] ([facility]) / [phone] ([facility])  Pager:  [phone]

## 2023-07-19 NOTE — DISCHARGE NOTE PROVIDER - NSFOLLOWUPCLINICS_GEN_ALL_ED_FT
[facility]  Nephrology  [address] [place] [address], [address] [place] [address]  Phone: [phone]  Fax:   Follow Up Time: 1 week

## 2023-07-19 NOTE — PROGRESS NOTE ADULT - ASSESSMENT
58F asthma, ovarian ca on chemo c/b malignant pleural effusion s/p pleurx presenting with purulent drainage from insertion site. Clinically has no sx except slight tenderness at catheter site.     pleurx catheter removed  cont abx, likely for 4 weeks  check CT chest now  outpt pulmonary f/u  outpt ct chest in 4 weeks  dvt ppx

## 2023-07-19 NOTE — DISCHARGE NOTE PROVIDER - NSDCCPCAREPLAN_GEN_ALL_CORE_FT
PRINCIPAL DISCHARGE DIAGNOSIS  Diagnosis: Complication of chest tube  Assessment and Plan of Treatment: You were admitted to the hospital due to purulent drainage from your PleurX site. The catheter was successfully removed. You were treated with antibiotics and are being sent a prescription for antibiotics to take at home. You should follow up with the pulmonary clinic in 4 weeks for reevaluation and arrangement of a repeat CT scan.  Pulmonary[facility] Clinic  [address] [place] [address]  [phone]  If you develop any worsening symptoms including but not limited to worsening chest pain, shortness of breath, difficulty breathing, fever, abdominal pain, nausea, vomiting, or any other symptoms concerning to you, please return to the emergency department     PRINCIPAL DISCHARGE DIAGNOSIS  Diagnosis: Complication of chest tube  Assessment and Plan of Treatment: You were admitted to the hospital due to purulent drainage from your PleurX site. The catheter was successfully removed. You were treated with antibiotics and are being sent a prescription for antibiotics to take at home. You should follow up with the pulmonary clinic in 4 weeks for reevaluation and arrangement of a repeat CT scan.  P[facility] Clinic  [address] [place] [address]  [phone]  If you develop any worsening symptoms including but not limited to worsening chest pain, shortness of breath, difficulty breathing, fever, abdominal pain, nausea, vomiting, or any other symptoms concerning to you, please return to the emergency department      SECONDARY DISCHARGE DIAGNOSES  Diagnosis: Examination prior to chemotherapy  Assessment and Plan of Treatment: You were found to have a mispositioned chest port that was not able to be used by the nurse. The interventional radiologists recommend replacement in the clinic when the infection clears.  For questions about scheduling during appropriate work hours, call IR :  [facility]: [phone]  [facility]: [phone]  For outpatient IR booking:  [facility]: [phone]  [facility]: [phone]     PRINCIPAL DISCHARGE DIAGNOSIS  Diagnosis: Complication of chest tube  Assessment and Plan of Treatment: You were admitted to the hospital due to purulent drainage from your PleurX site. The catheter was successfully removed. You were treated with antibiotics and are being sent a prescription for antibiotics to take at home. You should follow up with the pulmonary clinic in 4 weeks for reevaluation and arrangement of a repeat CT scan.  Pulmonary[facility]Sleep Clinic  [address] [place] [address]  [phone]  If you develop any worsening symptoms including but not limited to worsening chest pain, shortness of breath, difficulty breathing, fever, abdominal pain, nausea, vomiting, or any other symptoms concerning to you, please return to the emergency department      SECONDARY DISCHARGE DIAGNOSES  Diagnosis: MARIANA (acute kidney injury)  Assessment and Plan of Treatment: You were found to have decreasing kidney function on this hospital visit, that has how stopped declining.  Evaluate you should follow-up with a kidney doctor within 1 week to get repeat labs and to further assess your kidney function to continue the work-up of this issue, we have included the number of one for you if you do not already see one. We have also adjusted your insulin based off of your decreased kidney function.  Please take your blood sugars prior to injecting your insulin. Your antibiotics may also need to be adjusted based off of your kidney function.    Diagnosis: Type 2 diabetes mellitus  Assessment and Plan of Treatment: As stated above your kidney function is decreased which affects the way insulin is processed in your body. This means you should lower your insulin dosing until your kidney function improves. We have decreased your insulin to 15 units but you should set up an appointment with your PCP/diabetes specialist within the week to adjust your insulin based off your blood sugars and kidney function.      Diagnosis: Examination prior to chemotherapy  Assessment and Plan of Treatment: You were found to have a mispositioned chest port that was not able to be used by the nurse. The interventional radiologists recommend replacement in the clinic when the infection clears.  For questions about scheduling during appropriate work hours, call IR :  [facility]: [phone]  [facility]: [phone]  For outpatient IR booking:  [facility]: [phone]  J: [phone]

## 2023-07-19 NOTE — PROGRESS NOTE ADULT - ASSESSMENT
57 y/o female PMH diabetes, hypertension, asthma (no history of intubations), ovarian cancer (on chemo, last chemo session was 3 weeks ago) w/ peritoneal carcinomatosis and c/b malignant pleural effusion s/p pleurx catheter p/w drainage from around and inside pleurx c/f skin/soft tissue infection vs empyema

## 2023-07-19 NOTE — DISCHARGE NOTE PROVIDER - NSDCFUSCHEDAPPT_GEN_ALL_CORE_FT
[facility]  BRSTIMAG OP [address]   Scheduled Appointment: 07/24/2023    [facility]  DIAGRAD [address]  Scheduled Appointment: 07/24/2023    Whit Cuellar  [facility]  Carlos [facility] Clini  Scheduled Appointment: 07/25/2023    [facility]  [facility] Infusio  Scheduled Appointment: 07/25/2023    Whit Cuellar  [facility]  [facility] Practic  Scheduled Appointment: 08/01/2023    Santhosh Ac  [facility]  ENDOCRIN [address] [facility] [address]  Scheduled Appointment: 08/10/2023    Luisito Zimmer  [facility]  PULMMED [address]  Scheduled Appointment: 08/14/2023    Whit Cuellar  [facility]  [facility] Clini  Scheduled Appointment: 08/15/2023    [facility]  [facility] Infusio  Scheduled Appointment: 08/15/2023    Whit Cuellar  [facility]  [facility] Practic  Scheduled Appointment: 08/22/2023     Whit Cuellar  [facility]  [facility] Practic  Scheduled Appointment: 08/01/2023    Santhosh Ac  [facility]  ENDOCRIN [address] [facility] [address]  Scheduled Appointment: 08/10/2023    Luisito Zimmer  [facility]  PULMMED [address]  Scheduled Appointment: 08/14/2023    Whit Cuellar  [facility]  [facility] Clini  Scheduled Appointment: 08/15/2023    [facility]  Scheduled Appointment: 08/15/2023    Whit Cuellar  [facility]  [facility] Practic  Scheduled Appointment: 08/22/2023    Daniela Gloria  [facility]  INTMED [address] [facility]  Scheduled Appointment: 10/18/2023     Whit Cuellar  [facility]  [facility] CC Practic  Scheduled Appointment: 08/22/2023    Whit Cuellar  [facility]  Scheduled Appointment: 08/22/2023    Whit Cuellar  [facility] Clini  Scheduled Appointment: 09/01/2023    [facility] CC Infusio  Scheduled Appointment: 09/01/2023    Whit Cuellar  [facility]  Scheduled Appointment: 09/15/2023    Santhosh Ac  [facility] [address]  Scheduled Appointment: 10/10/2023    Daniela Gloria  [facility]  INTMED [address]  Scheduled Appointment: 10/18/2023    Stephy Mccann  [facility]  [facility] [address]  Scheduled Appointment: 10/24/2023

## 2023-07-19 NOTE — PROGRESS NOTE ADULT - PROBLEM SELECTOR PLAN 2
-w/ peritoneal mets and malignant pleural effusion. On active chemo   -output Onc f/u c/b peritoneal mets and malignant pleural effusion. On active chemo, x1 treatment  -output Onc f/u

## 2023-07-19 NOTE — DISCHARGE NOTE PROVIDER - NSDCMRMEDTOKEN_GEN_ALL_CORE_FT
alcohol swabs: Apply topically to affected area 4 times a day  dexAMETHasone 4 mg oral tablet: 1 tab(s) orally twice Take 5 tablets at bedtime the night before chemo and 5 tablets at 6 am the morning of chemo. Take it with food  glucometer (per patient&#x27;s insurance): Test blood sugars four times a day. Dispense #1 glucometer.  Insulin Pen Needles, 4mm: 1 application subcutaneously 4 times a day. ** Use with insulin pen **  Insulin Sliding Scale: Sliding Scale three times a day before meals:  1 Unit if Glucose 151-200  2 Units if Glucose 201-250  3 Units if Glucose 251-300  4 Units if glucose 301-350  5 units if glucose 350-400  Contact MD if Glucose 400+, three times a day before meals    Sliding scale before bedtime  0 units if glucose 0-250  1 Unit if Glucose 250-300  2 unit if glucose 301-350  3 units if glucose 351-400  Contact MD if Glucose 400+, at bedtime  lancets: 1 application subcutaneously 4 times a day  omega-3 polyunsaturated fatty acids oral capsule:  orally once a day  last dose 9/30  prochlorperazine 10 mg oral tablet: 1 tab(s) orally every 6 hours as needed for  nausea take 1 tablet every 6 hours AS NEEDED for nausea  Semglee 100 units/mL subcutaneous solution: 22 unit(s) subcutaneous once a day (at bedtime)  test strips (per patient&#x27;s insurance): 1 application subcutaneously 4 times a day. ** Compatible with patient&#x27;s glucometer **  Vitamin D3 2000 iu orally daily:      alcohol swabs: Apply topically to affected area 4 times a day  dexAMETHasone 4 mg oral tablet: 1 tab(s) orally twice Take 5 tablets at bedtime the night before chemo and 5 tablets at 6 am the morning of chemo. Take it with food  glucometer (per patient&#x27;s insurance): Test blood sugars four times a day. Dispense #1 glucometer.  Insulin Pen Needles, 4mm: 1 application subcutaneously 4 times a day. ** Use with insulin pen **  Insulin Sliding Scale: Sliding Scale three times a day before meals:  1 Unit if Glucose 151-200  2 Units if Glucose 201-250  3 Units if Glucose 251-300  4 Units if glucose 301-350  5 units if glucose 350-400  Contact MD if Glucose 400+, three times a day before meals    Sliding scale before bedtime  0 units if glucose 0-250  1 Unit if Glucose 250-300  2 unit if glucose 301-350  3 units if glucose 351-400  Contact MD if Glucose 400+, at bedtime  lancets: 1 application subcutaneously 4 times a day  levoFLOXacin 750 mg oral tablet: 1 tab(s) orally once a day  omega-3 polyunsaturated fatty acids oral capsule:  orally once a day  last dose 9/30  prochlorperazine 10 mg oral tablet: 1 tab(s) orally every 6 hours as needed for  nausea take 1 tablet every 6 hours AS NEEDED for nausea  Semglee 100 units/mL subcutaneous solution: 22 unit(s) subcutaneous once a day (at bedtime)  test strips (per patient&#x27;s insurance): 1 application subcutaneously 4 times a day. ** Compatible with patient&#x27;s glucometer **  Vitamin D3 2000 iu orally daily:      alcohol swabs: Apply topically to affected area 4 times a day  dexAMETHasone 4 mg oral tablet: 1 tab(s) orally twice Take 5 tablets at bedtime the night before chemo and 5 tablets at 6 am the morning of chemo. Take it with food  glucometer (per patient&#x27;s insurance): Test blood sugars four times a day. Dispense #1 glucometer.  Insulin Pen Needles, 4mm: 1 application subcutaneously 4 times a day. ** Use with insulin pen **  Insulin Sliding Scale: Sliding Scale three times a day before meals:  1 Unit if Glucose 151-200  2 Units if Glucose 201-250  3 Units if Glucose 251-300  4 Units if glucose 301-350  5 units if glucose 350-400  Contact MD if Glucose 400+, three times a day before meals    Sliding scale before bedtime  0 units if glucose 0-250  1 Unit if Glucose 250-300  2 unit if glucose 301-350  3 units if glucose 351-400  Contact MD if Glucose 400+, at bedtime  lancets: 1 application subcutaneously 4 times a day  levoFLOXacin 500 mg oral tablet: 1 tab(s) orally every other day  omega-3 polyunsaturated fatty acids oral capsule:  orally once a day  last dose 9/30  prochlorperazine 10 mg oral tablet: 1 tab(s) orally every 6 hours as needed for  nausea take 1 tablet every 6 hours AS NEEDED for nausea  Semglee 100 units/mL subcutaneous solution: 22 unit(s) subcutaneous once a day (at bedtime)  test strips (per patient&#x27;s insurance): 1 application subcutaneously 4 times a day. ** Compatible with patient&#x27;s glucometer **  Vitamin D3 2000 iu orally daily:      alcohol swabs: Apply topically to affected area 4 times a day  dexAMETHasone 4 mg oral tablet: 1 tab(s) orally twice Take 5 tablets at bedtime the night before chemo and 5 tablets at 6 am the morning of chemo. Take it with food  glucometer (per patient&#x27;s insurance): Test blood sugars four times a day. Dispense #1 glucometer.  Insulin Pen Needles, 4mm: 1 application subcutaneously 4 times a day. ** Use with insulin pen **  Insulin Sliding Scale: Sliding Scale three times a day before meals:  1 Unit if Glucose 151-200  2 Units if Glucose 201-250  3 Units if Glucose 251-300  4 Units if glucose 301-350  5 units if glucose 350-400  Contact MD if Glucose 400+, three times a day before meals    Sliding scale before bedtime  0 units if glucose 0-250  1 Unit if Glucose 250-300  2 unit if glucose 301-350  3 units if glucose 351-400  Contact MD if Glucose 400+, at bedtime  lancets: 1 application subcutaneously 4 times a day  levoFLOXacin 500 mg oral tablet: 1 tab(s) orally every other day (at bedtime)  omega-3 polyunsaturated fatty acids oral capsule:  orally once a day  last dose 9/30  prochlorperazine 10 mg oral tablet: 1 tab(s) orally every 6 hours as needed for  nausea take 1 tablet every 6 hours AS NEEDED for nausea  Semglee 100 units/mL subcutaneous solution: 15 unit(s) subcutaneous once a day (at bedtime)  test strips (per patient&#x27;s insurance): 1 application subcutaneously 4 times a day. ** Compatible with patient&#x27;s glucometer **  Vitamin D3 2000 iu orally daily:

## 2023-07-19 NOTE — DISCHARGE NOTE PROVIDER - HOSPITAL COURSE
57 y/o female PMH diabetes, hypertension, asthma (no history of intubations), ovarian cancer (on chemo, last chemo session was 3 weeks ago) w/ peritoneal carcinomatosis and c/b malignant pleural effusion s/p pleurx catheter 1 month ago now presenting with purulent discharge from around and in her Pleurx catheter today.  Patient states that she was going to her chemo appointment when her team there noticed that there was what appears to be purulent fluid in the catheter.  She reports that the frequency and amount of drainage has been decreasing over last 2 weeks and team was evaluating for removing the catheter. She reports a nurse has been helping with management of the catheter and had not noted any purulence in the last week. She reports mild soreness around catheter insertion site and a new dry cough, otherwise denies fevers, chills, chest pain, shortness of breath, abd pain, N/V or LE edema. Pulm removed pleurx without complication. Patient continued on vanc/zosyn. BCx showed****. ABx transitioned to**** 59 y/o female PMH diabetes, hypertension, asthma (no history of intubations), ovarian cancer (on chemo, last chemo session was 3 weeks ago) w/ peritoneal carcinomatosis and c/b malignant pleural effusion s/p pleurx catheter 1 month ago now presenting with purulent discharge from around and in her Pleurx catheter today.  Patient states that she was going to her chemo appointment when her team there noticed that there was what appears to be purulent fluid in the catheter.  She reports that the frequency and amount of drainage has been decreasing over last 2 weeks and team was evaluating for removing the catheter. She reports a nurse has been helping with management of the catheter and had not noted any purulence in the last week. She reports mild soreness around catheter insertion site and a new dry cough, otherwise denies fevers, chills, chest pain, shortness of breath, abd pain, N/V or LE edema. Pulm removed pleurx without complication. Patient continued on vanc/zosyn. BCx showed no growthes. ABx transitioned to****. CT also noted malpositioning in the patient's chest port. Patient evaluated by IR and port tested by RN. Port was not usable, IR rec OP replacement. 57 y/o female PMH diabetes, hypertension, asthma (no history of intubations), ovarian cancer (on chemo, last chemo session was 3 weeks ago) w/ peritoneal carcinomatosis and c/b malignant pleural effusion s/p pleurx catheter 1 month ago now presenting with purulent discharge from around and in her Pleurx catheter today.  Patient states that she was going to her chemo appointment when her team there noticed that there was what appears to be purulent fluid in the catheter.  She reports that the frequency and amount of drainage has been decreasing over last 2 weeks and team was evaluating for removing the catheter. She reports a nurse has been helping with management of the catheter and had not noted any purulence in the last week. She reports mild soreness around catheter insertion site and a new dry cough, otherwise denies fevers, chills, chest pain, shortness of breath, abd pain, N/V or LE edema. Pulm removed pleurx without complication. Patient continued on vanc/zosyn. BCx showed no growthes. ABx transitioned to levaquin. CT also noted malpositioning in the patient's chest port. Patient evaluated by IR and port tested by RN. Port was not usable, IR rec OP replacement. Patient medically optimized for discharge, afebrile, HDS 59 y/o female PMH diabetes, hypertension, asthma (no history of intubations), ovarian cancer (on chemo, last chemo session was 3 weeks ago) w/ peritoneal carcinomatosis and c/b malignant pleural effusion s/p pleurx catheter 1 month ago now presenting with purulent discharge from around and in her Pleurx catheter today.  Patient states that she was going to her chemo appointment when her team there noticed that there was what appears to be purulent fluid in the catheter.  She reports that the frequency and amount of drainage has been decreasing over last 2 weeks and team was evaluating for removing the catheter. She reports a nurse has been helping with management of the catheter and had not noted any purulence in the last week. She reports mild soreness around catheter insertion site and a new dry cough, otherwise denies fevers, chills, chest pain, shortness of breath, abd pain, N/V or LE edema. Pulm removed pleurx without complication. Patient continued on vanc/zosyn. BCx showed no growthes. ABx transitioned to levaquin. CT also noted malpositioning in the patient's chest port. Patient evaluated by IR and port tested by RN. Port was not usable, IR rec OP replacement. Patient demonstrated acute rise in creatinine, FENa suggestive of prerenal etiology. Patient started on IVF. Patient medically optimized for discharge, afebrile, HDS

## 2023-07-19 NOTE — PROGRESS NOTE ADULT - SUBJECTIVE AND OBJECTIVE BOX
Kit Angel MD  Emergency Medicine & Internal Medicine PGY-2    PROGRESS NOTE:    ID #:     Patient is a 58y old  Female who presents with a chief complaint of pleurx catheter purulent drainage (19 Jul 2023 07:03)      MAJOR INTERVAL HOSPITAL EVENTS:     SUBJECTIVE / OVERNIGHT EVENTS: No acute overnight events.       MEDICATIONS  (STANDING):  ascorbic acid 500 milliGRAM(s) Oral daily  cholecalciferol 2000 Unit(s) Oral daily  dextrose 5%. 1000 milliLiter(s) (50 mL/Hr) IV Continuous <Continuous>  dextrose 5%. 1000 milliLiter(s) (100 mL/Hr) IV Continuous <Continuous>  dextrose 50% Injectable 25 Gram(s) IV Push once  dextrose 50% Injectable 12.5 Gram(s) IV Push once  dextrose 50% Injectable 25 Gram(s) IV Push once  ferrous    sulfate 325 milliGRAM(s) Oral daily  glucagon  Injectable 1 milliGRAM(s) IntraMuscular once  insulin glargine Injectable (LANTUS) 8 Unit(s) SubCutaneous at bedtime  insulin lispro (ADMELOG) corrective regimen sliding scale   SubCutaneous every 6 hours  omega-3-Acid Ethyl Esters 2 Gram(s) Oral two times a day  piperacillin/tazobactam IVPB.. 3.375 Gram(s) IV Intermittent every 8 hours  vancomycin  IVPB 1250 milliGRAM(s) IV Intermittent every 12 hours    MEDICATIONS  (PRN):  acetaminophen     Tablet .. 650 milliGRAM(s) Oral every 6 hours PRN Temp greater or equal to 38C (100.4F), Mild Pain (1 - 3)  aluminum hydroxide/magnesium hydroxide/simethicone Suspension 30 milliLiter(s) Oral every 4 hours PRN Dyspepsia  dextrose Oral Gel 15 Gram(s) Oral once PRN Blood Glucose LESS THAN 70 milliGRAM(s)/deciliter  melatonin 3 milliGRAM(s) Oral at bedtime PRN Insomnia  ondansetron Injectable 4 milliGRAM(s) IV Push every 8 hours PRN Nausea and/or Vomiting      CAPILLARY BLOOD GLUCOSE      POCT Blood Glucose.: 112 mg/dL (19 Jul 2023 06:51)  POCT Blood Glucose.: 147 mg/dL (19 Jul 2023 00:49)  POCT Blood Glucose.: 114 mg/dL (18 Jul 2023 19:45)  POCT Blood Glucose.: 136 mg/dL (18 Jul 2023 16:36)    I&O's Summary      PHYSICAL EXAM:  Vital Signs Last 24 Hrs  T(C): 36.8 (18 Jul 2023 21:15), Max: 37.3 (18 Jul 2023 16:15)  T(F): 98.3 (18 Jul 2023 21:15), Max: 99.1 (18 Jul 2023 16:15)  HR: 84 (18 Jul 2023 21:15) (84 - 99)  BP: 125/70 (18 Jul 2023 21:15) (123/67 - 148/92)  BP(mean): --  RR: 18 (18 Jul 2023 21:15) (16 - 18)  SpO2: 97% (18 Jul 2023 21:15) (97% - 99%)    Parameters below as of 18 Jul 2023 21:15  Patient On (Oxygen Delivery Method): room air        GENERAL: No acute distress, well-developed  HEAD:  Atraumatic, Normocephalic  EYES: EOMI, PERRLA, conjunctiva and sclera clear  NECK: Supple, no lymphadenopathy, no JVD  CHEST/LUNG: CTAB; No wheezes, rales, or rhonchi  HEART: Regular rate and rhythm; Normal s1 and s2, No murmurs, rubs, or gallops  ABDOMEN: Soft, non-tender, non-distended; normal bowel sounds, no organomegaly  EXTREMITIES:  2+ peripheral pulses b/l, No clubbing, cyanosis, or edema  NEUROLOGY: A&O x 3, no focal deficits  SKIN: No rashes or lesions          LABS:                        8.8    4.39  )-----------( 159      ( 19 Jul 2023 03:45 )             27.0     07-19    137  |  101  |  8   ----------------------------<  124<H>  3.7   |  24  |  0.82    Ca    9.0      19 Jul 2023 03:45  Phos  3.2     07-19  Mg     1.60     07-19    TPro  7.9  /  Alb  2.9<L>  /  TBili  0.3  /  DBili  x   /  AST  11  /  ALT  7   /  AlkPhos  79  07-19    PT/INR - ( 19 Jul 2023 03:45 )   PT: 14.6 sec;   INR: 1.26 ratio         PTT - ( 19 Jul 2023 03:45 )  PTT:30.9 sec      Urinalysis Basic - ( 19 Jul 2023 03:45 )    Color: x / Appearance: x / SG: x / pH: x  Gluc: 124 mg/dL / Ketone: x  / Bili: x / Urobili: x   Blood: x / Protein: x / Nitrite: x   Leuk Esterase: x / RBC: x / WBC x   Sq Epi: x / Non Sq Epi: x / Bacteria: x          RADIOLOGY & ADDITIONAL TESTS:  Results Reviewed:   Imaging Personally Reviewed:  Electrocardiogram Personally Reviewed:    COORDINATION OF CARE:  Care Discussed with Consultants/Other Providers [Y/N]:  Prior or Outpatient Records Reviewed [Y/N]:   Kit Angel MD  Emergency Medicine & Internal Medicine PGY-2    PROGRESS NOTE:    ID #:     Patient is a 58y old  Female who presents with a chief complaint of pleurx catheter purulent drainage (19 Jul 2023 07:03)      MAJOR INTERVAL HOSPITAL EVENTS: admitted from ED OVN to medicine team    SUBJECTIVE / OVERNIGHT EVENTS: Patient reports feeling well. Pulm team had removed pleurx. Denies current bleeding, drainage, purulence, fever, cp, sob, abd pain, nausea vomiting      MEDICATIONS  (STANDING):  ascorbic acid 500 milliGRAM(s) Oral daily  cholecalciferol 2000 Unit(s) Oral daily  dextrose 5%. 1000 milliLiter(s) (50 mL/Hr) IV Continuous <Continuous>  dextrose 5%. 1000 milliLiter(s) (100 mL/Hr) IV Continuous <Continuous>  dextrose 50% Injectable 25 Gram(s) IV Push once  dextrose 50% Injectable 12.5 Gram(s) IV Push once  dextrose 50% Injectable 25 Gram(s) IV Push once  ferrous    sulfate 325 milliGRAM(s) Oral daily  glucagon  Injectable 1 milliGRAM(s) IntraMuscular once  insulin glargine Injectable (LANTUS) 8 Unit(s) SubCutaneous at bedtime  insulin lispro (ADMELOG) corrective regimen sliding scale   SubCutaneous every 6 hours  omega-3-Acid Ethyl Esters 2 Gram(s) Oral two times a day  piperacillin/tazobactam IVPB.. 3.375 Gram(s) IV Intermittent every 8 hours  vancomycin  IVPB 1250 milliGRAM(s) IV Intermittent every 12 hours    MEDICATIONS  (PRN):  acetaminophen     Tablet .. 650 milliGRAM(s) Oral every 6 hours PRN Temp greater or equal to 38C (100.4F), Mild Pain (1 - 3)  aluminum hydroxide/magnesium hydroxide/simethicone Suspension 30 milliLiter(s) Oral every 4 hours PRN Dyspepsia  dextrose Oral Gel 15 Gram(s) Oral once PRN Blood Glucose LESS THAN 70 milliGRAM(s)/deciliter  melatonin 3 milliGRAM(s) Oral at bedtime PRN Insomnia  ondansetron Injectable 4 milliGRAM(s) IV Push every 8 hours PRN Nausea and/or Vomiting      CAPILLARY BLOOD GLUCOSE      POCT Blood Glucose.: 112 mg/dL (19 Jul 2023 06:51)  POCT Blood Glucose.: 147 mg/dL (19 Jul 2023 00:49)  POCT Blood Glucose.: 114 mg/dL (18 Jul 2023 19:45)  POCT Blood Glucose.: 136 mg/dL (18 Jul 2023 16:36)    I&O's Summary      PHYSICAL EXAM:  Vital Signs Last 24 Hrs  T(C): 36.8 (18 Jul 2023 21:15), Max: 37.3 (18 Jul 2023 16:15)  T(F): 98.3 (18 Jul 2023 21:15), Max: 99.1 (18 Jul 2023 16:15)  HR: 84 (18 Jul 2023 21:15) (84 - 99)  BP: 125/70 (18 Jul 2023 21:15) (123/67 - 148/92)  BP(mean): --  RR: 18 (18 Jul 2023 21:15) (16 - 18)  SpO2: 97% (18 Jul 2023 21:15) (97% - 99%)    Parameters below as of 18 Jul 2023 21:15  Patient On (Oxygen Delivery Method): room air        GENERAL: No acute distress, well-developed  HEAD:  Atraumatic, Normocephalic  EYES: conjunctiva and sclera clear  NECK: Supple, no lymphadenopathy, no JVD  CHEST/LUNG: +bandage at previous pleurx site without purulence/bleeding/drainage and mild TTP, diminished lung sounds on the right  HEART: Regular rate and rhythm; Normal s1 and s2, No murmurs, rubs, or gallops  ABDOMEN: Soft, non-tender, non-distended; normal bowel sounds, no organomegaly  EXTREMITIES:  2+ peripheral pulses b/l, No clubbing, cyanosis, or edema  NEUROLOGY: A&O x 3, no focal deficits  SKIN: No rashes or lesions          LABS:                        8.8    4.39  )-----------( 159      ( 19 Jul 2023 03:45 )             27.0     07-19    137  |  101  |  8   ----------------------------<  124<H>  3.7   |  24  |  0.82    Ca    9.0      19 Jul 2023 03:45  Phos  3.2     07-19  Mg     1.60     07-19    TPro  7.9  /  Alb  2.9<L>  /  TBili  0.3  /  DBili  x   /  AST  11  /  ALT  7   /  AlkPhos  79  07-19    PT/INR - ( 19 Jul 2023 03:45 )   PT: 14.6 sec;   INR: 1.26 ratio         PTT - ( 19 Jul 2023 03:45 )  PTT:30.9 sec      Urinalysis Basic - ( 19 Jul 2023 03:45 )    Color: x / Appearance: x / SG: x / pH: x  Gluc: 124 mg/dL / Ketone: x  / Bili: x / Urobili: x   Blood: x / Protein: x / Nitrite: x   Leuk Esterase: x / RBC: x / WBC x   Sq Epi: x / Non Sq Epi: x / Bacteria: x          RADIOLOGY & ADDITIONAL TESTS:  Results Reviewed:   Imaging Personally Reviewed:  Electrocardiogram Personally Reviewed:    COORDINATION OF CARE:  Care Discussed with Consultants/Other Providers [Y/N]:  Prior or Outpatient Records Reviewed [Y/N]:

## 2023-07-19 NOTE — PROGRESS NOTE ADULT - ATTENDING COMMENTS
57 y/o female PMH diabetes, hypertension, asthma (no history of intubations), ovarian cancer (on chemo, last chemo session was 3 weeks ago) w/ peritoneal carcinomatosis and c/b malignant pleural effusion s/p pleurx catheter p/w drainage from around and inside pleurx c/f skin/soft tissue infection. Now s/p pleurx removal on 7/19      #Infection of pleurx catheter: cellulitis vs less likely pleural space infection  -s/p pleurx removal  -currently on broad spectrum antibiotics with vancomycin/Zosyn  -per pulm plan on antibiotics course of 4 weeks duration  -f/u CT chest  -MRSA swab pending    #Ovarian cancer:   -last chemo 3 weeks ago  -no plans for inpatient chemo    Remainder of chronic problems as above.

## 2023-07-19 NOTE — PROGRESS NOTE ADULT - PROBLEM SELECTOR PLAN 3
-pt on 22 units of lantus at bedtime, ISS before meals  -c/w 8 units of lantus as pt will be NPO, low ISS q6h  -trend FS -pt on 22 units of lantus at bedtime, ISS before meals  -will give 16 units of lantus as pt while IP, low ISS q6h  -trend FS

## 2023-07-19 NOTE — PROCEDURE NOTE - ADDITIONAL PROCEDURE DETAILS
Patient was position in the left lateral decubitus position. Then the site was cleaned in a sterile manner and site was draped. 10 cc of 1% lidocaine was injected locally at the insertion site as well as along the track. The pleurx was then removed with ease enbloc. The site was cleaned and dressed with sterile gauze and Tegaderm.      RECS  -Please see consult note from today.

## 2023-07-20 ENCOUNTER — NON-APPOINTMENT (OUTPATIENT)
Age: 58
End: 2023-07-20

## 2023-07-20 LAB
ALBUMIN SERPL ELPH-MCNC: 2.8 G/DL — LOW (ref 3.3–5)
ALP SERPL-CCNC: 83 U/L — SIGNIFICANT CHANGE UP (ref 40–120)
ALT FLD-CCNC: 5 U/L — SIGNIFICANT CHANGE UP (ref 4–33)
ANION GAP SERPL CALC-SCNC: 11 MMOL/L — SIGNIFICANT CHANGE UP (ref 7–14)
APPEARANCE UR: CLEAR — SIGNIFICANT CHANGE UP
APTT BLD: 30.7 SEC — SIGNIFICANT CHANGE UP (ref 27–36.3)
AST SERPL-CCNC: 15 U/L — SIGNIFICANT CHANGE UP (ref 4–32)
BACTERIA # UR AUTO: NEGATIVE /HPF — SIGNIFICANT CHANGE UP
BASOPHILS # BLD AUTO: 0.01 K/UL — SIGNIFICANT CHANGE UP (ref 0–0.2)
BASOPHILS NFR BLD AUTO: 0.2 % — SIGNIFICANT CHANGE UP (ref 0–2)
BILIRUB SERPL-MCNC: 0.4 MG/DL — SIGNIFICANT CHANGE UP (ref 0.2–1.2)
BILIRUB UR-MCNC: NEGATIVE — SIGNIFICANT CHANGE UP
BUN SERPL-MCNC: 8 MG/DL — SIGNIFICANT CHANGE UP (ref 7–23)
CALCIUM SERPL-MCNC: 9.1 MG/DL — SIGNIFICANT CHANGE UP (ref 8.4–10.5)
CAST: 3 /LPF — SIGNIFICANT CHANGE UP (ref 0–4)
CHLORIDE SERPL-SCNC: 101 MMOL/L — SIGNIFICANT CHANGE UP (ref 98–107)
CO2 SERPL-SCNC: 24 MMOL/L — SIGNIFICANT CHANGE UP (ref 22–31)
COLOR SPEC: YELLOW — SIGNIFICANT CHANGE UP
CREAT ?TM UR-MCNC: 97 MG/DL — SIGNIFICANT CHANGE UP
CREAT SERPL-MCNC: 1.39 MG/DL — HIGH (ref 0.5–1.3)
DIFF PNL FLD: NEGATIVE — SIGNIFICANT CHANGE UP
EGFR: 44 ML/MIN/1.73M2 — LOW
EOSINOPHIL # BLD AUTO: 0.01 K/UL — SIGNIFICANT CHANGE UP (ref 0–0.5)
EOSINOPHIL NFR BLD AUTO: 0.2 % — SIGNIFICANT CHANGE UP (ref 0–6)
GLUCOSE BLDC GLUCOMTR-MCNC: 107 MG/DL — HIGH (ref 70–99)
GLUCOSE BLDC GLUCOMTR-MCNC: 114 MG/DL — HIGH (ref 70–99)
GLUCOSE BLDC GLUCOMTR-MCNC: 134 MG/DL — HIGH (ref 70–99)
GLUCOSE BLDC GLUCOMTR-MCNC: 157 MG/DL — HIGH (ref 70–99)
GLUCOSE BLDC GLUCOMTR-MCNC: 172 MG/DL — HIGH (ref 70–99)
GLUCOSE SERPL-MCNC: 110 MG/DL — HIGH (ref 70–99)
GLUCOSE UR QL: NEGATIVE MG/DL — SIGNIFICANT CHANGE UP
HCT VFR BLD CALC: 27.8 % — LOW (ref 34.5–45)
HGB BLD-MCNC: 9.1 G/DL — LOW (ref 11.5–15.5)
IANC: 3.69 K/UL — SIGNIFICANT CHANGE UP (ref 1.8–7.4)
IMM GRANULOCYTES NFR BLD AUTO: 0.6 % — SIGNIFICANT CHANGE UP (ref 0–0.9)
INR BLD: 1.27 RATIO — HIGH (ref 0.88–1.16)
KETONES UR-MCNC: NEGATIVE MG/DL — SIGNIFICANT CHANGE UP
LEUKOCYTE ESTERASE UR-ACNC: NEGATIVE — SIGNIFICANT CHANGE UP
LYMPHOCYTES # BLD AUTO: 0.89 K/UL — LOW (ref 1–3.3)
LYMPHOCYTES # BLD AUTO: 17.5 % — SIGNIFICANT CHANGE UP (ref 13–44)
MAGNESIUM SERPL-MCNC: 1.6 MG/DL — SIGNIFICANT CHANGE UP (ref 1.6–2.6)
MCHC RBC-ENTMCNC: 26.2 PG — LOW (ref 27–34)
MCHC RBC-ENTMCNC: 32.7 GM/DL — SIGNIFICANT CHANGE UP (ref 32–36)
MCV RBC AUTO: 80.1 FL — SIGNIFICANT CHANGE UP (ref 80–100)
MONOCYTES # BLD AUTO: 0.46 K/UL — SIGNIFICANT CHANGE UP (ref 0–0.9)
MONOCYTES NFR BLD AUTO: 9 % — SIGNIFICANT CHANGE UP (ref 2–14)
MRSA PCR RESULT.: SIGNIFICANT CHANGE UP
NEUTROPHILS # BLD AUTO: 3.69 K/UL — SIGNIFICANT CHANGE UP (ref 1.8–7.4)
NEUTROPHILS NFR BLD AUTO: 72.5 % — SIGNIFICANT CHANGE UP (ref 43–77)
NITRITE UR-MCNC: NEGATIVE — SIGNIFICANT CHANGE UP
NRBC # BLD: 0 /100 WBCS — SIGNIFICANT CHANGE UP (ref 0–0)
NRBC # FLD: 0 K/UL — SIGNIFICANT CHANGE UP (ref 0–0)
OSMOLALITY UR: 258 MOSM/KG — SIGNIFICANT CHANGE UP (ref 50–1200)
PH UR: 5.5 — SIGNIFICANT CHANGE UP (ref 5–8)
PHOSPHATE SERPL-MCNC: 3.7 MG/DL — SIGNIFICANT CHANGE UP (ref 2.5–4.5)
PLATELET # BLD AUTO: 171 K/UL — SIGNIFICANT CHANGE UP (ref 150–400)
POTASSIUM SERPL-MCNC: 3.7 MMOL/L — SIGNIFICANT CHANGE UP (ref 3.5–5.3)
POTASSIUM SERPL-SCNC: 3.7 MMOL/L — SIGNIFICANT CHANGE UP (ref 3.5–5.3)
POTASSIUM UR-SCNC: 28.8 MMOL/L — SIGNIFICANT CHANGE UP
PROT ?TM UR-MCNC: 20 MG/DL — SIGNIFICANT CHANGE UP
PROT SERPL-MCNC: 8 G/DL — SIGNIFICANT CHANGE UP (ref 6–8.3)
PROT UR-MCNC: SIGNIFICANT CHANGE UP MG/DL
PROT/CREAT UR-RTO: 0.2 RATIO — SIGNIFICANT CHANGE UP (ref 0–0.2)
PROTHROM AB SERPL-ACNC: 14.8 SEC — HIGH (ref 10.5–13.4)
RBC # BLD: 3.47 M/UL — LOW (ref 3.8–5.2)
RBC # FLD: 13.3 % — SIGNIFICANT CHANGE UP (ref 10.3–14.5)
RBC CASTS # UR COMP ASSIST: 2 /HPF — SIGNIFICANT CHANGE UP (ref 0–4)
S AUREUS DNA NOSE QL NAA+PROBE: DETECTED
SODIUM SERPL-SCNC: 136 MMOL/L — SIGNIFICANT CHANGE UP (ref 135–145)
SODIUM UR-SCNC: 49 MMOL/L — SIGNIFICANT CHANGE UP
SP GR SPEC: 1.01 — SIGNIFICANT CHANGE UP (ref 1–1.03)
SQUAMOUS # UR AUTO: 2 /HPF — SIGNIFICANT CHANGE UP (ref 0–5)
UROBILINOGEN FLD QL: 0.2 MG/DL — SIGNIFICANT CHANGE UP (ref 0.2–1)
UUN UR-MCNC: 221.8 MG/DL — SIGNIFICANT CHANGE UP
VANCOMYCIN TROUGH SERPL-MCNC: 20.5 UG/ML — HIGH (ref 10–20)
WBC # BLD: 5.09 K/UL — SIGNIFICANT CHANGE UP (ref 3.8–10.5)
WBC # FLD AUTO: 5.09 K/UL — SIGNIFICANT CHANGE UP (ref 3.8–10.5)
WBC UR QL: 1 /HPF — SIGNIFICANT CHANGE UP (ref 0–5)

## 2023-07-20 PROCEDURE — 99233 SBSQ HOSP IP/OBS HIGH 50: CPT | Mod: GC

## 2023-07-20 RX ORDER — INSULIN LISPRO 100/ML
VIAL (ML) SUBCUTANEOUS AT BEDTIME
Refills: 0 | Status: DISCONTINUED | OUTPATIENT
Start: 2023-07-20 | End: 2023-07-25

## 2023-07-20 RX ORDER — INSULIN LISPRO 100/ML
VIAL (ML) SUBCUTANEOUS
Refills: 0 | Status: DISCONTINUED | OUTPATIENT
Start: 2023-07-20 | End: 2023-07-25

## 2023-07-20 RX ORDER — HEPARIN SODIUM 5000 [USP'U]/ML
5000 INJECTION INTRAVENOUS; SUBCUTANEOUS EVERY 8 HOURS
Refills: 0 | Status: DISCONTINUED | OUTPATIENT
Start: 2023-07-20 | End: 2023-07-25

## 2023-07-20 RX ORDER — SODIUM CHLORIDE 9 MG/ML
1000 INJECTION, SOLUTION INTRAVENOUS
Refills: 0 | Status: DISCONTINUED | OUTPATIENT
Start: 2023-07-20 | End: 2023-07-20

## 2023-07-20 RX ADMIN — PIPERACILLIN AND TAZOBACTAM 25 GRAM(S): 4; .5 INJECTION, POWDER, LYOPHILIZED, FOR SOLUTION INTRAVENOUS at 18:12

## 2023-07-20 RX ADMIN — Medication 2 GRAM(S): at 18:11

## 2023-07-20 RX ADMIN — Medication 2 GRAM(S): at 09:30

## 2023-07-20 RX ADMIN — Medication 500 MILLIGRAM(S): at 11:56

## 2023-07-20 RX ADMIN — ONDANSETRON 4 MILLIGRAM(S): 8 TABLET, FILM COATED ORAL at 02:41

## 2023-07-20 RX ADMIN — HEPARIN SODIUM 5000 UNIT(S): 5000 INJECTION INTRAVENOUS; SUBCUTANEOUS at 23:00

## 2023-07-20 RX ADMIN — Medication 1: at 13:23

## 2023-07-20 RX ADMIN — Medication 166.67 MILLIGRAM(S): at 07:48

## 2023-07-20 RX ADMIN — Medication 325 MILLIGRAM(S): at 11:57

## 2023-07-20 RX ADMIN — INSULIN GLARGINE 16 UNIT(S): 100 INJECTION, SOLUTION SUBCUTANEOUS at 22:57

## 2023-07-20 RX ADMIN — PIPERACILLIN AND TAZOBACTAM 25 GRAM(S): 4; .5 INJECTION, POWDER, LYOPHILIZED, FOR SOLUTION INTRAVENOUS at 10:42

## 2023-07-20 RX ADMIN — Medication 2000 UNIT(S): at 11:57

## 2023-07-20 RX ADMIN — HEPARIN SODIUM 5000 UNIT(S): 5000 INJECTION INTRAVENOUS; SUBCUTANEOUS at 13:26

## 2023-07-20 NOTE — PROGRESS NOTE ADULT - SUBJECTIVE AND OBJECTIVE BOX
PULMONARY PROGRESS NOTE    PATIENT INFORMATION:  NAME: ALO RAMIREZ:  MRN: [id number]    CHIEF COMPLAINT: Patient is a 58y old  Female who presents with a chief complaint of pleurx catheter purulent drainage (20 Jul 2023 07:41)      [x] INITIAL CONSULT, H&P, FAMILY HISTORY and PAST MEDICAL AND SURGICAL HISTORY REVIEWED    OVERNIGHT EVENTS or CHANGES TO HPI: reports mild cough, no fevers, chills, chest pain, dyspnea. cough is nonproductive and little change from baseline. dressing not saturated.    ========================REVIEW OF SYSTEMS========================  as per hpi    ========================MEDICATIONS=============================  MEDICATIONS  (STANDING):  ascorbic acid 500 milliGRAM(s) Oral daily  cholecalciferol 2000 Unit(s) Oral daily  dextrose 5%. 1000 milliLiter(s) (50 mL/Hr) IV Continuous <Continuous>  dextrose 5%. 1000 milliLiter(s) (100 mL/Hr) IV Continuous <Continuous>  dextrose 50% Injectable 25 Gram(s) IV Push once  dextrose 50% Injectable 25 Gram(s) IV Push once  dextrose 50% Injectable 12.5 Gram(s) IV Push once  enoxaparin Injectable 40 milliGRAM(s) SubCutaneous every 24 hours  ferrous    sulfate 325 milliGRAM(s) Oral daily  glucagon  Injectable 1 milliGRAM(s) IntraMuscular once  insulin glargine Injectable (LANTUS) 16 Unit(s) SubCutaneous at bedtime  insulin lispro (ADMELOG) corrective regimen sliding scale   SubCutaneous every 6 hours  omega-3-Acid Ethyl Esters 2 Gram(s) Oral two times a day  piperacillin/tazobactam IVPB.. 3.375 Gram(s) IV Intermittent every 8 hours  vancomycin  IVPB 1250 milliGRAM(s) IV Intermittent every 12 hours      MEDICATIONS  (PRN):  acetaminophen     Tablet .. 650 milliGRAM(s) Oral every 6 hours PRN Temp greater or equal to 38C (100.4F), Mild Pain (1 - 3)  aluminum hydroxide/magnesium hydroxide/simethicone Suspension 30 milliLiter(s) Oral every 4 hours PRN Dyspepsia  dextrose Oral Gel 15 Gram(s) Oral once PRN Blood Glucose LESS THAN 70 milliGRAM(s)/deciliter  melatonin 3 milliGRAM(s) Oral at bedtime PRN Insomnia  ondansetron Injectable 4 milliGRAM(s) IV Push every 8 hours PRN Nausea and/or Vomiting      ========================PHYSICAL EXAM============================    VITALS: ICU Vital Signs Last 24 Hrs  T(C): 36.3 (19 Jul 2023 22:45), Max: 37.1 (19 Jul 2023 18:45)  T(F): 97.3 (19 Jul 2023 22:45), Max: 98.7 (19 Jul 2023 18:45)  HR: 82 (19 Jul 2023 22:45) (82 - 100)  BP: 128/71 (19 Jul 2023 22:45) (126/79 - 135/77)  BP(mean): --  ABP: --  ABP(mean): --  RR: 18 (19 Jul 2023 22:45) (17 - 18)  SpO2: 96% (19 Jul 2023 22:45) (96% - 100%)    O2 Parameters below as of 19 Jul 2023 22:45  Patient On (Oxygen Delivery Method): room air            INTAKE and OUTPUT: I&O's Summary    19 Jul 2023 07:01  -  20 Jul 2023 07:00  --------------------------------------------------------  IN: 1360 mL / OUT: 0 mL / NET: 1360 mL        VENTILATOR SETTINGS:     Physical Exam  CONST:     NAD, well-appearing; well-developed; appears stated age  RESP :        Normal respiratory effort; CTA bilaterally; no W/R/R  CHEST:       No TTP, dressing clean but with some warmth compared to surrounding skin  CARDIO:     RRR, normal S1 and S2, no M/R/G; apical impulse at MCL  ABD:           Soft, NT/ND, norm bowel sounds; no R/G; No HSM; No CVAT  PSYCH        A&O to person, place, and time; affect appropriate  NEURO:     Non-focal; no gross sensory deficits; moving all extremities   SKIN:          No rashes; no palpable lesions; axillae not dry    Vital Signs Last 24 Hrs  T(F): 97.3, Max: 98.7 (07-19-23 @ 18:45)  HR: 82 (82 - 100)  BP: 128/71 (126/79 - 135/77)  RR: 18 (17 - 18)  SpO2: 96% (96% - 100%)      ========================LABORATORY RESULTS AND IMAGING=============                        9.1    5.09  )-----------( 171      ( 20 Jul 2023 05:11 )             27.8                                                    07-20    136  |  101  |  8   ----------------------------<  110<H>  3.7   |  24  |  1.39<H>    Ca    9.1      20 Jul 2023 05:11  Phos  3.7     07-20  Mg     1.60     07-20    TPro  8.0  /  Alb  2.8<L>  /  TBili  0.4  /  DBili  x   /  AST  15  /  ALT  5   /  AlkPhos  83  07-20          Creatinine Trend: 1.39<--, 0.82<--, 0.78<--    CT CHEST:     [] RADIOLOGY REVIEWED AND INTERPRETED BY ME

## 2023-07-20 NOTE — PROGRESS NOTE ADULT - ATTENDING COMMENTS
58F asthma, ovarian ca on chemo c/b malignant pleural effusion s/p pleurx presenting with purulent drainage from insertion site. Clinically has no sx except slight tenderness at catheter site. Remains comfortable after catheter removal    pleurx catheter removed   cont abx for 4 weeks  CT chest w/ barely any effusion now  outpt pulmonary f/u  outpt ct chest in 4 weeks  dvt ppx

## 2023-07-20 NOTE — PROGRESS NOTE ADULT - ASSESSMENT
58F asthma, ovarian ca on chemo c/b malignant pleural effusion s/p pleurx presenting with purulent drainage from insertion site now s/p removal.     #pleurx infection  #r/o pneumonia  pleurx catheter removed  cont abx, likely for 4 weeks  CT chest reviewed: decreased size of R sided effusion, RUL consolidation read by rads as likely pneumonia although seems less likely clinically  outpt pulmonary f/u  outpt ct chest in 4 weeks  dvt ppx     incomplete

## 2023-07-20 NOTE — PROGRESS NOTE ADULT - ATTENDING COMMENTS
59 y/o female PMH diabetes, hypertension, asthma (no history of intubations), ovarian cancer (on chemo, last chemo session was 3 weeks ago) w/ peritoneal carcinomatosis and c/b malignant pleural effusion s/p pleurx catheter p/w drainage from around and inside pleurx c/f skin/soft tissue infection. Now s/p pleurx removal on 7/19. Hospital course is c/b MARIANA.    #MARIANA:   -pt with Cr increase from 0.2 to 1.4  -pt states she is urinating, doubt obstruction. F/u urine studies. Possibly in setting of antibiotic therapy with vanc/Zosyn  -IVF hydration  -f/u urine studies      #Infection of pleurx catheter: cellulitis vs less likely pleural space infection  -s/p pleurx removal  -currently on broad spectrum antibiotics with vancomycin/Zosyn  -per pulm plan on antibiotics course of 4 weeks duration  -CT imaging with consolidations concerning for PNA; however, does not appear consistent with clinical picture  -MRSA swab negative    #Ovarian cancer:   -last chemo 3 weeks ago  -no plans for inpatient chemo    Remainder of chronic problems as above.

## 2023-07-20 NOTE — PROGRESS NOTE ADULT - PROBLEM SELECTOR PLAN 3
-pt on 22 units of lantus at bedtime, ISS before meals  -will give 16 units of lantus as pt while IP, low ISS q6h  -trend FS

## 2023-07-20 NOTE — PROGRESS NOTE ADULT - PROBLEM SELECTOR PLAN 1
p/w purulent drainage from pleurx catheter in the setting of a right pleural effusion. +cough however no fevers or systemic signs of infection. C/f catheter site infection vs empyema  - pulm removed pleurx 7/19: rec cont abx, repeat chest CT now and in 4 weeks, pulm f/u OP  - Chest CT pending  - will treat with zosyn and vanc for now given c/f empyema, recent hospitalization: if neg cultures, can maybe transition to oral such as augmentin or clinda  - f/u pleural fluid cx, blood cx

## 2023-07-20 NOTE — CONSULT NOTE ADULT - ASSESSMENT
-----------------------------------------------------------  Interventional Radiology Brief Consult Note  -----------------------------------------------------------    Reason for Referral:     Clinical Summary: 58y Female    Vitals:  T(F): 98.4 (07-20-23 @ 14:00), Max: 98.7 (07-19-23 @ 18:45)  HR: 75 (07-20-23 @ 14:00) (73 - 97)  BP: 126/63 (07-20-23 @ 14:00) (126/63 - 136/84)  RR: 18 (07-20-23 @ 14:00) (18 - 18)  SpO2: 100% (07-20-23 @ 14:00) (96% - 100%)    Labs:           9.1  5.09)-----(171     (07-20-23 @ 05:11)         27.8     136 | 101 | 8  --------------------< 110     (07-20-23 @ 05:11)  3.7 | 24 | 1.39       PT: 14.8<H> 07-20-23 @ 09:14  aPTT: 30.7 07-20-23 @ 09:14   INR: 1.27<H> 07-20-23 @ 09:14    Imaging:    Assessment: 58y Female    Recommendations:  -     -----------------------------------------------------------  Interventional Radiology Brief Consult Note  -----------------------------------------------------------    Reason for Referral: Port check vs revision     Clinical Summary: 58y Female with pmh of htn, DM, asthma, ovarian CA with peritoneal carcinomatosis and malignant pleural effusions s/p Pleurx catheter p/w drainage from the catheter concerning for empyema. Patient was also found to have a flipped port. IR is consulted for port check vs port revision.     Vitals:  T(F): 98.4 (07-20-23 @ 14:00), Max: 98.7 (07-19-23 @ 18:45)  HR: 75 (07-20-23 @ 14:00) (73 - 97)  BP: 126/63 (07-20-23 @ 14:00) (126/63 - 136/84)  RR: 18 (07-20-23 @ 14:00) (18 - 18)  SpO2: 100% (07-20-23 @ 14:00) (96% - 100%)    Labs:           9.1  5.09)-----(171     (07-20-23 @ 05:11)         27.8     136 | 101 | 8  --------------------< 110     (07-20-23 @ 05:11)  3.7 | 24 | 1.39       PT: 14.8<H> 07-20-23 @ 09:14  aPTT: 30.7 07-20-23 @ 09:14   INR: 1.27<H> 07-20-23 @ 09:14    Imaging: CT chest 7/19/23 reviewed     Assessment: 58y Female with pmh of ovarian CA with concern for empyema found to have a flipped port. IR is consulted for port check vs port revision.     Recommendations:  - Given concern for infection and empyema at this time, recommend holding off on port revision. Once concern for infection is cleared and there is no risk of bacteremia, a port revision can be performed as outpatient with IR.       --  Summer Tian MD  Interventional Radiology Resident (PGY-5)  Available on [employer] TEAMS    For EMERGENT inquiries/questions:  IR Pager ([facility]): [phone]  IR Pager ([facility]): [phone] ; [phone]    For non-emergent consults/questions:   Please place a sunrise order "Consult- Interventional Radiology" with an appropriate callback number    For questions about scheduling during appropriate work hours, call IR :  [facility]: [phone]  [facility]: [phone]    For outpatient IR booking:  [facility]: [phone]  [facility]: [phone]

## 2023-07-20 NOTE — PROGRESS NOTE ADULT - SUBJECTIVE AND OBJECTIVE BOX
Kit Angel MD  Emergency Medicine & Internal Medicine PGY-2    PROGRESS NOTE:    ID #:     Patient is a 58y old  Female who presents with a chief complaint of pleurx catheter purulent drainage (19 Jul 2023 16:29)      MAJOR INTERVAL HOSPITAL EVENTS:     SUBJECTIVE / OVERNIGHT EVENTS: No acute overnight events.       MEDICATIONS  (STANDING):  ascorbic acid 500 milliGRAM(s) Oral daily  cholecalciferol 2000 Unit(s) Oral daily  dextrose 5%. 1000 milliLiter(s) (50 mL/Hr) IV Continuous <Continuous>  dextrose 5%. 1000 milliLiter(s) (100 mL/Hr) IV Continuous <Continuous>  dextrose 50% Injectable 25 Gram(s) IV Push once  dextrose 50% Injectable 25 Gram(s) IV Push once  dextrose 50% Injectable 12.5 Gram(s) IV Push once  enoxaparin Injectable 40 milliGRAM(s) SubCutaneous every 24 hours  ferrous    sulfate 325 milliGRAM(s) Oral daily  glucagon  Injectable 1 milliGRAM(s) IntraMuscular once  insulin glargine Injectable (LANTUS) 16 Unit(s) SubCutaneous at bedtime  insulin lispro (ADMELOG) corrective regimen sliding scale   SubCutaneous every 6 hours  omega-3-Acid Ethyl Esters 2 Gram(s) Oral two times a day  piperacillin/tazobactam IVPB.. 3.375 Gram(s) IV Intermittent every 8 hours  vancomycin  IVPB 1250 milliGRAM(s) IV Intermittent every 12 hours    MEDICATIONS  (PRN):  acetaminophen     Tablet .. 650 milliGRAM(s) Oral every 6 hours PRN Temp greater or equal to 38C (100.4F), Mild Pain (1 - 3)  aluminum hydroxide/magnesium hydroxide/simethicone Suspension 30 milliLiter(s) Oral every 4 hours PRN Dyspepsia  dextrose Oral Gel 15 Gram(s) Oral once PRN Blood Glucose LESS THAN 70 milliGRAM(s)/deciliter  melatonin 3 milliGRAM(s) Oral at bedtime PRN Insomnia  ondansetron Injectable 4 milliGRAM(s) IV Push every 8 hours PRN Nausea and/or Vomiting      CAPILLARY BLOOD GLUCOSE      POCT Blood Glucose.: 107 mg/dL (20 Jul 2023 07:10)  POCT Blood Glucose.: 112 mg/dL (19 Jul 2023 23:06)  POCT Blood Glucose.: 130 mg/dL (19 Jul 2023 21:59)  POCT Blood Glucose.: 113 mg/dL (19 Jul 2023 17:37)  POCT Blood Glucose.: 155 mg/dL (19 Jul 2023 12:52)    I&O's Summary    19 Jul 2023 07:01  -  20 Jul 2023 07:00  --------------------------------------------------------  IN: 1360 mL / OUT: 0 mL / NET: 1360 mL        PHYSICAL EXAM:  Vital Signs Last 24 Hrs  T(C): 36.3 (19 Jul 2023 22:45), Max: 37.1 (19 Jul 2023 18:45)  T(F): 97.3 (19 Jul 2023 22:45), Max: 98.7 (19 Jul 2023 18:45)  HR: 82 (19 Jul 2023 22:45) (82 - 100)  BP: 128/71 (19 Jul 2023 22:45) (126/79 - 135/77)  BP(mean): --  RR: 18 (19 Jul 2023 22:45) (17 - 18)  SpO2: 96% (19 Jul 2023 22:45) (96% - 100%)    Parameters below as of 19 Jul 2023 22:45  Patient On (Oxygen Delivery Method): room air        GENERAL: No acute distress, well-developed  HEAD:  Atraumatic, Normocephalic  EYES: EOMI, PERRLA, conjunctiva and sclera clear  NECK: Supple, no lymphadenopathy, no JVD  CHEST/LUNG: CTAB; No wheezes, rales, or rhonchi  HEART: Regular rate and rhythm; Normal s1 and s2, No murmurs, rubs, or gallops  ABDOMEN: Soft, non-tender, non-distended; normal bowel sounds, no organomegaly  EXTREMITIES:  2+ peripheral pulses b/l, No clubbing, cyanosis, or edema  NEUROLOGY: A&O x 3, no focal deficits  SKIN: No rashes or lesions          LABS:                        9.1    5.09  )-----------( 171      ( 20 Jul 2023 05:11 )             27.8     07-20    136  |  101  |  8   ----------------------------<  110<H>  3.7   |  24  |  1.39<H>    Ca    9.1      20 Jul 2023 05:11  Phos  3.7     07-20  Mg     1.60     07-20    TPro  8.0  /  Alb  2.8<L>  /  TBili  0.4  /  DBili  x   /  AST  15  /  ALT  5   /  AlkPhos  83  07-20    PT/INR - ( 19 Jul 2023 03:45 )   PT: 14.6 sec;   INR: 1.26 ratio         PTT - ( 19 Jul 2023 03:45 )  PTT:30.9 sec      Urinalysis Basic - ( 20 Jul 2023 05:11 )    Color: x / Appearance: x / SG: x / pH: x  Gluc: 110 mg/dL / Ketone: x  / Bili: x / Urobili: x   Blood: x / Protein: x / Nitrite: x   Leuk Esterase: x / RBC: x / WBC x   Sq Epi: x / Non Sq Epi: x / Bacteria: x        Culture - Blood (collected 18 Jul 2023 18:56)  Source: .Blood Blood-Peripheral  Preliminary Report (20 Jul 2023 01:02):    No growth at 24 hours    Culture - Urine (collected 18 Jul 2023 15:57)  Source: Clean Catch Clean Catch (Midstream)  Final Report (19 Jul 2023 15:33):    No growth        RADIOLOGY & ADDITIONAL TESTS:  Results Reviewed:   Imaging Personally Reviewed:  Electrocardiogram Personally Reviewed:    COORDINATION OF CARE:  Care Discussed with Consultants/Other Providers [Y/N]:  Prior or Outpatient Records Reviewed [Y/N]:   Kit Angel MD  Emergency Medicine & Internal Medicine PGY-2    PROGRESS NOTE:    ID #:     Patient is a 58y old  Female who presents with a chief complaint of pleurx catheter purulent drainage (19 Jul 2023 16:29)      MAJOR INTERVAL HOSPITAL EVENTS: NAEO    SUBJECTIVE / OVERNIGHT EVENTS: No acute overnight events. Patient reports feeling well without acute complaints      MEDICATIONS  (STANDING):  ascorbic acid 500 milliGRAM(s) Oral daily  cholecalciferol 2000 Unit(s) Oral daily  dextrose 5%. 1000 milliLiter(s) (50 mL/Hr) IV Continuous <Continuous>  dextrose 5%. 1000 milliLiter(s) (100 mL/Hr) IV Continuous <Continuous>  dextrose 50% Injectable 25 Gram(s) IV Push once  dextrose 50% Injectable 25 Gram(s) IV Push once  dextrose 50% Injectable 12.5 Gram(s) IV Push once  enoxaparin Injectable 40 milliGRAM(s) SubCutaneous every 24 hours  ferrous    sulfate 325 milliGRAM(s) Oral daily  glucagon  Injectable 1 milliGRAM(s) IntraMuscular once  insulin glargine Injectable (LANTUS) 16 Unit(s) SubCutaneous at bedtime  insulin lispro (ADMELOG) corrective regimen sliding scale   SubCutaneous every 6 hours  omega-3-Acid Ethyl Esters 2 Gram(s) Oral two times a day  piperacillin/tazobactam IVPB.. 3.375 Gram(s) IV Intermittent every 8 hours  vancomycin  IVPB 1250 milliGRAM(s) IV Intermittent every 12 hours    MEDICATIONS  (PRN):  acetaminophen     Tablet .. 650 milliGRAM(s) Oral every 6 hours PRN Temp greater or equal to 38C (100.4F), Mild Pain (1 - 3)  aluminum hydroxide/magnesium hydroxide/simethicone Suspension 30 milliLiter(s) Oral every 4 hours PRN Dyspepsia  dextrose Oral Gel 15 Gram(s) Oral once PRN Blood Glucose LESS THAN 70 milliGRAM(s)/deciliter  melatonin 3 milliGRAM(s) Oral at bedtime PRN Insomnia  ondansetron Injectable 4 milliGRAM(s) IV Push every 8 hours PRN Nausea and/or Vomiting      CAPILLARY BLOOD GLUCOSE      POCT Blood Glucose.: 107 mg/dL (20 Jul 2023 07:10)  POCT Blood Glucose.: 112 mg/dL (19 Jul 2023 23:06)  POCT Blood Glucose.: 130 mg/dL (19 Jul 2023 21:59)  POCT Blood Glucose.: 113 mg/dL (19 Jul 2023 17:37)  POCT Blood Glucose.: 155 mg/dL (19 Jul 2023 12:52)    I&O's Summary    19 Jul 2023 07:01  -  20 Jul 2023 07:00  --------------------------------------------------------  IN: 1360 mL / OUT: 0 mL / NET: 1360 mL        PHYSICAL EXAM:  Vital Signs Last 24 Hrs  T(C): 36.3 (19 Jul 2023 22:45), Max: 37.1 (19 Jul 2023 18:45)  T(F): 97.3 (19 Jul 2023 22:45), Max: 98.7 (19 Jul 2023 18:45)  HR: 82 (19 Jul 2023 22:45) (82 - 100)  BP: 128/71 (19 Jul 2023 22:45) (126/79 - 135/77)  BP(mean): --  RR: 18 (19 Jul 2023 22:45) (17 - 18)  SpO2: 96% (19 Jul 2023 22:45) (96% - 100%)    Parameters below as of 19 Jul 2023 22:45  Patient On (Oxygen Delivery Method): room air        GENERAL: No acute distress, well-developed  HEAD:  Atraumatic, Normocephalic  EYES: conjunctiva and sclera clear  NECK: Supple, no lymphadenopathy, no JVD  CHEST/LUNG: +bandage at previous pleurx site without purulence/bleeding/drainage and mild TTP, diminished lung sounds on the right  HEART: Regular rate and rhythm; Normal s1 and s2, No murmurs, rubs, or gallops  ABDOMEN: Soft, non-tender, non-distended; normal bowel sounds, no organomegaly  EXTREMITIES:  2+ peripheral pulses b/l, No clubbing, cyanosis, or edema  NEUROLOGY: A&O x 3, no focal deficits  SKIN: No rashes or lesion          LABS:                        9.1    5.09  )-----------( 171      ( 20 Jul 2023 05:11 )             27.8     07-20    136  |  101  |  8   ----------------------------<  110<H>  3.7   |  24  |  1.39<H>    Ca    9.1      20 Jul 2023 05:11  Phos  3.7     07-20  Mg     1.60     07-20    TPro  8.0  /  Alb  2.8<L>  /  TBili  0.4  /  DBili  x   /  AST  15  /  ALT  5   /  AlkPhos  83  07-20    PT/INR - ( 19 Jul 2023 03:45 )   PT: 14.6 sec;   INR: 1.26 ratio         PTT - ( 19 Jul 2023 03:45 )  PTT:30.9 sec      Urinalysis Basic - ( 20 Jul 2023 05:11 )    Color: x / Appearance: x / SG: x / pH: x  Gluc: 110 mg/dL / Ketone: x  / Bili: x / Urobili: x   Blood: x / Protein: x / Nitrite: x   Leuk Esterase: x / RBC: x / WBC x   Sq Epi: x / Non Sq Epi: x / Bacteria: x        Culture - Blood (collected 18 Jul 2023 18:56)  Source: .Blood Blood-Peripheral  Preliminary Report (20 Jul 2023 01:02):    No growth at 24 hours    Culture - Urine (collected 18 Jul 2023 15:57)  Source: Clean Catch Clean Catch (Midstream)  Final Report (19 Jul 2023 15:33):    No growth        RADIOLOGY & ADDITIONAL TESTS:  Results Reviewed:   Imaging Personally Reviewed:  Electrocardiogram Personally Reviewed:    COORDINATION OF CARE:  Care Discussed with Consultants/Other Providers [Y/N]:  Prior or Outpatient Records Reviewed [Y/N]:

## 2023-07-21 DIAGNOSIS — N17.9 ACUTE KIDNEY FAILURE, UNSPECIFIED: ICD-10-CM

## 2023-07-21 DIAGNOSIS — Z01.818 ENCOUNTER FOR OTHER PREPROCEDURAL EXAMINATION: ICD-10-CM

## 2023-07-21 LAB
ALBUMIN SERPL ELPH-MCNC: 3.2 G/DL — LOW (ref 3.3–5)
ALP SERPL-CCNC: 93 U/L — SIGNIFICANT CHANGE UP (ref 40–120)
ALT FLD-CCNC: 6 U/L — SIGNIFICANT CHANGE UP (ref 4–33)
ANION GAP SERPL CALC-SCNC: 13 MMOL/L — SIGNIFICANT CHANGE UP (ref 7–14)
AST SERPL-CCNC: 17 U/L — SIGNIFICANT CHANGE UP (ref 4–32)
BILIRUB SERPL-MCNC: 0.3 MG/DL — SIGNIFICANT CHANGE UP (ref 0.2–1.2)
BUN SERPL-MCNC: 14 MG/DL — SIGNIFICANT CHANGE UP (ref 7–23)
CALCIUM SERPL-MCNC: 8.8 MG/DL — SIGNIFICANT CHANGE UP (ref 8.4–10.5)
CHLORIDE SERPL-SCNC: 98 MMOL/L — SIGNIFICANT CHANGE UP (ref 98–107)
CO2 SERPL-SCNC: 24 MMOL/L — SIGNIFICANT CHANGE UP (ref 22–31)
CREAT SERPL-MCNC: 2.94 MG/DL — HIGH (ref 0.5–1.3)
EGFR: 18 ML/MIN/1.73M2 — LOW
GLUCOSE BLDC GLUCOMTR-MCNC: 122 MG/DL — HIGH (ref 70–99)
GLUCOSE BLDC GLUCOMTR-MCNC: 130 MG/DL — HIGH (ref 70–99)
GLUCOSE BLDC GLUCOMTR-MCNC: 136 MG/DL — HIGH (ref 70–99)
GLUCOSE BLDC GLUCOMTR-MCNC: 97 MG/DL — SIGNIFICANT CHANGE UP (ref 70–99)
GLUCOSE SERPL-MCNC: 179 MG/DL — HIGH (ref 70–99)
HCT VFR BLD CALC: 29 % — LOW (ref 34.5–45)
HGB BLD-MCNC: 9.6 G/DL — LOW (ref 11.5–15.5)
MAGNESIUM SERPL-MCNC: 1.6 MG/DL — SIGNIFICANT CHANGE UP (ref 1.6–2.6)
MCHC RBC-ENTMCNC: 25.9 PG — LOW (ref 27–34)
MCHC RBC-ENTMCNC: 33.1 GM/DL — SIGNIFICANT CHANGE UP (ref 32–36)
MCV RBC AUTO: 78.4 FL — LOW (ref 80–100)
NRBC # BLD: 0 /100 WBCS — SIGNIFICANT CHANGE UP (ref 0–0)
NRBC # FLD: 0 K/UL — SIGNIFICANT CHANGE UP (ref 0–0)
PHOSPHATE SERPL-MCNC: 3.9 MG/DL — SIGNIFICANT CHANGE UP (ref 2.5–4.5)
PLATELET # BLD AUTO: 188 K/UL — SIGNIFICANT CHANGE UP (ref 150–400)
POTASSIUM SERPL-MCNC: 4.1 MMOL/L — SIGNIFICANT CHANGE UP (ref 3.5–5.3)
POTASSIUM SERPL-SCNC: 4.1 MMOL/L — SIGNIFICANT CHANGE UP (ref 3.5–5.3)
PROT SERPL-MCNC: 8.4 G/DL — HIGH (ref 6–8.3)
RBC # BLD: 3.7 M/UL — LOW (ref 3.8–5.2)
RBC # FLD: 13.3 % — SIGNIFICANT CHANGE UP (ref 10.3–14.5)
SODIUM SERPL-SCNC: 135 MMOL/L — SIGNIFICANT CHANGE UP (ref 135–145)
WBC # BLD: 4.86 K/UL — SIGNIFICANT CHANGE UP (ref 3.8–10.5)
WBC # FLD AUTO: 4.86 K/UL — SIGNIFICANT CHANGE UP (ref 3.8–10.5)

## 2023-07-21 PROCEDURE — 99233 SBSQ HOSP IP/OBS HIGH 50: CPT | Mod: GC

## 2023-07-21 PROCEDURE — 93010 ELECTROCARDIOGRAM REPORT: CPT

## 2023-07-21 PROCEDURE — 76770 US EXAM ABDO BACK WALL COMP: CPT | Mod: 26

## 2023-07-21 RX ORDER — LEVOFLOXACIN 5 MG/ML
1 INJECTION, SOLUTION INTRAVENOUS
Qty: 30 | Refills: 0
Start: 2023-07-21 | End: 2023-08-19

## 2023-07-21 RX ORDER — ONDANSETRON 8 MG/1
4 TABLET, FILM COATED ORAL ONCE
Refills: 0 | Status: COMPLETED | OUTPATIENT
Start: 2023-07-21 | End: 2023-07-21

## 2023-07-21 RX ORDER — LEVOFLOXACIN 5 MG/ML
1 INJECTION, SOLUTION INTRAVENOUS
Qty: 15 | Refills: 0
Start: 2023-07-21 | End: 2023-08-19

## 2023-07-21 RX ORDER — SODIUM CHLORIDE 9 MG/ML
1000 INJECTION, SOLUTION INTRAVENOUS
Refills: 0 | Status: DISCONTINUED | OUTPATIENT
Start: 2023-07-21 | End: 2023-07-25

## 2023-07-21 RX ADMIN — Medication 500 MILLIGRAM(S): at 17:41

## 2023-07-21 RX ADMIN — HEPARIN SODIUM 5000 UNIT(S): 5000 INJECTION INTRAVENOUS; SUBCUTANEOUS at 22:40

## 2023-07-21 RX ADMIN — Medication 2000 UNIT(S): at 17:41

## 2023-07-21 RX ADMIN — Medication 325 MILLIGRAM(S): at 17:41

## 2023-07-21 RX ADMIN — ONDANSETRON 4 MILLIGRAM(S): 8 TABLET, FILM COATED ORAL at 04:44

## 2023-07-21 RX ADMIN — ONDANSETRON 4 MILLIGRAM(S): 8 TABLET, FILM COATED ORAL at 07:57

## 2023-07-21 RX ADMIN — Medication 2 GRAM(S): at 17:41

## 2023-07-21 RX ADMIN — HEPARIN SODIUM 5000 UNIT(S): 5000 INJECTION INTRAVENOUS; SUBCUTANEOUS at 07:57

## 2023-07-21 RX ADMIN — PIPERACILLIN AND TAZOBACTAM 25 GRAM(S): 4; .5 INJECTION, POWDER, LYOPHILIZED, FOR SOLUTION INTRAVENOUS at 00:17

## 2023-07-21 RX ADMIN — PIPERACILLIN AND TAZOBACTAM 25 GRAM(S): 4; .5 INJECTION, POWDER, LYOPHILIZED, FOR SOLUTION INTRAVENOUS at 09:59

## 2023-07-21 RX ADMIN — INSULIN GLARGINE 16 UNIT(S): 100 INJECTION, SOLUTION SUBCUTANEOUS at 22:40

## 2023-07-21 RX ADMIN — SODIUM CHLORIDE 75 MILLILITER(S): 9 INJECTION, SOLUTION INTRAVENOUS at 17:45

## 2023-07-21 RX ADMIN — HEPARIN SODIUM 5000 UNIT(S): 5000 INJECTION INTRAVENOUS; SUBCUTANEOUS at 15:12

## 2023-07-21 NOTE — PROGRESS NOTE ADULT - PROBLEM SELECTOR PLAN 3
-pt on 22 units of lantus at bedtime, ISS before meals  -will give 16 units of lantus as pt while IP, low ISS q6h  -trend FS c/b peritoneal mets and malignant pleural effusion. On active chemo, x1 treatment  -output Onc f/u CT showed Right chest wall port reservoir appears to have rotated on its side with reservoir now perpendicular to the chest wall fascia. Catheter tip terminates within the SVC.   - RN not able to use port  - IR c/s: OP replacement after infection clear

## 2023-07-21 NOTE — PROGRESS NOTE ADULT - ASSESSMENT
58F asthma, ovarian ca on chemo c/b malignant pleural effusion s/p pleurx presenting with purulent drainage from insertion site now s/p removal.     #pleurx infection  #r/o pneumonia  pleurx catheter removed  cont abx, likely for 4 weeks  CT chest reviewed: decreased size of R sided effusion, RUL consolidation read by rads as likely pneumonia although seems less likely clinically  outpt pulmonary f/u  outpt ct chest in 4 weeks  dvt ppx

## 2023-07-21 NOTE — PROGRESS NOTE ADULT - SUBJECTIVE AND OBJECTIVE BOX
PULMONARY PROGRESS NOTE    PATIENT INFORMATION:  NAME: ALO RAMIREZ:  MRN: [id number]    CHIEF COMPLAINT: Patient is a 58y old  Female who presents with a chief complaint of pleurx catheter purulent drainage (21 Jul 2023 16:45)      [x] INITIAL CONSULT, H&P, FAMILY HISTORY and PAST MEDICAL AND SURGICAL HISTORY REVIEWED    OVERNIGHT EVENTS, CHANGES TO HPI, SUBJECTIVE:     ========================REVIEW OF SYSTEMS========================  [x] ROS negative except as per HPI    ========================MEDICATIONS=============================  MEDICATIONS  (STANDING):  ascorbic acid 500 milliGRAM(s) Oral daily  cholecalciferol 2000 Unit(s) Oral daily  dextrose 5%. 1000 milliLiter(s) (100 mL/Hr) IV Continuous <Continuous>  dextrose 5%. 1000 milliLiter(s) (50 mL/Hr) IV Continuous <Continuous>  dextrose 50% Injectable 25 Gram(s) IV Push once  dextrose 50% Injectable 12.5 Gram(s) IV Push once  dextrose 50% Injectable 25 Gram(s) IV Push once  ferrous    sulfate 325 milliGRAM(s) Oral daily  glucagon  Injectable 1 milliGRAM(s) IntraMuscular once  heparin   Injectable 5000 Unit(s) SubCutaneous every 8 hours  insulin glargine Injectable (LANTUS) 16 Unit(s) SubCutaneous at bedtime  insulin lispro (ADMELOG) corrective regimen sliding scale   SubCutaneous at bedtime  insulin lispro (ADMELOG) corrective regimen sliding scale   SubCutaneous three times a day before meals  lactated ringers. 1000 milliLiter(s) (75 mL/Hr) IV Continuous <Continuous>  levoFLOXacin  Tablet 500 milliGRAM(s) Oral every 48 hours  omega-3-Acid Ethyl Esters 2 Gram(s) Oral two times a day      MEDICATIONS  (PRN):  acetaminophen     Tablet .. 650 milliGRAM(s) Oral every 6 hours PRN Temp greater or equal to 38C (100.4F), Mild Pain (1 - 3)  aluminum hydroxide/magnesium hydroxide/simethicone Suspension 30 milliLiter(s) Oral every 4 hours PRN Dyspepsia  dextrose Oral Gel 15 Gram(s) Oral once PRN Blood Glucose LESS THAN 70 milliGRAM(s)/deciliter  melatonin 3 milliGRAM(s) Oral at bedtime PRN Insomnia  ondansetron Injectable 4 milliGRAM(s) IV Push every 8 hours PRN Nausea and/or Vomiting      ========================PHYSICAL EXAM============================    VITALS: ICU Vital Signs Last 24 Hrs  T(C): 36.9 (21 Jul 2023 14:48), Max: 36.9 (21 Jul 2023 14:48)  T(F): 98.4 (21 Jul 2023 14:48), Max: 98.4 (21 Jul 2023 14:48)  HR: 81 (21 Jul 2023 14:48) (81 - 92)  BP: 128/81 (21 Jul 2023 14:48) (115/86 - 132/73)  BP(mean): --  ABP: --  ABP(mean): --  RR: 18 (21 Jul 2023 14:48) (18 - 18)  SpO2: 100% (21 Jul 2023 14:48) (93% - 100%)    O2 Parameters below as of 21 Jul 2023 14:48  Patient On (Oxygen Delivery Method): room air            INTAKE and OUTPUT: I&O's Summary    20 Jul 2023 07:01  -  21 Jul 2023 07:00  --------------------------------------------------------  IN: 600 mL / OUT: 0 mL / NET: 600 mL    21 Jul 2023 07:01  -  21 Jul 2023 17:44  --------------------------------------------------------  IN: 480 mL / OUT: 0 mL / NET: 480 mL        VENTILATOR SETTINGS:     Physical Exam  CONST:     NAD, well-appearing; well-developed; appears stated age  RESP :        Normal respiratory effort; CTA bilaterally; no W/R/R  CHEST:       No TTP, dressing clean but with some warmth compared to surrounding skin  CARDIO:     RRR, normal S1 and S2, no M/R/G; apical impulse at MCL  ABD:           Soft, NT/ND, norm bowel sounds; no R/G; No HSM; No CVAT  PSYCH        A&O to person, place, and time; affect appropriate  NEURO:     Non-focal; no gross sensory deficits; moving all extremities   SKIN:          No rashes; no palpable lesions; axillae not dry    ========================LABORATORY RESULTS AND IMAGING=============                        9.6    4.86  )-----------( 188      ( 21 Jul 2023 11:54 )             29.0                                                    07-21    135  |  98  |  14  ----------------------------<  179<H>  4.1   |  24  |  2.94<H>    Ca    8.8      21 Jul 2023 11:54  Phos  3.9     07-21  Mg     1.60     07-21    TPro  8.4<H>  /  Alb  3.2<L>  /  TBili  0.3  /  DBili  x   /  AST  17  /  ALT  6   /  AlkPhos  93  07-21          Creatinine Trend: 2.94<--, 1.39<--, 0.82<--, 0.78<--    [x] RADIOLOGY REVIEWED AND INTERPRETED BY ME

## 2023-07-21 NOTE — PROGRESS NOTE ADULT - ATTENDING COMMENTS
59 y/o female PMH diabetes, hypertension, asthma (no history of intubations), ovarian cancer (on chemo, last chemo session was 3 weeks ago) w/ peritoneal carcinomatosis and c/b malignant pleural effusion s/p pleurx catheter p/w drainage from around and inside pleurx c/f skin/soft tissue infection. Now s/p pleurx removal on 7/19. Hospital course is c/b worsening MARIANA.    #MARIANA:   -pt with Cr increase from 0.2 to 1.4 to now 2.9  -FENA consistent with pre-renal etiology, however, worsening despite IVF administration  -will need renal US for r/o obstructive etiologies  -possibly related to carboplatin administration?        #Infection of pleurx catheter: cellulitis vs less likely pleural space infection  -s/p pleurx removal  -currently on broad spectrum antibiotics with vancomycin/Zosyn  -per pulm plan on antibiotics course of 4 weeks duration  -CT imaging with consolidations concerning for PNA; however, does not appear consistent with clinical picture  -MRSA swab negative    #Ovarian cancer:   -last chemo 3 weeks ago  -no plans for inpatient chemo    Remainder of chronic problems as above. 59 y/o female PMH diabetes, hypertension, asthma (no history of intubations), ovarian cancer (on active treatement) w/ peritoneal carcinomatosis and c/b malignant pleural effusion s/p pleurx catheter p/w drainage from around and inside pleurx c/f skin/soft tissue infection. Now s/p pleurx removal on 7/19. Hospital course is c/b worsening MARIANA.    #MARIANA:   -pt with Cr increase from 0.2 to 1.4 to now 2.9  -FENA consistent with pre-renal etiology, however, worsening despite IVF administration  -will need renal US for r/o obstructive etiologies  -possibly related to carboplatin nephrotoxicity, will need to ascertain date of last chemo      #Infection of pleurx catheter: cellulitis vs less likely pleural space infection  -s/p pleurx removal  -currently on broad spectrum antibiotics with Zosyn  -per pulm plan on antibiotics course of 4 weeks duration  -CT imaging with consolidations concerning for PNA; however, does not appear consistent with clinical picture  -MRSA swab negative; no need to continue Vanco      #Ovarian cancer:   -no plans for inpatient chemo    Remainder of chronic problems as above. 59 y/o female PMH diabetes, hypertension, asthma (no history of intubations), ovarian cancer (on active treatement) w/ peritoneal carcinomatosis and c/b malignant pleural effusion s/p pleurx catheter p/w drainage from around and inside pleurx c/f skin/soft tissue infection. Now s/p pleurx removal on 7/19. Hospital course is c/b worsening MARIANA.    #MARIANA:   -pt with Cr increase from 0.2 to 1.4 to now 2.9  -FENA consistent with pre-renal etiology, however, worsening despite IVF administration  -will need renal US for r/o obstructive etiologies      #Infection of pleurx catheter: cellulitis vs less likely pleural space infection  -s/p pleurx removal  -currently on broad spectrum antibiotics with Zosyn  -per pulm plan on antibiotics course of 4 weeks duration  -CT imaging with consolidations concerning for PNA; however, does not appear consistent with clinical picture  -MRSA swab negative; no need to continue Vanco      #Ovarian cancer:   -no plans for inpatient chemo    Remainder of chronic problems as above. 57 y/o female PMH diabetes, hypertension, asthma (no history of intubations), ovarian cancer (on active treatement, last 3 weeks ago) w/ peritoneal carcinomatosis and c/b malignant pleural effusion s/p pleurx catheter p/w drainage from around and inside pleurx c/f skin/soft tissue infection. Now s/p pleurx removal on 7/19. Hospital course is c/b worsening MARIANA.    #MARIANA:   -pt with Cr increase from 0.2 to 1.4 to now 2.9  -FENA consistent with pre-renal etiology, however, worsening despite IVF administration  -doubt carboplatin nephrotoxicity given last administration was 3 weeks ago  -will need renal US for r/o obstructive etiologies given bulky tumor disease   -u/a bland, less suspicion for AIN at this time, but can switch from Zosyn to Levaquin  -continue IVF      #Infection of pleurx catheter: cellulitis vs less likely pleural space infection  -s/p pleurx removal  -currently on broad spectrum antibiotics with Zosyn. Plan to switch to Levaquin, as above  -per pulm plan on antibiotics course of 4 weeks duration  -CT imaging with consolidations concerning for PNA; however, does not appear consistent with clinical picture  -MRSA swab negative; no need to continue Vanco      #Ovarian cancer:   -no plans for inpatient chemo    Remainder of chronic problems as above.

## 2023-07-21 NOTE — PROGRESS NOTE ADULT - ATTENDING COMMENTS
58F asthma, ovarian ca on chemo c/b malignant pleural effusion s/p pleurx presenting with purulent drainage from insertion site. Clinically has no sx except slight tenderness at catheter site.   Remains comfortable after catheter removal. Denies all sx      cont abx for 4 weeks  CT chest w/ barely any effusion now    outpt ct chest in 4 weeks  dvt ppx    on d/c will need supply for dressing changes  outpt pulmonary f/u in 1-2 weeks

## 2023-07-21 NOTE — PROGRESS NOTE ADULT - PROBLEM SELECTOR PLAN 4
-recently taken off medications. Trend BP -pt on 22 units of lantus at bedtime, ISS before meals  -will give 16 units of lantus as pt while IP, low ISS q6h  -trend FS c/b peritoneal mets and malignant pleural effusion. On active chemo, x1 treatment  -output Onc f/u

## 2023-07-21 NOTE — PROGRESS NOTE ADULT - ASSESSMENT
59 y/o female PMH diabetes, hypertension, asthma (no history of intubations), ovarian cancer (on chemo, last chemo session was 3 weeks ago) w/ peritoneal carcinomatosis and c/b malignant pleural effusion s/p pleurx catheter p/w drainage from around and inside pleurx c/f skin/soft tissue infection vs empyema  57 y/o female PMH diabetes, hypertension, asthma (no history of intubations), ovarian cancer (on chemo, last chemo session was 3 weeks ago) w/ peritoneal carcinomatosis and c/b malignant pleural effusion s/p pleurx catheter p/w drainage from around and inside pleurx c/f skin/soft tissue infection vs empyema. Found to have rising Cr c/f MARIANA

## 2023-07-21 NOTE — PROGRESS NOTE ADULT - SUBJECTIVE AND OBJECTIVE BOX
Kit Angel MD  Emergency Medicine & Internal Medicine PGY-2    PROGRESS NOTE:    ID #:     Patient is a 58y old  Female who presents with a chief complaint of pleurx catheter purulent drainage (2023 17:56)      MAJOR INTERVAL HOSPITAL EVENTS:     SUBJECTIVE / OVERNIGHT EVENTS: No acute overnight events.       MEDICATIONS  (STANDING):  ascorbic acid 500 milliGRAM(s) Oral daily  cholecalciferol 2000 Unit(s) Oral daily  dextrose 5%. 1000 milliLiter(s) (50 mL/Hr) IV Continuous <Continuous>  dextrose 5%. 1000 milliLiter(s) (100 mL/Hr) IV Continuous <Continuous>  dextrose 50% Injectable 25 Gram(s) IV Push once  dextrose 50% Injectable 12.5 Gram(s) IV Push once  dextrose 50% Injectable 25 Gram(s) IV Push once  ferrous    sulfate 325 milliGRAM(s) Oral daily  glucagon  Injectable 1 milliGRAM(s) IntraMuscular once  heparin   Injectable 5000 Unit(s) SubCutaneous every 8 hours  insulin glargine Injectable (LANTUS) 16 Unit(s) SubCutaneous at bedtime  insulin lispro (ADMELOG) corrective regimen sliding scale   SubCutaneous at bedtime  insulin lispro (ADMELOG) corrective regimen sliding scale   SubCutaneous three times a day before meals  omega-3-Acid Ethyl Esters 2 Gram(s) Oral two times a day  piperacillin/tazobactam IVPB.. 3.375 Gram(s) IV Intermittent every 8 hours    MEDICATIONS  (PRN):  acetaminophen     Tablet .. 650 milliGRAM(s) Oral every 6 hours PRN Temp greater or equal to 38C (100.4F), Mild Pain (1 - 3)  aluminum hydroxide/magnesium hydroxide/simethicone Suspension 30 milliLiter(s) Oral every 4 hours PRN Dyspepsia  dextrose Oral Gel 15 Gram(s) Oral once PRN Blood Glucose LESS THAN 70 milliGRAM(s)/deciliter  melatonin 3 milliGRAM(s) Oral at bedtime PRN Insomnia  ondansetron Injectable 4 milliGRAM(s) IV Push every 8 hours PRN Nausea and/or Vomiting      CAPILLARY BLOOD GLUCOSE      POCT Blood Glucose.: 172 mg/dL (2023 22:28)  POCT Blood Glucose.: 114 mg/dL (2023 17:50)  POCT Blood Glucose.: 157 mg/dL (2023 12:34)  POCT Blood Glucose.: 134 mg/dL (2023 08:37)    I&O's Summary    2023 07:01  -  2023 07:00  --------------------------------------------------------  IN: 600 mL / OUT: 0 mL / NET: 600 mL        PHYSICAL EXAM:  Vital Signs Last 24 Hrs  T(C): 36.7 (2023 05:51), Max: 36.9 (2023 10:00)  T(F): 98 (2023 05:51), Max: 98.4 (2023 10:00)  HR: 85 (2023 05:51) (75 - 92)  BP: 116/66 (2023 05:51) (116/66 - 142/83)  BP(mean): --  RR: 18 (2023 05:51) (18 - 18)  SpO2: 98% (2023 05:51) (93% - 100%)    Parameters below as of 2023 05:51  Patient On (Oxygen Delivery Method): room air        GENERAL: No acute distress, well-developed  HEAD:  Atraumatic, Normocephalic  EYES: EOMI, PERRLA, conjunctiva and sclera clear  NECK: Supple, no lymphadenopathy, no JVD  CHEST/LUNG: CTAB; No wheezes, rales, or rhonchi  HEART: Regular rate and rhythm; Normal s1 and s2, No murmurs, rubs, or gallops  ABDOMEN: Soft, non-tender, non-distended; normal bowel sounds, no organomegaly  EXTREMITIES:  2+ peripheral pulses b/l, No clubbing, cyanosis, or edema  NEUROLOGY: A&O x 3, no focal deficits  SKIN: No rashes or lesions          LABS:                        9.1    5.09  )-----------( 171      ( 2023 05:11 )             27.8     07-20    136  |  101  |  8   ----------------------------<  110<H>  3.7   |  24  |  1.39<H>    Ca    9.1      2023 05:11  Phos  3.7     07-20  Mg     1.60     07-20    TPro  8.0  /  Alb  2.8<L>  /  TBili  0.4  /  DBili  x   /  AST  15  /  ALT  5   /  AlkPhos  83  07-20    PT/INR - ( 2023 09:14 )   PT: 14.8 sec;   INR: 1.27 ratio         PTT - ( 2023 09:14 )  PTT:30.7 sec      Urinalysis Basic - ( 2023 13:30 )    Color: Yellow / Appearance: Clear / S.012 / pH: x  Gluc: x / Ketone: Negative mg/dL  / Bili: Negative / Urobili: 0.2 mg/dL   Blood: x / Protein: Trace mg/dL / Nitrite: Negative   Leuk Esterase: Negative / RBC: 2 /HPF / WBC 1 /HPF   Sq Epi: x / Non Sq Epi: 2 /HPF / Bacteria: Negative /HPF        Culture - Blood (collected 2023 18:56)  Source: .Blood Blood-Peripheral  Preliminary Report (2023 01:02):    No growth at 48 Hours    Culture - Urine (collected 2023 15:57)  Source: Clean Catch Clean Catch (Midstream)  Final Report (2023 15:33):    No growth        RADIOLOGY & ADDITIONAL TESTS:  Results Reviewed:   Imaging Personally Reviewed:  Electrocardiogram Personally Reviewed:    COORDINATION OF CARE:  Care Discussed with Consultants/Other Providers [Y/N]:  Prior or Outpatient Records Reviewed [Y/N]:   Kit Angel MD  Emergency Medicine & Internal Medicine PGY-2    PROGRESS NOTE:    ID #:     Patient is a 58y old  Female who presents with a chief complaint of pleurx catheter purulent drainage (2023 17:56)      MAJOR INTERVAL HOSPITAL EVENTS: NAEO    SUBJECTIVE / OVERNIGHT EVENTS: No acute overnight events. Patient endorses some nausea without vomiting, Otherwise denies other acute complaints including fever, sore throat, cough, cp, sob, abd pain, nausea, vomiting, dysuria, hematuria, back pain, LE pain/swelling      MEDICATIONS  (STANDING):  ascorbic acid 500 milliGRAM(s) Oral daily  cholecalciferol 2000 Unit(s) Oral daily  dextrose 5%. 1000 milliLiter(s) (50 mL/Hr) IV Continuous <Continuous>  dextrose 5%. 1000 milliLiter(s) (100 mL/Hr) IV Continuous <Continuous>  dextrose 50% Injectable 25 Gram(s) IV Push once  dextrose 50% Injectable 12.5 Gram(s) IV Push once  dextrose 50% Injectable 25 Gram(s) IV Push once  ferrous    sulfate 325 milliGRAM(s) Oral daily  glucagon  Injectable 1 milliGRAM(s) IntraMuscular once  heparin   Injectable 5000 Unit(s) SubCutaneous every 8 hours  insulin glargine Injectable (LANTUS) 16 Unit(s) SubCutaneous at bedtime  insulin lispro (ADMELOG) corrective regimen sliding scale   SubCutaneous at bedtime  insulin lispro (ADMELOG) corrective regimen sliding scale   SubCutaneous three times a day before meals  omega-3-Acid Ethyl Esters 2 Gram(s) Oral two times a day  piperacillin/tazobactam IVPB.. 3.375 Gram(s) IV Intermittent every 8 hours    MEDICATIONS  (PRN):  acetaminophen     Tablet .. 650 milliGRAM(s) Oral every 6 hours PRN Temp greater or equal to 38C (100.4F), Mild Pain (1 - 3)  aluminum hydroxide/magnesium hydroxide/simethicone Suspension 30 milliLiter(s) Oral every 4 hours PRN Dyspepsia  dextrose Oral Gel 15 Gram(s) Oral once PRN Blood Glucose LESS THAN 70 milliGRAM(s)/deciliter  melatonin 3 milliGRAM(s) Oral at bedtime PRN Insomnia  ondansetron Injectable 4 milliGRAM(s) IV Push every 8 hours PRN Nausea and/or Vomiting      CAPILLARY BLOOD GLUCOSE      POCT Blood Glucose.: 172 mg/dL (2023 22:28)  POCT Blood Glucose.: 114 mg/dL (2023 17:50)  POCT Blood Glucose.: 157 mg/dL (2023 12:34)  POCT Blood Glucose.: 134 mg/dL (2023 08:37)    I&O's Summary    2023 07:01  -  2023 07:00  --------------------------------------------------------  IN: 600 mL / OUT: 0 mL / NET: 600 mL        PHYSICAL EXAM:  Vital Signs Last 24 Hrs  T(C): 36.7 (2023 05:51), Max: 36.9 (2023 10:00)  T(F): 98 (2023 05:51), Max: 98.4 (2023 10:00)  HR: 85 (2023 05:51) (75 - 92)  BP: 116/66 (2023 05:51) (116/66 - 142/83)  BP(mean): --  RR: 18 (2023 05:51) (18 - 18)  SpO2: 98% (2023 05:51) (93% - 100%)    Parameters below as of 2023 05:51  Patient On (Oxygen Delivery Method): room air        GENERAL: No acute distress, well-developed  NECK: Supple, no lymphadenopathy, no JVD  CHEST/LUNG: +bandage at previous pleurx site without purulence/bleeding/drainage and mild TTP, diminished lung sounds on the right  HEART: Regular rate and rhythm; Normal s1 and s2, No murmurs, rubs, or gallops  ABDOMEN: Soft, non-tender, non-distended; normal bowel sounds, no organomegaly, no CVAT  EXTREMITIES:  2+ peripheral pulses b/l, No clubbing, cyanosis, or edema  NEUROLOGY: A&O x 3, no focal deficits  SKIN: No rashes or lesion          LABS:                        9.1    5.09  )-----------( 171      ( 2023 05:11 )             27.8     07-20    136  |  101  |  8   ----------------------------<  110<H>  3.7   |  24  |  1.39<H>    Ca    9.1      2023 05:11  Phos  3.7     -  Mg     1.60         TPro  8.0  /  Alb  2.8<L>  /  TBili  0.4  /  DBili  x   /  AST  15  /  ALT  5   /  AlkPhos  83  -20    PT/INR - ( 2023 09:14 )   PT: 14.8 sec;   INR: 1.27 ratio         PTT - ( 2023 09:14 )  PTT:30.7 sec      Urinalysis Basic - ( 2023 13:30 )    Color: Yellow / Appearance: Clear / S.012 / pH: x  Gluc: x / Ketone: Negative mg/dL  / Bili: Negative / Urobili: 0.2 mg/dL   Blood: x / Protein: Trace mg/dL / Nitrite: Negative   Leuk Esterase: Negative / RBC: 2 /HPF / WBC 1 /HPF   Sq Epi: x / Non Sq Epi: 2 /HPF / Bacteria: Negative /HPF        Culture - Blood (collected 2023 18:56)  Source: .Blood Blood-Peripheral  Preliminary Report (2023 01:02):    No growth at 48 Hours    Culture - Urine (collected 2023 15:57)  Source: Clean Catch Clean Catch (Midstream)  Final Report (2023 15:33):    No growth        RADIOLOGY & ADDITIONAL TESTS:  Results Reviewed:   Imaging Personally Reviewed:  Electrocardiogram Personally Reviewed:    COORDINATION OF CARE:  Care Discussed with Consultants/Other Providers [Y/N]:  Prior or Outpatient Records Reviewed [Y/N]:

## 2023-07-21 NOTE — PROGRESS NOTE ADULT - PROBLEM SELECTOR PLAN 1
p/w purulent drainage from pleurx catheter in the setting of a right pleural effusion. +cough however no fevers or systemic signs of infection. C/f catheter site infection vs empyema  - pulm removed pleurx 7/19: rec cont abx, repeat chest CT now and in 4 weeks, pulm f/u OP  - Chest CT pending  - will treat with zosyn and vanc for now given c/f empyema, recent hospitalization: if neg cultures, can maybe transition to oral such as augmentin or clinda  - f/u pleural fluid cx, blood cx p/w purulent drainage from pleurx catheter in the setting of a right pleural effusion. +cough however no fevers or systemic signs of infection. C/f catheter site infection vs empyema  - pulm removed pleurx 7/19: rec cont abx, repeat chest CT now and in 4 weeks, pulm f/u OP  - s/p zosyn and vanc given c/f empyema, recent hospitalization  - start levaquin PO  - Cx NGTD

## 2023-07-21 NOTE — PROGRESS NOTE ADULT - PROBLEM SELECTOR PLAN 5
DVT ppx: SCDs pending procedure -recently taken off medications. Trend BP -pt on 22 units of lantus at bedtime, ISS before meals  -will give 16 units of lantus as pt while IP, low ISS q6h  -trend FS

## 2023-07-21 NOTE — PROGRESS NOTE ADULT - PROBLEM SELECTOR PLAN 2
c/b peritoneal mets and malignant pleural effusion. On active chemo, x1 treatment  -output Onc f/u CT showed Right chest wall port reservoir appears to have rotated on its side with reservoir now perpendicular to the chest wall fascia. Catheter tip terminates within the SVC.   - RN not able to use port  - IR c/s: OP replacement after infection clear Patient demostrated acute rising in Cr from 0.82, 1.39, 2.94. FENa suggesting prerenal etiology. Patient does not appear volume down or overloaded. possibly pseudoAKI 2/2 vanc/zoyn  - US Kidney bladder  - LR at 75/hr

## 2023-07-22 LAB
ALBUMIN SERPL ELPH-MCNC: 3.3 G/DL — SIGNIFICANT CHANGE UP (ref 3.3–5)
ALP SERPL-CCNC: 88 U/L — SIGNIFICANT CHANGE UP (ref 40–120)
ALT FLD-CCNC: 7 U/L — SIGNIFICANT CHANGE UP (ref 4–33)
ANION GAP SERPL CALC-SCNC: 13 MMOL/L — SIGNIFICANT CHANGE UP (ref 7–14)
AST SERPL-CCNC: 11 U/L — SIGNIFICANT CHANGE UP (ref 4–32)
BASOPHILS # BLD AUTO: 0.01 K/UL — SIGNIFICANT CHANGE UP (ref 0–0.2)
BASOPHILS NFR BLD AUTO: 0.2 % — SIGNIFICANT CHANGE UP (ref 0–2)
BILIRUB SERPL-MCNC: 0.2 MG/DL — SIGNIFICANT CHANGE UP (ref 0.2–1.2)
BUN SERPL-MCNC: 14 MG/DL — SIGNIFICANT CHANGE UP (ref 7–23)
CALCIUM SERPL-MCNC: 9.2 MG/DL — SIGNIFICANT CHANGE UP (ref 8.4–10.5)
CHLORIDE SERPL-SCNC: 99 MMOL/L — SIGNIFICANT CHANGE UP (ref 98–107)
CO2 SERPL-SCNC: 24 MMOL/L — SIGNIFICANT CHANGE UP (ref 22–31)
CREAT ?TM UR-MCNC: 94 MG/DL — SIGNIFICANT CHANGE UP
CREAT SERPL-MCNC: 3.21 MG/DL — HIGH (ref 0.5–1.3)
EGFR: 16 ML/MIN/1.73M2 — LOW
EOSINOPHIL # BLD AUTO: 0.02 K/UL — SIGNIFICANT CHANGE UP (ref 0–0.5)
EOSINOPHIL NFR BLD AUTO: 0.4 % — SIGNIFICANT CHANGE UP (ref 0–6)
GLUCOSE BLDC GLUCOMTR-MCNC: 109 MG/DL — HIGH (ref 70–99)
GLUCOSE BLDC GLUCOMTR-MCNC: 127 MG/DL — HIGH (ref 70–99)
GLUCOSE BLDC GLUCOMTR-MCNC: 84 MG/DL — SIGNIFICANT CHANGE UP (ref 70–99)
GLUCOSE BLDC GLUCOMTR-MCNC: 94 MG/DL — SIGNIFICANT CHANGE UP (ref 70–99)
GLUCOSE SERPL-MCNC: 124 MG/DL — HIGH (ref 70–99)
HCT VFR BLD CALC: 30.1 % — LOW (ref 34.5–45)
HGB BLD-MCNC: 9.9 G/DL — LOW (ref 11.5–15.5)
IANC: 3.94 K/UL — SIGNIFICANT CHANGE UP (ref 1.8–7.4)
IMM GRANULOCYTES NFR BLD AUTO: 0.4 % — SIGNIFICANT CHANGE UP (ref 0–0.9)
LYMPHOCYTES # BLD AUTO: 0.89 K/UL — LOW (ref 1–3.3)
LYMPHOCYTES # BLD AUTO: 16.7 % — SIGNIFICANT CHANGE UP (ref 13–44)
MAGNESIUM SERPL-MCNC: 1.6 MG/DL — SIGNIFICANT CHANGE UP (ref 1.6–2.6)
MCHC RBC-ENTMCNC: 26.1 PG — LOW (ref 27–34)
MCHC RBC-ENTMCNC: 32.9 GM/DL — SIGNIFICANT CHANGE UP (ref 32–36)
MCV RBC AUTO: 79.4 FL — LOW (ref 80–100)
MONOCYTES # BLD AUTO: 0.44 K/UL — SIGNIFICANT CHANGE UP (ref 0–0.9)
MONOCYTES NFR BLD AUTO: 8.3 % — SIGNIFICANT CHANGE UP (ref 2–14)
NEUTROPHILS # BLD AUTO: 3.94 K/UL — SIGNIFICANT CHANGE UP (ref 1.8–7.4)
NEUTROPHILS NFR BLD AUTO: 74 % — SIGNIFICANT CHANGE UP (ref 43–77)
NRBC # BLD: 0 /100 WBCS — SIGNIFICANT CHANGE UP (ref 0–0)
NRBC # FLD: 0 K/UL — SIGNIFICANT CHANGE UP (ref 0–0)
OSMOLALITY UR: 232 MOSM/KG — SIGNIFICANT CHANGE UP (ref 50–1200)
PHOSPHATE SERPL-MCNC: 3.3 MG/DL — SIGNIFICANT CHANGE UP (ref 2.5–4.5)
PLATELET # BLD AUTO: 211 K/UL — SIGNIFICANT CHANGE UP (ref 150–400)
POTASSIUM SERPL-MCNC: 4.1 MMOL/L — SIGNIFICANT CHANGE UP (ref 3.5–5.3)
POTASSIUM SERPL-SCNC: 4.1 MMOL/L — SIGNIFICANT CHANGE UP (ref 3.5–5.3)
PROT SERPL-MCNC: 8.4 G/DL — HIGH (ref 6–8.3)
RBC # BLD: 3.79 M/UL — LOW (ref 3.8–5.2)
RBC # FLD: 13.4 % — SIGNIFICANT CHANGE UP (ref 10.3–14.5)
SODIUM SERPL-SCNC: 136 MMOL/L — SIGNIFICANT CHANGE UP (ref 135–145)
SODIUM UR-SCNC: 49 MMOL/L — SIGNIFICANT CHANGE UP
UUN UR-MCNC: 227 MG/DL — SIGNIFICANT CHANGE UP
WBC # BLD: 5.32 K/UL — SIGNIFICANT CHANGE UP (ref 3.8–10.5)
WBC # FLD AUTO: 5.32 K/UL — SIGNIFICANT CHANGE UP (ref 3.8–10.5)

## 2023-07-22 PROCEDURE — 99233 SBSQ HOSP IP/OBS HIGH 50: CPT

## 2023-07-22 RX ORDER — CHLORHEXIDINE GLUCONATE 213 G/1000ML
1 SOLUTION TOPICAL DAILY
Refills: 0 | Status: DISCONTINUED | OUTPATIENT
Start: 2023-07-22 | End: 2023-07-25

## 2023-07-22 RX ADMIN — HEPARIN SODIUM 5000 UNIT(S): 5000 INJECTION INTRAVENOUS; SUBCUTANEOUS at 23:11

## 2023-07-22 RX ADMIN — SODIUM CHLORIDE 75 MILLILITER(S): 9 INJECTION, SOLUTION INTRAVENOUS at 10:43

## 2023-07-22 RX ADMIN — Medication 325 MILLIGRAM(S): at 11:57

## 2023-07-22 RX ADMIN — CHLORHEXIDINE GLUCONATE 1 APPLICATION(S): 213 SOLUTION TOPICAL at 14:59

## 2023-07-22 RX ADMIN — HEPARIN SODIUM 5000 UNIT(S): 5000 INJECTION INTRAVENOUS; SUBCUTANEOUS at 07:03

## 2023-07-22 RX ADMIN — Medication 2000 UNIT(S): at 11:57

## 2023-07-22 RX ADMIN — Medication 2 GRAM(S): at 23:10

## 2023-07-22 RX ADMIN — HEPARIN SODIUM 5000 UNIT(S): 5000 INJECTION INTRAVENOUS; SUBCUTANEOUS at 14:59

## 2023-07-22 RX ADMIN — Medication 2 GRAM(S): at 10:39

## 2023-07-22 RX ADMIN — Medication 500 MILLIGRAM(S): at 11:57

## 2023-07-22 RX ADMIN — ONDANSETRON 4 MILLIGRAM(S): 8 TABLET, FILM COATED ORAL at 03:13

## 2023-07-22 NOTE — PROGRESS NOTE ADULT - SUBJECTIVE AND OBJECTIVE BOX
INTERVAL HPI/OVERNIGHT EVENTS:    SUBJECTIVE: Patient seen and examined at bedside.       VITAL SIGNS:  ICU Vital Signs Last 24 Hrs  T(C): 36.7 (22 Jul 2023 05:25), Max: 36.9 (21 Jul 2023 14:48)  T(F): 98.1 (22 Jul 2023 05:25), Max: 98.4 (21 Jul 2023 14:48)  HR: 86 (22 Jul 2023 05:25) (81 - 86)  BP: 131/76 (22 Jul 2023 05:25) (115/86 - 141/81)  BP(mean): --  ABP: --  ABP(mean): --  RR: 18 (22 Jul 2023 05:25) (18 - 18)  SpO2: 100% (22 Jul 2023 05:25) (99% - 100%)    O2 Parameters below as of 22 Jul 2023 05:25  Patient On (Oxygen Delivery Method): room air            Plateau pressure:   P/F ratio:     07-21 @ 07:01  -  07-22 @ 07:00  --------------------------------------------------------  IN: 720 mL / OUT: 150 mL / NET: 570 mL      CAPILLARY BLOOD GLUCOSE      POCT Blood Glucose.: 122 mg/dL (21 Jul 2023 22:36)    ECG:    PHYSICAL EXAM:    General:   HEENT:   Neck:   Respiratory:   Cardiovascular:   Abdomen:   Extremities:  Neurological:    MEDICATIONS:  MEDICATIONS  (STANDING):  ascorbic acid 500 milliGRAM(s) Oral daily  cholecalciferol 2000 Unit(s) Oral daily  dextrose 5%. 1000 milliLiter(s) (100 mL/Hr) IV Continuous <Continuous>  dextrose 5%. 1000 milliLiter(s) (50 mL/Hr) IV Continuous <Continuous>  dextrose 50% Injectable 25 Gram(s) IV Push once  dextrose 50% Injectable 12.5 Gram(s) IV Push once  dextrose 50% Injectable 25 Gram(s) IV Push once  ferrous    sulfate 325 milliGRAM(s) Oral daily  glucagon  Injectable 1 milliGRAM(s) IntraMuscular once  heparin   Injectable 5000 Unit(s) SubCutaneous every 8 hours  insulin glargine Injectable (LANTUS) 16 Unit(s) SubCutaneous at bedtime  insulin lispro (ADMELOG) corrective regimen sliding scale   SubCutaneous three times a day before meals  insulin lispro (ADMELOG) corrective regimen sliding scale   SubCutaneous at bedtime  lactated ringers. 1000 milliLiter(s) (75 mL/Hr) IV Continuous <Continuous>  levoFLOXacin  Tablet 500 milliGRAM(s) Oral every 48 hours  omega-3-Acid Ethyl Esters 2 Gram(s) Oral two times a day    MEDICATIONS  (PRN):  acetaminophen     Tablet .. 650 milliGRAM(s) Oral every 6 hours PRN Temp greater or equal to 38C (100.4F), Mild Pain (1 - 3)  aluminum hydroxide/magnesium hydroxide/simethicone Suspension 30 milliLiter(s) Oral every 4 hours PRN Dyspepsia  dextrose Oral Gel 15 Gram(s) Oral once PRN Blood Glucose LESS THAN 70 milliGRAM(s)/deciliter  melatonin 3 milliGRAM(s) Oral at bedtime PRN Insomnia  ondansetron Injectable 4 milliGRAM(s) IV Push every 8 hours PRN Nausea and/or Vomiting      ALLERGIES:  Allergies    Proventil HFA (Hives)  latex (Hives)    Intolerances        LABS:                        9.9    5.32  )-----------( 211      ( 22 Jul 2023 05:45 )             30.1     07-21    135  |  98  |  14  ----------------------------<  179<H>  4.1   |  24  |  2.94<H>    Ca    8.8      21 Jul 2023 11:54  Phos  3.9     07-21  Mg     1.60     07-21    TPro  8.4<H>  /  Alb  3.2<L>  /  TBili  0.3  /  DBili  x   /  AST  17  /  ALT  6   /  AlkPhos  93  07-21    PT/INR - ( 20 Jul 2023 09:14 )   PT: 14.8 sec;   INR: 1.27 ratio         PTT - ( 20 Jul 2023 09:14 )  PTT:30.7 sec  Urinalysis Basic - ( 21 Jul 2023 11:54 )    Color: x / Appearance: x / SG: x / pH: x  Gluc: 179 mg/dL / Ketone: x  / Bili: x / Urobili: x   Blood: x / Protein: x / Nitrite: x   Leuk Esterase: x / RBC: x / WBC x   Sq Epi: x / Non Sq Epi: x / Bacteria: x        RADIOLOGY & ADDITIONAL TESTS: Reviewed. PROGRESS NOTE:   Authored by Dr. Stefan Cotter / Internal Medicine Resident    Patient is a 58y old  Female who presents with a chief complaint of pleurx catheter purulent drainage (22 Jul 2023 07:58)      SUBJECTIVE / OVERNIGHT EVENTS: No overnight events. Patient reported feeling well. Denied SOB< chest pain, subjective fevers.     ADDITIONAL REVIEW OF SYSTEMS:  Review of Systems:  Constitutional: No fever, No weight loss, good appetite/po intake  Head: No headache   Eyes: No blurry vision, No diplopia  Neuro: No tremors, No muscle weakness   Cardiovascular: No chest pain, No palpitations  Respiratory: No SOB, No cough  GI: No nausea, No vomiting, No diarrhea  : No dysuria, No hematuria  Skin: No rash  MSK: No joint pain   Psych: No depression  Heme: No abnormal bruising, no abnormal bleeding      MEDICATIONS  (STANDING):  ascorbic acid 500 milliGRAM(s) Oral daily  cholecalciferol 2000 Unit(s) Oral daily  dextrose 5%. 1000 milliLiter(s) (100 mL/Hr) IV Continuous <Continuous>  dextrose 5%. 1000 milliLiter(s) (50 mL/Hr) IV Continuous <Continuous>  dextrose 50% Injectable 25 Gram(s) IV Push once  dextrose 50% Injectable 12.5 Gram(s) IV Push once  dextrose 50% Injectable 25 Gram(s) IV Push once  ferrous    sulfate 325 milliGRAM(s) Oral daily  glucagon  Injectable 1 milliGRAM(s) IntraMuscular once  heparin   Injectable 5000 Unit(s) SubCutaneous every 8 hours  insulin glargine Injectable (LANTUS) 16 Unit(s) SubCutaneous at bedtime  insulin lispro (ADMELOG) corrective regimen sliding scale   SubCutaneous three times a day before meals  insulin lispro (ADMELOG) corrective regimen sliding scale   SubCutaneous at bedtime  lactated ringers. 1000 milliLiter(s) (75 mL/Hr) IV Continuous <Continuous>  levoFLOXacin  Tablet 500 milliGRAM(s) Oral every 48 hours  omega-3-Acid Ethyl Esters 2 Gram(s) Oral two times a day    MEDICATIONS  (PRN):  acetaminophen     Tablet .. 650 milliGRAM(s) Oral every 6 hours PRN Temp greater or equal to 38C (100.4F), Mild Pain (1 - 3)  aluminum hydroxide/magnesium hydroxide/simethicone Suspension 30 milliLiter(s) Oral every 4 hours PRN Dyspepsia  dextrose Oral Gel 15 Gram(s) Oral once PRN Blood Glucose LESS THAN 70 milliGRAM(s)/deciliter  melatonin 3 milliGRAM(s) Oral at bedtime PRN Insomnia  ondansetron Injectable 4 milliGRAM(s) IV Push every 8 hours PRN Nausea and/or Vomiting      CAPILLARY BLOOD GLUCOSE      POCT Blood Glucose.: 109 mg/dL (22 Jul 2023 08:39)  POCT Blood Glucose.: 122 mg/dL (21 Jul 2023 22:36)  POCT Blood Glucose.: 97 mg/dL (21 Jul 2023 19:29)  POCT Blood Glucose.: 136 mg/dL (21 Jul 2023 13:08)    I&O's Summary    21 Jul 2023 07:01  -  22 Jul 2023 07:00  --------------------------------------------------------  IN: 720 mL / OUT: 150 mL / NET: 570 mL        PHYSICAL EXAM:  Vital Signs Last 24 Hrs  T(C): 36.7 (22 Jul 2023 05:25), Max: 36.9 (21 Jul 2023 14:48)  T(F): 98.1 (22 Jul 2023 05:25), Max: 98.4 (21 Jul 2023 14:48)  HR: 86 (22 Jul 2023 05:25) (81 - 86)  BP: 131/76 (22 Jul 2023 05:25) (128/81 - 141/81)  BP(mean): --  RR: 18 (22 Jul 2023 05:25) (18 - 18)  SpO2: 100% (22 Jul 2023 05:25) (99% - 100%)    Parameters below as of 22 Jul 2023 05:25  Patient On (Oxygen Delivery Method): room air        GENERAL: No acute distress, well-developed  NECK: Supple, no lymphadenopathy, no JVD  CHEST/LUNG: +bandage at previous pleurx site without purulence/bleeding/drainage and mild TTP, diminished lung sounds on the right  HEART: Regular rate and rhythm; Normal s1 and s2, No murmurs, rubs, or gallops  ABDOMEN: Soft, non-tender, non-distended; normal bowel sounds, no organomegaly, no CVAT  EXTREMITIES:  2+ peripheral pulses b/l, No clubbing, cyanosis, or edema  NEUROLOGY: A&O x 3, no focal deficits  SKIN: No rashes or lesion    LABS:                          9.9    5.32  )-----------( 211      ( 22 Jul 2023 05:45 )             30.1     07-21    135  |  98  |  14  ----------------------------<  179<H>  4.1   |  24  |  2.94<H>    Ca    8.8      21 Jul 2023 11:54  Phos  3.9     07-21  Mg     1.60     07-21    TPro  8.4<H>  /  Alb  3.2<L>  /  TBili  0.3  /  DBili  x   /  AST  17  /  ALT  6   /  AlkPhos  93  07-21          Urinalysis Basic - ( 21 Jul 2023 11:54 )    Color: x / Appearance: x / SG: x / pH: x  Gluc: 179 mg/dL / Ketone: x  / Bili: x / Urobili: x   Blood: x / Protein: x / Nitrite: x   Leuk Esterase: x / RBC: x / WBC x   Sq Epi: x / Non Sq Epi: x / Bacteria: x         PROGRESS NOTE:   Authored by Dr. Stefan Cotter / Internal Medicine Resident    Patient is a 58y old  Female who presents with a chief complaint of pleurx catheter purulent drainage (22 Jul 2023 07:58)      SUBJECTIVE / OVERNIGHT EVENTS: No overnight events. Patient reported feeling well. Denied SOB< chest pain, subjective fevers.     ADDITIONAL REVIEW OF SYSTEMS:  Review of Systems:  Constitutional: No fever, No weight loss, good appetite/po intake  Head: No headache   Eyes: No blurry vision, No diplopia  Neuro: No tremors, No muscle weakness   Cardiovascular: No chest pain, No palpitations  Respiratory: No SOB, No cough  GI: No nausea, No vomiting, No diarrhea  : No dysuria, No hematuria  Skin: No rash  MSK: No joint pain   Psych: No depression  Heme: No abnormal bruising, no abnormal bleeding      MEDICATIONS  (STANDING):  ascorbic acid 500 milliGRAM(s) Oral daily  cholecalciferol 2000 Unit(s) Oral daily  dextrose 5%. 1000 milliLiter(s) (100 mL/Hr) IV Continuous <Continuous>  dextrose 5%. 1000 milliLiter(s) (50 mL/Hr) IV Continuous <Continuous>  dextrose 50% Injectable 25 Gram(s) IV Push once  dextrose 50% Injectable 12.5 Gram(s) IV Push once  dextrose 50% Injectable 25 Gram(s) IV Push once  ferrous    sulfate 325 milliGRAM(s) Oral daily  glucagon  Injectable 1 milliGRAM(s) IntraMuscular once  heparin   Injectable 5000 Unit(s) SubCutaneous every 8 hours  insulin glargine Injectable (LANTUS) 16 Unit(s) SubCutaneous at bedtime  insulin lispro (ADMELOG) corrective regimen sliding scale   SubCutaneous three times a day before meals  insulin lispro (ADMELOG) corrective regimen sliding scale   SubCutaneous at bedtime  lactated ringers. 1000 milliLiter(s) (75 mL/Hr) IV Continuous <Continuous>  levoFLOXacin  Tablet 500 milliGRAM(s) Oral every 48 hours  omega-3-Acid Ethyl Esters 2 Gram(s) Oral two times a day    MEDICATIONS  (PRN):  acetaminophen     Tablet .. 650 milliGRAM(s) Oral every 6 hours PRN Temp greater or equal to 38C (100.4F), Mild Pain (1 - 3)  aluminum hydroxide/magnesium hydroxide/simethicone Suspension 30 milliLiter(s) Oral every 4 hours PRN Dyspepsia  dextrose Oral Gel 15 Gram(s) Oral once PRN Blood Glucose LESS THAN 70 milliGRAM(s)/deciliter  melatonin 3 milliGRAM(s) Oral at bedtime PRN Insomnia  ondansetron Injectable 4 milliGRAM(s) IV Push every 8 hours PRN Nausea and/or Vomiting      CAPILLARY BLOOD GLUCOSE      POCT Blood Glucose.: 109 mg/dL (22 Jul 2023 08:39)  POCT Blood Glucose.: 122 mg/dL (21 Jul 2023 22:36)  POCT Blood Glucose.: 97 mg/dL (21 Jul 2023 19:29)  POCT Blood Glucose.: 136 mg/dL (21 Jul 2023 13:08)    I&O's Summary    21 Jul 2023 07:01  -  22 Jul 2023 07:00  --------------------------------------------------------  IN: 720 mL / OUT: 150 mL / NET: 570 mL        PHYSICAL EXAM:  Vital Signs Last 24 Hrs  T(C): 36.7 (22 Jul 2023 05:25), Max: 36.9 (21 Jul 2023 14:48)  T(F): 98.1 (22 Jul 2023 05:25), Max: 98.4 (21 Jul 2023 14:48)  HR: 86 (22 Jul 2023 05:25) (81 - 86)  BP: 131/76 (22 Jul 2023 05:25) (128/81 - 141/81)  BP(mean): --  RR: 18 (22 Jul 2023 05:25) (18 - 18)  SpO2: 100% (22 Jul 2023 05:25) (99% - 100%)    Parameters below as of 22 Jul 2023 05:25  Patient On (Oxygen Delivery Method): room air        GENERAL: No acute distress, well-developed  NECK: Supple, no lymphadenopathy, no JVD  CHEST/LUNG: +bandage at previous pleurx site without purulence/bleeding/drainage and mild TTP, diminished lung sounds on the right  HEART: Regular rate and rhythm; Normal s1 and s2, No murmurs, rubs, or gallops  ABDOMEN: Soft, non-tender, non-distended; normal bowel sounds, no organomegaly, no CVAT  EXTREMITIES:  2+ peripheral pulses b/l, No clubbing, cyanosis, or edema  NEUROLOGY: A&O x 3, no focal deficits  SKIN: No rashes or lesion    LABS:                          9.9    5.32  )-----------( 211      ( 22 Jul 2023 05:45 )             30.1     07-21    135  |  98  |  14  ----------------------------<  179<H>  4.1   |  24  |  2.94<H>    Ca    8.8      21 Jul 2023 11:54  Phos  3.9     07-21  Mg     1.60     07-21    TPro  8.4<H>  /  Alb  3.2<L>  /  TBili  0.3  /  DBili  x   /  AST  17  /  ALT  6   /  AlkPhos  93  07-21

## 2023-07-22 NOTE — PROGRESS NOTE ADULT - ATTENDING COMMENTS
9 y/o female PMH diabetes, hypertension, asthma (no history of intubations), ovarian cancer (on active treatement, last 3 weeks ago) w/ peritoneal carcinomatosis and c/b malignant pleural effusion s/p pleurx catheter p/w drainage from around and inside pleurx c/f skin/soft tissue infection. Now s/p pleurx removal on 7/19. Hospital course is c/b worsening MARIANA.    MARIANA: -pt with Cr increase from 0.2 to 1.4 to now 2.9 to 3.21  -FENA consistent with pre-renal etiology, however, worsening despite IVF administration  -doubt carboplatin nephrotoxicity given last administration was 3 weeks ago  -renal US no hydro   -u/a bland, less suspicion for AIN at this time, but can switch from Zosyn to Levaquin  -continue IVF    #Infection of pleurx catheter: cellulitis vs less likely pleural space infection  -s/p pleurx removal  -currently on broad spectrum antibiotics with Zosyn. Plan to switch to Levaquin, as above  -per pulm plan on antibiotics course of 4 weeks duration  -CT imaging with consolidations concerning for PNA; however, does not appear consistent with clinical picture  -MRSA swab negative; no need to continue Vanco      #Ovarian cancer:   -no plans for inpatient chemo

## 2023-07-22 NOTE — PROGRESS NOTE ADULT - ASSESSMENT
59 y/o female PMH diabetes, hypertension, asthma (no history of intubations), ovarian cancer (on chemo, last chemo session was 3 weeks ago) w/ peritoneal carcinomatosis and c/b malignant pleural effusion s/p pleurx catheter p/w drainage from around and inside pleurx c/f skin/soft tissue infection vs empyema. Found to have rising Cr c/f MARIANA

## 2023-07-22 NOTE — PROVIDER CONTACT NOTE (OTHER) - BACKGROUND
58Y F admitted for unspecified complication of internal prosthetic device, implant / graft. PMH T2DM, HTN, Obesity, asthma

## 2023-07-22 NOTE — PROGRESS NOTE ADULT - PROBLEM SELECTOR PLAN 2
Patient demostrated acute rising in Cr from 0.82, 1.39, 2.94. FENa suggesting prerenal etiology. Patient does not appear volume down or overloaded. possibly pseudoAKI 2/2 vanc/zoyn  - US Kidney bladder  - LR at 75/hr Patient demonstrate acute rising in Cr from 0.82, 1.39, 2.94. FENa suggesting prerenal etiology. Patient does not appear volume down or overloaded. possibly pseudo MARIANA 2/2 vanc/zoyn  - US Kidney bladder  - LR at 75/hr x 24hr p/w purulent drainage from pleurx catheter in the setting of a right pleural effusion. +cough however no fevers or systemic signs of infection. C/f catheter site infection vs empyema  - pulm removed pleurx 7/19: rec cont abx, repeat chest CT now and in 4 weeks, pulm f/u OP  - s/p zosyn and vanc given c/f empyema, recent hospitalization  - start levaquin PO  - Cx NGTD

## 2023-07-22 NOTE — PROGRESS NOTE ADULT - PROBLEM SELECTOR PLAN 1
p/w purulent drainage from pleurx catheter in the setting of a right pleural effusion. +cough however no fevers or systemic signs of infection. C/f catheter site infection vs empyema  - pulm removed pleurx 7/19: rec cont abx, repeat chest CT now and in 4 weeks, pulm f/u OP  - s/p zosyn and vanc given c/f empyema, recent hospitalization  - start levaquin PO  - Cx NGTD Patient demonstrate acute rising in Cr from 0.82, 1.39, 2.94. FENa suggesting prerenal etiology. Patient does not appear volume down or overloaded. possibly pseudo MARIANA 2/2 vanc/zoyn  - US Kidney bladder  - LR at 75/hr x 24hr  --> renal consult

## 2023-07-22 NOTE — PROGRESS NOTE ADULT - PROBLEM SELECTOR PLAN 3
CT showed Right chest wall port reservoir appears to have rotated on its side with reservoir now perpendicular to the chest wall fascia. Catheter tip terminates within the SVC.   - RN not able to use port  - IR c/s: OP replacement after infection clear

## 2023-07-23 LAB
ALBUMIN SERPL ELPH-MCNC: 3.3 G/DL — SIGNIFICANT CHANGE UP (ref 3.3–5)
ALP SERPL-CCNC: 89 U/L — SIGNIFICANT CHANGE UP (ref 40–120)
ALT FLD-CCNC: 7 U/L — SIGNIFICANT CHANGE UP (ref 4–33)
ANION GAP SERPL CALC-SCNC: 15 MMOL/L — HIGH (ref 7–14)
AST SERPL-CCNC: 14 U/L — SIGNIFICANT CHANGE UP (ref 4–32)
BILIRUB SERPL-MCNC: 0.2 MG/DL — SIGNIFICANT CHANGE UP (ref 0.2–1.2)
BUN SERPL-MCNC: 14 MG/DL — SIGNIFICANT CHANGE UP (ref 7–23)
CALCIUM SERPL-MCNC: 9.5 MG/DL — SIGNIFICANT CHANGE UP (ref 8.4–10.5)
CHLORIDE SERPL-SCNC: 101 MMOL/L — SIGNIFICANT CHANGE UP (ref 98–107)
CO2 SERPL-SCNC: 24 MMOL/L — SIGNIFICANT CHANGE UP (ref 22–31)
CREAT SERPL-MCNC: 3.31 MG/DL — HIGH (ref 0.5–1.3)
EGFR: 15 ML/MIN/1.73M2 — LOW
GLUCOSE BLDC GLUCOMTR-MCNC: 103 MG/DL — HIGH (ref 70–99)
GLUCOSE BLDC GLUCOMTR-MCNC: 109 MG/DL — HIGH (ref 70–99)
GLUCOSE BLDC GLUCOMTR-MCNC: 111 MG/DL — HIGH (ref 70–99)
GLUCOSE BLDC GLUCOMTR-MCNC: 92 MG/DL — SIGNIFICANT CHANGE UP (ref 70–99)
GLUCOSE SERPL-MCNC: 94 MG/DL — SIGNIFICANT CHANGE UP (ref 70–99)
HCT VFR BLD CALC: 32.3 % — LOW (ref 34.5–45)
HGB BLD-MCNC: 10.4 G/DL — LOW (ref 11.5–15.5)
INR BLD: 1.22 RATIO — HIGH (ref 0.88–1.16)
MAGNESIUM SERPL-MCNC: 1.6 MG/DL — SIGNIFICANT CHANGE UP (ref 1.6–2.6)
MCHC RBC-ENTMCNC: 25.4 PG — LOW (ref 27–34)
MCHC RBC-ENTMCNC: 32.2 GM/DL — SIGNIFICANT CHANGE UP (ref 32–36)
MCV RBC AUTO: 79 FL — LOW (ref 80–100)
NRBC # BLD: 0 /100 WBCS — SIGNIFICANT CHANGE UP (ref 0–0)
NRBC # FLD: 0 K/UL — SIGNIFICANT CHANGE UP (ref 0–0)
PHOSPHATE SERPL-MCNC: 3.2 MG/DL — SIGNIFICANT CHANGE UP (ref 2.5–4.5)
PLATELET # BLD AUTO: 216 K/UL — SIGNIFICANT CHANGE UP (ref 150–400)
POTASSIUM SERPL-MCNC: 4 MMOL/L — SIGNIFICANT CHANGE UP (ref 3.5–5.3)
POTASSIUM SERPL-SCNC: 4 MMOL/L — SIGNIFICANT CHANGE UP (ref 3.5–5.3)
PROT SERPL-MCNC: 8.4 G/DL — HIGH (ref 6–8.3)
PROTHROM AB SERPL-ACNC: 14.2 SEC — HIGH (ref 10.5–13.4)
RBC # BLD: 4.09 M/UL — SIGNIFICANT CHANGE UP (ref 3.8–5.2)
RBC # FLD: 13.5 % — SIGNIFICANT CHANGE UP (ref 10.3–14.5)
SODIUM SERPL-SCNC: 140 MMOL/L — SIGNIFICANT CHANGE UP (ref 135–145)
WBC # BLD: 4.23 K/UL — SIGNIFICANT CHANGE UP (ref 3.8–10.5)
WBC # FLD AUTO: 4.23 K/UL — SIGNIFICANT CHANGE UP (ref 3.8–10.5)

## 2023-07-23 PROCEDURE — 99232 SBSQ HOSP IP/OBS MODERATE 35: CPT | Mod: GC

## 2023-07-23 PROCEDURE — 99222 1ST HOSP IP/OBS MODERATE 55: CPT

## 2023-07-23 RX ADMIN — SODIUM CHLORIDE 75 MILLILITER(S): 9 INJECTION, SOLUTION INTRAVENOUS at 00:13

## 2023-07-23 RX ADMIN — HEPARIN SODIUM 5000 UNIT(S): 5000 INJECTION INTRAVENOUS; SUBCUTANEOUS at 07:30

## 2023-07-23 RX ADMIN — HEPARIN SODIUM 5000 UNIT(S): 5000 INJECTION INTRAVENOUS; SUBCUTANEOUS at 13:21

## 2023-07-23 RX ADMIN — Medication 500 MILLIGRAM(S): at 11:31

## 2023-07-23 RX ADMIN — SODIUM CHLORIDE 75 MILLILITER(S): 9 INJECTION, SOLUTION INTRAVENOUS at 11:31

## 2023-07-23 RX ADMIN — INSULIN GLARGINE 16 UNIT(S): 100 INJECTION, SOLUTION SUBCUTANEOUS at 22:23

## 2023-07-23 RX ADMIN — Medication 325 MILLIGRAM(S): at 11:31

## 2023-07-23 RX ADMIN — Medication 2000 UNIT(S): at 11:31

## 2023-07-23 RX ADMIN — Medication 2 GRAM(S): at 09:25

## 2023-07-23 RX ADMIN — Medication 2 GRAM(S): at 17:23

## 2023-07-23 RX ADMIN — HEPARIN SODIUM 5000 UNIT(S): 5000 INJECTION INTRAVENOUS; SUBCUTANEOUS at 21:59

## 2023-07-23 RX ADMIN — CHLORHEXIDINE GLUCONATE 1 APPLICATION(S): 213 SOLUTION TOPICAL at 11:31

## 2023-07-23 NOTE — PROGRESS NOTE ADULT - PROBLEM SELECTOR PLAN 2
p/w purulent drainage from pleurx catheter in the setting of a right pleural effusion. +cough however no fevers or systemic signs of infection. C/f catheter site infection vs empyema  - pulm removed pleurx 7/19: rec cont abx, repeat chest CT now and in 4 weeks, pulm f/u OP  - s/p zosyn and vanc given c/f empyema, recent hospitalization  - start levaquin PO  - Cx NGTD

## 2023-07-23 NOTE — PROGRESS NOTE ADULT - SUBJECTIVE AND OBJECTIVE BOX
Kit Angel MD  Emergency Medicine & Internal Medicine PGY-2    PROGRESS NOTE:    ID #:     Patient is a 58y old  Female who presents with a chief complaint of pleurx catheter purulent drainage (22 Jul 2023 07:58)      MAJOR INTERVAL HOSPITAL EVENTS:     SUBJECTIVE / OVERNIGHT EVENTS: No acute overnight events.       MEDICATIONS  (STANDING):  ascorbic acid 500 milliGRAM(s) Oral daily  chlorhexidine 2% Cloths 1 Application(s) Topical daily  cholecalciferol 2000 Unit(s) Oral daily  dextrose 5%. 1000 milliLiter(s) (100 mL/Hr) IV Continuous <Continuous>  dextrose 5%. 1000 milliLiter(s) (50 mL/Hr) IV Continuous <Continuous>  dextrose 50% Injectable 25 Gram(s) IV Push once  dextrose 50% Injectable 12.5 Gram(s) IV Push once  dextrose 50% Injectable 25 Gram(s) IV Push once  ferrous    sulfate 325 milliGRAM(s) Oral daily  glucagon  Injectable 1 milliGRAM(s) IntraMuscular once  heparin   Injectable 5000 Unit(s) SubCutaneous every 8 hours  insulin glargine Injectable (LANTUS) 16 Unit(s) SubCutaneous at bedtime  insulin lispro (ADMELOG) corrective regimen sliding scale   SubCutaneous three times a day before meals  insulin lispro (ADMELOG) corrective regimen sliding scale   SubCutaneous at bedtime  lactated ringers. 1000 milliLiter(s) (75 mL/Hr) IV Continuous <Continuous>  levoFLOXacin  Tablet 500 milliGRAM(s) Oral every 48 hours  omega-3-Acid Ethyl Esters 2 Gram(s) Oral two times a day    MEDICATIONS  (PRN):  acetaminophen     Tablet .. 650 milliGRAM(s) Oral every 6 hours PRN Temp greater or equal to 38C (100.4F), Mild Pain (1 - 3)  aluminum hydroxide/magnesium hydroxide/simethicone Suspension 30 milliLiter(s) Oral every 4 hours PRN Dyspepsia  dextrose Oral Gel 15 Gram(s) Oral once PRN Blood Glucose LESS THAN 70 milliGRAM(s)/deciliter  melatonin 3 milliGRAM(s) Oral at bedtime PRN Insomnia  ondansetron Injectable 4 milliGRAM(s) IV Push every 8 hours PRN Nausea and/or Vomiting      CAPILLARY BLOOD GLUCOSE      POCT Blood Glucose.: 94 mg/dL (22 Jul 2023 22:22)  POCT Blood Glucose.: 84 mg/dL (22 Jul 2023 17:50)  POCT Blood Glucose.: 127 mg/dL (22 Jul 2023 12:11)  POCT Blood Glucose.: 109 mg/dL (22 Jul 2023 08:39)    I&O's Summary    22 Jul 2023 07:01  -  23 Jul 2023 07:00  --------------------------------------------------------  IN: 1400 mL / OUT: 350 mL / NET: 1050 mL        PHYSICAL EXAM:  Vital Signs Last 24 Hrs  T(C): 36.7 (22 Jul 2023 21:19), Max: 36.7 (22 Jul 2023 15:02)  T(F): 98.1 (22 Jul 2023 21:19), Max: 98.1 (22 Jul 2023 15:02)  HR: 81 (22 Jul 2023 21:19) (81 - 88)  BP: 146/81 (22 Jul 2023 21:19) (146/81 - 154/94)  BP(mean): --  RR: 18 (22 Jul 2023 21:19) (18 - 18)  SpO2: 96% (22 Jul 2023 21:19) (96% - 96%)    Parameters below as of 22 Jul 2023 21:19  Patient On (Oxygen Delivery Method): room air        GENERAL: No acute distress, well-developed  HEAD:  Atraumatic, Normocephalic  EYES: EOMI, PERRLA, conjunctiva and sclera clear  NECK: Supple, no lymphadenopathy, no JVD  CHEST/LUNG: CTAB; No wheezes, rales, or rhonchi  HEART: Regular rate and rhythm; Normal s1 and s2, No murmurs, rubs, or gallops  ABDOMEN: Soft, non-tender, non-distended; normal bowel sounds, no organomegaly  EXTREMITIES:  2+ peripheral pulses b/l, No clubbing, cyanosis, or edema  NEUROLOGY: A&O x 3, no focal deficits  SKIN: No rashes or lesions          LABS:                        9.9    5.32  )-----------( 211      ( 22 Jul 2023 05:45 )             30.1     07-22    136  |  99  |  14  ----------------------------<  124<H>  4.1   |  24  |  3.21<H>    Ca    9.2      22 Jul 2023 12:14  Phos  3.3     07-22  Mg     1.60     07-22    TPro  8.4<H>  /  Alb  3.3  /  TBili  0.2  /  DBili  x   /  AST  11  /  ALT  7   /  AlkPhos  88  07-22          Urinalysis Basic - ( 22 Jul 2023 12:14 )    Color: x / Appearance: x / SG: x / pH: x  Gluc: 124 mg/dL / Ketone: x  / Bili: x / Urobili: x   Blood: x / Protein: x / Nitrite: x   Leuk Esterase: x / RBC: x / WBC x   Sq Epi: x / Non Sq Epi: x / Bacteria: x          RADIOLOGY & ADDITIONAL TESTS:  Results Reviewed:   Imaging Personally Reviewed:  Electrocardiogram Personally Reviewed:    COORDINATION OF CARE:  Care Discussed with Consultants/Other Providers [Y/N]:  Prior or Outpatient Records Reviewed [Y/N]:   Kit Angel MD  Emergency Medicine & Internal Medicine PGY-2    PROGRESS NOTE:    ID #:     Patient is a 58y old  Female who presents with a chief complaint of pleurx catheter purulent drainage (22 Jul 2023 07:58)      MAJOR INTERVAL HOSPITAL EVENTS: NAEO    SUBJECTIVE / OVERNIGHT EVENTS: No acute overnight events. Patient endorses mild nausea without vomiting, declines antiemetics at this time. She says she is having some trouble eating/drinking. She denies other acute complaints including fever, chills, cp, sob, abd pain, flank pain, vomiting, dysuria, hematuria, difficulty urinating, diarrhea.      MEDICATIONS  (STANDING):  ascorbic acid 500 milliGRAM(s) Oral daily  chlorhexidine 2% Cloths 1 Application(s) Topical daily  cholecalciferol 2000 Unit(s) Oral daily  dextrose 5%. 1000 milliLiter(s) (100 mL/Hr) IV Continuous <Continuous>  dextrose 5%. 1000 milliLiter(s) (50 mL/Hr) IV Continuous <Continuous>  dextrose 50% Injectable 25 Gram(s) IV Push once  dextrose 50% Injectable 12.5 Gram(s) IV Push once  dextrose 50% Injectable 25 Gram(s) IV Push once  ferrous    sulfate 325 milliGRAM(s) Oral daily  glucagon  Injectable 1 milliGRAM(s) IntraMuscular once  heparin   Injectable 5000 Unit(s) SubCutaneous every 8 hours  insulin glargine Injectable (LANTUS) 16 Unit(s) SubCutaneous at bedtime  insulin lispro (ADMELOG) corrective regimen sliding scale   SubCutaneous three times a day before meals  insulin lispro (ADMELOG) corrective regimen sliding scale   SubCutaneous at bedtime  lactated ringers. 1000 milliLiter(s) (75 mL/Hr) IV Continuous <Continuous>  levoFLOXacin  Tablet 500 milliGRAM(s) Oral every 48 hours  omega-3-Acid Ethyl Esters 2 Gram(s) Oral two times a day    MEDICATIONS  (PRN):  acetaminophen     Tablet .. 650 milliGRAM(s) Oral every 6 hours PRN Temp greater or equal to 38C (100.4F), Mild Pain (1 - 3)  aluminum hydroxide/magnesium hydroxide/simethicone Suspension 30 milliLiter(s) Oral every 4 hours PRN Dyspepsia  dextrose Oral Gel 15 Gram(s) Oral once PRN Blood Glucose LESS THAN 70 milliGRAM(s)/deciliter  melatonin 3 milliGRAM(s) Oral at bedtime PRN Insomnia  ondansetron Injectable 4 milliGRAM(s) IV Push every 8 hours PRN Nausea and/or Vomiting      CAPILLARY BLOOD GLUCOSE      POCT Blood Glucose.: 94 mg/dL (22 Jul 2023 22:22)  POCT Blood Glucose.: 84 mg/dL (22 Jul 2023 17:50)  POCT Blood Glucose.: 127 mg/dL (22 Jul 2023 12:11)  POCT Blood Glucose.: 109 mg/dL (22 Jul 2023 08:39)    I&O's Summary    22 Jul 2023 07:01  -  23 Jul 2023 07:00  --------------------------------------------------------  IN: 1400 mL / OUT: 350 mL / NET: 1050 mL        PHYSICAL EXAM:  Vital Signs Last 24 Hrs  T(C): 36.7 (22 Jul 2023 21:19), Max: 36.7 (22 Jul 2023 15:02)  T(F): 98.1 (22 Jul 2023 21:19), Max: 98.1 (22 Jul 2023 15:02)  HR: 81 (22 Jul 2023 21:19) (81 - 88)  BP: 146/81 (22 Jul 2023 21:19) (146/81 - 154/94)  BP(mean): --  RR: 18 (22 Jul 2023 21:19) (18 - 18)  SpO2: 96% (22 Jul 2023 21:19) (96% - 96%)    Parameters below as of 22 Jul 2023 21:19  Patient On (Oxygen Delivery Method): room air        GENERAL: No acute distress, well-developed  NECK: Supple, no lymphadenopathy, no JVD  CHEST/LUNG: +bandage at previous pleurx site without purulence/bleeding/drainage and mild TTP, diminished lung sounds on the right  HEART: Regular rate and rhythm; Normal s1 and s2, No murmurs, rubs, or gallops  ABDOMEN: Soft, non-tender, non-distended; normal bowel sounds, no organomegaly, no CVAT  EXTREMITIES:  2+ peripheral pulses b/l, No clubbing, cyanosis, or edema  NEUROLOGY: A&O x 3, no focal deficits  SKIN: No rashes or lesion            LABS:                        9.9    5.32  )-----------( 211      ( 22 Jul 2023 05:45 )             30.1     07-22    136  |  99  |  14  ----------------------------<  124<H>  4.1   |  24  |  3.21<H>    Ca    9.2      22 Jul 2023 12:14  Phos  3.3     07-22  Mg     1.60     07-22    TPro  8.4<H>  /  Alb  3.3  /  TBili  0.2  /  DBili  x   /  AST  11  /  ALT  7   /  AlkPhos  88  07-22          Urinalysis Basic - ( 22 Jul 2023 12:14 )    Color: x / Appearance: x / SG: x / pH: x  Gluc: 124 mg/dL / Ketone: x  / Bili: x / Urobili: x   Blood: x / Protein: x / Nitrite: x   Leuk Esterase: x / RBC: x / WBC x   Sq Epi: x / Non Sq Epi: x / Bacteria: x          RADIOLOGY & ADDITIONAL TESTS:  Results Reviewed:   Imaging Personally Reviewed:  Electrocardiogram Personally Reviewed:    COORDINATION OF CARE:  Care Discussed with Consultants/Other Providers [Y/N]:  Prior or Outpatient Records Reviewed [Y/N]:

## 2023-07-23 NOTE — CONSULT NOTE ADULT - REASON FOR ADMISSION
Pleurx issue
pleurx catheter purulent drainage

## 2023-07-23 NOTE — PROGRESS NOTE ADULT - PROBLEM SELECTOR PLAN 1
Patient demonstrate acute rising in Cr from 0.82, 1.39, 2.94. FENa suggesting prerenal etiology. Patient does not appear volume down or overloaded. possibly pseudo MARIANA 2/2 vanc/zoyn  - US Kidney bladder  - LR at 75/hr x 24hr  --> renal consult Patient demonstrate acute rising in Cr from 0.82, 1.39, 2.94. FENa suggesting prerenal etiology. Patient does not appear volume down or overloaded. possibly pseudo MARIANA 2/2 vanc/zoyn  - US Kidney bladder  - LR at 75/hr x 24hr  --> renal consult: appreciate recs Patient demonstrate acute rising in Cr from 0.82, 1.39, 2.94. FENa suggesting prerenal etiology. Patient does not appear volume down or overloaded. possibly pseudo MARIANA 2/2 vanc/zoyn  - US Kidney bladder showing: An approximately 2 x 2.6 x 1.7 cm hypoechoic focus adjacent to the left upper pole may represent perinephric or perisplenic fluid  - LR at 75/hr x 24hr  - renal consult: rec CTAP iso perinephric vs perisplenic fluid on US

## 2023-07-23 NOTE — CONSULT NOTE ADULT - ATTENDING COMMENTS
58F asthma, ovarian ca on chemo c/b malignant pleural effusion s/p pleurx presenting with purulent drainage from insertion site. CLinically has no sx except slight tenderness at catheter site.  Concern for catheter site infection and plan for removal tomorrow. Start empiric broad spectrum abx. f/u cx.
agree with above
MARIANA    Plan as outlined in fellows note above  Cont to monitor labs and Is/Os

## 2023-07-23 NOTE — PROGRESS NOTE ADULT - ASSESSMENT
57 y/o female PMH diabetes, hypertension, asthma (no history of intubations), ovarian cancer (on chemo, last chemo session was 3 weeks ago) w/ peritoneal carcinomatosis and c/b malignant pleural effusion s/p pleurx catheter p/w drainage from around and inside pleurx c/f skin/soft tissue infection vs empyema. Found to have rising Cr c/f MARIANA 57 y/o female PMH diabetes, hypertension, asthma (no history of intubations), ovarian cancer (on chemo, last chemo session was 3 weeks ago) w/ peritoneal carcinomatosis and c/b malignant pleural effusion s/p pleurx catheter p/w drainage from around and inside pleurx c/f skin/soft tissue infection vs empyema. Found to have rising Cr c/f MARIANA, nephro consulted

## 2023-07-23 NOTE — CONSULT NOTE ADULT - CONSULT REASON
Chauncey Burroughs is a 60 year old male presenting for   Chief Complaint   Patient presents with   • Consultation     essential HTN, uncontrolled HTN     Denies Latex allergy or sensitivity.    Medication verified and med list updated  Refills needed today: No    BP is elevated; will update Dr. Hernandez.   
right pleurx removal.
Pleurx issue
MARIANA
[place]

## 2023-07-23 NOTE — PROGRESS NOTE ADULT - ATTENDING COMMENTS
57 y/o female PMH diabetes, hypertension, asthma (no history of intubations), ovarian cancer (on active treatement, last 3 weeks ago) w/ peritoneal carcinomatosis and c/b malignant pleural effusion s/p pleurx catheter p/w drainage from around and inside pleurx c/f skin/soft tissue infection. Now s/p pleurx removal on 7/19. Hospital course is c/b worsening MARIANA.    MARIANA: -pt with Cr increase from 0.2 to 1.4 to now 2.9 to 3.21  -FENA consistent with pre-renal etiology, however, worsening despite IVF administration  -doubt carboplatin nephrotoxicity given last administration was 3 weeks ago  -renal US no hydro   -u/a bland, less suspicion for AIN at this time (switched from Zosyn to Levaquin)  -continue IVF    #Infection of pleurx catheter: cellulitis vs less likely pleural space infection  -s/p pleurx removal  -currently on broad spectrum antibiotics with Zosyn. Plan to switch to Levaquin, as above  -per pulm plan on antibiotics course of 4 weeks duration  -CT imaging with consolidations concerning for PNA; however, does not appear consistent with clinical picture  -MRSA swab negative; no need to continue Vanco      #Ovarian cancer:   -no plans for inpatient chemo . 57 y/o female PMH diabetes, hypertension, asthma (no history of intubations), ovarian cancer (on active treatement, last 3 weeks ago) w/ peritoneal carcinomatosis and c/b malignant pleural effusion s/p pleurx catheter p/w drainage from around and inside pleurx c/f skin/soft tissue infection. Now s/p pleurx removal on 7/19. Hospital course is c/b worsening MARIANA.    MARIANA: -pt with Cr increase from 0.2 to 1.4 to now 2.9 to 3.21  -FENA consistent with pre-renal etiology, however, worsening despite IVF administration  -doubt carboplatin nephrotoxicity given last administration was 3 weeks ago  -renal US no hydro   -u/a bland, less suspicion for AIN at this time (switched from Zosyn to Levaquin)  -continue IVF  --> appreciate renal eval, likely ATN, check noncontrast CT abdomen...    #Infection of pleurx catheter: cellulitis vs less likely pleural space infection  -s/p pleurx removal  -currently on broad spectrum antibiotics with Zosyn. Plan to switch to Levaquin, as above  -per pulm plan on antibiotics course of 4 weeks duration  -CT imaging with consolidations concerning for PNA; however, does not appear consistent with clinical picture  -MRSA swab negative; no need to continue Vanco    #Ovarian cancer:   -no plans for inpatient chemo .

## 2023-07-23 NOTE — CONSULT NOTE ADULT - PROBLEM SELECTOR RECOMMENDATION 9
Pt. with MARIANA in the setting of recent purulent drainage from PleurX. PleurX catheter removed on 7/19. On review of [place], pt. was admitted with normal Scr level of 0.78 (7/23) and has progressively increased to Scr of 3.31 today (7/23). Pt. with likely ATN. Agree with IVF. Recommend CT abd to evaluate fluid collection seen on renal US from 7/21 (perinephric vs perisplenic). Monitor labs and urine output. Avoid nephrotoxins. Dose medications as per eGFR.    If you have any questions, please feel free to contact me.  Emmett Guo  Nephrology Fellow  [id number] / [phone] / [employer] Teams (Preferred)  (After 4pm or on weekends, please call the on-call Fellow)

## 2023-07-23 NOTE — CONSULT NOTE ADULT - SUBJECTIVE AND OBJECTIVE BOX
PULMONARY CONSULTATION NOTE    NAME: ALO RAMIREZ  MEDICAL RECORD NUMBER: [id number]    CHIEF COMPLAINT: Patient is a 58y old  Female who presents with a chief complaint of     HISTORY OF PRESENT ILLNESS:   58-year-old female with past medical history of diabetes, hypertension, asthma (no history of intubations), ovarian cancer (on chemo, last chemo session was 3 weeks ago) c/b malignant pleural effusion s/p pleurx catheter 1 month ago now presenting with purulent discharge from around and in her Pleurx catheter today.  Patient states that she was going to her chemo appointment when her team there noticed that there was what appears to be purulent fluid in the catheter.  She reports that the frequency and amount of drainage has been decreasing over last 2 weeks and team was evaluating for removing the catheter.      ====================BACKGROUND INFORMATION====================    FAMILY HISTORY: FAMILY HISTORY:  Family history of colon cancer in mother (Mother)    Type 2 diabetes mellitus (Father)    Family history of MI (myocardial infarction) (Father)        PAST MEDICAL AND SURGICAL HISTORY: PAST MEDICAL & SURGICAL HISTORY:  HTN (hypertension)      Type 2 diabetes mellitus      Hyperlipidemia  (diet control)      Asthma      Morbid obesity with body mass index (BMI) of 40.0 to 44.9 in adult      Abnormal uterine and vaginal bleeding, unspecified      Hair loss      Obesity      Magnesium deficiency      Malignant pleural effusion      Osteopenia      Knee pain, left      History of hay fever      S/P breast biopsy, left      History of myomectomy          ALLERGIES:Allergies    Proventil HFA (Hives)  latex (Hives)    Intolerances        HOME MEDICATIONS:     OUTPATIENT PULMONARY DOCTOR: Goran    PFTs:    SOCIAL HISTORY:  TOBACCO USE: never  ALCOHOL:  DRUGS:  MARITAL STATUS:  EMPLOYMENT:  EXPOSURES:  RECENT TRAVELS:  PETS:  CODE STATUS:    ====================REVIEW OF SYSTEMS===============================  CONSTITUTIONAL: neg  CARDIOVASCULAR: neg  PULMONARY: neg  GASTROINTESTINAL: neg  [x] ALL OTHER REVIEW OF SYSTEMS ARE NEGATIVE   [] UNABLE TO OBTAIN REVIEW OF SYSTEMS DUE TO ______________      ====================PHYSICAL EXAM==========================    VITALS: ICU Vital Signs Last 24 Hrs  T(C): 37.3 (18 Jul 2023 16:15), Max: 37.3 (18 Jul 2023 16:15)  T(F): 99.1 (18 Jul 2023 16:15), Max: 99.1 (18 Jul 2023 16:15)  HR: 87 (18 Jul 2023 16:15) (87 - 99)  BP: 123/67 (18 Jul 2023 16:15) (123/67 - 148/92)  BP(mean): --  ABP: --  ABP(mean): --  RR: 16 (18 Jul 2023 16:15) (16 - 16)  SpO2: 99% (18 Jul 2023 16:15) (98% - 99%)    O2 Parameters below as of 18 Jul 2023 16:15  Patient On (Oxygen Delivery Method): room air            INTAKE and OUTPUT: I&O's Summary      WEIGHT: Daily Height in cm: 157.48 (18 Jul 2023 11:02)    Daily     GLUCOSE: CAPILLARY BLOOD GLUCOSE      POCT Blood Glucose.: 136 mg/dL (18 Jul 2023 16:36)      VENTILATOR SETTINGS:     Physical Exam  CONST:     NAD, well-appearing; well-developed; appears stated age  RESP :        Normal respiratory effort; CTA bilaterally; no W/R/R  CHEST:       No TTP, pleurx site without tenderness or induration. needle insertion site (not the part of the tunnel that catheter protrudes from) with purulent drainage when expressed. no purulence noted at tunnel exit site. pleurx tubing with pus and blood draining when hooked up to pleurx cannister ~10cc.  CARDIO:     RRR, normal S1 and S2, no M/R/G; apical impulse at MCL  ABD:           Soft, NT/ND, norm bowel sounds; no R/G; No HSM; No CVAT  PSYCH        A&O to person, place, and time; affect appropriate  NEURO:     Non-focal; no gross sensory deficits; moving all extremities   SKIN:          No rashes; no palpable lesions; axillae not dry    Vital Signs Last 24 Hrs  T(F): 99.1, Max: 99.1 (07-18-23 @ 16:15)  HR: 87 (87 - 99)  BP: 123/67 (123/67 - 148/92)  RR: 16 (16 - 16)  SpO2: 99% (98% - 99%)      ====================MEDICATIONS==============================  MEDICATIONS  (STANDING):      MEDICATIONS  (PRN):      ====================LABORATORY RESULTS===================  CBC Full  -  ( 18 Jul 2023 12:33 )  WBC Count : 5.40 K/uL  RBC Count : 4.02 M/uL  Hemoglobin : 10.2 g/dL  Hematocrit : 31.7 %  Platelet Count - Automated : 199 K/uL  Mean Cell Volume : 78.9 fL  Mean Cell Hemoglobin : 25.4 pg  Mean Cell Hemoglobin Concentration : 32.2 gm/dL  Auto Neutrophil # : 3.86 K/uL  Auto Lymphocyte # : 1.10 K/uL  Auto Monocyte # : 0.37 K/uL  Auto Eosinophil # : 0.01 K/uL  Auto Basophil # : 0.01 K/uL  Auto Neutrophil % : 71.4 %  Auto Lymphocyte % : 20.4 %  Auto Monocyte % : 6.9 %  Auto Eosinophil % : 0.2 %  Auto Basophil % : 0.2 %                                    07-18    138  |  100  |  8   ----------------------------<  132<H>  3.5   |  27  |  0.78    Ca    9.4      18 Jul 2023 12:33    TPro  9.0<H>  /  Alb  3.3  /  TBili  0.2  /  DBili  x   /  AST  13  /  ALT  6   /  AlkPhos  91  07-18        PT/INR - ( 18 Jul 2023 12:33 )   PT: 14.1 sec;   INR: 1.21 ratio         PTT - ( 18 Jul 2023 12:33 )  PTT:30.2 sec    Creatinine Trend: 0.78<--      =============RADIOLOGY and ECHOCARDIOGRAPHY==================    CXR:    CT CHEST:      ECHOCARDIOGRAPHY:      POINT OF CARE ULTRASOUND:
INTERVENTIONAL PULMONARY SERVICE INITIAL CONSULT NOTE    HPI:  59 y/o female PMH diabetes, hypertension, asthma (no history of intubations), ovarian cancer (on chemo, last chemo session was 3 weeks ago) w/ peritoneal carcinomatosis and c/b malignant pleural effusion s/p pleurx catheter 1 month ago now presenting with purulent discharge from around and in her Pleurx catheter.  Patient states that she was going to her chemo appointment when her team there noticed that there was what appears to be purulent fluid in the catheter.  She reports that the frequency and amount of drainage has been decreasing over last 2 weeks. She is draining the pleurx once a week with minimal output. She states that the drainage stops without any associated chest pain or discomfort. She has mild pain around the right pleurx catheter site but does not have any cough, fever, chills.      REVIEW OF SYSTEMS:  All additional ROS negative.    PAST MEDICAL & SURGICAL HISTORY:  HTN (hypertension)      Type 2 diabetes mellitus      Hyperlipidemia  (diet control)      Asthma      Morbid obesity with body mass index (BMI) of 40.0 to 44.9 in adult      Abnormal uterine and vaginal bleeding, unspecified      Hair loss      Obesity      Magnesium deficiency      Malignant pleural effusion      Osteopenia      Knee pain, left      History of hay fever      S/P breast biopsy, left      History of myomectomy          FAMILY HISTORY:  Family history of colon cancer in mother (Mother)    Type 2 diabetes mellitus (Father)    Family history of MI (myocardial infarction) (Father)         MEDICATIONS:  Pulmonary:    Antimicrobials:  piperacillin/tazobactam IVPB.. 3.375 Gram(s) IV Intermittent every 8 hours  vancomycin  IVPB 1250 milliGRAM(s) IV Intermittent every 12 hours    Anticoagulants:    Onc:    GI/:  aluminum hydroxide/magnesium hydroxide/simethicone Suspension 30 milliLiter(s) Oral every 4 hours PRN    Endocrine:  dextrose 50% Injectable 25 Gram(s) IV Push once  dextrose 50% Injectable 12.5 Gram(s) IV Push once  dextrose 50% Injectable 25 Gram(s) IV Push once  dextrose Oral Gel 15 Gram(s) Oral once PRN  glucagon  Injectable 1 milliGRAM(s) IntraMuscular once  insulin glargine Injectable (LANTUS) 8 Unit(s) SubCutaneous at bedtime  insulin lispro (ADMELOG) corrective regimen sliding scale   SubCutaneous every 6 hours    Cardiac:    Other Medications:  acetaminophen     Tablet .. 650 milliGRAM(s) Oral every 6 hours PRN  ascorbic acid 500 milliGRAM(s) Oral daily  cholecalciferol 2000 Unit(s) Oral daily  dextrose 5%. 1000 milliLiter(s) IV Continuous <Continuous>  dextrose 5%. 1000 milliLiter(s) IV Continuous <Continuous>  ferrous    sulfate 325 milliGRAM(s) Oral daily  melatonin 3 milliGRAM(s) Oral at bedtime PRN  omega-3-Acid Ethyl Esters 2 Gram(s) Oral two times a day  ondansetron Injectable 4 milliGRAM(s) IV Push every 8 hours PRN      Allergies    Proventil HFA (Hives)  latex (Hives)    Intolerances        PHYSICAL EXAM  Vital Signs Last 24 Hrs  T(C): 36.8 (18 Jul 2023 21:15), Max: 37.3 (18 Jul 2023 16:15)  T(F): 98.3 (18 Jul 2023 21:15), Max: 99.1 (18 Jul 2023 16:15)  HR: 84 (18 Jul 2023 21:15) (84 - 99)  BP: 125/70 (18 Jul 2023 21:15) (123/67 - 148/92)  BP(mean): --  RR: 18 (18 Jul 2023 21:15) (16 - 18)  SpO2: 97% (18 Jul 2023 21:15) (97% - 99%)    Parameters below as of 18 Jul 2023 21:15  Patient On (Oxygen Delivery Method): room air            CONSTITUTIONAL: No acute distress.   HEENT:  Conjunctiva clear B/L.  Moist oral mucosa.   Cardiovascular: RRR with no murmurs. No JVD noted. No lower extremity edema B/L. Extremities are warm and well perfused.    Respiratory: Dec bs on the right. No wrr. No accessory muscle use. Pleurovac on -10 cm H2O.  Gastrointestinal:  Soft, nontender. Non-distended. Non-rigid.    Neurologic:  Alert and awake. Moving all extremities. Following commands.    Skin:  Tenderness and mild erythema at right pleurx catheter site.       LABS:      CBC Full  -  ( 19 Jul 2023 03:45 )  WBC Count : 4.39 K/uL  RBC Count : 3.41 M/uL  Hemoglobin : 8.8 g/dL  Hematocrit : 27.0 %  Platelet Count - Automated : 159 K/uL  Mean Cell Volume : 79.2 fL  Mean Cell Hemoglobin : 25.8 pg  Mean Cell Hemoglobin Concentration : 32.6 gm/dL  Auto Neutrophil # : x  Auto Lymphocyte # : x  Auto Monocyte # : x  Auto Eosinophil # : x  Auto Basophil # : x  Auto Neutrophil % : x  Auto Lymphocyte % : x  Auto Monocyte % : x  Auto Eosinophil % : x  Auto Basophil % : x    07-19    137  |  101  |  8   ----------------------------<  124<H>  3.7   |  24  |  0.82    Ca    9.0      19 Jul 2023 03:45  Phos  3.2     07-19  Mg     1.60     07-19    TPro  7.9  /  Alb  2.9<L>  /  TBili  0.3  /  DBili  x   /  AST  11  /  ALT  7   /  AlkPhos  79  07-19    PT/INR - ( 19 Jul 2023 03:45 )   PT: 14.6 sec;   INR: 1.26 ratio         PTT - ( 19 Jul 2023 03:45 )  PTT:30.9 sec      Urinalysis Basic - ( 19 Jul 2023 03:45 )    Color: x / Appearance: x / SG: x / pH: x  Gluc: 124 mg/dL / Ketone: x  / Bili: x / Urobili: x   Blood: x / Protein: x / Nitrite: x   Leuk Esterase: x / RBC: x / WBC x   Sq Epi: x / Non Sq Epi: x / Bacteria: x                RADIOLOGY & ADDITIONAL STUDIES:  CXR reviewed.  
[facility] DIVISION OF KIDNEY DISEASES AND HYPERTENSION -- [phone]  -- INITIAL CONSULT NOTE  --------------------------------------------------------------------------------  HPI: 58F w/ PMH of HTN, HLD, DM2, asthma, ovarian cancer w/ peritoneal carcinomatosis complicated by a malignant pleural effusion (PluerX placed 1 month prior)) currently undergoing chemotherapy presented to the ED complaining of purulent discharge from her PleurX catheter. PleurX catheter removed by Pulm on 7/19. Nephrology consulted for MARIANA.     On review of [place], pt. was admitted with normal Scr level of 0.78 (7/23) and has progressively increased to Scr of 3.31 (7/23).     Pt. was seen and evaluated at the bedside this AM. Feels well and complained of an episode of diarrhea overnight and some mild leg swelling. Otherwise denies fevers/chills, headaches, SOB, chest pain, abdominal pain, or difficulty with urination.     PAST HISTORY  --------------------------------------------------------------------------------  PAST MEDICAL & SURGICAL HISTORY:  HTN (hypertension)  Type 2 diabetes mellitus  Hyperlipidemia  (diet control)  Asthma  Morbid obesity with body mass index (BMI) of 40.0 to 44.9 in adult  Abnormal uterine and vaginal bleeding, unspecified  Hair loss  Obesity  Magnesium deficiency  Malignant pleural effusion  Osteopenia  Knee pain, left  History of hay fever  S/P breast biopsy, left  History of myomectomy    FAMILY HISTORY:  Family history of colon cancer in mother (Mother)  Type 2 diabetes mellitus (Father)  Family history of MI (myocardial infarction) (Father)    PAST SOCIAL HISTORY:  Gnosticist, does not accept blood products   Independent of ADLs (18 Jul 2023 22:44)    ALLERGIES & MEDICATIONS  --------------------------------------------------------------------------------  Allergies  Proventil HFA (Hives)  latex (Hives)    Intolerances    Standing Inpatient Medicationsascorbic acid 500 milliGRAM(s) Oral daily  chlorhexidine 2% Cloths 1 Application(s) Topical daily  cholecalciferol 2000 Unit(s) Oral daily  dextrose 5%. 1000 milliLiter(s) IV Continuous <Continuous>  dextrose 5%. 1000 milliLiter(s) IV Continuous <Continuous>  dextrose 50% Injectable 25 Gram(s) IV Push once  dextrose 50% Injectable 12.5 Gram(s) IV Push once  dextrose 50% Injectable 25 Gram(s) IV Push once  ferrous    sulfate 325 milliGRAM(s) Oral daily  glucagon  Injectable 1 milliGRAM(s) IntraMuscular once  heparin   Injectable 5000 Unit(s) SubCutaneous every 8 hours  insulin glargine Injectable (LANTUS) 16 Unit(s) SubCutaneous at bedtime  insulin lispro (ADMELOG) corrective regimen sliding scale   SubCutaneous three times a day before meals  insulin lispro (ADMELOG) corrective regimen sliding scale   SubCutaneous at bedtime  lactated ringers. 1000 milliLiter(s) IV Continuous <Continuous>  levoFLOXacin  Tablet 500 milliGRAM(s) Oral every 48 hours  omega-3-Acid Ethyl Esters 2 Gram(s) Oral two times a day    PRN Inpatient Medicationsacetaminophen     Tablet .. 650 milliGRAM(s) Oral every 6 hours PRN  aluminum hydroxide/magnesium hydroxide/simethicone Suspension 30 milliLiter(s) Oral every 4 hours PRN  dextrose Oral Gel 15 Gram(s) Oral once PRN  melatonin 3 milliGRAM(s) Oral at bedtime PRN  ondansetron Injectable 4 milliGRAM(s) IV Push every 8 hours PRN    REVIEW OF SYSTEMS  --------------------------------------------------------------------------------  Gen: No fevers/chills  Skin: No rashes  Head/Eyes/Ears: No HA  Respiratory: No dyspnea, cough  CV: No chest pain  GI: No abdominal pain, +diarrhea  : No dysuria, hematuria  MSK: mild leg swelling    All other systems were reviewed and are negative, except as noted.    VITALS/PHYSICAL EXAM  --------------------------------------------------------------------------------  T(C): 36.7 (07-23-23 @ 05:25), Max: 36.7 (07-22-23 @ 15:02)  HR: 77 (07-23-23 @ 05:25) (77 - 88)  BP: 153/81 (07-23-23 @ 05:25) (146/81 - 154/94)  RR: 17 (07-23-23 @ 05:25) (17 - 18)  SpO2: 100% (07-23-23 @ 05:25) (96% - 100%)  Wt(kg): --    07-22-23 @ 07:01  -  07-23-23 @ 07:00  --------------------------------------------------------  IN: 2540 mL / OUT: 350 mL / NET: 2190 mL    Physical Exam:  Gen: NAD  HEENT: MMM  Pulm: CTA B/L  CV: S1S2  Abd: Soft, +BS, no tenderness to palpation  Ext: trace B/L LE edema  Neuro: Awake  Skin: Warm and dry    LABS/STUDIES  --------------------------------------------------------------------------------              10.4   4.23  >-----------<  216      [07-23-23 @ 08:44]              32.3     140  |  101  |  14  ----------------------------<  94      [07-23-23 @ 08:44]  4.0   |  24  |  3.31        Ca     9.5     [07-23-23 @ 08:44]      Mg     1.60     [07-23-23 @ 08:44]      Phos  3.2     [07-23-23 @ 08:44]    TPro  8.4  /  Alb  3.3  /  TBili  0.2  /  DBili  x   /  AST  14  /  ALT  7   /  AlkPhos  89  [07-23-23 @ 08:44]    Creatinine Trend:  SCr 3.31 [07-23 @ 08:44]  SCr 3.21 [07-22 @ 12:14]  SCr 2.94 [07-21 @ 11:54]  SCr 1.39 [07-20 @ 05:11]  SCr 0.82 [07-19 @ 03:45]    Urine Creatinine 94      [07-22-23 @ 16:01]  Urine Protein 20      [07-20-23 @ 13:30]  Urine Sodium 49      [07-22-23 @ 16:01]  Urine Urea Nitrogen 227.0      [07-22-23 @ 16:01]  Urine Potassium 28.8      [07-20-23 @ 13:30]  Urine Osmolality 232      [07-22-23 @ 16:01]

## 2023-07-24 ENCOUNTER — APPOINTMENT (OUTPATIENT)
Dept: RADIOLOGY | Facility: CLINIC | Age: 58
End: 2023-07-24

## 2023-07-24 ENCOUNTER — APPOINTMENT (OUTPATIENT)
Dept: MAMMOGRAPHY | Facility: CLINIC | Age: 58
End: 2023-07-24

## 2023-07-24 DIAGNOSIS — N17.9 ACUTE KIDNEY FAILURE, UNSPECIFIED: ICD-10-CM

## 2023-07-24 DIAGNOSIS — Z01.818 ENCOUNTER FOR OTHER PREPROCEDURAL EXAMINATION: ICD-10-CM

## 2023-07-24 DIAGNOSIS — J90 PLEURAL EFFUSION, NOT ELSEWHERE CLASSIFIED: ICD-10-CM

## 2023-07-24 DIAGNOSIS — I10 ESSENTIAL (PRIMARY) HYPERTENSION: ICD-10-CM

## 2023-07-24 DIAGNOSIS — Z29.9 ENCOUNTER FOR PROPHYLACTIC MEASURES, UNSPECIFIED: ICD-10-CM

## 2023-07-24 DIAGNOSIS — E11.9 TYPE 2 DIABETES MELLITUS WITHOUT COMPLICATIONS: ICD-10-CM

## 2023-07-24 DIAGNOSIS — C56.9 MALIGNANT NEOPLASM OF UNSPECIFIED OVARY: ICD-10-CM

## 2023-07-24 LAB
ALBUMIN SERPL ELPH-MCNC: 2.6 G/DL — LOW (ref 3.3–5)
ALP SERPL-CCNC: 72 U/L — SIGNIFICANT CHANGE UP (ref 40–120)
ALT FLD-CCNC: 6 U/L — SIGNIFICANT CHANGE UP (ref 4–33)
ANION GAP SERPL CALC-SCNC: 13 MMOL/L — SIGNIFICANT CHANGE UP (ref 7–14)
AST SERPL-CCNC: 12 U/L — SIGNIFICANT CHANGE UP (ref 4–32)
BILIRUB SERPL-MCNC: 0.2 MG/DL — SIGNIFICANT CHANGE UP (ref 0.2–1.2)
BUN SERPL-MCNC: 13 MG/DL — SIGNIFICANT CHANGE UP (ref 7–23)
CALCIUM SERPL-MCNC: 9 MG/DL — SIGNIFICANT CHANGE UP (ref 8.4–10.5)
CHLORIDE SERPL-SCNC: 102 MMOL/L — SIGNIFICANT CHANGE UP (ref 98–107)
CO2 SERPL-SCNC: 24 MMOL/L — SIGNIFICANT CHANGE UP (ref 22–31)
CREAT SERPL-MCNC: 3.3 MG/DL — HIGH (ref 0.5–1.3)
CULTURE RESULTS: SIGNIFICANT CHANGE UP
EGFR: 15 ML/MIN/1.73M2 — LOW
GLUCOSE BLDC GLUCOMTR-MCNC: 120 MG/DL — HIGH (ref 70–99)
GLUCOSE BLDC GLUCOMTR-MCNC: 133 MG/DL — HIGH (ref 70–99)
GLUCOSE BLDC GLUCOMTR-MCNC: 93 MG/DL — SIGNIFICANT CHANGE UP (ref 70–99)
GLUCOSE BLDC GLUCOMTR-MCNC: 93 MG/DL — SIGNIFICANT CHANGE UP (ref 70–99)
GLUCOSE SERPL-MCNC: 98 MG/DL — SIGNIFICANT CHANGE UP (ref 70–99)
HCT VFR BLD CALC: 28 % — LOW (ref 34.5–45)
HGB BLD-MCNC: 9.1 G/DL — LOW (ref 11.5–15.5)
MAGNESIUM SERPL-MCNC: 1.6 MG/DL — SIGNIFICANT CHANGE UP (ref 1.6–2.6)
MCHC RBC-ENTMCNC: 26.1 PG — LOW (ref 27–34)
MCHC RBC-ENTMCNC: 32.5 GM/DL — SIGNIFICANT CHANGE UP (ref 32–36)
MCV RBC AUTO: 80.5 FL — SIGNIFICANT CHANGE UP (ref 80–100)
NRBC # BLD: 0 /100 WBCS — SIGNIFICANT CHANGE UP (ref 0–0)
NRBC # FLD: 0 K/UL — SIGNIFICANT CHANGE UP (ref 0–0)
PHOSPHATE SERPL-MCNC: 3.1 MG/DL — SIGNIFICANT CHANGE UP (ref 2.5–4.5)
PLATELET # BLD AUTO: 203 K/UL — SIGNIFICANT CHANGE UP (ref 150–400)
POTASSIUM SERPL-MCNC: 4 MMOL/L — SIGNIFICANT CHANGE UP (ref 3.5–5.3)
POTASSIUM SERPL-SCNC: 4 MMOL/L — SIGNIFICANT CHANGE UP (ref 3.5–5.3)
PROT SERPL-MCNC: 7.5 G/DL — SIGNIFICANT CHANGE UP (ref 6–8.3)
RBC # BLD: 3.48 M/UL — LOW (ref 3.8–5.2)
RBC # FLD: 14 % — SIGNIFICANT CHANGE UP (ref 10.3–14.5)
SODIUM SERPL-SCNC: 139 MMOL/L — SIGNIFICANT CHANGE UP (ref 135–145)
SPECIMEN SOURCE: SIGNIFICANT CHANGE UP
WBC # BLD: 4.23 K/UL — SIGNIFICANT CHANGE UP (ref 3.8–10.5)
WBC # FLD AUTO: 4.23 K/UL — SIGNIFICANT CHANGE UP (ref 3.8–10.5)

## 2023-07-24 PROCEDURE — 99233 SBSQ HOSP IP/OBS HIGH 50: CPT | Mod: GC

## 2023-07-24 PROCEDURE — 74176 CT ABD & PELVIS W/O CONTRAST: CPT | Mod: 26

## 2023-07-24 RX ORDER — MUPIROCIN 20 MG/G
1 OINTMENT TOPICAL
Refills: 0 | Status: DISCONTINUED | OUTPATIENT
Start: 2023-07-24 | End: 2023-07-24

## 2023-07-24 RX ORDER — MUPIROCIN 20 MG/G
1 OINTMENT TOPICAL
Refills: 0 | Status: DISCONTINUED | OUTPATIENT
Start: 2023-07-24 | End: 2023-07-25

## 2023-07-24 RX ADMIN — HEPARIN SODIUM 5000 UNIT(S): 5000 INJECTION INTRAVENOUS; SUBCUTANEOUS at 13:15

## 2023-07-24 RX ADMIN — HEPARIN SODIUM 5000 UNIT(S): 5000 INJECTION INTRAVENOUS; SUBCUTANEOUS at 21:46

## 2023-07-24 RX ADMIN — Medication 2000 UNIT(S): at 12:18

## 2023-07-24 RX ADMIN — CHLORHEXIDINE GLUCONATE 1 APPLICATION(S): 213 SOLUTION TOPICAL at 12:18

## 2023-07-24 RX ADMIN — Medication 2 GRAM(S): at 17:22

## 2023-07-24 RX ADMIN — Medication 2 GRAM(S): at 09:11

## 2023-07-24 RX ADMIN — MUPIROCIN 1 APPLICATION(S): 20 OINTMENT TOPICAL at 17:24

## 2023-07-24 RX ADMIN — INSULIN GLARGINE 16 UNIT(S): 100 INJECTION, SOLUTION SUBCUTANEOUS at 22:26

## 2023-07-24 RX ADMIN — Medication 500 MILLIGRAM(S): at 12:17

## 2023-07-24 RX ADMIN — SODIUM CHLORIDE 75 MILLILITER(S): 9 INJECTION, SOLUTION INTRAVENOUS at 13:15

## 2023-07-24 RX ADMIN — Medication 325 MILLIGRAM(S): at 12:18

## 2023-07-24 RX ADMIN — SODIUM CHLORIDE 75 MILLILITER(S): 9 INJECTION, SOLUTION INTRAVENOUS at 06:23

## 2023-07-24 RX ADMIN — HEPARIN SODIUM 5000 UNIT(S): 5000 INJECTION INTRAVENOUS; SUBCUTANEOUS at 06:18

## 2023-07-24 NOTE — PROGRESS NOTE ADULT - PROBLEM SELECTOR PLAN 4
-recently taken off medications. Trend BP c/b peritoneal mets and malignant pleural effusion. On active chemo, x1 treatment  -output Onc f/u.

## 2023-07-24 NOTE — PROGRESS NOTE ADULT - ASSESSMENT
59 y/o female PMH diabetes, hypertension, asthma (no history of intubations), ovarian cancer (on chemo, last chemo session was 3 weeks ago) w/ peritoneal carcinomatosis and c/b malignant pleural effusion s/p pleurx catheter p/w drainage from around and inside pleurx c/f skin/soft tissue infection vs empyema  57 y/o female PMH diabetes, hypertension, asthma (no history of intubations), ovarian cancer (on chemo, last chemo session was 3 weeks ago) w/ peritoneal carcinomatosis and c/b malignant pleural effusion s/p pleurx catheter p/w drainage from around and inside pleurx c/f skin/soft tissue infection vs empyema  57 y/o female PMH diabetes, hypertension, asthma (no history of intubations), ovarian cancer (on chemo, last chemo session was 3 weeks ago) w/ peritoneal carcinomatosis and c/b malignant pleural effusion s/p pleurx catheter p/w drainage from around and inside pleurx c/f skin/soft tissue infection vs empyema. Found to have rising Cr c/f MARIANA, nephro consulted

## 2023-07-24 NOTE — PROGRESS NOTE ADULT - SUBJECTIVE AND OBJECTIVE BOX
[facility] Division of Kidney Diseases & Hypertension  FOLLOW UP NOTE  [phone]--------------------------------------------------------------------------------    HPI: 58F w/ PMH of HTN, HLD, DM2, asthma, ovarian cancer w/ peritoneal carcinomatosis complicated by a malignant pleural effusion (PluerX placed 1 month prior)) currently undergoing chemotherapy presented to the ED complaining of purulent discharge from her PleurX catheter. PleurX catheter removed by Pulm on 7/19. Nephrology consulted for MARIANA.     24 hour events/subjective: Pt. was seen and evaluated at the bedside this AM. Feels well and had no complaints overnight. Otherwise denies fevers/chills, headaches, SOB, chest pain, abdominal pain, or difficulty with urination.     PAST HISTORY  --------------------------------------------------------------------------------  No significant changes to PMH, PSH, FHx, SHx, unless otherwise noted    ALLERGIES & MEDICATIONS  --------------------------------------------------------------------------------  Allergies  Proventil HFA (Hives)  latex (Hives)    Intolerances    Standing Inpatient Medications  ascorbic acid 500 milliGRAM(s) Oral daily  chlorhexidine 2% Cloths 1 Application(s) Topical daily  cholecalciferol 2000 Unit(s) Oral daily  dextrose 5%. 1000 milliLiter(s) IV Continuous <Continuous>  dextrose 5%. 1000 milliLiter(s) IV Continuous <Continuous>  dextrose 50% Injectable 25 Gram(s) IV Push once  dextrose 50% Injectable 12.5 Gram(s) IV Push once  dextrose 50% Injectable 25 Gram(s) IV Push once  ferrous    sulfate 325 milliGRAM(s) Oral daily  glucagon  Injectable 1 milliGRAM(s) IntraMuscular once  heparin   Injectable 5000 Unit(s) SubCutaneous every 8 hours  insulin glargine Injectable (LANTUS) 16 Unit(s) SubCutaneous at bedtime  insulin lispro (ADMELOG) corrective regimen sliding scale   SubCutaneous three times a day before meals  insulin lispro (ADMELOG) corrective regimen sliding scale   SubCutaneous at bedtime  lactated ringers. 1000 milliLiter(s) IV Continuous <Continuous>  levoFLOXacin  Tablet 500 milliGRAM(s) Oral every 48 hours  omega-3-Acid Ethyl Esters 2 Gram(s) Oral two times a day    PRN Inpatient Medications  acetaminophen     Tablet .. 650 milliGRAM(s) Oral every 6 hours PRN  aluminum hydroxide/magnesium hydroxide/simethicone Suspension 30 milliLiter(s) Oral every 4 hours PRN  dextrose Oral Gel 15 Gram(s) Oral once PRN  melatonin 3 milliGRAM(s) Oral at bedtime PRN  ondansetron Injectable 4 milliGRAM(s) IV Push every 8 hours PRN    REVIEW OF SYSTEMS  --------------------------------------------------------------------------------  Gen: No fevers/chills  Skin: No rashes  Head/Eyes/Ears: No HA  Respiratory: No dyspnea, cough  CV: No chest pain  GI: No abdominal pain, diarrhea  : No dysuria, hematuria  MSK: mild leg swelling    All other systems were reviewed and are negative, except as noted.    VITALS/PHYSICAL EXAM  --------------------------------------------------------------------------------  T(C): 36.7 (07-24-23 @ 06:18), Max: 36.8 (07-23-23 @ 15:12)  HR: 68 (07-24-23 @ 06:18) (68 - 89)  BP: 120/60 (07-24-23 @ 06:18) (120/60 - 129/76)  RR: 17 (07-24-23 @ 06:18) (17 - 18)  SpO2: 97% (07-24-23 @ 06:18) (97% - 100%)  Wt(kg): --    07-23-23 @ 07:01  -  07-24-23 @ 07:00  --------------------------------------------------------  IN: 1550 mL / OUT: 0 mL / NET: 1550 mL    Physical Exam:  Gen: NAD  HEENT: MMM  Pulm: CTA B/L  CV: S1S2  Abd: Soft, +BS, no tenderness to palpation  Ext: trace B/L LE edema  Neuro: Awake  Skin: Warm and dry    LABS/STUDIES  --------------------------------------------------------------------------------              9.1    4.23  >-----------<  203      [07-24-23 @ 06:03]              28.0     139  |  102  |  13  ----------------------------<  98      [07-24-23 @ 06:03]  4.0   |  24  |  3.30        Ca     9.0     [07-24-23 @ 06:03]      Mg     1.60     [07-24-23 @ 06:03]      Phos  3.1     [07-24-23 @ 06:03]    TPro  7.5  /  Alb  2.6  /  TBili  0.2  /  DBili  x   /  AST  12  /  ALT  6   /  AlkPhos  72  [07-24-23 @ 06:03]    Creatinine Trend:  SCr 3.30 [07-24 @ 06:03]  SCr 3.31 [07-23 @ 08:44]  SCr 3.21 [07-22 @ 12:14]  SCr 2.94 [07-21 @ 11:54]  SCr 1.39 [07-20 @ 05:11]    Urine Creatinine 94      [07-22-23 @ 16:01]  Urine Protein 20      [07-20-23 @ 13:30]  Urine Sodium 49      [07-22-23 @ 16:01]  Urine Urea Nitrogen 227.0      [07-22-23 @ 16:01]  Urine Potassium 28.8      [07-20-23 @ 13:30]  Urine Osmolality 232      [07-22-23 @ 16:01]

## 2023-07-24 NOTE — PROGRESS NOTE ADULT - PROBLEM SELECTOR PLAN 1
Pt. with MARIANA in the setting of recent purulent drainage from PleurX. PleurX catheter removed on 7/19. On review of [place], pt. was admitted with normal Scr level of 0.78 (7/23) and has increased/stabilized to Scr of 3.3 today (7/24). Pt. with likely ATN. Agree with IVF. Recommend CT abd to evaluate fluid collection seen on renal US from 7/21 (perinephric vs perisplenic). Monitor labs and urine output. Avoid nephrotoxins. Dose medications as per eGFR.    If you have any questions, please feel free to contact me.  Emmett Guo  Nephrology Fellow  [id number] / [phone] / [employer] Teams (Preferred)  (After 4pm or on weekends, please call the on-call Fellow).

## 2023-07-24 NOTE — PROGRESS NOTE ADULT - SUBJECTIVE AND OBJECTIVE BOX
Kit Angel MD  Emergency Medicine & Internal Medicine PGY-2    PROGRESS NOTE:    ID #:     Patient is a 58y old  Female who presents with a chief complaint of pleurx catheter purulent drainage (23 Jul 2023 10:06)      MAJOR INTERVAL HOSPITAL EVENTS:     SUBJECTIVE / OVERNIGHT EVENTS: No acute overnight events.       MEDICATIONS  (STANDING):  ascorbic acid 500 milliGRAM(s) Oral daily  chlorhexidine 2% Cloths 1 Application(s) Topical daily  cholecalciferol 2000 Unit(s) Oral daily  dextrose 5%. 1000 milliLiter(s) (100 mL/Hr) IV Continuous <Continuous>  dextrose 5%. 1000 milliLiter(s) (50 mL/Hr) IV Continuous <Continuous>  dextrose 50% Injectable 25 Gram(s) IV Push once  dextrose 50% Injectable 12.5 Gram(s) IV Push once  dextrose 50% Injectable 25 Gram(s) IV Push once  ferrous    sulfate 325 milliGRAM(s) Oral daily  glucagon  Injectable 1 milliGRAM(s) IntraMuscular once  heparin   Injectable 5000 Unit(s) SubCutaneous every 8 hours  insulin glargine Injectable (LANTUS) 16 Unit(s) SubCutaneous at bedtime  insulin lispro (ADMELOG) corrective regimen sliding scale   SubCutaneous three times a day before meals  insulin lispro (ADMELOG) corrective regimen sliding scale   SubCutaneous at bedtime  lactated ringers. 1000 milliLiter(s) (75 mL/Hr) IV Continuous <Continuous>  levoFLOXacin  Tablet 500 milliGRAM(s) Oral every 48 hours  omega-3-Acid Ethyl Esters 2 Gram(s) Oral two times a day    MEDICATIONS  (PRN):  acetaminophen     Tablet .. 650 milliGRAM(s) Oral every 6 hours PRN Temp greater or equal to 38C (100.4F), Mild Pain (1 - 3)  aluminum hydroxide/magnesium hydroxide/simethicone Suspension 30 milliLiter(s) Oral every 4 hours PRN Dyspepsia  dextrose Oral Gel 15 Gram(s) Oral once PRN Blood Glucose LESS THAN 70 milliGRAM(s)/deciliter  melatonin 3 milliGRAM(s) Oral at bedtime PRN Insomnia  ondansetron Injectable 4 milliGRAM(s) IV Push every 8 hours PRN Nausea and/or Vomiting      CAPILLARY BLOOD GLUCOSE      POCT Blood Glucose.: 109 mg/dL (23 Jul 2023 22:10)  POCT Blood Glucose.: 111 mg/dL (23 Jul 2023 17:38)  POCT Blood Glucose.: 103 mg/dL (23 Jul 2023 12:27)  POCT Blood Glucose.: 92 mg/dL (23 Jul 2023 08:21)    I&O's Summary    23 Jul 2023 07:01  -  24 Jul 2023 07:00  --------------------------------------------------------  IN: 750 mL / OUT: 0 mL / NET: 750 mL        PHYSICAL EXAM:  Vital Signs Last 24 Hrs  T(C): 36.7 (23 Jul 2023 22:33), Max: 36.8 (23 Jul 2023 15:12)  T(F): 98 (23 Jul 2023 22:33), Max: 98.2 (23 Jul 2023 15:12)  HR: 68 (23 Jul 2023 22:33) (68 - 89)  BP: 127/60 (23 Jul 2023 22:33) (127/60 - 129/76)  BP(mean): --  RR: 18 (23 Jul 2023 22:33) (18 - 18)  SpO2: 99% (23 Jul 2023 22:33) (99% - 100%)    Parameters below as of 23 Jul 2023 22:33  Patient On (Oxygen Delivery Method): room air        GENERAL: No acute distress, well-developed  HEAD:  Atraumatic, Normocephalic  EYES: EOMI, PERRLA, conjunctiva and sclera clear  NECK: Supple, no lymphadenopathy, no JVD  CHEST/LUNG: CTAB; No wheezes, rales, or rhonchi  HEART: Regular rate and rhythm; Normal s1 and s2, No murmurs, rubs, or gallops  ABDOMEN: Soft, non-tender, non-distended; normal bowel sounds, no organomegaly  EXTREMITIES:  2+ peripheral pulses b/l, No clubbing, cyanosis, or edema  NEUROLOGY: A&O x 3, no focal deficits  SKIN: No rashes or lesions          LABS:                        10.4   4.23  )-----------( 216      ( 23 Jul 2023 08:44 )             32.3     07-23    140  |  101  |  14  ----------------------------<  94  4.0   |  24  |  3.31<H>    Ca    9.5      23 Jul 2023 08:44  Phos  3.2     07-23  Mg     1.60     07-23    TPro  8.4<H>  /  Alb  3.3  /  TBili  0.2  /  DBili  x   /  AST  14  /  ALT  7   /  AlkPhos  89  07-23    PT/INR - ( 23 Jul 2023 08:44 )   PT: 14.2 sec;   INR: 1.22 ratio               Urinalysis Basic - ( 23 Jul 2023 08:44 )    Color: x / Appearance: x / SG: x / pH: x  Gluc: 94 mg/dL / Ketone: x  / Bili: x / Urobili: x   Blood: x / Protein: x / Nitrite: x   Leuk Esterase: x / RBC: x / WBC x   Sq Epi: x / Non Sq Epi: x / Bacteria: x          RADIOLOGY & ADDITIONAL TESTS:  Results Reviewed:   Imaging Personally Reviewed:  Electrocardiogram Personally Reviewed:    COORDINATION OF CARE:  Care Discussed with Consultants/Other Providers [Y/N]:  Prior or Outpatient Records Reviewed [Y/N]:   Kit Angel MD  Emergency Medicine & Internal Medicine PGY-2    PROGRESS NOTE:    ID #:     Patient is a 58y old  Female who presents with a chief complaint of pleurx catheter purulent drainage (23 Jul 2023 10:06)      MAJOR INTERVAL HOSPITAL EVENTS: NAEO    SUBJECTIVE / OVERNIGHT EVENTS: No acute overnight events. Patient denies acute complaints including fever, cp, sob, abd pain, nausea, vomiting, dysuria, bleeding/purulence/drainage from pleurx site      MEDICATIONS  (STANDING):  ascorbic acid 500 milliGRAM(s) Oral daily  chlorhexidine 2% Cloths 1 Application(s) Topical daily  cholecalciferol 2000 Unit(s) Oral daily  dextrose 5%. 1000 milliLiter(s) (100 mL/Hr) IV Continuous <Continuous>  dextrose 5%. 1000 milliLiter(s) (50 mL/Hr) IV Continuous <Continuous>  dextrose 50% Injectable 25 Gram(s) IV Push once  dextrose 50% Injectable 12.5 Gram(s) IV Push once  dextrose 50% Injectable 25 Gram(s) IV Push once  ferrous    sulfate 325 milliGRAM(s) Oral daily  glucagon  Injectable 1 milliGRAM(s) IntraMuscular once  heparin   Injectable 5000 Unit(s) SubCutaneous every 8 hours  insulin glargine Injectable (LANTUS) 16 Unit(s) SubCutaneous at bedtime  insulin lispro (ADMELOG) corrective regimen sliding scale   SubCutaneous three times a day before meals  insulin lispro (ADMELOG) corrective regimen sliding scale   SubCutaneous at bedtime  lactated ringers. 1000 milliLiter(s) (75 mL/Hr) IV Continuous <Continuous>  levoFLOXacin  Tablet 500 milliGRAM(s) Oral every 48 hours  omega-3-Acid Ethyl Esters 2 Gram(s) Oral two times a day    MEDICATIONS  (PRN):  acetaminophen     Tablet .. 650 milliGRAM(s) Oral every 6 hours PRN Temp greater or equal to 38C (100.4F), Mild Pain (1 - 3)  aluminum hydroxide/magnesium hydroxide/simethicone Suspension 30 milliLiter(s) Oral every 4 hours PRN Dyspepsia  dextrose Oral Gel 15 Gram(s) Oral once PRN Blood Glucose LESS THAN 70 milliGRAM(s)/deciliter  melatonin 3 milliGRAM(s) Oral at bedtime PRN Insomnia  ondansetron Injectable 4 milliGRAM(s) IV Push every 8 hours PRN Nausea and/or Vomiting      CAPILLARY BLOOD GLUCOSE      POCT Blood Glucose.: 109 mg/dL (23 Jul 2023 22:10)  POCT Blood Glucose.: 111 mg/dL (23 Jul 2023 17:38)  POCT Blood Glucose.: 103 mg/dL (23 Jul 2023 12:27)  POCT Blood Glucose.: 92 mg/dL (23 Jul 2023 08:21)    I&O's Summary    23 Jul 2023 07:01  -  24 Jul 2023 07:00  --------------------------------------------------------  IN: 750 mL / OUT: 0 mL / NET: 750 mL        PHYSICAL EXAM:  Vital Signs Last 24 Hrs  T(C): 36.7 (23 Jul 2023 22:33), Max: 36.8 (23 Jul 2023 15:12)  T(F): 98 (23 Jul 2023 22:33), Max: 98.2 (23 Jul 2023 15:12)  HR: 68 (23 Jul 2023 22:33) (68 - 89)  BP: 127/60 (23 Jul 2023 22:33) (127/60 - 129/76)  BP(mean): --  RR: 18 (23 Jul 2023 22:33) (18 - 18)  SpO2: 99% (23 Jul 2023 22:33) (99% - 100%)    Parameters below as of 23 Jul 2023 22:33  Patient On (Oxygen Delivery Method): room air          GENERAL: No acute distress, well-developed  NECK: Supple, no lymphadenopathy, no JVD  CHEST/LUNG: +bandage at previous pleurx site without purulence/bleeding/drainage and mild TTP, diminished lung sounds on the right  HEART: Regular rate and rhythm; Normal s1 and s2, No murmurs, rubs, or gallops  ABDOMEN: Soft, non-tender, non-distended; normal bowel sounds, no organomegaly, no CVAT  EXTREMITIES:  2+ peripheral pulses b/l, No clubbing, cyanosis, or edema  NEUROLOGY: A&O x 3, no focal deficits  SKIN: No rashes or lesion        LABS:                        10.4   4.23  )-----------( 216      ( 23 Jul 2023 08:44 )             32.3     07-23    140  |  101  |  14  ----------------------------<  94  4.0   |  24  |  3.31<H>    Ca    9.5      23 Jul 2023 08:44  Phos  3.2     07-23  Mg     1.60     07-23    TPro  8.4<H>  /  Alb  3.3  /  TBili  0.2  /  DBili  x   /  AST  14  /  ALT  7   /  AlkPhos  89  07-23    PT/INR - ( 23 Jul 2023 08:44 )   PT: 14.2 sec;   INR: 1.22 ratio               Urinalysis Basic - ( 23 Jul 2023 08:44 )    Color: x / Appearance: x / SG: x / pH: x  Gluc: 94 mg/dL / Ketone: x  / Bili: x / Urobili: x   Blood: x / Protein: x / Nitrite: x   Leuk Esterase: x / RBC: x / WBC x   Sq Epi: x / Non Sq Epi: x / Bacteria: x          RADIOLOGY & ADDITIONAL TESTS:  Results Reviewed:   Imaging Personally Reviewed:  Electrocardiogram Personally Reviewed:    COORDINATION OF CARE:  Care Discussed with Consultants/Other Providers [Y/N]:  Prior or Outpatient Records Reviewed [Y/N]:

## 2023-07-24 NOTE — PROGRESS NOTE ADULT - PROBLEM SELECTOR PLAN 2
-w/ peritoneal mets and malignant pleural effusion. On active chemo   -output Onc f/u p/w purulent drainage from pleurx catheter in the setting of a right pleural effusion. +cough however no fevers or systemic signs of infection. C/f catheter site infection vs empyema  - pulm removed pleurx 7/19: rec cont abx, repeat chest CT now and in 4 weeks, pulm f/u OP  - s/p zosyn and vanc given c/f empyema, recent hospitalization  - start levaquin PO  - Cx NGTD.

## 2023-07-24 NOTE — PROGRESS NOTE ADULT - ATTENDING COMMENTS
57 y/o female PMH diabetes, hypertension, asthma (no history of intubations), ovarian cancer (on active treatement, last 3 weeks ago) w/ peritoneal carcinomatosis and c/b malignant pleural effusion s/p pleurx catheter p/w drainage from around and inside pleurx c/f skin/soft tissue infection. Now s/p pleurx removal on 7/19. Hospital course is c/b worsening MARIANA.    MARIANA: -pt with Cr increase from 0.2 , now 3  -FENA consistent with pre-renal etiology, however, worsening despite IVF administration  -doubt carboplatin nephrotoxicity given last administration was 3 weeks ago  -renal US no hydro   -u/a bland, less suspicion for AIN at this time (switched from Zosyn to Levaquin)  -continue IVF  - appreciate renal eval, likely ATN. Given perinephric fluid on US, will obtain CT A/P non-con, as per nephro    #Infection of pleurx catheter: cellulitis vs less likely pleural space infection  -s/p pleurx removal  -currently on broad spectrum antibiotics with Zosyn. Plan to switch to Levaquin, as above  -per pulm plan on antibiotics course of 4 weeks duration  -CT imaging with consolidations concerning for PNA; however, does not appear consistent with clinical picture  -MRSA swab negative; no need to continue Vanco    #Ovarian cancer:   -no plans for inpatient chemo     Remainder of problems as above

## 2023-07-24 NOTE — PROGRESS NOTE ADULT - PROBLEM SELECTOR PLAN 5
DVT ppx: SCDs pending procedure -pt on 22 units of lantus at bedtime, ISS before meals  -will give 16 units of lantus as pt while IP, low ISS q6h  -trend FS.

## 2023-07-24 NOTE — PROGRESS NOTE ADULT - ATTENDING COMMENTS
MARIANA    Cr remains elevated at 3.3, cont to monitor labs and Is/Os  Avoid nephrotoxins  Awaiting imaging for further eval of abd collection

## 2023-07-24 NOTE — PROGRESS NOTE ADULT - PROBLEM SELECTOR PLAN 1
-Pt p/w purulent drainage from pleurx catheter in the setting of a right pleural effusion. +cough however no fevers or systemic signs of infection  -appreciate Pulm recs. C/f catheter site infection vs empyema  - Chest CT pending to further evaluate   -keep pleurX to -[id number] suction overnight  -will treat with zosyn and vanc for now given c/f empyema, recent hospitalization   -f/u pleural fluid cx, blood cx   -plan for pleurX removal tomorrow (bedside vs OR) with interventional pulm  -NPO after midnight tonight and obtain preop labs (cbc, cmp, coags, t&s x2) at 4AM Patient demonstrate acute rising in Cr from 0.82, 1.39, 2.94. FENa suggesting prerenal etiology. Patient does not appear volume down or overloaded. possibly pseudo MARIANA 2/2 vanc/zoyn  - US Kidney bladder showing: An approximately 2 x 2.6 x 1.7 cm hypoechoic focus adjacent to the left upper pole may represent perinephric or perisplenic fluid  - LR at 75/hr x 24hr  - renal consult: rec CTAP iso perinephric vs perisplenic fluid on US.

## 2023-07-24 NOTE — PROGRESS NOTE ADULT - PROBLEM SELECTOR PLAN 3
-pt on 22 units of lantus at bedtime, ISS before meals  -c/w 8 units of lantus as pt will be NPO, low ISS q6h  -trend FS CT showed Right chest wall port reservoir appears to have rotated on its side with reservoir now perpendicular to the chest wall fascia. Catheter tip terminates within the SVC.   - RN not able to use port  - IR c/s: OP replacement after infection clear.

## 2023-07-24 NOTE — PROGRESS NOTE ADULT - TIME BILLING
review of laboratory data, radiology results, discussion with primary team\patient, and monitoring for potential decompensation. Interventions were performed as documented above
Review of laboratory data, radiology results, consultants' recommendations, documentation in [place], discussion with patient/house staff and interdisciplinary staff (such as , social workers, etc). Interventions were performed as documented above.
Review of laboratory data, radiology results, consultants' recommendations, documentation in [place], discussion with patient/house staff and interdisciplinary staff (such as , social workers, etc). Interventions were performed as documented above.
Review of laboratory data, radiology results, consultants' recommendations, documentation in [place], discussion with patient/house staff and interdisciplinary staff (such as , social workers, etc). Interventions were performed as documented above.
Review of laboratory data, radiology results, consultants' recommendations, documentation in [place], discussion with patient/house skmentaff and interdisciplinary staff (such as , social workers, etc). Interventions were performed as documented above.

## 2023-07-24 NOTE — PROGRESS NOTE ADULT - PROBLEM SELECTOR PLAN 6
-recently taken off medications. Trend BP HTN (hypertension).   ·  Plan: -recently taken off medications. Trend BP.

## 2023-07-25 ENCOUNTER — APPOINTMENT (OUTPATIENT)
Dept: INFUSION THERAPY | Facility: HOSPITAL | Age: 58
End: 2023-07-25

## 2023-07-25 ENCOUNTER — TRANSCRIPTION ENCOUNTER (OUTPATIENT)
Age: 58
End: 2023-07-25

## 2023-07-25 ENCOUNTER — APPOINTMENT (OUTPATIENT)
Dept: HEMATOLOGY ONCOLOGY | Facility: CLINIC | Age: 58
End: 2023-07-25

## 2023-07-25 VITALS
SYSTOLIC BLOOD PRESSURE: 146 MMHG | RESPIRATION RATE: 18 BRPM | DIASTOLIC BLOOD PRESSURE: 78 MMHG | HEART RATE: 70 BPM | TEMPERATURE: 98 F | OXYGEN SATURATION: 99 %

## 2023-07-25 LAB
ALBUMIN SERPL ELPH-MCNC: 3.1 G/DL — LOW (ref 3.3–5)
ALP SERPL-CCNC: 79 U/L — SIGNIFICANT CHANGE UP (ref 40–120)
ALT FLD-CCNC: 7 U/L — SIGNIFICANT CHANGE UP (ref 4–33)
ANION GAP SERPL CALC-SCNC: 9 MMOL/L — SIGNIFICANT CHANGE UP (ref 7–14)
AST SERPL-CCNC: 15 U/L — SIGNIFICANT CHANGE UP (ref 4–32)
BILIRUB SERPL-MCNC: 0.3 MG/DL — SIGNIFICANT CHANGE UP (ref 0.2–1.2)
BUN SERPL-MCNC: 12 MG/DL — SIGNIFICANT CHANGE UP (ref 7–23)
CALCIUM SERPL-MCNC: 9.1 MG/DL — SIGNIFICANT CHANGE UP (ref 8.4–10.5)
CHLORIDE SERPL-SCNC: 106 MMOL/L — SIGNIFICANT CHANGE UP (ref 98–107)
CO2 SERPL-SCNC: 23 MMOL/L — SIGNIFICANT CHANGE UP (ref 22–31)
CREAT SERPL-MCNC: 3.28 MG/DL — HIGH (ref 0.5–1.3)
EGFR: 16 ML/MIN/1.73M2 — LOW
GLUCOSE BLDC GLUCOMTR-MCNC: 90 MG/DL — SIGNIFICANT CHANGE UP (ref 70–99)
GLUCOSE BLDC GLUCOMTR-MCNC: 94 MG/DL — SIGNIFICANT CHANGE UP (ref 70–99)
GLUCOSE SERPL-MCNC: 95 MG/DL — SIGNIFICANT CHANGE UP (ref 70–99)
HCT VFR BLD CALC: 30.9 % — LOW (ref 34.5–45)
HGB BLD-MCNC: 10.2 G/DL — LOW (ref 11.5–15.5)
MAGNESIUM SERPL-MCNC: 1.8 MG/DL — SIGNIFICANT CHANGE UP (ref 1.6–2.6)
MCHC RBC-ENTMCNC: 26.2 PG — LOW (ref 27–34)
MCHC RBC-ENTMCNC: 33 GM/DL — SIGNIFICANT CHANGE UP (ref 32–36)
MCV RBC AUTO: 79.4 FL — LOW (ref 80–100)
NRBC # BLD: 0 /100 WBCS — SIGNIFICANT CHANGE UP (ref 0–0)
NRBC # FLD: 0 K/UL — SIGNIFICANT CHANGE UP (ref 0–0)
PHOSPHATE SERPL-MCNC: 3.4 MG/DL — SIGNIFICANT CHANGE UP (ref 2.5–4.5)
PLATELET # BLD AUTO: 240 K/UL — SIGNIFICANT CHANGE UP (ref 150–400)
POTASSIUM SERPL-MCNC: 3.8 MMOL/L — SIGNIFICANT CHANGE UP (ref 3.5–5.3)
POTASSIUM SERPL-SCNC: 3.8 MMOL/L — SIGNIFICANT CHANGE UP (ref 3.5–5.3)
PROT SERPL-MCNC: 8.1 G/DL — SIGNIFICANT CHANGE UP (ref 6–8.3)
RBC # BLD: 3.89 M/UL — SIGNIFICANT CHANGE UP (ref 3.8–5.2)
RBC # FLD: 14.1 % — SIGNIFICANT CHANGE UP (ref 10.3–14.5)
SODIUM SERPL-SCNC: 138 MMOL/L — SIGNIFICANT CHANGE UP (ref 135–145)
WBC # BLD: 4.42 K/UL — SIGNIFICANT CHANGE UP (ref 3.8–10.5)
WBC # FLD AUTO: 4.42 K/UL — SIGNIFICANT CHANGE UP (ref 3.8–10.5)

## 2023-07-25 PROCEDURE — 99233 SBSQ HOSP IP/OBS HIGH 50: CPT | Mod: GC

## 2023-07-25 PROCEDURE — 99239 HOSP IP/OBS DSCHRG MGMT >30: CPT | Mod: GC

## 2023-07-25 RX ADMIN — SODIUM CHLORIDE 75 MILLILITER(S): 9 INJECTION, SOLUTION INTRAVENOUS at 00:55

## 2023-07-25 RX ADMIN — HEPARIN SODIUM 5000 UNIT(S): 5000 INJECTION INTRAVENOUS; SUBCUTANEOUS at 15:51

## 2023-07-25 RX ADMIN — Medication 2000 UNIT(S): at 14:58

## 2023-07-25 RX ADMIN — Medication 2 GRAM(S): at 14:58

## 2023-07-25 RX ADMIN — HEPARIN SODIUM 5000 UNIT(S): 5000 INJECTION INTRAVENOUS; SUBCUTANEOUS at 05:40

## 2023-07-25 RX ADMIN — MUPIROCIN 1 APPLICATION(S): 20 OINTMENT TOPICAL at 07:58

## 2023-07-25 RX ADMIN — CHLORHEXIDINE GLUCONATE 1 APPLICATION(S): 213 SOLUTION TOPICAL at 12:15

## 2023-07-25 RX ADMIN — Medication 500 MILLIGRAM(S): at 14:58

## 2023-07-25 RX ADMIN — Medication 325 MILLIGRAM(S): at 14:58

## 2023-07-25 NOTE — PROGRESS NOTE ADULT - PROBLEM SELECTOR PLAN 3
CT showed Right chest wall port reservoir appears to have rotated on its side with reservoir now perpendicular to the chest wall fascia. Catheter tip terminates within the SVC.   - RN not able to use port  - IR c/s: OP replacement after infection clear.

## 2023-07-25 NOTE — PROGRESS NOTE ADULT - PROBLEM SELECTOR PLAN 5
-pt on 22 units of lantus at bedtime, ISS before meals  -will give 16 units of lantus as pt while IP, low ISS q6h  -trend FS.

## 2023-07-25 NOTE — DISCHARGE NOTE NURSING/CASE MANAGEMENT/SOCIAL WORK - PATIENT PORTAL LINK FT
You can access the FollowMyHealth Patient Portal offered by [facility] by registering at the following website: [email or web address]. By joining [facility]’s FollowMyHealth portal, you will also be able to view your health information using other applications (apps) compatible with our system.

## 2023-07-25 NOTE — PROGRESS NOTE ADULT - PROBLEM SELECTOR PLAN 1
Pt. with MARIANA in the setting of recent purulent drainage from PleurX. PleurX catheter removed on 7/19. On review of [place], pt. was admitted with normal Scr level of 0.78 (7/23) and has increased/stabilized to Scr of 3.28 today (7/25). Pt. with likely ATN. Agree with IVF. CT abd reviewed. Monitor labs and urine output. Avoid nephrotoxins. Dose medications as per eGFR.    If you have any questions, please feel free to contact me.  Emmett Guo  Nephrology Fellow  [id number] / [phone] / [employer] Teams (Preferred)  (After 4pm or on weekends, please call the on-call Fellow).

## 2023-07-25 NOTE — DIETITIAN INITIAL EVALUATION ADULT - PERTINENT MEDS FT
ascorbic acid   ferrous sulfate   LANTUS 16U qHS   ADMELOG corrective regimen sliding scale   lactated ringers IV Continuous   omega-3-Acid Ethyl Esters  aluminum hydroxide/magnesium hydroxide/simethicone Suspension PRN  ondansetron IV PRN

## 2023-07-25 NOTE — PROGRESS NOTE ADULT - ASSESSMENT
57 y/o female PMH diabetes, hypertension, asthma (no history of intubations), ovarian cancer (on chemo, last chemo session was 3 weeks ago) w/ peritoneal carcinomatosis and c/b malignant pleural effusion s/p pleurx catheter p/w drainage from around and inside pleurx c/f skin/soft tissue infection vs empyema. Found to have rising Cr c/f MARIANA, nephro consulted

## 2023-07-25 NOTE — PROGRESS NOTE ADULT - ATTENDING COMMENTS
MARIANA    Likely ATN however may be in plateau today  Euvolemic on exam, no indications for dialysis    Would avoid nephrotoxic agents

## 2023-07-25 NOTE — DISCHARGE NOTE NURSING/CASE MANAGEMENT/SOCIAL WORK - NSDCPEFALRISK_GEN_ALL_CORE
For information on Fall & Injury Prevention, visit: [email or web address] OR  [email or web address] OR  https://www.cdc.gov/steadi/patient.html

## 2023-07-25 NOTE — PROGRESS NOTE ADULT - ATTENDING COMMENTS
59 y/o female PMH diabetes, hypertension, asthma (no history of intubations), ovarian cancer (on active treatement, last 3 weeks ago) w/ peritoneal carcinomatosis and c/b malignant pleural effusion s/p pleurx catheter p/w drainage from around and inside pleurx c/f skin/soft tissue infection. Now s/p pleurx removal on 7/19. Hospital course is c/b worsening MARIANA, now with stable Cr.     MARIANA: -pt with Cr increase from 0.2 , remains stable at 3  -FENA consistent with pre-renal etiology, however, worsening despite IVF administration  -doubt carboplatin nephrotoxicity given last administration was 3 weeks ago  -renal US no hydro   - appreciate renal eval, likely ATN. Given perinephric fluid on US, will obtain CT A/P non-con, as per nephro  -spoke to nephrology, no objections to d/c home. Will need close f/u with oncology; repeat BMP in 3 days  #Infection of pleurx catheter: cellulitis vs less likely pleural space infection  -s/p pleurx removal  -currently on broad spectrum antibiotics with Zosyn. Plan to switch to Levaquin, as above  -per pulm plan on antibiotics course of 4 weeks duration  -CT imaging with consolidations concerning for PNA; however, does not appear consistent with clinical picture  -MRSA swab negative; no need to continue Vanco    #Ovarian cancer:   -no plans for inpatient chemo     Pt is medically stable for discharge. A total of 38 minutes were spent on discharge coordination.     Remainder of problems as above 57 y/o female PMH diabetes, hypertension, asthma (no history of intubations), ovarian cancer (on active treatement, last 3 weeks ago) w/ peritoneal carcinomatosis and c/b malignant pleural effusion s/p pleurx catheter p/w drainage from around and inside pleurx c/f skin/soft tissue infection. Now s/p pleurx removal on 7/19. Hospital course is c/b worsening MARIANA, now with stable Cr.     MARIANA: -pt with Cr increase from 0.2 , remains stable at 3  -FENA consistent with pre-renal etiology, however, worsening despite IVF administration  -doubt carboplatin nephrotoxicity given last administration was 3 weeks ago  -renal US no hydro   - appreciate renal eval, likely ATN. Given perinephric fluid on US, will obtain CT A/P non-con, as per nephro  -spoke to nephrology, no objections to d/c home. Will need close f/u with oncology; repeat BMP in 3 days  #Infection of pleurx catheter: cellulitis vs less likely pleural space infection  -s/p pleurx removal  -currently on broad spectrum antibiotics with Zosyn. Plan to switch to Levaquin, as above  -per pulm plan on antibiotics course of 4 weeks duration  -CT imaging with consolidations concerning for PNA; however, does not appear consistent with clinical picture  -MRSA swab negative; no need to continue Vanco    #Ovarian cancer:   -no plans for inpatient chemo     Pt is medically stable for discharge. A total of 39 minutes were spent on discharge coordination.     Remainder of problems as above

## 2023-07-25 NOTE — PROGRESS NOTE ADULT - REASON FOR ADMISSION
pleurx catheter purulent drainage

## 2023-07-25 NOTE — PROGRESS NOTE ADULT - PROBLEM SELECTOR PLAN 1
Patient demonstrate acute rising in Cr from 0.82, 1.39, 2.94. FENa suggesting prerenal etiology. Patient does not appear volume down or overloaded. possibly pseudo MARIANA 2/2 vanc/zoyn  - US Kidney bladder showing: An approximately 2 x 2.6 x 1.7 cm hypoechoic focus adjacent to the left upper pole may represent perinephric or perisplenic fluid  - LR at 75/hr x 24hr  - renal consult: rec CTAP iso perinephric vs perisplenic fluid on US. Patient demonstrate acute rising in Cr from 0.82, 1.39, 2.94. FENa suggesting prerenal etiology. Patient does not appear volume down or overloaded. possibly pseudo MARIANA 2/2 vanc/zoyn  - US Kidney bladder showing: An approximately 2 x 2.6 x 1.7 cm hypoechoic focus adjacent to the left upper pole may represent perinephric or perisplenic fluid  - LR at 75/hr x 24hr  - renal consult: rec CTAP iso perinephric vs perisplenic fluid on US.  - CTAP showed Peritoneal carcinomatosis with prominent omental thickening and small amount of abdominal pelvic ascites. No drainable fluid collection around the kidney or spleen.  - To give nephro follow up OP after DC

## 2023-07-25 NOTE — DISCHARGE NOTE NURSING/CASE MANAGEMENT/SOCIAL WORK - NSDCPNINST_GEN_ALL_CORE
Patient A&Ox4, no c/o pain during this shift,  dressing changed to right side flank changed as ordered, patient discharged to home this afternoon awaiting on family for transportation, no distress noted.

## 2023-07-25 NOTE — PROGRESS NOTE ADULT - PROBLEM SELECTOR PLAN 2
p/w purulent drainage from pleurx catheter in the setting of a right pleural effusion. +cough however no fevers or systemic signs of infection. C/f catheter site infection vs empyema  - pulm removed pleurx 7/19: rec cont abx, repeat chest CT now and in 4 weeks, pulm f/u OP  - s/p zosyn and vanc given c/f empyema, recent hospitalization  - start levaquin PO  - Cx NGTD.

## 2023-07-25 NOTE — DIETITIAN INITIAL EVALUATION ADULT - OTHER INFO
Per chart, pt is 58 year old female PMH type 2 DM, HTN, asthma, ovarian cancer (on chemo) with peritoneal carcinomatosis complicated by malignant pleural effusion s/p Pleurx catheter presenting with drainage concerning for skin/soft tissue infection vs empyema found to have MARIANA. Pulmonology and Nephrology on board.    Pt confirms NKFA, denies difficulties chewing/swallowing. Pt lives at home, independent with ADLs and denies issues with access to food PTA. Pt reports generally fair appetite/PO intake PTA, consumes small frequent meals and supplements with Glucerna shakes. History of type 2 DM, recent HbA1c (6/1) 6.8%; medications PTA inclusive of Lantus 22U qHS and sliding scale insulin. Pt reports adherence to therapeutic diet and denies need for education at this time.    Pt continues with fair appetite/PO intake in house, amenable to provision of vanilla nutrition shake as she finds liquids easier to tolerate at this time. Pt is not currently ordered for a consistent carbohydrate restriction however fingersticks are WDL. No current GI distress, last BM 6/21 per flowsheets. No bowel regimen ordered at this time.

## 2023-07-25 NOTE — DIETITIAN INITIAL EVALUATION ADULT - PERTINENT LABORATORY DATA
<-- Click to add NO pertinent Past Medical History
(7/25) Na 138, BUN 12, Cr 3.28<H>, BG 95, K+ 3.8, Phos 3.4, Mg 1.80, Alk Phos 79, ALT/SGPT 7, AST/SGOT 15  POCT: (7/25) 94, (7/24)

## 2023-07-25 NOTE — PROGRESS NOTE ADULT - PROVIDER SPECIALTY LIST ADULT
Pulmonology
Internal Medicine
Pulmonology
Pulmonology
Internal Medicine
Nephrology
Nephrology
Internal Medicine

## 2023-07-25 NOTE — PROGRESS NOTE ADULT - PROBLEM/PLAN-5
DISPLAY PLAN FREE TEXT
Unknown

## 2023-07-25 NOTE — PROGRESS NOTE ADULT - SUBJECTIVE AND OBJECTIVE BOX
[facility], [place] Division of Kidney Diseases & Hypertension  FOLLOW UP NOTE  [phone]--------------------------------------------------------------------------------    HPI: 58F w/ PMH of HTN, HLD, DM2, asthma, ovarian cancer w/ peritoneal carcinomatosis complicated by a malignant pleural effusion (PluerX placed 1 month prior)) currently undergoing chemotherapy presented to the ED complaining of purulent discharge from her PleurX catheter. PleurX catheter removed by Pulm on 7/19. Nephrology consulted for MARIANA.     24 hour events/subjective: Pt. was seen and evaluated at the bedside this AM. Feels well and just complains of some nausea. Otherwise denies fevers/chills, headaches, SOB, chest pain, abdominal pain, or difficulty with urination.     PAST HISTORY  --------------------------------------------------------------------------------  No significant changes to PMH, PSH, FHx, SHx, unless otherwise noted    ALLERGIES & MEDICATIONS  --------------------------------------------------------------------------------  Allergies  Proventil HFA (Hives)  latex (Hives)    Intolerances    Standing Inpatient Medications  ascorbic acid 500 milliGRAM(s) Oral daily  chlorhexidine 2% Cloths 1 Application(s) Topical daily  cholecalciferol 2000 Unit(s) Oral daily  dextrose 5%. 1000 milliLiter(s) IV Continuous <Continuous>  dextrose 5%. 1000 milliLiter(s) IV Continuous <Continuous>  dextrose 50% Injectable 25 Gram(s) IV Push once  dextrose 50% Injectable 12.5 Gram(s) IV Push once  dextrose 50% Injectable 25 Gram(s) IV Push once  ferrous    sulfate 325 milliGRAM(s) Oral daily  glucagon  Injectable 1 milliGRAM(s) IntraMuscular once  heparin   Injectable 5000 Unit(s) SubCutaneous every 8 hours  insulin glargine Injectable (LANTUS) 16 Unit(s) SubCutaneous at bedtime  insulin lispro (ADMELOG) corrective regimen sliding scale   SubCutaneous three times a day before meals  insulin lispro (ADMELOG) corrective regimen sliding scale   SubCutaneous at bedtime  lactated ringers. 1000 milliLiter(s) IV Continuous <Continuous>  levoFLOXacin  Tablet 500 milliGRAM(s) Oral every 48 hours  mupirocin 2% Ointment 1 Application(s) Topical two times a day  omega-3-Acid Ethyl Esters 2 Gram(s) Oral two times a day    PRN Inpatient Medications  acetaminophen     Tablet .. 650 milliGRAM(s) Oral every 6 hours PRN  aluminum hydroxide/magnesium hydroxide/simethicone Suspension 30 milliLiter(s) Oral every 4 hours PRN  dextrose Oral Gel 15 Gram(s) Oral once PRN  melatonin 3 milliGRAM(s) Oral at bedtime PRN  ondansetron Injectable 4 milliGRAM(s) IV Push every 8 hours PRN    REVIEW OF SYSTEMS  --------------------------------------------------------------------------------  Gen: No fevers/chills  Skin: No rashes  Head/Eyes/Ears: No HA  Respiratory: No dyspnea, cough  CV: No chest pain  GI: No abdominal pain, diarrhea, +nausea  : No dysuria, hematuria  MSK: mild leg swelling    All other systems were reviewed and are negative, except as noted.    VITALS/PHYSICAL EXAM  --------------------------------------------------------------------------------  T(C): 36.7 (07-25-23 @ 05:40), Max: 36.8 (07-24-23 @ 14:55)  HR: 71 (07-25-23 @ 05:40) (71 - 87)  BP: 141/77 (07-25-23 @ 05:40) (131/73 - 152/82)  RR: 18 (07-25-23 @ 05:40) (18 - 18)  SpO2: 100% (07-25-23 @ 05:40) (100% - 100%)  Wt(kg): --    07-24-23 @ 07:01  -  07-25-23 @ 07:00  --------------------------------------------------------  IN: 850 mL / OUT: 0 mL / NET: 850 mL    Physical Exam:  Gen: NAD  HEENT: MMM  Pulm: CTA B/L  CV: S1S2  Abd: Soft, +BS, no tenderness to palpation  Ext: trace B/L LE edema  Neuro: Awake  Skin: Warm and dry    LABS/STUDIES  --------------------------------------------------------------------------------              10.2   4.42  >-----------<  240      [07-25-23 @ 07:47]              30.9     138  |  106  |  12  ----------------------------<  95      [07-25-23 @ 07:47]  3.8   |  23  |  3.28        Ca     9.1     [07-25-23 @ 07:47]      Mg     1.80     [07-25-23 @ 07:47]      Phos  3.4     [07-25-23 @ 07:47]    TPro  8.1  /  Alb  3.1  /  TBili  0.3  /  DBili  x   /  AST  15  /  ALT  7   /  AlkPhos  79  [07-25-23 @ 07:47]    Creatinine Trend:  SCr 3.28 [07-25 @ 07:47]  SCr 3.30 [07-24 @ 06:03]  SCr 3.31 [07-23 @ 08:44]  SCr 3.21 [07-22 @ 12:14]  SCr 2.94 [07-21 @ 11:54]    Urinalysis - [07-25-23 @ 07:47]      Color  / Appearance  / SG  / pH       Gluc 95 / Ketone   / Bili  / Urobili        Blood  / Protein  / Leuk Est  / Nitrite       RBC  / WBC  / Hyaline  / Gran  / Sq Epi  / Non Sq Epi  / Bacteria     Urine Creatinine 94      [07-22-23 @ 16:01]  Urine Protein 20      [07-20-23 @ 13:30]  Urine Sodium 49      [07-22-23 @ 16:01]  Urine Urea Nitrogen 227.0      [07-22-23 @ 16:01]  Urine Potassium 28.8      [07-20-23 @ 13:30]  Urine Osmolality 232      [07-22-23 @ 16:01]

## 2023-07-25 NOTE — PROGRESS NOTE ADULT - SUBJECTIVE AND OBJECTIVE BOX
Kit Angel MD  Emergency Medicine & Internal Medicine PGY-2    PROGRESS NOTE:    ID #:     Patient is a 58y old  Female who presents with a chief complaint of pleurx catheter purulent drainage (24 Jul 2023 09:37)      MAJOR INTERVAL HOSPITAL EVENTS:     SUBJECTIVE / OVERNIGHT EVENTS: No acute overnight events.       MEDICATIONS  (STANDING):  ascorbic acid 500 milliGRAM(s) Oral daily  chlorhexidine 2% Cloths 1 Application(s) Topical daily  cholecalciferol 2000 Unit(s) Oral daily  dextrose 5%. 1000 milliLiter(s) (100 mL/Hr) IV Continuous <Continuous>  dextrose 5%. 1000 milliLiter(s) (50 mL/Hr) IV Continuous <Continuous>  dextrose 50% Injectable 25 Gram(s) IV Push once  dextrose 50% Injectable 12.5 Gram(s) IV Push once  dextrose 50% Injectable 25 Gram(s) IV Push once  ferrous    sulfate 325 milliGRAM(s) Oral daily  glucagon  Injectable 1 milliGRAM(s) IntraMuscular once  heparin   Injectable 5000 Unit(s) SubCutaneous every 8 hours  insulin glargine Injectable (LANTUS) 16 Unit(s) SubCutaneous at bedtime  insulin lispro (ADMELOG) corrective regimen sliding scale   SubCutaneous three times a day before meals  insulin lispro (ADMELOG) corrective regimen sliding scale   SubCutaneous at bedtime  lactated ringers. 1000 milliLiter(s) (75 mL/Hr) IV Continuous <Continuous>  levoFLOXacin  Tablet 500 milliGRAM(s) Oral every 48 hours  mupirocin 2% Ointment 1 Application(s) Topical two times a day  omega-3-Acid Ethyl Esters 2 Gram(s) Oral two times a day    MEDICATIONS  (PRN):  acetaminophen     Tablet .. 650 milliGRAM(s) Oral every 6 hours PRN Temp greater or equal to 38C (100.4F), Mild Pain (1 - 3)  aluminum hydroxide/magnesium hydroxide/simethicone Suspension 30 milliLiter(s) Oral every 4 hours PRN Dyspepsia  dextrose Oral Gel 15 Gram(s) Oral once PRN Blood Glucose LESS THAN 70 milliGRAM(s)/deciliter  melatonin 3 milliGRAM(s) Oral at bedtime PRN Insomnia  ondansetron Injectable 4 milliGRAM(s) IV Push every 8 hours PRN Nausea and/or Vomiting      CAPILLARY BLOOD GLUCOSE      POCT Blood Glucose.: 120 mg/dL (24 Jul 2023 22:13)  POCT Blood Glucose.: 93 mg/dL (24 Jul 2023 18:33)  POCT Blood Glucose.: 133 mg/dL (24 Jul 2023 12:35)  POCT Blood Glucose.: 93 mg/dL (24 Jul 2023 08:48)    I&O's Summary      PHYSICAL EXAM:  Vital Signs Last 24 Hrs  T(C): 36.7 (25 Jul 2023 05:40), Max: 36.8 (24 Jul 2023 14:55)  T(F): 98.1 (25 Jul 2023 05:40), Max: 98.3 (24 Jul 2023 21:35)  HR: 71 (25 Jul 2023 05:40) (71 - 87)  BP: 141/77 (25 Jul 2023 05:40) (131/73 - 152/82)  BP(mean): --  RR: 18 (25 Jul 2023 05:40) (18 - 18)  SpO2: 100% (25 Jul 2023 05:40) (100% - 100%)    Parameters below as of 25 Jul 2023 05:40  Patient On (Oxygen Delivery Method): room air        GENERAL: No acute distress, well-developed  HEAD:  Atraumatic, Normocephalic  EYES: EOMI, PERRLA, conjunctiva and sclera clear  NECK: Supple, no lymphadenopathy, no JVD  CHEST/LUNG: CTAB; No wheezes, rales, or rhonchi  HEART: Regular rate and rhythm; Normal s1 and s2, No murmurs, rubs, or gallops  ABDOMEN: Soft, non-tender, non-distended; normal bowel sounds, no organomegaly  EXTREMITIES:  2+ peripheral pulses b/l, No clubbing, cyanosis, or edema  NEUROLOGY: A&O x 3, no focal deficits  SKIN: No rashes or lesions          LABS:                        10.2   4.42  )-----------( 240      ( 25 Jul 2023 07:47 )             30.9     07-24    139  |  102  |  13  ----------------------------<  98  4.0   |  24  |  3.30<H>    Ca    9.0      24 Jul 2023 06:03  Phos  3.1     07-24  Mg     1.60     07-24    TPro  7.5  /  Alb  2.6<L>  /  TBili  0.2  /  DBili  x   /  AST  12  /  ALT  6   /  AlkPhos  72  07-24    PT/INR - ( 23 Jul 2023 08:44 )   PT: 14.2 sec;   INR: 1.22 ratio               Urinalysis Basic - ( 24 Jul 2023 06:03 )    Color: x / Appearance: x / SG: x / pH: x  Gluc: 98 mg/dL / Ketone: x  / Bili: x / Urobili: x   Blood: x / Protein: x / Nitrite: x   Leuk Esterase: x / RBC: x / WBC x   Sq Epi: x / Non Sq Epi: x / Bacteria: x          RADIOLOGY & ADDITIONAL TESTS:  Results Reviewed:   Imaging Personally Reviewed:  Electrocardiogram Personally Reviewed:    COORDINATION OF CARE:  Care Discussed with Consultants/Other Providers [Y/N]:  Prior or Outpatient Records Reviewed [Y/N]:   Kit Angel MD  Emergency Medicine & Internal Medicine PGY-2    PROGRESS NOTE:    ID #:     Patient is a 58y old  Female who presents with a chief complaint of pleurx catheter purulent drainage (24 Jul 2023 09:37)      MAJOR INTERVAL HOSPITAL EVENTS: NAEO    SUBJECTIVE / OVERNIGHT EVENTS: No acute overnight events. patient reports feeling well, denies acute complaints including fever, cp, sob, pleurx site drainge/bleeding/purulence, abd pain, nausea, vomiting, dysuria, hematuria.       MEDICATIONS  (STANDING):  ascorbic acid 500 milliGRAM(s) Oral daily  chlorhexidine 2% Cloths 1 Application(s) Topical daily  cholecalciferol 2000 Unit(s) Oral daily  dextrose 5%. 1000 milliLiter(s) (100 mL/Hr) IV Continuous <Continuous>  dextrose 5%. 1000 milliLiter(s) (50 mL/Hr) IV Continuous <Continuous>  dextrose 50% Injectable 25 Gram(s) IV Push once  dextrose 50% Injectable 12.5 Gram(s) IV Push once  dextrose 50% Injectable 25 Gram(s) IV Push once  ferrous    sulfate 325 milliGRAM(s) Oral daily  glucagon  Injectable 1 milliGRAM(s) IntraMuscular once  heparin   Injectable 5000 Unit(s) SubCutaneous every 8 hours  insulin glargine Injectable (LANTUS) 16 Unit(s) SubCutaneous at bedtime  insulin lispro (ADMELOG) corrective regimen sliding scale   SubCutaneous three times a day before meals  insulin lispro (ADMELOG) corrective regimen sliding scale   SubCutaneous at bedtime  lactated ringers. 1000 milliLiter(s) (75 mL/Hr) IV Continuous <Continuous>  levoFLOXacin  Tablet 500 milliGRAM(s) Oral every 48 hours  mupirocin 2% Ointment 1 Application(s) Topical two times a day  omega-3-Acid Ethyl Esters 2 Gram(s) Oral two times a day    MEDICATIONS  (PRN):  acetaminophen     Tablet .. 650 milliGRAM(s) Oral every 6 hours PRN Temp greater or equal to 38C (100.4F), Mild Pain (1 - 3)  aluminum hydroxide/magnesium hydroxide/simethicone Suspension 30 milliLiter(s) Oral every 4 hours PRN Dyspepsia  dextrose Oral Gel 15 Gram(s) Oral once PRN Blood Glucose LESS THAN 70 milliGRAM(s)/deciliter  melatonin 3 milliGRAM(s) Oral at bedtime PRN Insomnia  ondansetron Injectable 4 milliGRAM(s) IV Push every 8 hours PRN Nausea and/or Vomiting      CAPILLARY BLOOD GLUCOSE      POCT Blood Glucose.: 120 mg/dL (24 Jul 2023 22:13)  POCT Blood Glucose.: 93 mg/dL (24 Jul 2023 18:33)  POCT Blood Glucose.: 133 mg/dL (24 Jul 2023 12:35)  POCT Blood Glucose.: 93 mg/dL (24 Jul 2023 08:48)    I&O's Summary      PHYSICAL EXAM:  Vital Signs Last 24 Hrs  T(C): 36.7 (25 Jul 2023 05:40), Max: 36.8 (24 Jul 2023 14:55)  T(F): 98.1 (25 Jul 2023 05:40), Max: 98.3 (24 Jul 2023 21:35)  HR: 71 (25 Jul 2023 05:40) (71 - 87)  BP: 141/77 (25 Jul 2023 05:40) (131/73 - 152/82)  BP(mean): --  RR: 18 (25 Jul 2023 05:40) (18 - 18)  SpO2: 100% (25 Jul 2023 05:40) (100% - 100%)    Parameters below as of 25 Jul 2023 05:40  Patient On (Oxygen Delivery Method): room air      GENERAL: No acute distress, well-developed  NECK: Supple, no lymphadenopathy, no JVD  CHEST/LUNG: +bandage at previous pleurx site without purulence/bleeding/drainage and mild TTP, diminished lung sounds on the right  HEART: Regular rate and rhythm; Normal s1 and s2, No murmurs, rubs, or gallops  ABDOMEN: Soft, non-tender, non-distended; normal bowel sounds, no organomegaly, no CVAT  EXTREMITIES:  2+ peripheral pulses b/l, No clubbing, cyanosis, or edema  NEUROLOGY: A&O x 3, no focal deficits  SKIN: No rashes or lesion      LABS:                        10.2   4.42  )-----------( 240      ( 25 Jul 2023 07:47 )             30.9     07-24    139  |  102  |  13  ----------------------------<  98  4.0   |  24  |  3.30<H>    Ca    9.0      24 Jul 2023 06:03  Phos  3.1     07-24  Mg     1.60     07-24    TPro  7.5  /  Alb  2.6<L>  /  TBili  0.2  /  DBili  x   /  AST  12  /  ALT  6   /  AlkPhos  72  07-24    PT/INR - ( 23 Jul 2023 08:44 )   PT: 14.2 sec;   INR: 1.22 ratio               Urinalysis Basic - ( 24 Jul 2023 06:03 )    Color: x / Appearance: x / SG: x / pH: x  Gluc: 98 mg/dL / Ketone: x  / Bili: x / Urobili: x   Blood: x / Protein: x / Nitrite: x   Leuk Esterase: x / RBC: x / WBC x   Sq Epi: x / Non Sq Epi: x / Bacteria: x          RADIOLOGY & ADDITIONAL TESTS:  Results Reviewed:   Imaging Personally Reviewed:  Electrocardiogram Personally Reviewed:    COORDINATION OF CARE:  Care Discussed with Consultants/Other Providers [Y/N]:  Prior or Outpatient Records Reviewed [Y/N]:

## 2023-07-25 NOTE — DIETITIAN INITIAL EVALUATION ADULT - OTHER CALCULATIONS
Ideal Body Weight: 110 lbs / 50 kg +/-10%   Dosing weight (7/18) 190 lbs / 86.2 kg.   Pt reports usual body sindhu 188 lbs, denies recent significant changes to weight.   No recent weight history available via HIE.

## 2023-07-25 NOTE — DISCHARGE NOTE NURSING/CASE MANAGEMENT/SOCIAL WORK - NSFLUVACAGEDISCH_IMM_ALL_CORE
"Cheri"Francesca Degroot was seen and treated in our emergency department on 6/4/2022.  She may return to work on 06/08/2022.       If you have any questions or concerns, please don't hesitate to call.      DIGNA Moore" Adult

## 2023-07-26 ENCOUNTER — NON-APPOINTMENT (OUTPATIENT)
Age: 58
End: 2023-07-26

## 2023-07-27 ENCOUNTER — APPOINTMENT (OUTPATIENT)
Dept: INTERNAL MEDICINE | Facility: CLINIC | Age: 58
End: 2023-07-27
Payer: COMMERCIAL

## 2023-07-27 RX ORDER — LEVOFLOXACIN 5 MG/ML
1 INJECTION, SOLUTION INTRAVENOUS
Qty: 12 | Refills: 0
Start: 2023-07-27 | End: 2023-08-18

## 2023-07-28 ENCOUNTER — APPOINTMENT (OUTPATIENT)
Dept: INTERNAL MEDICINE | Facility: CLINIC | Age: 58
End: 2023-07-28
Payer: COMMERCIAL

## 2023-07-28 VITALS
OXYGEN SATURATION: 97 % | SYSTOLIC BLOOD PRESSURE: 152 MMHG | TEMPERATURE: 98.2 F | HEART RATE: 99 BPM | HEIGHT: 60 IN | DIASTOLIC BLOOD PRESSURE: 80 MMHG | WEIGHT: 187 LBS | BODY MASS INDEX: 36.71 KG/M2

## 2023-07-28 DIAGNOSIS — J90 PLEURAL EFFUSION, NOT ELSEWHERE CLASSIFIED: ICD-10-CM

## 2023-07-28 PROCEDURE — 99214 OFFICE O/P EST MOD 30 MIN: CPT

## 2023-07-28 NOTE — PHYSICAL EXAM
[No Acute Distress] : no acute distress [Well Nourished] : well nourished [Well Developed] : well developed [No Respiratory Distress] : no respiratory distress  [No Accessory Muscle Use] : no accessory muscle use [Clear to Auscultation] : lungs were clear to auscultation bilaterally [Normal Rate] : normal rate  [Regular Rhythm] : with a regular rhythm [Normal S1, S2] : normal S1 and S2 [Alert and Oriented x3] : oriented to person, place, and time [Normal Mood] : the mood was normal [de-identified] : right pleurax incision healing well

## 2023-07-28 NOTE — HISTORY OF PRESENT ILLNESS
[Post-hospitalization from ___ Hospital] : Post-hospitalization from [unfilled] Hospital [Admitted on: ___] : The patient was admitted on [unfilled] [Discharged on ___] : discharged on [unfilled] [Discharge Summary] : discharge summary [Pertinent Labs] : pertinent labs [Radiology Findings] : radiology findings [Discharge Med List] : discharge medication list [Med Reconciliation] : medication reconciliation has been completed [FreeTextEntry2] : Pt was admitted to hospital for purulent drainage from right  pleurx.\par Pt feeling better today.\par No fever,chills. Denies SOB.

## 2023-08-01 ENCOUNTER — RESULT REVIEW (OUTPATIENT)
Age: 58
End: 2023-08-01

## 2023-08-01 ENCOUNTER — APPOINTMENT (OUTPATIENT)
Dept: HEMATOLOGY ONCOLOGY | Facility: CLINIC | Age: 58
End: 2023-08-01
Payer: COMMERCIAL

## 2023-08-01 VITALS
BODY MASS INDEX: 36.88 KG/M2 | SYSTOLIC BLOOD PRESSURE: 139 MMHG | TEMPERATURE: 97.2 F | WEIGHT: 187.83 LBS | OXYGEN SATURATION: 97 % | HEIGHT: 60 IN | RESPIRATION RATE: 17 BRPM | HEART RATE: 89 BPM | DIASTOLIC BLOOD PRESSURE: 84 MMHG

## 2023-08-01 LAB
ALBUMIN SERPL ELPH-MCNC: 3.8 G/DL
ALP BLD-CCNC: 85 U/L
ALT SERPL-CCNC: 9 U/L
ANION GAP SERPL CALC-SCNC: 16 MMOL/L
AST SERPL-CCNC: 15 U/L
BASOPHILS # BLD AUTO: 0.02 K/UL — SIGNIFICANT CHANGE UP (ref 0–0.2)
BASOPHILS NFR BLD AUTO: 0.4 % — SIGNIFICANT CHANGE UP (ref 0–2)
BILIRUB SERPL-MCNC: 0.3 MG/DL
BUN SERPL-MCNC: 20 MG/DL
CALCIUM SERPL-MCNC: 9.6 MG/DL
CANCER AG125 SERPL-ACNC: 693 U/ML
CHLORIDE SERPL-SCNC: 100 MMOL/L
CO2 SERPL-SCNC: 25 MMOL/L
CREAT SERPL-MCNC: 2.76 MG/DL
EGFR: 19 ML/MIN/1.73M2
EOSINOPHIL # BLD AUTO: 0.14 K/UL — SIGNIFICANT CHANGE UP (ref 0–0.5)
EOSINOPHIL NFR BLD AUTO: 3.1 % — SIGNIFICANT CHANGE UP (ref 0–6)
GLUCOSE SERPL-MCNC: 156 MG/DL
HCT VFR BLD CALC: 31.6 % — LOW (ref 34.5–45)
HGB BLD-MCNC: 10.1 G/DL — LOW (ref 11.5–15.5)
IMM GRANULOCYTES NFR BLD AUTO: 0.9 % — SIGNIFICANT CHANGE UP (ref 0–0.9)
LYMPHOCYTES # BLD AUTO: 1.13 K/UL — SIGNIFICANT CHANGE UP (ref 1–3.3)
LYMPHOCYTES # BLD AUTO: 24.9 % — SIGNIFICANT CHANGE UP (ref 13–44)
MAGNESIUM SERPL-MCNC: 1.6 MG/DL
MCHC RBC-ENTMCNC: 26.1 PG — LOW (ref 27–34)
MCHC RBC-ENTMCNC: 32 G/DL — SIGNIFICANT CHANGE UP (ref 32–36)
MCV RBC AUTO: 81.7 FL — SIGNIFICANT CHANGE UP (ref 80–100)
MONOCYTES # BLD AUTO: 0.37 K/UL — SIGNIFICANT CHANGE UP (ref 0–0.9)
MONOCYTES NFR BLD AUTO: 8.2 % — SIGNIFICANT CHANGE UP (ref 2–14)
NEUTROPHILS # BLD AUTO: 2.83 K/UL — SIGNIFICANT CHANGE UP (ref 1.8–7.4)
NEUTROPHILS NFR BLD AUTO: 62.5 % — SIGNIFICANT CHANGE UP (ref 43–77)
NRBC # BLD: 0 /100 WBCS — SIGNIFICANT CHANGE UP (ref 0–0)
PLATELET # BLD AUTO: 227 K/UL — SIGNIFICANT CHANGE UP (ref 150–400)
POTASSIUM SERPL-SCNC: 4.1 MMOL/L
PROT SERPL-MCNC: 8.6 G/DL
RBC # BLD: 3.87 M/UL — SIGNIFICANT CHANGE UP (ref 3.8–5.2)
RBC # FLD: 15.2 % — HIGH (ref 10.3–14.5)
SODIUM SERPL-SCNC: 141 MMOL/L
WBC # BLD: 4.53 K/UL — SIGNIFICANT CHANGE UP (ref 3.8–10.5)
WBC # FLD AUTO: 4.53 K/UL — SIGNIFICANT CHANGE UP (ref 3.8–10.5)

## 2023-08-01 PROCEDURE — 99214 OFFICE O/P EST MOD 30 MIN: CPT

## 2023-08-01 NOTE — HISTORY OF PRESENT ILLNESS
[Disease: _____________________] : Disease: [unfilled] [AJCC Stage: ____] : AJCC Stage: [unfilled] [de-identified] : Ms. Cassidy is a 57 yo  postmenopausal female who presents for initial consultation for NACT  for presumed GYN cancer. She reports a >5 year lapse in GYN care. She presented to [facility] ED on 5/31/23 c/o RECIO a 4 weeks. Imaging revealed a right pleural effusion (s/p thoracentesis positive for malignant cells with IHC of likely Mullerian origin), carcinomatosis, adnexal mass and a markedly elevated CA-125. IR guided core biopsy of peritoneal mass is pending.  Patient is a [employer] and does not accept blood products.  LABS on 6/6/23: CA-125 was 1,506 (ref<38)  was <2 (ref<35) CEA was <1.0 (ref<3.8)  CYTOLOGY/PATHOLOGY: 1) Right pleural thoracentesis, 6/1/23, To  a) positive for malignant cells, PAX-8 +, ER +, CK7 + - favor Mullerian origin 2) Core biopsy of peritoneal mass, 6/8/23, To  OMENTUM, CT GUIDED CORE BIOPSY POSITIVE FOR MALIGNANT CELLS. 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 The neoplastic cells are positive for Moc31, [place]-8, CK7, P53, P16,  WT1 and ER. P40 and CK 20 are negative  IMAGING: TVUS on 6/4/23: uterus 11 x 5.0 x 7.6 cm. Leiomyomatous uterus with scattered calcified uterine myomas including a 3.5 cm right intramural myoma and 5.4 cm posterior intramural myoma. EML not visualized.  RTO - 3.2 x 2.2 x 1.8 cm.  LTO - there is a 7.0 x 5.8 x 5.5 cm calcified mass in the left adnexa likely representing calcified pedunculated myoma.  Small FF.    CT C/A/P on 6/2/23: LOWER CHEST: Loculated moderate right pleural effusion and small left pleural effusion are unchanged. LIVER: Soft tissue nodularity along the inferior right hepatic lobe. BILE DUCTS: Normal caliber. GALLBLADDER: Cholelithiasis and contracted. SPLEEN: Within normal limits. PANCREAS: Within normal limits. ADRENALS: Within normal limits. KIDNEYS/URETERS: Bilateral cortical scarring. BLADDER: Within normal limits. REPRODUCTIVE ORGANS: Likely 2 intramural calcified fibroids. Another partially calcified mass in the left lower abdomen may represent a subserosal fibroid or an adnexal mass. The adnexa are not well visualized. BOWEL: No bowel obstruction. Appendix is normal. Colonic diverticulosis. PERITONEUM: Small to moderate ascites. Large amount of soft tissue in the greater omentum VESSELS: Within normal limits. RETROPERITONEUM/LYMPH NODES: No lymphadenopathy. ABDOMINAL WALL: Within normal limits. BONES: Degenerative changes. IMPRESSION: Extensive peritoneal carcinomatosis and small to moderate ascites. Fibroid uterus with either a left subserosal fibroid or a possible adnexal mass. Loculated moderate right pleural effusion and small left pleural effusion, unchanged.  Patient saw Dr. Huizar and had right pleurex catheter placed.   PMHx: DM, HTN, HLD, asthma PSHx: N/A Fam Hx: mother (colon cancer)   HCM: Mammogram: unsure Colonoscopy: 5/2019  Patient got one cycle of chemotherapy on 6/17/23 and then went to ED with purulent pleurex. catheter. PluerX placed 1 month prior)) currently undergoing chemotherapy presented to the ED complaining of purulent discharge from her PleurX catheter. PleurX catheter removed by Pulm on 7/19. Nephrology consulted for MARIANA.   [de-identified] : [employer] [de-identified] : She has some nausea, on anti-emetics with good relief. No pain or bowel or bladder issues. She is on antibiotics., Levaquin. She has a f/u appt with renal specialist.

## 2023-08-05 ENCOUNTER — APPOINTMENT (OUTPATIENT)
Dept: INFUSION THERAPY | Facility: HOSPITAL | Age: 58
End: 2023-08-05

## 2023-08-07 ENCOUNTER — OUTPATIENT (OUTPATIENT)
Dept: OUTPATIENT SERVICES | Facility: HOSPITAL | Age: 58
LOS: 1 days | End: 2023-08-07
Payer: COMMERCIAL

## 2023-08-07 ENCOUNTER — TRANSCRIPTION ENCOUNTER (OUTPATIENT)
Age: 58
End: 2023-08-07

## 2023-08-07 ENCOUNTER — RESULT REVIEW (OUTPATIENT)
Age: 58
End: 2023-08-07

## 2023-08-07 VITALS
OXYGEN SATURATION: 99 % | TEMPERATURE: 98 F | RESPIRATION RATE: 18 BRPM | HEIGHT: 62 IN | HEART RATE: 72 BPM | SYSTOLIC BLOOD PRESSURE: 134 MMHG | DIASTOLIC BLOOD PRESSURE: 91 MMHG | WEIGHT: 197.09 LBS

## 2023-08-07 DIAGNOSIS — C56.9 MALIGNANT NEOPLASM OF UNSPECIFIED OVARY: ICD-10-CM

## 2023-08-07 DIAGNOSIS — Z98.890 OTHER SPECIFIED POSTPROCEDURAL STATES: Chronic | ICD-10-CM

## 2023-08-07 DIAGNOSIS — Z98.89 OTHER SPECIFIED POSTPROCEDURAL STATES: Chronic | ICD-10-CM

## 2023-08-07 PROCEDURE — 36598 INJ W/FLUOR EVAL CV DEVICE: CPT

## 2023-08-07 PROCEDURE — 36598 INJ W/FLUOR EVAL CV DEVICE: CPT | Mod: 52

## 2023-08-07 RX ORDER — INSULIN GLARGINE 100 [IU]/ML
15 INJECTION, SOLUTION SUBCUTANEOUS
Qty: 0 | Refills: 0 | DISCHARGE

## 2023-08-07 NOTE — PROCEDURE NOTE - PROCEDURE FINDINGS AND DETAILS
Port noted to be flipped. Port manually flipped and accessed under sterile condition. Port reservoir positioned medially with some redundancy of catheter over right neck. Port fushed and aspirated easilly and Tip in the SVC. Brisk flow at catheter tip.  no fibrin sheath noted. . Port locked with heparin and de accessed. Ok to use port.

## 2023-08-07 NOTE — ASU DISCHARGE PLAN (ADULT/PEDIATRIC) - NS MD DC FALL RISK RISK
For information on Fall & Injury Prevention, visit: [email or web address] OR  [email or web address] OR  https://www.cdc.gov/steadi/patient.html

## 2023-08-07 NOTE — ASU DISCHARGE PLAN (ADULT/PEDIATRIC) - ASU DC SPECIAL INSTRUCTIONSFT
Chest Port Check    Discharge Instructions  - You have had a chest port evaluated.   - You may shower in 24 hours.  - Keep the area covered and dry for the next 24 hours.  - You may resume your normal diet.  - You may resume your normal medications.  - It is normal to experience some pain over the site for the next 24 hours. You may take apply ice to the area (20 minutes on, 20 minutes off) and take Tylenol for that pain. Do not take more frequently than every 6 hours and do not exceed more than 3000mg of Tylenol in a 24 hour period.    Notify your primary physician and/or Interventional Radiology IMMEDIATELY if you experience any of the following       - Fever of 101F or 38C       - Chills or Rigors/ Shakes       - Swelling and/or Redness in the area around the port       - Worsening Pain       - Blood soaked bandages or worsening bleeding       - Lightheadedness and/or dizziness upon standing       - Chest Pain/ Tightness       - Shortness of Breath       - Difficulty walking    If you have a problem that you believe requires IMMEDIATE attention, please go to your NEAREST Emergency Room. If you believe your problem can safely wait until you speak to a physician, please call Interventional Radiology for any concerns.    Please feel free to contact us at [phone] if any problems arise. After 6PM, Monday through Friday, on weekends and on holidays, please call [phone] and ask for the radiology resident on call to be paged.

## 2023-08-07 NOTE — ASU PATIENT PROFILE, ADULT - FALL HARM RISK - UNIVERSAL INTERVENTIONS
Bed in lowest position, wheels locked, appropriate side rails in place/Call bell, personal items and telephone in reach/Instruct patient to call for assistance before getting out of bed or chair/Non-slip footwear when patient is out of bed/[place] to call system/Physically safe environment - no spills, clutter or unnecessary equipment/Purposeful Proactive Rounding/Room/bathroom lighting operational, light cord in reach

## 2023-08-07 NOTE — PRE PROCEDURE NOTE - PRE PROCEDURE EVALUATION
Interventional Radiology    HPI: 58y PMH diabetes, hypertension, asthma (no history of intubations), ovarian cancer (on chemo, last chemo session was 3 weeks ago) w/ peritoneal carcinomatosis and c/b malignant pleural effusion s/p pleurx catheter 1 month ago and now removed 2/2 infection. Patient presents today for port evaluation 2/2 flipped port (port placed on 7/6). Patient states port has not been used since placement.     Allergies: latex (Hives)  plastic ,rash (Other)  Proventil HFA (Hives)    Medications (Abx/Cardiac/Anticoagulation/Blood Products)      Data:  157.5  89.4  T(C): 36.7  HR: 72  BP: 134/91  RR: 18  SpO2: 99%    Exam  General: No acute distress, A&Ox3  Chest: Non labored breathing    Plan: 58y Female presents for Chest wall port evaluation  -Risks/Benefits/alternatives explained with the patient and/or healthcare proxy and witnessed informed consent obtained.

## 2023-08-11 ENCOUNTER — APPOINTMENT (OUTPATIENT)
Dept: INFUSION THERAPY | Facility: HOSPITAL | Age: 58
End: 2023-08-11

## 2023-08-11 ENCOUNTER — RESULT REVIEW (OUTPATIENT)
Age: 58
End: 2023-08-11

## 2023-08-11 ENCOUNTER — APPOINTMENT (OUTPATIENT)
Dept: HEMATOLOGY ONCOLOGY | Facility: CLINIC | Age: 58
End: 2023-08-11

## 2023-08-11 LAB
BASOPHILS # BLD AUTO: 0.02 K/UL — SIGNIFICANT CHANGE UP (ref 0–0.2)
BASOPHILS NFR BLD AUTO: 0.5 % — SIGNIFICANT CHANGE UP (ref 0–2)
EOSINOPHIL # BLD AUTO: 0.11 K/UL — SIGNIFICANT CHANGE UP (ref 0–0.5)
EOSINOPHIL NFR BLD AUTO: 3 % — SIGNIFICANT CHANGE UP (ref 0–6)
HCT VFR BLD CALC: 29.7 % — LOW (ref 34.5–45)
HGB BLD-MCNC: 10 G/DL — LOW (ref 11.5–15.5)
IMM GRANULOCYTES NFR BLD AUTO: 0.8 % — SIGNIFICANT CHANGE UP (ref 0–0.9)
LYMPHOCYTES # BLD AUTO: 1.3 K/UL — SIGNIFICANT CHANGE UP (ref 1–3.3)
LYMPHOCYTES # BLD AUTO: 35 % — SIGNIFICANT CHANGE UP (ref 13–44)
MCHC RBC-ENTMCNC: 26.7 PG — LOW (ref 27–34)
MCHC RBC-ENTMCNC: 33.7 G/DL — SIGNIFICANT CHANGE UP (ref 32–36)
MCV RBC AUTO: 79.4 FL — LOW (ref 80–100)
MONOCYTES # BLD AUTO: 0.26 K/UL — SIGNIFICANT CHANGE UP (ref 0–0.9)
MONOCYTES NFR BLD AUTO: 7 % — SIGNIFICANT CHANGE UP (ref 2–14)
NEUTROPHILS # BLD AUTO: 1.99 K/UL — SIGNIFICANT CHANGE UP (ref 1.8–7.4)
NEUTROPHILS NFR BLD AUTO: 53.7 % — SIGNIFICANT CHANGE UP (ref 43–77)
NRBC # BLD: 0 /100 WBCS — SIGNIFICANT CHANGE UP (ref 0–0)
PLATELET # BLD AUTO: 151 K/UL — SIGNIFICANT CHANGE UP (ref 150–400)
RBC # BLD: 3.74 M/UL — LOW (ref 3.8–5.2)
RBC # FLD: 15.4 % — HIGH (ref 10.3–14.5)
WBC # BLD: 3.71 K/UL — LOW (ref 3.8–10.5)
WBC # FLD AUTO: 3.71 K/UL — LOW (ref 3.8–10.5)

## 2023-08-14 ENCOUNTER — APPOINTMENT (OUTPATIENT)
Dept: PULMONOLOGY | Facility: CLINIC | Age: 58
End: 2023-08-14

## 2023-08-14 ENCOUNTER — OUTPATIENT (OUTPATIENT)
Dept: OUTPATIENT SERVICES | Facility: HOSPITAL | Age: 58
LOS: 1 days | Discharge: HOME | End: 2023-08-14

## 2023-08-14 DIAGNOSIS — C57.4 MALIGNANT NEOPLASM OF UTERINE ADNEXA, UNSPECIFIED: ICD-10-CM

## 2023-08-14 DIAGNOSIS — Z98.890 OTHER SPECIFIED POSTPROCEDURAL STATES: Chronic | ICD-10-CM

## 2023-08-14 DIAGNOSIS — Z98.89 OTHER SPECIFIED POSTPROCEDURAL STATES: Chronic | ICD-10-CM

## 2023-08-15 ENCOUNTER — APPOINTMENT (OUTPATIENT)
Dept: INFUSION THERAPY | Facility: HOSPITAL | Age: 58
End: 2023-08-15

## 2023-08-15 ENCOUNTER — APPOINTMENT (OUTPATIENT)
Dept: HEMATOLOGY ONCOLOGY | Facility: CLINIC | Age: 58
End: 2023-08-15

## 2023-08-18 DIAGNOSIS — Z45.2 ENCOUNTER FOR ADJUSTMENT AND MANAGEMENT OF VASCULAR ACCESS DEVICE: ICD-10-CM

## 2023-08-18 DIAGNOSIS — C56.9 MALIGNANT NEOPLASM OF UNSPECIFIED OVARY: ICD-10-CM

## 2023-08-21 ENCOUNTER — NON-APPOINTMENT (OUTPATIENT)
Age: 58
End: 2023-08-21

## 2023-08-22 ENCOUNTER — RESULT REVIEW (OUTPATIENT)
Age: 58
End: 2023-08-22

## 2023-08-22 ENCOUNTER — APPOINTMENT (OUTPATIENT)
Dept: HEMATOLOGY ONCOLOGY | Facility: CLINIC | Age: 58
End: 2023-08-22
Payer: COMMERCIAL

## 2023-08-22 VITALS
BODY MASS INDEX: 35.74 KG/M2 | TEMPERATURE: 97 F | RESPIRATION RATE: 17 BRPM | DIASTOLIC BLOOD PRESSURE: 84 MMHG | SYSTOLIC BLOOD PRESSURE: 138 MMHG | WEIGHT: 182.98 LBS | OXYGEN SATURATION: 99 % | HEART RATE: 86 BPM

## 2023-08-22 LAB
ANISOCYTOSIS BLD QL: SLIGHT — SIGNIFICANT CHANGE UP
BASOPHILS # BLD AUTO: 0.03 K/UL — SIGNIFICANT CHANGE UP (ref 0–0.2)
BASOPHILS NFR BLD AUTO: 1 % — SIGNIFICANT CHANGE UP (ref 0–2)
EOSINOPHIL # BLD AUTO: 0.03 K/UL — SIGNIFICANT CHANGE UP (ref 0–0.5)
EOSINOPHIL NFR BLD AUTO: 1 % — SIGNIFICANT CHANGE UP (ref 0–6)
HCT VFR BLD CALC: 26.3 % — LOW (ref 34.5–45)
HGB BLD-MCNC: 8.8 G/DL — LOW (ref 11.5–15.5)
HYPOCHROMIA BLD QL: SLIGHT — SIGNIFICANT CHANGE UP
LYMPHOCYTES # BLD AUTO: 1.06 K/UL — SIGNIFICANT CHANGE UP (ref 1–3.3)
LYMPHOCYTES # BLD AUTO: 36 % — SIGNIFICANT CHANGE UP (ref 13–44)
MCHC RBC-ENTMCNC: 26.7 PG — LOW (ref 27–34)
MCHC RBC-ENTMCNC: 33.5 G/DL — SIGNIFICANT CHANGE UP (ref 32–36)
MCV RBC AUTO: 79.9 FL — LOW (ref 80–100)
MICROCYTES BLD QL: SLIGHT — SIGNIFICANT CHANGE UP
MONOCYTES # BLD AUTO: 0.18 K/UL — SIGNIFICANT CHANGE UP (ref 0–0.9)
MONOCYTES NFR BLD AUTO: 6 % — SIGNIFICANT CHANGE UP (ref 2–14)
NEUTROPHILS # BLD AUTO: 1.65 K/UL — LOW (ref 1.8–7.4)
NEUTROPHILS NFR BLD AUTO: 56 % — SIGNIFICANT CHANGE UP (ref 43–77)
NRBC # BLD: 0 /100 — SIGNIFICANT CHANGE UP (ref 0–0)
NRBC # BLD: SIGNIFICANT CHANGE UP /100 WBCS (ref 0–0)
PLAT MORPH BLD: NORMAL — SIGNIFICANT CHANGE UP
PLATELET # BLD AUTO: 138 K/UL — LOW (ref 150–400)
RBC # BLD: 3.29 M/UL — LOW (ref 3.8–5.2)
RBC # FLD: 15.3 % — HIGH (ref 10.3–14.5)
RBC BLD AUTO: ABNORMAL
WBC # BLD: 2.95 K/UL — LOW (ref 3.8–10.5)
WBC # FLD AUTO: 2.95 K/UL — LOW (ref 3.8–10.5)

## 2023-08-22 PROCEDURE — 99213 OFFICE O/P EST LOW 20 MIN: CPT

## 2023-08-22 NOTE — HISTORY OF PRESENT ILLNESS
[Disease: _____________________] : Disease: [unfilled] [AJCC Stage: ____] : AJCC Stage: [unfilled] [de-identified] : Ms. Cassidy is a 59 yo  postmenopausal female who presents for initial consultation for NACT  for presumed GYN cancer. She reports a >5 year lapse in GYN care. She presented to [facility] ED on 5/31/23 c/o RECIO a 4 weeks. Imaging revealed a right pleural effusion (s/p thoracentesis positive for malignant cells with IHC of likely Mullerian origin), carcinomatosis, adnexal mass and a markedly elevated CA-125. IR guided core biopsy of peritoneal mass is pending.  Patient is a Alevism and does not accept blood products.  LABS on 6/6/23: CA-125 was 1,506 (ref<38)  was <2 (ref<35) CEA was <1.0 (ref<3.8)  CYTOLOGY/PATHOLOGY: 1) Right pleural thoracentesis, 6/1/23, To  a) positive for malignant cells, PAX-8 +, ER +, CK7 + - favor Mullerian origin 2) Core biopsy of peritoneal mass, 6/8/23, To  OMENTUM, CT GUIDED CORE BIOPSY POSITIVE FOR MALIGNANT CELLS. 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 The neoplastic cells are positive for Moc31, Winston-8, CK7, P53, P16,  WT1 and ER. P40 and CK 20 are negative  IMAGING: TVUS on 6/4/23: uterus 11 x 5.0 x 7.6 cm. Leiomyomatous uterus with scattered calcified uterine myomas including a 3.5 cm right intramural myoma and 5.4 cm posterior intramural myoma. EML not visualized.  RTO - 3.2 x 2.2 x 1.8 cm.  LTO - there is a 7.0 x 5.8 x 5.5 cm calcified mass in the left adnexa likely representing calcified pedunculated myoma.  Small FF.    CT C/A/P on 6/2/23: LOWER CHEST: Loculated moderate right pleural effusion and small left pleural effusion are unchanged. LIVER: Soft tissue nodularity along the inferior right hepatic lobe. BILE DUCTS: Normal caliber. GALLBLADDER: Cholelithiasis and contracted. SPLEEN: Within normal limits. PANCREAS: Within normal limits. ADRENALS: Within normal limits. KIDNEYS/URETERS: Bilateral cortical scarring. BLADDER: Within normal limits. REPRODUCTIVE ORGANS: Likely 2 intramural calcified fibroids. Another partially calcified mass in the left lower abdomen may represent a subserosal fibroid or an adnexal mass. The adnexa are not well visualized. BOWEL: No bowel obstruction. Appendix is normal. Colonic diverticulosis. PERITONEUM: Small to moderate ascites. Large amount of soft tissue in the greater omentum VESSELS: Within normal limits. RETROPERITONEUM/LYMPH NODES: No lymphadenopathy. ABDOMINAL WALL: Within normal limits. BONES: Degenerative changes. IMPRESSION: Extensive peritoneal carcinomatosis and small to moderate ascites. Fibroid uterus with either a left subserosal fibroid or a possible adnexal mass. Loculated moderate right pleural effusion and small left pleural effusion, unchanged.  Patient saw Dr. Huizar and had right pleurex catheter placed.   PMHx: DM, HTN, HLD, asthma PSHx: N/A Fam Hx: mother (colon cancer)   HCM: Mammogram: unsure Colonoscopy: 5/2019  Patient got one cycle of chemotherapy on 6/17/23 and then went to ED with purulent pleurex. catheter. PluerX placed 1 month prior)) currently undergoing chemotherapy presented to the ED complaining of purulent discharge from her PleurX catheter. PleurX catheter removed by Pulm on 7/19. Nephrology consulted for MARIANA.   [de-identified] : Gnosticist [FreeTextEntry1] : Carboplatin and Taxol C1 - 6/27/23 C2 - 8/11/23  [de-identified] : Patient is here for follow up after resuming chemo post hospitalization. She is overall feeling better. She has some occasional nausea, no vomiting. She has not seen renal yet  for follow up, says she will call today. Has some constipation, not taking laxatives. She has transient tingling of her feet. She denies fever, chills, chest pain, SOB, cough, abdominal pain, bloating, diarrhea, bleeding.

## 2023-08-22 NOTE — REVIEW OF SYSTEMS
[Diarrhea: Grade 0] : Diarrhea: Grade 0 [Negative] : Allergic/Immunologic [FreeTextEntry7] : nausea, constipation

## 2023-08-23 LAB
ALBUMIN SERPL ELPH-MCNC: 3.9 G/DL
ALP BLD-CCNC: 99 U/L
ALT SERPL-CCNC: 10 U/L
ANION GAP SERPL CALC-SCNC: 13 MMOL/L
AST SERPL-CCNC: 14 U/L
BILIRUB SERPL-MCNC: 0.4 MG/DL
BUN SERPL-MCNC: 13 MG/DL
CALCIUM SERPL-MCNC: 9.4 MG/DL
CANCER AG125 SERPL-ACNC: 566 U/ML
CHLORIDE SERPL-SCNC: 102 MMOL/L
CO2 SERPL-SCNC: 26 MMOL/L
CREAT SERPL-MCNC: 1.17 MG/DL
EGFR: 54 ML/MIN/1.73M2
GLUCOSE SERPL-MCNC: 160 MG/DL
MAGNESIUM SERPL-MCNC: 1.4 MG/DL
POTASSIUM SERPL-SCNC: 3.6 MMOL/L
PROT SERPL-MCNC: 7.7 G/DL
SODIUM SERPL-SCNC: 141 MMOL/L

## 2023-08-31 ENCOUNTER — NON-APPOINTMENT (OUTPATIENT)
Age: 58
End: 2023-08-31

## 2023-09-05 ENCOUNTER — LABORATORY RESULT (OUTPATIENT)
Age: 58
End: 2023-09-05

## 2023-09-05 ENCOUNTER — APPOINTMENT (OUTPATIENT)
Dept: ORTHOPEDIC SURGERY | Facility: CLINIC | Age: 58
End: 2023-09-05
Payer: COMMERCIAL

## 2023-09-05 VITALS — WEIGHT: 183 LBS | BODY MASS INDEX: 33.68 KG/M2 | HEIGHT: 62 IN

## 2023-09-05 DIAGNOSIS — M25.462 EFFUSION, LEFT KNEE: ICD-10-CM

## 2023-09-05 DIAGNOSIS — M17.12 UNILATERAL PRIMARY OSTEOARTHRITIS, LEFT KNEE: ICD-10-CM

## 2023-09-05 DIAGNOSIS — E11.9 TYPE 2 DIABETES MELLITUS W/OUT COMPLICATIONS: ICD-10-CM

## 2023-09-05 PROCEDURE — 99214 OFFICE O/P EST MOD 30 MIN: CPT | Mod: 25

## 2023-09-05 PROCEDURE — 20611 DRAIN/INJ JOINT/BURSA W/US: CPT | Mod: LT

## 2023-09-05 NOTE — DISCUSSION/SUMMARY
[de-identified] : 58f with left knee djd and effusion 1) aspiration left knee today - tolerated well - 35cc - sent to labs for cell count, gram strain and crystals 2) will obtain clearance from oncologist for steroid injection 3) cryotherapy, rest and activity modification 4) rtc when ready for injection  Entered by Emelia Bee acting as scribe. Dr. Voss- The documentation recorded by the scribe accurately reflects the service I personally performed and the decisions made by me.

## 2023-09-05 NOTE — PHYSICAL EXAM
[Left] : left knee [5___] : hamstring 5[unfilled]/5 [Negative] : negative Yomi's [] : negative Valgus instability [de-identified] : extension 3 degrees

## 2023-09-05 NOTE — HISTORY OF PRESENT ILLNESS
[5] : 5 [4] : 4 [Dull/Aching] : dull/aching [Localized] : localized [Full time] : Work status: full time [de-identified] : 09/05/2023 Ms. ALO RAMIREZ, a 58 year old female, presents today for left knee. Reports that on 09.01.23 she started to experiencing left knee pain and swelling. Pain can be present with walking and has been using crutches for assistance.  Pmhx: Ovarian cancer, currently on chemotherapy.  Diabetes last A1c 6.5, last daily at 135  [] : Post Surgical Visit: no [FreeTextEntry1] : L Knee [FreeTextEntry3] : 9/1/23 [FreeTextEntry5] : Pt is a 59 y/o F here c/o 4 days of atraumatic L knee pain and swelling. Pt went to [facility]  on 9/1/23 and had XR which Conf a large effusion.  HX of ovarian cancer ( Currently in chemo)  [de-identified] : XR  [de-identified] : Lalita avilez

## 2023-09-11 ENCOUNTER — RESULT REVIEW (OUTPATIENT)
Age: 58
End: 2023-09-11

## 2023-09-11 ENCOUNTER — NON-APPOINTMENT (OUTPATIENT)
Age: 58
End: 2023-09-11

## 2023-09-11 ENCOUNTER — APPOINTMENT (OUTPATIENT)
Dept: INFUSION THERAPY | Facility: HOSPITAL | Age: 58
End: 2023-09-11

## 2023-09-11 ENCOUNTER — APPOINTMENT (OUTPATIENT)
Dept: HEMATOLOGY ONCOLOGY | Facility: CLINIC | Age: 58
End: 2023-09-11

## 2023-09-11 LAB
BASOPHILS # BLD AUTO: 0.01 K/UL — SIGNIFICANT CHANGE UP (ref 0–0.2)
BASOPHILS NFR BLD AUTO: 0.2 % — SIGNIFICANT CHANGE UP (ref 0–2)
EOSINOPHIL # BLD AUTO: 0.15 K/UL — SIGNIFICANT CHANGE UP (ref 0–0.5)
EOSINOPHIL NFR BLD AUTO: 3.1 % — SIGNIFICANT CHANGE UP (ref 0–6)
HCT VFR BLD CALC: 25.7 % — LOW (ref 34.5–45)
HGB BLD-MCNC: 8.7 G/DL — LOW (ref 11.5–15.5)
IMM GRANULOCYTES NFR BLD AUTO: 0.6 % — SIGNIFICANT CHANGE UP (ref 0–0.9)
LYMPHOCYTES # BLD AUTO: 1.37 K/UL — SIGNIFICANT CHANGE UP (ref 1–3.3)
LYMPHOCYTES # BLD AUTO: 28.6 % — SIGNIFICANT CHANGE UP (ref 13–44)
MCHC RBC-ENTMCNC: 27.9 PG — SIGNIFICANT CHANGE UP (ref 27–34)
MCHC RBC-ENTMCNC: 33.9 G/DL — SIGNIFICANT CHANGE UP (ref 32–36)
MCV RBC AUTO: 82.4 FL — SIGNIFICANT CHANGE UP (ref 80–100)
MONOCYTES # BLD AUTO: 0.32 K/UL — SIGNIFICANT CHANGE UP (ref 0–0.9)
MONOCYTES NFR BLD AUTO: 6.7 % — SIGNIFICANT CHANGE UP (ref 2–14)
NEUTROPHILS # BLD AUTO: 2.91 K/UL — SIGNIFICANT CHANGE UP (ref 1.8–7.4)
NEUTROPHILS NFR BLD AUTO: 60.8 % — SIGNIFICANT CHANGE UP (ref 43–77)
NRBC # BLD: 0 /100 WBCS — SIGNIFICANT CHANGE UP (ref 0–0)
PLATELET # BLD AUTO: 189 K/UL — SIGNIFICANT CHANGE UP (ref 150–400)
RBC # BLD: 3.12 M/UL — LOW (ref 3.8–5.2)
RBC # FLD: 17.9 % — HIGH (ref 10.3–14.5)
WBC # BLD: 4.79 K/UL — SIGNIFICANT CHANGE UP (ref 3.8–10.5)
WBC # FLD AUTO: 4.79 K/UL — SIGNIFICANT CHANGE UP (ref 3.8–10.5)

## 2023-09-12 DIAGNOSIS — R11.2 NAUSEA WITH VOMITING, UNSPECIFIED: ICD-10-CM

## 2023-09-12 DIAGNOSIS — E86.0 DEHYDRATION: ICD-10-CM

## 2023-09-12 DIAGNOSIS — Z51.11 ENCOUNTER FOR ANTINEOPLASTIC CHEMOTHERAPY: ICD-10-CM

## 2023-09-12 DIAGNOSIS — R79.89 OTHER SPECIFIED ABNORMAL FINDINGS OF BLOOD CHEMISTRY: ICD-10-CM

## 2023-09-15 ENCOUNTER — APPOINTMENT (OUTPATIENT)
Dept: HEMATOLOGY ONCOLOGY | Facility: CLINIC | Age: 58
End: 2023-09-15
Payer: COMMERCIAL

## 2023-09-20 ENCOUNTER — RESULT REVIEW (OUTPATIENT)
Age: 58
End: 2023-09-20

## 2023-09-20 ENCOUNTER — LABORATORY RESULT (OUTPATIENT)
Age: 58
End: 2023-09-20

## 2023-09-20 ENCOUNTER — APPOINTMENT (OUTPATIENT)
Dept: HEMATOLOGY ONCOLOGY | Facility: CLINIC | Age: 58
End: 2023-09-20
Payer: COMMERCIAL

## 2023-09-20 VITALS
TEMPERATURE: 97.2 F | HEIGHT: 61.97 IN | WEIGHT: 180.78 LBS | DIASTOLIC BLOOD PRESSURE: 86 MMHG | HEART RATE: 90 BPM | RESPIRATION RATE: 17 BRPM | OXYGEN SATURATION: 97 % | SYSTOLIC BLOOD PRESSURE: 145 MMHG | BODY MASS INDEX: 33.27 KG/M2

## 2023-09-20 DIAGNOSIS — D64.9 ANEMIA, UNSPECIFIED: ICD-10-CM

## 2023-09-20 LAB
BASOPHILS # BLD AUTO: 0.01 K/UL — SIGNIFICANT CHANGE UP (ref 0–0.2)
BASOPHILS NFR BLD AUTO: 0.3 % — SIGNIFICANT CHANGE UP (ref 0–2)
EOSINOPHIL # BLD AUTO: 0.04 K/UL — SIGNIFICANT CHANGE UP (ref 0–0.5)
EOSINOPHIL NFR BLD AUTO: 1.1 % — SIGNIFICANT CHANGE UP (ref 0–6)
HCT VFR BLD CALC: 22.4 % — LOW (ref 34.5–45)
HGB BLD-MCNC: 7.4 G/DL — LOW (ref 11.5–15.5)
IMM GRANULOCYTES NFR BLD AUTO: 0.3 % — SIGNIFICANT CHANGE UP (ref 0–0.9)
LYMPHOCYTES # BLD AUTO: 1.35 K/UL — SIGNIFICANT CHANGE UP (ref 1–3.3)
LYMPHOCYTES # BLD AUTO: 38.1 % — SIGNIFICANT CHANGE UP (ref 13–44)
MCHC RBC-ENTMCNC: 27.7 PG — SIGNIFICANT CHANGE UP (ref 27–34)
MCHC RBC-ENTMCNC: 33 G/DL — SIGNIFICANT CHANGE UP (ref 32–36)
MCV RBC AUTO: 83.9 FL — SIGNIFICANT CHANGE UP (ref 80–100)
MONOCYTES # BLD AUTO: 0.19 K/UL — SIGNIFICANT CHANGE UP (ref 0–0.9)
MONOCYTES NFR BLD AUTO: 5.4 % — SIGNIFICANT CHANGE UP (ref 2–14)
NEUTROPHILS # BLD AUTO: 1.94 K/UL — SIGNIFICANT CHANGE UP (ref 1.8–7.4)
NEUTROPHILS NFR BLD AUTO: 54.8 % — SIGNIFICANT CHANGE UP (ref 43–77)
NRBC # BLD: 0 /100 WBCS — SIGNIFICANT CHANGE UP (ref 0–0)
PLATELET # BLD AUTO: 150 K/UL — SIGNIFICANT CHANGE UP (ref 150–400)
RBC # BLD: 2.67 M/UL — LOW (ref 3.8–5.2)
RBC # FLD: 16.9 % — HIGH (ref 10.3–14.5)
WBC # BLD: 3.54 K/UL — LOW (ref 3.8–10.5)
WBC # FLD AUTO: 3.54 K/UL — LOW (ref 3.8–10.5)

## 2023-09-20 PROCEDURE — 99214 OFFICE O/P EST MOD 30 MIN: CPT

## 2023-09-21 LAB
ALBUMIN SERPL ELPH-MCNC: 3.9 G/DL
ALP BLD-CCNC: 112 U/L
ALT SERPL-CCNC: 6 U/L
ANION GAP SERPL CALC-SCNC: 12 MMOL/L
AST SERPL-CCNC: 7 U/L
BILIRUB SERPL-MCNC: 0.2 MG/DL
BUN SERPL-MCNC: 13 MG/DL
CALCIUM SERPL-MCNC: 9.4 MG/DL
CANCER AG125 SERPL-ACNC: 393 U/ML
CHLORIDE SERPL-SCNC: 99 MMOL/L
CO2 SERPL-SCNC: 26 MMOL/L
CREAT SERPL-MCNC: 0.8 MG/DL
EGFR: 85 ML/MIN/1.73M2
GLUCOSE SERPL-MCNC: 186 MG/DL
MAGNESIUM SERPL-MCNC: 1.5 MG/DL
POTASSIUM SERPL-SCNC: 3.7 MMOL/L
PROT SERPL-MCNC: 7.6 G/DL
SODIUM SERPL-SCNC: 136 MMOL/L

## 2023-09-26 ENCOUNTER — RESULT REVIEW (OUTPATIENT)
Age: 58
End: 2023-09-26

## 2023-10-06 ENCOUNTER — APPOINTMENT (OUTPATIENT)
Dept: HEMATOLOGY ONCOLOGY | Facility: CLINIC | Age: 58
End: 2023-10-06

## 2023-10-06 ENCOUNTER — RESULT REVIEW (OUTPATIENT)
Age: 58
End: 2023-10-06

## 2023-10-06 ENCOUNTER — APPOINTMENT (OUTPATIENT)
Dept: INFUSION THERAPY | Facility: HOSPITAL | Age: 58
End: 2023-10-06

## 2023-10-06 LAB
BASOPHILS # BLD AUTO: 0.01 K/UL — SIGNIFICANT CHANGE UP (ref 0–0.2)
BASOPHILS NFR BLD AUTO: 0.2 % — SIGNIFICANT CHANGE UP (ref 0–2)
EOSINOPHIL # BLD AUTO: 0.06 K/UL — SIGNIFICANT CHANGE UP (ref 0–0.5)
EOSINOPHIL NFR BLD AUTO: 1.4 % — SIGNIFICANT CHANGE UP (ref 0–6)
HCT VFR BLD CALC: 25.7 % — LOW (ref 34.5–45)
HGB BLD-MCNC: 8.8 G/DL — LOW (ref 11.5–15.5)
IMM GRANULOCYTES NFR BLD AUTO: 0.7 % — SIGNIFICANT CHANGE UP (ref 0–0.9)
LYMPHOCYTES # BLD AUTO: 1.61 K/UL — SIGNIFICANT CHANGE UP (ref 1–3.3)
LYMPHOCYTES # BLD AUTO: 37.9 % — SIGNIFICANT CHANGE UP (ref 13–44)
MCHC RBC-ENTMCNC: 29.2 PG — SIGNIFICANT CHANGE UP (ref 27–34)
MCHC RBC-ENTMCNC: 34.2 G/DL — SIGNIFICANT CHANGE UP (ref 32–36)
MCV RBC AUTO: 85.4 FL — SIGNIFICANT CHANGE UP (ref 80–100)
MONOCYTES # BLD AUTO: 0.22 K/UL — SIGNIFICANT CHANGE UP (ref 0–0.9)
MONOCYTES NFR BLD AUTO: 5.2 % — SIGNIFICANT CHANGE UP (ref 2–14)
NEUTROPHILS # BLD AUTO: 2.32 K/UL — SIGNIFICANT CHANGE UP (ref 1.8–7.4)
NEUTROPHILS NFR BLD AUTO: 54.6 % — SIGNIFICANT CHANGE UP (ref 43–77)
NRBC # BLD: 0 /100 WBCS — SIGNIFICANT CHANGE UP (ref 0–0)
PLATELET # BLD AUTO: 81 K/UL — LOW (ref 150–400)
RBC # BLD: 3.01 M/UL — LOW (ref 3.8–5.2)
RBC # FLD: 18.2 % — HIGH (ref 10.3–14.5)
WBC # BLD: 4.25 K/UL — SIGNIFICANT CHANGE UP (ref 3.8–10.5)
WBC # FLD AUTO: 4.25 K/UL — SIGNIFICANT CHANGE UP (ref 3.8–10.5)

## 2023-10-10 ENCOUNTER — APPOINTMENT (OUTPATIENT)
Dept: ENDOCRINOLOGY | Facility: CLINIC | Age: 58
End: 2023-10-10
Payer: COMMERCIAL

## 2023-10-10 ENCOUNTER — OUTPATIENT (OUTPATIENT)
Dept: OUTPATIENT SERVICES | Facility: HOSPITAL | Age: 58
LOS: 1 days | End: 2023-10-10
Payer: COMMERCIAL

## 2023-10-10 ENCOUNTER — APPOINTMENT (OUTPATIENT)
Dept: CT IMAGING | Facility: CLINIC | Age: 58
End: 2023-10-10
Payer: COMMERCIAL

## 2023-10-10 VITALS
BODY MASS INDEX: 34.95 KG/M2 | DIASTOLIC BLOOD PRESSURE: 70 MMHG | OXYGEN SATURATION: 98 % | SYSTOLIC BLOOD PRESSURE: 128 MMHG | HEIGHT: 61 IN | HEART RATE: 65 BPM | TEMPERATURE: 97.6 F | WEIGHT: 185.13 LBS

## 2023-10-10 DIAGNOSIS — Z98.890 OTHER SPECIFIED POSTPROCEDURAL STATES: Chronic | ICD-10-CM

## 2023-10-10 DIAGNOSIS — Z98.89 OTHER SPECIFIED POSTPROCEDURAL STATES: Chronic | ICD-10-CM

## 2023-10-10 DIAGNOSIS — Z00.8 ENCOUNTER FOR OTHER GENERAL EXAMINATION: ICD-10-CM

## 2023-10-10 DIAGNOSIS — C80.0 DISSEMINATED MALIGNANT NEOPLASM, UNSPECIFIED: ICD-10-CM

## 2023-10-10 DIAGNOSIS — E66.9 OBESITY, UNSPECIFIED: ICD-10-CM

## 2023-10-10 DIAGNOSIS — Z79.4 LONG TERM (CURRENT) USE OF INSULIN: ICD-10-CM

## 2023-10-10 LAB
GLUCOSE BLDC GLUCOMTR-MCNC: 123
HBA1C MFR BLD HPLC: 6.2

## 2023-10-10 PROCEDURE — 71260 CT THORAX DX C+: CPT | Mod: 26

## 2023-10-10 PROCEDURE — 82962 GLUCOSE BLOOD TEST: CPT

## 2023-10-10 PROCEDURE — 71260 CT THORAX DX C+: CPT

## 2023-10-10 PROCEDURE — 83036 HEMOGLOBIN GLYCOSYLATED A1C: CPT | Mod: QW

## 2023-10-10 PROCEDURE — 74177 CT ABD & PELVIS W/CONTRAST: CPT | Mod: 26

## 2023-10-10 PROCEDURE — 99204 OFFICE O/P NEW MOD 45 MIN: CPT

## 2023-10-10 PROCEDURE — 74177 CT ABD & PELVIS W/CONTRAST: CPT

## 2023-10-10 RX ORDER — PEN NEEDLE, DIABETIC 29 G X1/2"
32G X 4 MM NEEDLE, DISPOSABLE MISCELLANEOUS
Qty: 1 | Refills: 2 | Status: DISCONTINUED | COMMUNITY
Start: 2023-10-10 | End: 2023-10-10

## 2023-10-10 RX ORDER — INSULIN LISPRO 100 U/ML
100 INJECTION, SOLUTION INTRAVENOUS; SUBCUTANEOUS
Qty: 1 | Refills: 3 | Status: DISCONTINUED | COMMUNITY
Start: 2023-06-16 | End: 2023-10-10

## 2023-10-10 RX ORDER — BLOOD-GLUCOSE METER
70 EACH MISCELLANEOUS
Qty: 3 | Refills: 3 | Status: ACTIVE | COMMUNITY
Start: 2023-10-10 | End: 1900-01-01

## 2023-10-10 RX ORDER — INSULIN GLARGINE 100 [IU]/ML
100 INJECTION, SOLUTION SUBCUTANEOUS
Qty: 3 | Refills: 3 | Status: DISCONTINUED | COMMUNITY
Start: 2023-06-16 | End: 2023-10-10

## 2023-10-10 RX ORDER — INSULIN GLARGINE-YFGN 100 [IU]/ML
100 INJECTION, SOLUTION SUBCUTANEOUS DAILY
Qty: 5 | Refills: 3 | Status: ACTIVE | COMMUNITY
Start: 2023-07-13 | End: 1900-01-01

## 2023-10-10 RX ORDER — LANCETS 33 GAUGE
EACH MISCELLANEOUS
Qty: 360 | Refills: 2 | Status: ACTIVE | COMMUNITY
Start: 2023-10-10 | End: 1900-01-01

## 2023-10-10 RX ORDER — GLUCOSAM/CHON-MSM1/C/MANG/BOSW 500-416.6
TABLET ORAL
Qty: 1 | Refills: 0 | Status: ACTIVE | COMMUNITY
Start: 2023-10-10 | End: 1900-01-01

## 2023-10-10 RX ORDER — BLOOD SUGAR DIAGNOSTIC
STRIP MISCELLANEOUS 4 TIMES DAILY
Qty: 360 | Refills: 2 | Status: ACTIVE | COMMUNITY
Start: 2023-10-10 | End: 1900-01-01

## 2023-10-13 ENCOUNTER — APPOINTMENT (OUTPATIENT)
Dept: INFUSION THERAPY | Facility: HOSPITAL | Age: 58
End: 2023-10-13

## 2023-10-13 ENCOUNTER — RESULT REVIEW (OUTPATIENT)
Age: 58
End: 2023-10-13

## 2023-10-13 ENCOUNTER — APPOINTMENT (OUTPATIENT)
Dept: HEMATOLOGY ONCOLOGY | Facility: CLINIC | Age: 58
End: 2023-10-13

## 2023-10-13 LAB
BASOPHILS # BLD AUTO: 0.01 K/UL — SIGNIFICANT CHANGE UP (ref 0–0.2)
BASOPHILS NFR BLD AUTO: 0.3 % — SIGNIFICANT CHANGE UP (ref 0–2)
EOSINOPHIL # BLD AUTO: 0.11 K/UL — SIGNIFICANT CHANGE UP (ref 0–0.5)
EOSINOPHIL NFR BLD AUTO: 2.9 % — SIGNIFICANT CHANGE UP (ref 0–6)
HCT VFR BLD CALC: 27.7 % — LOW (ref 34.5–45)
HGB BLD-MCNC: 9.6 G/DL — LOW (ref 11.5–15.5)
IMM GRANULOCYTES NFR BLD AUTO: 1.3 % — HIGH (ref 0–0.9)
LYMPHOCYTES # BLD AUTO: 1.23 K/UL — SIGNIFICANT CHANGE UP (ref 1–3.3)
LYMPHOCYTES # BLD AUTO: 33 % — SIGNIFICANT CHANGE UP (ref 13–44)
MCHC RBC-ENTMCNC: 30.9 PG — SIGNIFICANT CHANGE UP (ref 27–34)
MCHC RBC-ENTMCNC: 34.7 G/DL — SIGNIFICANT CHANGE UP (ref 32–36)
MCV RBC AUTO: 89.1 FL — SIGNIFICANT CHANGE UP (ref 80–100)
MONOCYTES # BLD AUTO: 0.24 K/UL — SIGNIFICANT CHANGE UP (ref 0–0.9)
MONOCYTES NFR BLD AUTO: 6.4 % — SIGNIFICANT CHANGE UP (ref 2–14)
NEUTROPHILS # BLD AUTO: 2.09 K/UL — SIGNIFICANT CHANGE UP (ref 1.8–7.4)
NEUTROPHILS NFR BLD AUTO: 56.1 % — SIGNIFICANT CHANGE UP (ref 43–77)
NRBC # BLD: 0 /100 WBCS — SIGNIFICANT CHANGE UP (ref 0–0)
PLATELET # BLD AUTO: 128 K/UL — LOW (ref 150–400)
RBC # BLD: 3.11 M/UL — LOW (ref 3.8–5.2)
RBC # FLD: 20.1 % — HIGH (ref 10.3–14.5)
WBC # BLD: 3.73 K/UL — LOW (ref 3.8–10.5)
WBC # FLD AUTO: 3.73 K/UL — LOW (ref 3.8–10.5)

## 2023-10-18 ENCOUNTER — APPOINTMENT (OUTPATIENT)
Dept: INTERNAL MEDICINE | Facility: CLINIC | Age: 58
End: 2023-10-18
Payer: COMMERCIAL

## 2023-10-18 VITALS
TEMPERATURE: 98.5 F | BODY MASS INDEX: 34.93 KG/M2 | WEIGHT: 185 LBS | DIASTOLIC BLOOD PRESSURE: 83 MMHG | HEART RATE: 77 BPM | HEIGHT: 61 IN | SYSTOLIC BLOOD PRESSURE: 146 MMHG | OXYGEN SATURATION: 100 %

## 2023-10-18 DIAGNOSIS — E11.9 TYPE 2 DIABETES MELLITUS W/OUT COMPLICATIONS: ICD-10-CM

## 2023-10-18 DIAGNOSIS — I10 ESSENTIAL (PRIMARY) HYPERTENSION: ICD-10-CM

## 2023-10-18 DIAGNOSIS — C80.0 DISSEMINATED MALIGNANT NEOPLASM, UNSPECIFIED: ICD-10-CM

## 2023-10-18 DIAGNOSIS — R03.0 ELEVATED BLOOD-PRESSURE READING, W/OUT DIAGNOSIS OF HYPERTENSION: ICD-10-CM

## 2023-10-18 PROCEDURE — 99214 OFFICE O/P EST MOD 30 MIN: CPT

## 2023-10-19 PROBLEM — C80.0 CARCINOMATOSIS: Status: ACTIVE | Noted: 2023-06-12

## 2023-10-19 PROBLEM — E11.9 DIABETES MELLITUS, TYPE 2: Status: ACTIVE | Noted: 2022-07-22

## 2023-10-24 ENCOUNTER — APPOINTMENT (OUTPATIENT)
Dept: INTERNAL MEDICINE | Facility: CLINIC | Age: 58
End: 2023-10-24

## 2023-10-25 ENCOUNTER — OUTPATIENT (OUTPATIENT)
Dept: OUTPATIENT SERVICES | Facility: HOSPITAL | Age: 58
LOS: 1 days | Discharge: HOME | End: 2023-10-25

## 2023-10-25 DIAGNOSIS — Z98.89 OTHER SPECIFIED POSTPROCEDURAL STATES: Chronic | ICD-10-CM

## 2023-10-25 DIAGNOSIS — Z98.890 OTHER SPECIFIED POSTPROCEDURAL STATES: Chronic | ICD-10-CM

## 2023-10-25 DIAGNOSIS — C57.4 MALIGNANT NEOPLASM OF UTERINE ADNEXA, UNSPECIFIED: ICD-10-CM

## 2023-10-27 ENCOUNTER — RESULT REVIEW (OUTPATIENT)
Age: 58
End: 2023-10-27

## 2023-10-27 ENCOUNTER — APPOINTMENT (OUTPATIENT)
Dept: INFUSION THERAPY | Facility: HOSPITAL | Age: 58
End: 2023-10-27

## 2023-10-27 ENCOUNTER — APPOINTMENT (OUTPATIENT)
Dept: HEMATOLOGY ONCOLOGY | Facility: CLINIC | Age: 58
End: 2023-10-27

## 2023-10-27 LAB
BASOPHILS # BLD AUTO: 0.01 K/UL — SIGNIFICANT CHANGE UP (ref 0–0.2)
BASOPHILS # BLD AUTO: 0.01 K/UL — SIGNIFICANT CHANGE UP (ref 0–0.2)
BASOPHILS NFR BLD AUTO: 0.3 % — SIGNIFICANT CHANGE UP (ref 0–2)
BASOPHILS NFR BLD AUTO: 0.3 % — SIGNIFICANT CHANGE UP (ref 0–2)
EOSINOPHIL # BLD AUTO: 0.08 K/UL — SIGNIFICANT CHANGE UP (ref 0–0.5)
EOSINOPHIL # BLD AUTO: 0.08 K/UL — SIGNIFICANT CHANGE UP (ref 0–0.5)
EOSINOPHIL NFR BLD AUTO: 2.1 % — SIGNIFICANT CHANGE UP (ref 0–6)
EOSINOPHIL NFR BLD AUTO: 2.1 % — SIGNIFICANT CHANGE UP (ref 0–6)
HCT VFR BLD CALC: 31.8 % — LOW (ref 34.5–45)
HCT VFR BLD CALC: 31.8 % — LOW (ref 34.5–45)
HGB BLD-MCNC: 11.2 G/DL — LOW (ref 11.5–15.5)
HGB BLD-MCNC: 11.2 G/DL — LOW (ref 11.5–15.5)
IMM GRANULOCYTES NFR BLD AUTO: 0.8 % — SIGNIFICANT CHANGE UP (ref 0–0.9)
IMM GRANULOCYTES NFR BLD AUTO: 0.8 % — SIGNIFICANT CHANGE UP (ref 0–0.9)
LYMPHOCYTES # BLD AUTO: 1.25 K/UL — SIGNIFICANT CHANGE UP (ref 1–3.3)
LYMPHOCYTES # BLD AUTO: 1.25 K/UL — SIGNIFICANT CHANGE UP (ref 1–3.3)
LYMPHOCYTES # BLD AUTO: 33.4 % — SIGNIFICANT CHANGE UP (ref 13–44)
LYMPHOCYTES # BLD AUTO: 33.4 % — SIGNIFICANT CHANGE UP (ref 13–44)
MCHC RBC-ENTMCNC: 31.9 PG — SIGNIFICANT CHANGE UP (ref 27–34)
MCHC RBC-ENTMCNC: 31.9 PG — SIGNIFICANT CHANGE UP (ref 27–34)
MCHC RBC-ENTMCNC: 35.2 G/DL — SIGNIFICANT CHANGE UP (ref 32–36)
MCHC RBC-ENTMCNC: 35.2 G/DL — SIGNIFICANT CHANGE UP (ref 32–36)
MCV RBC AUTO: 90.6 FL — SIGNIFICANT CHANGE UP (ref 80–100)
MCV RBC AUTO: 90.6 FL — SIGNIFICANT CHANGE UP (ref 80–100)
MONOCYTES # BLD AUTO: 0.29 K/UL — SIGNIFICANT CHANGE UP (ref 0–0.9)
MONOCYTES # BLD AUTO: 0.29 K/UL — SIGNIFICANT CHANGE UP (ref 0–0.9)
MONOCYTES NFR BLD AUTO: 7.8 % — SIGNIFICANT CHANGE UP (ref 2–14)
MONOCYTES NFR BLD AUTO: 7.8 % — SIGNIFICANT CHANGE UP (ref 2–14)
NEUTROPHILS # BLD AUTO: 2.08 K/UL — SIGNIFICANT CHANGE UP (ref 1.8–7.4)
NEUTROPHILS # BLD AUTO: 2.08 K/UL — SIGNIFICANT CHANGE UP (ref 1.8–7.4)
NEUTROPHILS NFR BLD AUTO: 55.6 % — SIGNIFICANT CHANGE UP (ref 43–77)
NEUTROPHILS NFR BLD AUTO: 55.6 % — SIGNIFICANT CHANGE UP (ref 43–77)
NRBC # BLD: 0 /100 WBCS — SIGNIFICANT CHANGE UP (ref 0–0)
NRBC # BLD: 0 /100 WBCS — SIGNIFICANT CHANGE UP (ref 0–0)
PLATELET # BLD AUTO: 143 K/UL — LOW (ref 150–400)
PLATELET # BLD AUTO: 143 K/UL — LOW (ref 150–400)
RBC # BLD: 3.51 M/UL — LOW (ref 3.8–5.2)
RBC # BLD: 3.51 M/UL — LOW (ref 3.8–5.2)
RBC # FLD: 16.8 % — HIGH (ref 10.3–14.5)
RBC # FLD: 16.8 % — HIGH (ref 10.3–14.5)
WBC # BLD: 3.74 K/UL — LOW (ref 3.8–10.5)
WBC # BLD: 3.74 K/UL — LOW (ref 3.8–10.5)
WBC # FLD AUTO: 3.74 K/UL — LOW (ref 3.8–10.5)
WBC # FLD AUTO: 3.74 K/UL — LOW (ref 3.8–10.5)

## 2023-10-30 DIAGNOSIS — Z51.11 ENCOUNTER FOR ANTINEOPLASTIC CHEMOTHERAPY: ICD-10-CM

## 2023-10-30 DIAGNOSIS — E86.0 DEHYDRATION: ICD-10-CM

## 2023-10-30 DIAGNOSIS — R11.2 NAUSEA WITH VOMITING, UNSPECIFIED: ICD-10-CM

## 2023-11-01 PROBLEM — Z13.820 OSTEOPOROSIS SCREENING: Status: ACTIVE | Noted: 2023-07-13

## 2023-11-03 NOTE — DISCHARGE NOTE NURSING/CASE MANAGEMENT/SOCIAL WORK - NSPROMEDSBROUGHTTOHOSP_GEN_A_NUR
Intubation    Date/Time: 11/3/2023 8:29 AM    Performed by: Nicolasa Bennett CRNA  Authorized by: Ricardo Salinas MD    Intubation:     Induction:  Intravenous    Intubated:  Postinduction    Mask Ventilation:  Easy with oral airway    Attempts:  1    Attempted By:  CRNA    Method of Intubation:  Video laryngoscopy    Blade:  Barrios 3    Laryngeal View Grade: Grade I - full view of cords      Difficult Airway Encountered?: No      Complications:  None    Airway Device:  Oral endotracheal tube    Airway Device Size:  7.0    Style/Cuff Inflation:  Cuffed (inflated to minimal occlusive pressure)    Inflation Amount (mL):  6    Tube secured:  21    Secured at:  The lips    Placement Verified By:  Capnometry    Complicating Factors:  None    Findings Post-Intubation:  BS equal bilateral and atraumatic/condition of teeth unchanged      
no

## 2023-11-07 ENCOUNTER — RESULT REVIEW (OUTPATIENT)
Age: 58
End: 2023-11-07

## 2023-11-07 ENCOUNTER — APPOINTMENT (OUTPATIENT)
Dept: HEMATOLOGY ONCOLOGY | Facility: CLINIC | Age: 58
End: 2023-11-07
Payer: COMMERCIAL

## 2023-11-07 VITALS
WEIGHT: 189.58 LBS | RESPIRATION RATE: 17 BRPM | HEIGHT: 60.98 IN | BODY MASS INDEX: 35.79 KG/M2 | OXYGEN SATURATION: 99 % | HEART RATE: 80 BPM | TEMPERATURE: 97.4 F | SYSTOLIC BLOOD PRESSURE: 127 MMHG | DIASTOLIC BLOOD PRESSURE: 78 MMHG

## 2023-11-07 LAB
BASOPHILS # BLD AUTO: 0 K/UL — SIGNIFICANT CHANGE UP (ref 0–0.2)
BASOPHILS # BLD AUTO: 0 K/UL — SIGNIFICANT CHANGE UP (ref 0–0.2)
BASOPHILS NFR BLD AUTO: 0 % — SIGNIFICANT CHANGE UP (ref 0–2)
BASOPHILS NFR BLD AUTO: 0 % — SIGNIFICANT CHANGE UP (ref 0–2)
CANCER AG125 SERPL-ACNC: 293 U/ML
EOSINOPHIL # BLD AUTO: 0.03 K/UL — SIGNIFICANT CHANGE UP (ref 0–0.5)
EOSINOPHIL # BLD AUTO: 0.03 K/UL — SIGNIFICANT CHANGE UP (ref 0–0.5)
EOSINOPHIL NFR BLD AUTO: 0.8 % — SIGNIFICANT CHANGE UP (ref 0–6)
EOSINOPHIL NFR BLD AUTO: 0.8 % — SIGNIFICANT CHANGE UP (ref 0–6)
HCT VFR BLD CALC: 26.8 % — LOW (ref 34.5–45)
HCT VFR BLD CALC: 26.8 % — LOW (ref 34.5–45)
HGB BLD-MCNC: 9.5 G/DL — LOW (ref 11.5–15.5)
HGB BLD-MCNC: 9.5 G/DL — LOW (ref 11.5–15.5)
IMM GRANULOCYTES NFR BLD AUTO: 0.3 % — SIGNIFICANT CHANGE UP (ref 0–0.9)
IMM GRANULOCYTES NFR BLD AUTO: 0.3 % — SIGNIFICANT CHANGE UP (ref 0–0.9)
LYMPHOCYTES # BLD AUTO: 1.23 K/UL — SIGNIFICANT CHANGE UP (ref 1–3.3)
LYMPHOCYTES # BLD AUTO: 1.23 K/UL — SIGNIFICANT CHANGE UP (ref 1–3.3)
LYMPHOCYTES # BLD AUTO: 33.2 % — SIGNIFICANT CHANGE UP (ref 13–44)
LYMPHOCYTES # BLD AUTO: 33.2 % — SIGNIFICANT CHANGE UP (ref 13–44)
MCHC RBC-ENTMCNC: 32 PG — SIGNIFICANT CHANGE UP (ref 27–34)
MCHC RBC-ENTMCNC: 32 PG — SIGNIFICANT CHANGE UP (ref 27–34)
MCHC RBC-ENTMCNC: 35.4 G/DL — SIGNIFICANT CHANGE UP (ref 32–36)
MCHC RBC-ENTMCNC: 35.4 G/DL — SIGNIFICANT CHANGE UP (ref 32–36)
MCV RBC AUTO: 90.2 FL — SIGNIFICANT CHANGE UP (ref 80–100)
MCV RBC AUTO: 90.2 FL — SIGNIFICANT CHANGE UP (ref 80–100)
MONOCYTES # BLD AUTO: 0.25 K/UL — SIGNIFICANT CHANGE UP (ref 0–0.9)
MONOCYTES # BLD AUTO: 0.25 K/UL — SIGNIFICANT CHANGE UP (ref 0–0.9)
MONOCYTES NFR BLD AUTO: 6.8 % — SIGNIFICANT CHANGE UP (ref 2–14)
MONOCYTES NFR BLD AUTO: 6.8 % — SIGNIFICANT CHANGE UP (ref 2–14)
NEUTROPHILS # BLD AUTO: 2.18 K/UL — SIGNIFICANT CHANGE UP (ref 1.8–7.4)
NEUTROPHILS # BLD AUTO: 2.18 K/UL — SIGNIFICANT CHANGE UP (ref 1.8–7.4)
NEUTROPHILS NFR BLD AUTO: 58.9 % — SIGNIFICANT CHANGE UP (ref 43–77)
NEUTROPHILS NFR BLD AUTO: 58.9 % — SIGNIFICANT CHANGE UP (ref 43–77)
NRBC # BLD: 0 /100 WBCS — SIGNIFICANT CHANGE UP (ref 0–0)
NRBC # BLD: 0 /100 WBCS — SIGNIFICANT CHANGE UP (ref 0–0)
PLATELET # BLD AUTO: 121 K/UL — LOW (ref 150–400)
PLATELET # BLD AUTO: 121 K/UL — LOW (ref 150–400)
RBC # BLD: 2.97 M/UL — LOW (ref 3.8–5.2)
RBC # BLD: 2.97 M/UL — LOW (ref 3.8–5.2)
RBC # FLD: 14.1 % — SIGNIFICANT CHANGE UP (ref 10.3–14.5)
RBC # FLD: 14.1 % — SIGNIFICANT CHANGE UP (ref 10.3–14.5)
WBC # BLD: 3.7 K/UL — LOW (ref 3.8–10.5)
WBC # BLD: 3.7 K/UL — LOW (ref 3.8–10.5)
WBC # FLD AUTO: 3.7 K/UL — LOW (ref 3.8–10.5)
WBC # FLD AUTO: 3.7 K/UL — LOW (ref 3.8–10.5)

## 2023-11-07 PROCEDURE — 99213 OFFICE O/P EST LOW 20 MIN: CPT

## 2023-11-08 LAB
ALBUMIN SERPL ELPH-MCNC: 3.8 G/DL
ALP BLD-CCNC: 125 U/L
ALT SERPL-CCNC: 8 U/L
ANION GAP SERPL CALC-SCNC: 11 MMOL/L
AST SERPL-CCNC: 13 U/L
BILIRUB SERPL-MCNC: 0.3 MG/DL
BUN SERPL-MCNC: 15 MG/DL
CALCIUM SERPL-MCNC: 9.7 MG/DL
CHLORIDE SERPL-SCNC: 103 MMOL/L
CO2 SERPL-SCNC: 26 MMOL/L
CREAT SERPL-MCNC: 0.91 MG/DL
EGFR: 73 ML/MIN/1.73M2
GLUCOSE SERPL-MCNC: 166 MG/DL
MAGNESIUM SERPL-MCNC: 1.5 MG/DL
POTASSIUM SERPL-SCNC: 4.1 MMOL/L
PROT SERPL-MCNC: 7.2 G/DL
SODIUM SERPL-SCNC: 140 MMOL/L

## 2023-11-17 ENCOUNTER — RESULT REVIEW (OUTPATIENT)
Age: 58
End: 2023-11-17

## 2023-11-17 ENCOUNTER — APPOINTMENT (OUTPATIENT)
Dept: INFUSION THERAPY | Facility: HOSPITAL | Age: 58
End: 2023-11-17

## 2023-11-17 ENCOUNTER — APPOINTMENT (OUTPATIENT)
Dept: HEMATOLOGY ONCOLOGY | Facility: CLINIC | Age: 58
End: 2023-11-17

## 2023-11-17 LAB
BASOPHILS # BLD AUTO: 0.01 K/UL — SIGNIFICANT CHANGE UP (ref 0–0.2)
BASOPHILS # BLD AUTO: 0.01 K/UL — SIGNIFICANT CHANGE UP (ref 0–0.2)
BASOPHILS NFR BLD AUTO: 0.3 % — SIGNIFICANT CHANGE UP (ref 0–2)
BASOPHILS NFR BLD AUTO: 0.3 % — SIGNIFICANT CHANGE UP (ref 0–2)
EOSINOPHIL # BLD AUTO: 0.07 K/UL — SIGNIFICANT CHANGE UP (ref 0–0.5)
EOSINOPHIL # BLD AUTO: 0.07 K/UL — SIGNIFICANT CHANGE UP (ref 0–0.5)
EOSINOPHIL NFR BLD AUTO: 1.9 % — SIGNIFICANT CHANGE UP (ref 0–6)
EOSINOPHIL NFR BLD AUTO: 1.9 % — SIGNIFICANT CHANGE UP (ref 0–6)
HCT VFR BLD CALC: 28.2 % — LOW (ref 34.5–45)
HCT VFR BLD CALC: 28.2 % — LOW (ref 34.5–45)
HGB BLD-MCNC: 10.1 G/DL — LOW (ref 11.5–15.5)
HGB BLD-MCNC: 10.1 G/DL — LOW (ref 11.5–15.5)
IMM GRANULOCYTES NFR BLD AUTO: 1.6 % — HIGH (ref 0–0.9)
IMM GRANULOCYTES NFR BLD AUTO: 1.6 % — HIGH (ref 0–0.9)
LYMPHOCYTES # BLD AUTO: 1.19 K/UL — SIGNIFICANT CHANGE UP (ref 1–3.3)
LYMPHOCYTES # BLD AUTO: 1.19 K/UL — SIGNIFICANT CHANGE UP (ref 1–3.3)
LYMPHOCYTES # BLD AUTO: 31.5 % — SIGNIFICANT CHANGE UP (ref 13–44)
LYMPHOCYTES # BLD AUTO: 31.5 % — SIGNIFICANT CHANGE UP (ref 13–44)
MCHC RBC-ENTMCNC: 31.9 PG — SIGNIFICANT CHANGE UP (ref 27–34)
MCHC RBC-ENTMCNC: 31.9 PG — SIGNIFICANT CHANGE UP (ref 27–34)
MCHC RBC-ENTMCNC: 35.8 G/DL — SIGNIFICANT CHANGE UP (ref 32–36)
MCHC RBC-ENTMCNC: 35.8 G/DL — SIGNIFICANT CHANGE UP (ref 32–36)
MCV RBC AUTO: 89 FL — SIGNIFICANT CHANGE UP (ref 80–100)
MCV RBC AUTO: 89 FL — SIGNIFICANT CHANGE UP (ref 80–100)
MONOCYTES # BLD AUTO: 0.27 K/UL — SIGNIFICANT CHANGE UP (ref 0–0.9)
MONOCYTES # BLD AUTO: 0.27 K/UL — SIGNIFICANT CHANGE UP (ref 0–0.9)
MONOCYTES NFR BLD AUTO: 7.1 % — SIGNIFICANT CHANGE UP (ref 2–14)
MONOCYTES NFR BLD AUTO: 7.1 % — SIGNIFICANT CHANGE UP (ref 2–14)
NEUTROPHILS # BLD AUTO: 2.18 K/UL — SIGNIFICANT CHANGE UP (ref 1.8–7.4)
NEUTROPHILS # BLD AUTO: 2.18 K/UL — SIGNIFICANT CHANGE UP (ref 1.8–7.4)
NEUTROPHILS NFR BLD AUTO: 57.6 % — SIGNIFICANT CHANGE UP (ref 43–77)
NEUTROPHILS NFR BLD AUTO: 57.6 % — SIGNIFICANT CHANGE UP (ref 43–77)
NRBC # BLD: 0 /100 WBCS — SIGNIFICANT CHANGE UP (ref 0–0)
NRBC # BLD: 0 /100 WBCS — SIGNIFICANT CHANGE UP (ref 0–0)
PLATELET # BLD AUTO: 88 K/UL — LOW (ref 150–400)
PLATELET # BLD AUTO: 88 K/UL — LOW (ref 150–400)
RBC # BLD: 3.17 M/UL — LOW (ref 3.8–5.2)
RBC # BLD: 3.17 M/UL — LOW (ref 3.8–5.2)
RBC # FLD: 15.2 % — HIGH (ref 10.3–14.5)
RBC # FLD: 15.2 % — HIGH (ref 10.3–14.5)
WBC # BLD: 3.78 K/UL — LOW (ref 3.8–10.5)
WBC # BLD: 3.78 K/UL — LOW (ref 3.8–10.5)
WBC # FLD AUTO: 3.78 K/UL — LOW (ref 3.8–10.5)
WBC # FLD AUTO: 3.78 K/UL — LOW (ref 3.8–10.5)

## 2023-11-27 ENCOUNTER — APPOINTMENT (OUTPATIENT)
Dept: HEMATOLOGY ONCOLOGY | Facility: CLINIC | Age: 58
End: 2023-11-27

## 2023-11-27 ENCOUNTER — RESULT REVIEW (OUTPATIENT)
Age: 58
End: 2023-11-27

## 2023-11-27 ENCOUNTER — APPOINTMENT (OUTPATIENT)
Dept: INFUSION THERAPY | Facility: HOSPITAL | Age: 58
End: 2023-11-27

## 2023-11-27 LAB
BASOPHILS # BLD AUTO: 0 K/UL — SIGNIFICANT CHANGE UP (ref 0–0.2)
BASOPHILS # BLD AUTO: 0 K/UL — SIGNIFICANT CHANGE UP (ref 0–0.2)
BASOPHILS NFR BLD AUTO: 0 % — SIGNIFICANT CHANGE UP (ref 0–2)
BASOPHILS NFR BLD AUTO: 0 % — SIGNIFICANT CHANGE UP (ref 0–2)
EOSINOPHIL # BLD AUTO: 0.11 K/UL — SIGNIFICANT CHANGE UP (ref 0–0.5)
EOSINOPHIL # BLD AUTO: 0.11 K/UL — SIGNIFICANT CHANGE UP (ref 0–0.5)
EOSINOPHIL NFR BLD AUTO: 2.5 % — SIGNIFICANT CHANGE UP (ref 0–6)
EOSINOPHIL NFR BLD AUTO: 2.5 % — SIGNIFICANT CHANGE UP (ref 0–6)
HCT VFR BLD CALC: 27.4 % — LOW (ref 34.5–45)
HCT VFR BLD CALC: 27.4 % — LOW (ref 34.5–45)
HGB BLD-MCNC: 9.6 G/DL — LOW (ref 11.5–15.5)
HGB BLD-MCNC: 9.6 G/DL — LOW (ref 11.5–15.5)
IMM GRANULOCYTES NFR BLD AUTO: 0.5 % — SIGNIFICANT CHANGE UP (ref 0–0.9)
IMM GRANULOCYTES NFR BLD AUTO: 0.5 % — SIGNIFICANT CHANGE UP (ref 0–0.9)
LYMPHOCYTES # BLD AUTO: 1.06 K/UL — SIGNIFICANT CHANGE UP (ref 1–3.3)
LYMPHOCYTES # BLD AUTO: 1.06 K/UL — SIGNIFICANT CHANGE UP (ref 1–3.3)
LYMPHOCYTES # BLD AUTO: 24.5 % — SIGNIFICANT CHANGE UP (ref 13–44)
LYMPHOCYTES # BLD AUTO: 24.5 % — SIGNIFICANT CHANGE UP (ref 13–44)
MCHC RBC-ENTMCNC: 31.5 PG — SIGNIFICANT CHANGE UP (ref 27–34)
MCHC RBC-ENTMCNC: 31.5 PG — SIGNIFICANT CHANGE UP (ref 27–34)
MCHC RBC-ENTMCNC: 35 G/DL — SIGNIFICANT CHANGE UP (ref 32–36)
MCHC RBC-ENTMCNC: 35 G/DL — SIGNIFICANT CHANGE UP (ref 32–36)
MCV RBC AUTO: 89.8 FL — SIGNIFICANT CHANGE UP (ref 80–100)
MCV RBC AUTO: 89.8 FL — SIGNIFICANT CHANGE UP (ref 80–100)
MONOCYTES # BLD AUTO: 0.24 K/UL — SIGNIFICANT CHANGE UP (ref 0–0.9)
MONOCYTES # BLD AUTO: 0.24 K/UL — SIGNIFICANT CHANGE UP (ref 0–0.9)
MONOCYTES NFR BLD AUTO: 5.5 % — SIGNIFICANT CHANGE UP (ref 2–14)
MONOCYTES NFR BLD AUTO: 5.5 % — SIGNIFICANT CHANGE UP (ref 2–14)
NEUTROPHILS # BLD AUTO: 2.9 K/UL — SIGNIFICANT CHANGE UP (ref 1.8–7.4)
NEUTROPHILS # BLD AUTO: 2.9 K/UL — SIGNIFICANT CHANGE UP (ref 1.8–7.4)
NEUTROPHILS NFR BLD AUTO: 67 % — SIGNIFICANT CHANGE UP (ref 43–77)
NEUTROPHILS NFR BLD AUTO: 67 % — SIGNIFICANT CHANGE UP (ref 43–77)
NRBC # BLD: 0 /100 WBCS — SIGNIFICANT CHANGE UP (ref 0–0)
NRBC # BLD: 0 /100 WBCS — SIGNIFICANT CHANGE UP (ref 0–0)
PLATELET # BLD AUTO: 69 K/UL — LOW (ref 150–400)
PLATELET # BLD AUTO: 69 K/UL — LOW (ref 150–400)
RBC # BLD: 3.05 M/UL — LOW (ref 3.8–5.2)
RBC # BLD: 3.05 M/UL — LOW (ref 3.8–5.2)
RBC # FLD: 15.2 % — HIGH (ref 10.3–14.5)
RBC # FLD: 15.2 % — HIGH (ref 10.3–14.5)
WBC # BLD: 4.33 K/UL — SIGNIFICANT CHANGE UP (ref 3.8–10.5)
WBC # BLD: 4.33 K/UL — SIGNIFICANT CHANGE UP (ref 3.8–10.5)
WBC # FLD AUTO: 4.33 K/UL — SIGNIFICANT CHANGE UP (ref 3.8–10.5)
WBC # FLD AUTO: 4.33 K/UL — SIGNIFICANT CHANGE UP (ref 3.8–10.5)

## 2023-12-06 ENCOUNTER — RESULT REVIEW (OUTPATIENT)
Age: 58
End: 2023-12-06

## 2023-12-06 ENCOUNTER — APPOINTMENT (OUTPATIENT)
Dept: HEMATOLOGY ONCOLOGY | Facility: CLINIC | Age: 58
End: 2023-12-06
Payer: COMMERCIAL

## 2023-12-06 VITALS
OXYGEN SATURATION: 99 % | WEIGHT: 195.11 LBS | HEIGHT: 60.98 IN | RESPIRATION RATE: 16 BRPM | DIASTOLIC BLOOD PRESSURE: 83 MMHG | HEART RATE: 70 BPM | SYSTOLIC BLOOD PRESSURE: 133 MMHG | BODY MASS INDEX: 36.84 KG/M2 | TEMPERATURE: 97 F

## 2023-12-06 LAB
ALBUMIN SERPL ELPH-MCNC: 4.3 G/DL
ALP BLD-CCNC: 128 U/L
ALT SERPL-CCNC: 7 U/L
ANION GAP SERPL CALC-SCNC: 13 MMOL/L
AST SERPL-CCNC: 13 U/L
BASOPHILS # BLD AUTO: 0.01 K/UL — SIGNIFICANT CHANGE UP (ref 0–0.2)
BASOPHILS # BLD AUTO: 0.01 K/UL — SIGNIFICANT CHANGE UP (ref 0–0.2)
BASOPHILS NFR BLD AUTO: 0.3 % — SIGNIFICANT CHANGE UP (ref 0–2)
BASOPHILS NFR BLD AUTO: 0.3 % — SIGNIFICANT CHANGE UP (ref 0–2)
BILIRUB SERPL-MCNC: 0.4 MG/DL
BUN SERPL-MCNC: 17 MG/DL
CALCIUM SERPL-MCNC: 9.6 MG/DL
CANCER AG125 SERPL-ACNC: 264 U/ML
CHLORIDE SERPL-SCNC: 107 MMOL/L
CO2 SERPL-SCNC: 24 MMOL/L
CREAT SERPL-MCNC: 0.95 MG/DL
EGFR: 69 ML/MIN/1.73M2
EOSINOPHIL # BLD AUTO: 0.12 K/UL — SIGNIFICANT CHANGE UP (ref 0–0.5)
EOSINOPHIL # BLD AUTO: 0.12 K/UL — SIGNIFICANT CHANGE UP (ref 0–0.5)
EOSINOPHIL NFR BLD AUTO: 3.7 % — SIGNIFICANT CHANGE UP (ref 0–6)
EOSINOPHIL NFR BLD AUTO: 3.7 % — SIGNIFICANT CHANGE UP (ref 0–6)
GLUCOSE SERPL-MCNC: 108 MG/DL
HCT VFR BLD CALC: 29.8 % — LOW (ref 34.5–45)
HCT VFR BLD CALC: 29.8 % — LOW (ref 34.5–45)
HGB BLD-MCNC: 10.3 G/DL — LOW (ref 11.5–15.5)
HGB BLD-MCNC: 10.3 G/DL — LOW (ref 11.5–15.5)
IMM GRANULOCYTES NFR BLD AUTO: 0.3 % — SIGNIFICANT CHANGE UP (ref 0–0.9)
IMM GRANULOCYTES NFR BLD AUTO: 0.3 % — SIGNIFICANT CHANGE UP (ref 0–0.9)
LYMPHOCYTES # BLD AUTO: 0.96 K/UL — LOW (ref 1–3.3)
LYMPHOCYTES # BLD AUTO: 0.96 K/UL — LOW (ref 1–3.3)
LYMPHOCYTES # BLD AUTO: 29.4 % — SIGNIFICANT CHANGE UP (ref 13–44)
LYMPHOCYTES # BLD AUTO: 29.4 % — SIGNIFICANT CHANGE UP (ref 13–44)
MAGNESIUM SERPL-MCNC: 1.8 MG/DL
MCHC RBC-ENTMCNC: 31.9 PG — SIGNIFICANT CHANGE UP (ref 27–34)
MCHC RBC-ENTMCNC: 31.9 PG — SIGNIFICANT CHANGE UP (ref 27–34)
MCHC RBC-ENTMCNC: 34.6 G/DL — SIGNIFICANT CHANGE UP (ref 32–36)
MCHC RBC-ENTMCNC: 34.6 G/DL — SIGNIFICANT CHANGE UP (ref 32–36)
MCV RBC AUTO: 92.3 FL — SIGNIFICANT CHANGE UP (ref 80–100)
MCV RBC AUTO: 92.3 FL — SIGNIFICANT CHANGE UP (ref 80–100)
MONOCYTES # BLD AUTO: 0.18 K/UL — SIGNIFICANT CHANGE UP (ref 0–0.9)
MONOCYTES # BLD AUTO: 0.18 K/UL — SIGNIFICANT CHANGE UP (ref 0–0.9)
MONOCYTES NFR BLD AUTO: 5.5 % — SIGNIFICANT CHANGE UP (ref 2–14)
MONOCYTES NFR BLD AUTO: 5.5 % — SIGNIFICANT CHANGE UP (ref 2–14)
NEUTROPHILS # BLD AUTO: 1.98 K/UL — SIGNIFICANT CHANGE UP (ref 1.8–7.4)
NEUTROPHILS # BLD AUTO: 1.98 K/UL — SIGNIFICANT CHANGE UP (ref 1.8–7.4)
NEUTROPHILS NFR BLD AUTO: 60.8 % — SIGNIFICANT CHANGE UP (ref 43–77)
NEUTROPHILS NFR BLD AUTO: 60.8 % — SIGNIFICANT CHANGE UP (ref 43–77)
NRBC # BLD: 0 /100 WBCS — SIGNIFICANT CHANGE UP (ref 0–0)
NRBC # BLD: 0 /100 WBCS — SIGNIFICANT CHANGE UP (ref 0–0)
PLATELET # BLD AUTO: 131 K/UL — LOW (ref 150–400)
PLATELET # BLD AUTO: 131 K/UL — LOW (ref 150–400)
POTASSIUM SERPL-SCNC: 3.9 MMOL/L
PROT SERPL-MCNC: 7.7 G/DL
RBC # BLD: 3.23 M/UL — LOW (ref 3.8–5.2)
RBC # BLD: 3.23 M/UL — LOW (ref 3.8–5.2)
RBC # FLD: 15.1 % — HIGH (ref 10.3–14.5)
RBC # FLD: 15.1 % — HIGH (ref 10.3–14.5)
SODIUM SERPL-SCNC: 144 MMOL/L
WBC # BLD: 3.26 K/UL — LOW (ref 3.8–10.5)
WBC # BLD: 3.26 K/UL — LOW (ref 3.8–10.5)
WBC # FLD AUTO: 3.26 K/UL — LOW (ref 3.8–10.5)
WBC # FLD AUTO: 3.26 K/UL — LOW (ref 3.8–10.5)

## 2023-12-06 PROCEDURE — 99214 OFFICE O/P EST MOD 30 MIN: CPT

## 2023-12-13 ENCOUNTER — OUTPATIENT (OUTPATIENT)
Dept: OUTPATIENT SERVICES | Facility: HOSPITAL | Age: 58
LOS: 1 days | Discharge: HOME | End: 2023-12-13

## 2023-12-13 ENCOUNTER — APPOINTMENT (OUTPATIENT)
Dept: ENDOCRINOLOGY | Facility: CLINIC | Age: 58
End: 2023-12-13

## 2023-12-13 DIAGNOSIS — C57.4 MALIGNANT NEOPLASM OF UTERINE ADNEXA, UNSPECIFIED: ICD-10-CM

## 2023-12-13 DIAGNOSIS — Z98.890 OTHER SPECIFIED POSTPROCEDURAL STATES: Chronic | ICD-10-CM

## 2023-12-13 DIAGNOSIS — Z98.89 OTHER SPECIFIED POSTPROCEDURAL STATES: Chronic | ICD-10-CM

## 2023-12-15 ENCOUNTER — APPOINTMENT (OUTPATIENT)
Dept: HEMATOLOGY ONCOLOGY | Facility: CLINIC | Age: 58
End: 2023-12-15

## 2023-12-15 ENCOUNTER — RESULT REVIEW (OUTPATIENT)
Age: 58
End: 2023-12-15

## 2023-12-15 ENCOUNTER — APPOINTMENT (OUTPATIENT)
Dept: INFUSION THERAPY | Facility: HOSPITAL | Age: 58
End: 2023-12-15

## 2023-12-15 LAB
BASOPHILS # BLD AUTO: 0 K/UL — SIGNIFICANT CHANGE UP (ref 0–0.2)
BASOPHILS # BLD AUTO: 0 K/UL — SIGNIFICANT CHANGE UP (ref 0–0.2)
BASOPHILS NFR BLD AUTO: 0 % — SIGNIFICANT CHANGE UP (ref 0–2)
BASOPHILS NFR BLD AUTO: 0 % — SIGNIFICANT CHANGE UP (ref 0–2)
EOSINOPHIL # BLD AUTO: 0.1 K/UL — SIGNIFICANT CHANGE UP (ref 0–0.5)
EOSINOPHIL # BLD AUTO: 0.1 K/UL — SIGNIFICANT CHANGE UP (ref 0–0.5)
EOSINOPHIL NFR BLD AUTO: 2.5 % — SIGNIFICANT CHANGE UP (ref 0–6)
EOSINOPHIL NFR BLD AUTO: 2.5 % — SIGNIFICANT CHANGE UP (ref 0–6)
HCT VFR BLD CALC: 30.9 % — LOW (ref 34.5–45)
HCT VFR BLD CALC: 30.9 % — LOW (ref 34.5–45)
HGB BLD-MCNC: 10.7 G/DL — LOW (ref 11.5–15.5)
HGB BLD-MCNC: 10.7 G/DL — LOW (ref 11.5–15.5)
IMM GRANULOCYTES NFR BLD AUTO: 0.5 % — SIGNIFICANT CHANGE UP (ref 0–0.9)
IMM GRANULOCYTES NFR BLD AUTO: 0.5 % — SIGNIFICANT CHANGE UP (ref 0–0.9)
LYMPHOCYTES # BLD AUTO: 1.07 K/UL — SIGNIFICANT CHANGE UP (ref 1–3.3)
LYMPHOCYTES # BLD AUTO: 1.07 K/UL — SIGNIFICANT CHANGE UP (ref 1–3.3)
LYMPHOCYTES # BLD AUTO: 26.8 % — SIGNIFICANT CHANGE UP (ref 13–44)
LYMPHOCYTES # BLD AUTO: 26.8 % — SIGNIFICANT CHANGE UP (ref 13–44)
MCHC RBC-ENTMCNC: 31.6 PG — SIGNIFICANT CHANGE UP (ref 27–34)
MCHC RBC-ENTMCNC: 31.6 PG — SIGNIFICANT CHANGE UP (ref 27–34)
MCHC RBC-ENTMCNC: 34.6 G/DL — SIGNIFICANT CHANGE UP (ref 32–36)
MCHC RBC-ENTMCNC: 34.6 G/DL — SIGNIFICANT CHANGE UP (ref 32–36)
MCV RBC AUTO: 91.2 FL — SIGNIFICANT CHANGE UP (ref 80–100)
MCV RBC AUTO: 91.2 FL — SIGNIFICANT CHANGE UP (ref 80–100)
MONOCYTES # BLD AUTO: 0.29 K/UL — SIGNIFICANT CHANGE UP (ref 0–0.9)
MONOCYTES # BLD AUTO: 0.29 K/UL — SIGNIFICANT CHANGE UP (ref 0–0.9)
MONOCYTES NFR BLD AUTO: 7.3 % — SIGNIFICANT CHANGE UP (ref 2–14)
MONOCYTES NFR BLD AUTO: 7.3 % — SIGNIFICANT CHANGE UP (ref 2–14)
NEUTROPHILS # BLD AUTO: 2.52 K/UL — SIGNIFICANT CHANGE UP (ref 1.8–7.4)
NEUTROPHILS # BLD AUTO: 2.52 K/UL — SIGNIFICANT CHANGE UP (ref 1.8–7.4)
NEUTROPHILS NFR BLD AUTO: 62.9 % — SIGNIFICANT CHANGE UP (ref 43–77)
NEUTROPHILS NFR BLD AUTO: 62.9 % — SIGNIFICANT CHANGE UP (ref 43–77)
NRBC # BLD: 0 /100 WBCS — SIGNIFICANT CHANGE UP (ref 0–0)
NRBC # BLD: 0 /100 WBCS — SIGNIFICANT CHANGE UP (ref 0–0)
PLATELET # BLD AUTO: 142 K/UL — LOW (ref 150–400)
PLATELET # BLD AUTO: 142 K/UL — LOW (ref 150–400)
RBC # BLD: 3.39 M/UL — LOW (ref 3.8–5.2)
RBC # BLD: 3.39 M/UL — LOW (ref 3.8–5.2)
RBC # FLD: 14 % — SIGNIFICANT CHANGE UP (ref 10.3–14.5)
RBC # FLD: 14 % — SIGNIFICANT CHANGE UP (ref 10.3–14.5)
WBC # BLD: 4 K/UL — SIGNIFICANT CHANGE UP (ref 3.8–10.5)
WBC # BLD: 4 K/UL — SIGNIFICANT CHANGE UP (ref 3.8–10.5)
WBC # FLD AUTO: 4 K/UL — SIGNIFICANT CHANGE UP (ref 3.8–10.5)
WBC # FLD AUTO: 4 K/UL — SIGNIFICANT CHANGE UP (ref 3.8–10.5)

## 2023-12-17 DIAGNOSIS — Z51.11 ENCOUNTER FOR ANTINEOPLASTIC CHEMOTHERAPY: ICD-10-CM

## 2023-12-17 DIAGNOSIS — R11.2 NAUSEA WITH VOMITING, UNSPECIFIED: ICD-10-CM

## 2023-12-19 ENCOUNTER — NON-APPOINTMENT (OUTPATIENT)
Age: 58
End: 2023-12-19

## 2023-12-20 ENCOUNTER — APPOINTMENT (OUTPATIENT)
Dept: HEMATOLOGY ONCOLOGY | Facility: CLINIC | Age: 58
End: 2023-12-20

## 2023-12-21 ENCOUNTER — APPOINTMENT (OUTPATIENT)
Dept: INTERNAL MEDICINE | Facility: CLINIC | Age: 58
End: 2023-12-21

## 2023-12-29 ENCOUNTER — APPOINTMENT (OUTPATIENT)
Dept: HEMATOLOGY ONCOLOGY | Facility: CLINIC | Age: 58
End: 2023-12-29

## 2024-01-04 ENCOUNTER — OUTPATIENT (OUTPATIENT)
Dept: OUTPATIENT SERVICES | Facility: HOSPITAL | Age: 59
LOS: 1 days | Discharge: HOME | End: 2024-01-04

## 2024-01-04 DIAGNOSIS — Z98.890 OTHER SPECIFIED POSTPROCEDURAL STATES: Chronic | ICD-10-CM

## 2024-01-04 DIAGNOSIS — C57.4 MALIGNANT NEOPLASM OF UTERINE ADNEXA, UNSPECIFIED: ICD-10-CM

## 2024-01-04 DIAGNOSIS — Z98.89 OTHER SPECIFIED POSTPROCEDURAL STATES: Chronic | ICD-10-CM

## 2024-01-05 ENCOUNTER — APPOINTMENT (OUTPATIENT)
Dept: HEMATOLOGY ONCOLOGY | Facility: CLINIC | Age: 59
End: 2024-01-05
Payer: COMMERCIAL

## 2024-01-05 ENCOUNTER — NON-APPOINTMENT (OUTPATIENT)
Age: 59
End: 2024-01-05

## 2024-01-05 ENCOUNTER — RESULT REVIEW (OUTPATIENT)
Age: 59
End: 2024-01-05

## 2024-01-05 VITALS
TEMPERATURE: 97 F | SYSTOLIC BLOOD PRESSURE: 138 MMHG | HEIGHT: 60.98 IN | RESPIRATION RATE: 17 BRPM | OXYGEN SATURATION: 98 % | HEART RATE: 80 BPM | WEIGHT: 197.31 LBS | DIASTOLIC BLOOD PRESSURE: 82 MMHG | BODY MASS INDEX: 37.25 KG/M2

## 2024-01-05 LAB
BASOPHILS # BLD AUTO: 0.01 K/UL — SIGNIFICANT CHANGE UP (ref 0–0.2)
BASOPHILS # BLD AUTO: 0.01 K/UL — SIGNIFICANT CHANGE UP (ref 0–0.2)
BASOPHILS NFR BLD AUTO: 0.2 % — SIGNIFICANT CHANGE UP (ref 0–2)
BASOPHILS NFR BLD AUTO: 0.2 % — SIGNIFICANT CHANGE UP (ref 0–2)
EOSINOPHIL # BLD AUTO: 0.06 K/UL — SIGNIFICANT CHANGE UP (ref 0–0.5)
EOSINOPHIL # BLD AUTO: 0.06 K/UL — SIGNIFICANT CHANGE UP (ref 0–0.5)
EOSINOPHIL NFR BLD AUTO: 1.4 % — SIGNIFICANT CHANGE UP (ref 0–6)
EOSINOPHIL NFR BLD AUTO: 1.4 % — SIGNIFICANT CHANGE UP (ref 0–6)
HCT VFR BLD CALC: 29.8 % — LOW (ref 34.5–45)
HCT VFR BLD CALC: 29.8 % — LOW (ref 34.5–45)
HGB BLD-MCNC: 10.2 G/DL — LOW (ref 11.5–15.5)
HGB BLD-MCNC: 10.2 G/DL — LOW (ref 11.5–15.5)
IMM GRANULOCYTES NFR BLD AUTO: 0.7 % — SIGNIFICANT CHANGE UP (ref 0–0.9)
IMM GRANULOCYTES NFR BLD AUTO: 0.7 % — SIGNIFICANT CHANGE UP (ref 0–0.9)
LYMPHOCYTES # BLD AUTO: 1.01 K/UL — SIGNIFICANT CHANGE UP (ref 1–3.3)
LYMPHOCYTES # BLD AUTO: 1.01 K/UL — SIGNIFICANT CHANGE UP (ref 1–3.3)
LYMPHOCYTES # BLD AUTO: 23.9 % — SIGNIFICANT CHANGE UP (ref 13–44)
LYMPHOCYTES # BLD AUTO: 23.9 % — SIGNIFICANT CHANGE UP (ref 13–44)
MCHC RBC-ENTMCNC: 31.5 PG — SIGNIFICANT CHANGE UP (ref 27–34)
MCHC RBC-ENTMCNC: 31.5 PG — SIGNIFICANT CHANGE UP (ref 27–34)
MCHC RBC-ENTMCNC: 34.2 G/DL — SIGNIFICANT CHANGE UP (ref 32–36)
MCHC RBC-ENTMCNC: 34.2 G/DL — SIGNIFICANT CHANGE UP (ref 32–36)
MCV RBC AUTO: 92 FL — SIGNIFICANT CHANGE UP (ref 80–100)
MCV RBC AUTO: 92 FL — SIGNIFICANT CHANGE UP (ref 80–100)
MONOCYTES # BLD AUTO: 0.37 K/UL — SIGNIFICANT CHANGE UP (ref 0–0.9)
MONOCYTES # BLD AUTO: 0.37 K/UL — SIGNIFICANT CHANGE UP (ref 0–0.9)
MONOCYTES NFR BLD AUTO: 8.8 % — SIGNIFICANT CHANGE UP (ref 2–14)
MONOCYTES NFR BLD AUTO: 8.8 % — SIGNIFICANT CHANGE UP (ref 2–14)
NEUTROPHILS # BLD AUTO: 2.74 K/UL — SIGNIFICANT CHANGE UP (ref 1.8–7.4)
NEUTROPHILS # BLD AUTO: 2.74 K/UL — SIGNIFICANT CHANGE UP (ref 1.8–7.4)
NEUTROPHILS NFR BLD AUTO: 65 % — SIGNIFICANT CHANGE UP (ref 43–77)
NEUTROPHILS NFR BLD AUTO: 65 % — SIGNIFICANT CHANGE UP (ref 43–77)
NRBC # BLD: 0 /100 WBCS — SIGNIFICANT CHANGE UP (ref 0–0)
NRBC # BLD: 0 /100 WBCS — SIGNIFICANT CHANGE UP (ref 0–0)
PLATELET # BLD AUTO: 124 K/UL — LOW (ref 150–400)
PLATELET # BLD AUTO: 124 K/UL — LOW (ref 150–400)
RBC # BLD: 3.24 M/UL — LOW (ref 3.8–5.2)
RBC # BLD: 3.24 M/UL — LOW (ref 3.8–5.2)
RBC # FLD: 13.3 % — SIGNIFICANT CHANGE UP (ref 10.3–14.5)
RBC # FLD: 13.3 % — SIGNIFICANT CHANGE UP (ref 10.3–14.5)
WBC # BLD: 4.22 K/UL — SIGNIFICANT CHANGE UP (ref 3.8–10.5)
WBC # BLD: 4.22 K/UL — SIGNIFICANT CHANGE UP (ref 3.8–10.5)
WBC # FLD AUTO: 4.22 K/UL — SIGNIFICANT CHANGE UP (ref 3.8–10.5)
WBC # FLD AUTO: 4.22 K/UL — SIGNIFICANT CHANGE UP (ref 3.8–10.5)

## 2024-01-05 PROCEDURE — 99213 OFFICE O/P EST LOW 20 MIN: CPT

## 2024-01-05 NOTE — HISTORY OF PRESENT ILLNESS
[Disease: _____________________] : Disease: [unfilled] [AJCC Stage: ____] : AJCC Stage: [unfilled] [de-identified] : Ms. Cassidy is a 57 yo  postmenopausal female who presents for initial consultation for NACT  for presumed GYN cancer. She reports a >5 year lapse in GYN care. She presented to [facility] ED on 5/31/23 c/o RECIO a 4 weeks. Imaging revealed a right pleural effusion (s/p thoracentesis positive for malignant cells with IHC of likely Mullerian origin), carcinomatosis, adnexal mass and a markedly elevated CA-125. IR guided core biopsy of peritoneal mass is pending.  Patient is a Buddhist and does not accept blood products.  LABS on 6/6/23: CA-125 was 1,506 (ref<38)  was <2 (ref<35) CEA was <1.0 (ref<3.8)  CYTOLOGY/PATHOLOGY: 1) Right pleural thoracentesis, 6/1/23, To  a) positive for malignant cells, PAX-8 +, ER +, CK7 + - favor Mullerian origin 2) Core biopsy of peritoneal mass, 6/8/23, To  OMENTUM, CT GUIDED CORE BIOPSY POSITIVE FOR MALIGNANT CELLS. 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 The neoplastic cells are positive for Moc31, [place]leer-8, CK7, P53, P16,  WT1 and ER. P40 and CK 20 are negative  IMAGING: TVUS on 6/4/23: uterus 11 x 5.0 x 7.6 cm. Leiomyomatous uterus with scattered calcified uterine myomas including a 3.5 cm right intramural myoma and 5.4 cm posterior intramural myoma. EML not visualized.  RTO - 3.2 x 2.2 x 1.8 cm.  LTO - there is a 7.0 x 5.8 x 5.5 cm calcified mass in the left adnexa likely representing calcified pedunculated myoma.  Small FF.    CT C/A/P on 6/2/23: LOWER CHEST: Loculated moderate right pleural effusion and small left pleural effusion are unchanged. LIVER: Soft tissue nodularity along the inferior right hepatic lobe. BILE DUCTS: Normal caliber. GALLBLADDER: Cholelithiasis and contracted. SPLEEN: Within normal limits. PANCREAS: Within normal limits. ADRENALS: Within normal limits. KIDNEYS/URETERS: Bilateral cortical scarring. BLADDER: Within normal limits. REPRODUCTIVE ORGANS: Likely 2 intramural calcified fibroids. Another partially calcified mass in the left lower abdomen may represent a subserosal fibroid or an adnexal mass. The adnexa are not well visualized. BOWEL: No bowel obstruction. Appendix is normal. Colonic diverticulosis. PERITONEUM: Small to moderate ascites. Large amount of soft tissue in the greater omentum VESSELS: Within normal limits. RETROPERITONEUM/LYMPH NODES: No lymphadenopathy. ABDOMINAL WALL: Within normal limits. BONES: Degenerative changes. IMPRESSION: Extensive peritoneal carcinomatosis and small to moderate ascites. Fibroid uterus with either a left subserosal fibroid or a possible adnexal mass. Loculated moderate right pleural effusion and small left pleural effusion, unchanged.  Patient saw Dr. Huizar and had right pleurex catheter placed.   PMHx: DM, HTN, HLD, asthma PSHx: N/A Fam Hx: mother (colon cancer)   HCM: Mammogram: unsure Colonoscopy: 5/2019  Patient got one cycle of chemotherapy on 6/17/23 and then went to ED with purulent pleurex. catheter. PluerX placed 1 month prior)) currently undergoing chemotherapy presented to the ED complaining of purulent discharge from her PleurX catheter. PleurX catheter removed by Pulm on 7/19. Nephrology consulted for MARIANA.   [de-identified] : Temple [FreeTextEntry1] : Carboplatin/Taxol s/p 5 cycles [de-identified] : Patient is here for follow up after completing 5 cycles of chemo. She continues to tolerate treatment well. Denies chest pain, SOB, abdominal bloating/pain, nausea, vomiting, diarrhea, constipation, neuropathy.

## 2024-01-08 LAB
ALBUMIN SERPL ELPH-MCNC: 4.2 G/DL
ALP BLD-CCNC: 108 U/L
ALT SERPL-CCNC: 7 U/L
ANION GAP SERPL CALC-SCNC: 11 MMOL/L
AST SERPL-CCNC: 14 U/L
BILIRUB SERPL-MCNC: 0.4 MG/DL
BUN SERPL-MCNC: 17 MG/DL
CALCIUM SERPL-MCNC: 9.4 MG/DL
CANCER AG125 SERPL-ACNC: 275 U/ML
CHLORIDE SERPL-SCNC: 105 MMOL/L
CO2 SERPL-SCNC: 25 MMOL/L
CREAT SERPL-MCNC: 0.98 MG/DL
EGFR: 66 ML/MIN/1.73M2
GLUCOSE SERPL-MCNC: 169 MG/DL
MAGNESIUM SERPL-MCNC: 1.7 MG/DL
POTASSIUM SERPL-SCNC: 4.1 MMOL/L
PROT SERPL-MCNC: 7.3 G/DL
SODIUM SERPL-SCNC: 141 MMOL/L

## 2024-01-12 ENCOUNTER — RESULT REVIEW (OUTPATIENT)
Age: 59
End: 2024-01-12

## 2024-01-12 ENCOUNTER — APPOINTMENT (OUTPATIENT)
Dept: HEMATOLOGY ONCOLOGY | Facility: CLINIC | Age: 59
End: 2024-01-12

## 2024-01-12 ENCOUNTER — APPOINTMENT (OUTPATIENT)
Dept: INFUSION THERAPY | Facility: HOSPITAL | Age: 59
End: 2024-01-12

## 2024-01-12 LAB
BASOPHILS # BLD AUTO: 0.01 K/UL — SIGNIFICANT CHANGE UP (ref 0–0.2)
BASOPHILS # BLD AUTO: 0.01 K/UL — SIGNIFICANT CHANGE UP (ref 0–0.2)
BASOPHILS NFR BLD AUTO: 0.2 % — SIGNIFICANT CHANGE UP (ref 0–2)
BASOPHILS NFR BLD AUTO: 0.2 % — SIGNIFICANT CHANGE UP (ref 0–2)
EOSINOPHIL # BLD AUTO: 0.14 K/UL — SIGNIFICANT CHANGE UP (ref 0–0.5)
EOSINOPHIL # BLD AUTO: 0.14 K/UL — SIGNIFICANT CHANGE UP (ref 0–0.5)
EOSINOPHIL NFR BLD AUTO: 3.2 % — SIGNIFICANT CHANGE UP (ref 0–6)
EOSINOPHIL NFR BLD AUTO: 3.2 % — SIGNIFICANT CHANGE UP (ref 0–6)
HCT VFR BLD CALC: 29.4 % — LOW (ref 34.5–45)
HCT VFR BLD CALC: 29.4 % — LOW (ref 34.5–45)
HGB BLD-MCNC: 10.3 G/DL — LOW (ref 11.5–15.5)
HGB BLD-MCNC: 10.3 G/DL — LOW (ref 11.5–15.5)
IMM GRANULOCYTES NFR BLD AUTO: 0.7 % — SIGNIFICANT CHANGE UP (ref 0–0.9)
IMM GRANULOCYTES NFR BLD AUTO: 0.7 % — SIGNIFICANT CHANGE UP (ref 0–0.9)
LYMPHOCYTES # BLD AUTO: 1.26 K/UL — SIGNIFICANT CHANGE UP (ref 1–3.3)
LYMPHOCYTES # BLD AUTO: 1.26 K/UL — SIGNIFICANT CHANGE UP (ref 1–3.3)
LYMPHOCYTES # BLD AUTO: 28.8 % — SIGNIFICANT CHANGE UP (ref 13–44)
LYMPHOCYTES # BLD AUTO: 28.8 % — SIGNIFICANT CHANGE UP (ref 13–44)
MCHC RBC-ENTMCNC: 31.4 PG — SIGNIFICANT CHANGE UP (ref 27–34)
MCHC RBC-ENTMCNC: 31.4 PG — SIGNIFICANT CHANGE UP (ref 27–34)
MCHC RBC-ENTMCNC: 35 G/DL — SIGNIFICANT CHANGE UP (ref 32–36)
MCHC RBC-ENTMCNC: 35 G/DL — SIGNIFICANT CHANGE UP (ref 32–36)
MCV RBC AUTO: 89.6 FL — SIGNIFICANT CHANGE UP (ref 80–100)
MCV RBC AUTO: 89.6 FL — SIGNIFICANT CHANGE UP (ref 80–100)
MONOCYTES # BLD AUTO: 0.3 K/UL — SIGNIFICANT CHANGE UP (ref 0–0.9)
MONOCYTES # BLD AUTO: 0.3 K/UL — SIGNIFICANT CHANGE UP (ref 0–0.9)
MONOCYTES NFR BLD AUTO: 6.8 % — SIGNIFICANT CHANGE UP (ref 2–14)
MONOCYTES NFR BLD AUTO: 6.8 % — SIGNIFICANT CHANGE UP (ref 2–14)
NEUTROPHILS # BLD AUTO: 2.64 K/UL — SIGNIFICANT CHANGE UP (ref 1.8–7.4)
NEUTROPHILS # BLD AUTO: 2.64 K/UL — SIGNIFICANT CHANGE UP (ref 1.8–7.4)
NEUTROPHILS NFR BLD AUTO: 60.3 % — SIGNIFICANT CHANGE UP (ref 43–77)
NEUTROPHILS NFR BLD AUTO: 60.3 % — SIGNIFICANT CHANGE UP (ref 43–77)
NRBC # BLD: 0 /100 WBCS — SIGNIFICANT CHANGE UP (ref 0–0)
NRBC # BLD: 0 /100 WBCS — SIGNIFICANT CHANGE UP (ref 0–0)
PLATELET # BLD AUTO: 81 K/UL — LOW (ref 150–400)
PLATELET # BLD AUTO: 81 K/UL — LOW (ref 150–400)
RBC # BLD: 3.28 M/UL — LOW (ref 3.8–5.2)
RBC # BLD: 3.28 M/UL — LOW (ref 3.8–5.2)
RBC # FLD: 13.2 % — SIGNIFICANT CHANGE UP (ref 10.3–14.5)
RBC # FLD: 13.2 % — SIGNIFICANT CHANGE UP (ref 10.3–14.5)
WBC # BLD: 4.38 K/UL — SIGNIFICANT CHANGE UP (ref 3.8–10.5)
WBC # BLD: 4.38 K/UL — SIGNIFICANT CHANGE UP (ref 3.8–10.5)
WBC # FLD AUTO: 4.38 K/UL — SIGNIFICANT CHANGE UP (ref 3.8–10.5)
WBC # FLD AUTO: 4.38 K/UL — SIGNIFICANT CHANGE UP (ref 3.8–10.5)

## 2024-01-16 ENCOUNTER — NON-APPOINTMENT (OUTPATIENT)
Age: 59
End: 2024-01-16

## 2024-01-19 ENCOUNTER — RESULT REVIEW (OUTPATIENT)
Age: 59
End: 2024-01-19

## 2024-01-19 ENCOUNTER — APPOINTMENT (OUTPATIENT)
Dept: HEMATOLOGY ONCOLOGY | Facility: CLINIC | Age: 59
End: 2024-01-19
Payer: COMMERCIAL

## 2024-01-19 VITALS
SYSTOLIC BLOOD PRESSURE: 139 MMHG | OXYGEN SATURATION: 98 % | WEIGHT: 198.83 LBS | DIASTOLIC BLOOD PRESSURE: 78 MMHG | HEIGHT: 60.98 IN | RESPIRATION RATE: 17 BRPM | HEART RATE: 79 BPM | BODY MASS INDEX: 37.54 KG/M2 | TEMPERATURE: 97 F

## 2024-01-19 LAB
BASOPHILS # BLD AUTO: 0 K/UL — SIGNIFICANT CHANGE UP (ref 0–0.2)
BASOPHILS NFR BLD AUTO: 0 % — SIGNIFICANT CHANGE UP (ref 0–2)
EOSINOPHIL # BLD AUTO: 0.33 K/UL — SIGNIFICANT CHANGE UP (ref 0–0.5)
EOSINOPHIL NFR BLD AUTO: 7.1 % — HIGH (ref 0–6)
HCT VFR BLD CALC: 30.4 % — LOW (ref 34.5–45)
HGB BLD-MCNC: 10.5 G/DL — LOW (ref 11.5–15.5)
IMM GRANULOCYTES NFR BLD AUTO: 0.2 % — SIGNIFICANT CHANGE UP (ref 0–0.9)
LYMPHOCYTES # BLD AUTO: 1.34 K/UL — SIGNIFICANT CHANGE UP (ref 1–3.3)
LYMPHOCYTES # BLD AUTO: 29 % — SIGNIFICANT CHANGE UP (ref 13–44)
MCHC RBC-ENTMCNC: 31.3 PG — SIGNIFICANT CHANGE UP (ref 27–34)
MCHC RBC-ENTMCNC: 34.5 G/DL — SIGNIFICANT CHANGE UP (ref 32–36)
MCV RBC AUTO: 90.5 FL — SIGNIFICANT CHANGE UP (ref 80–100)
MONOCYTES # BLD AUTO: 0.23 K/UL — SIGNIFICANT CHANGE UP (ref 0–0.9)
MONOCYTES NFR BLD AUTO: 5 % — SIGNIFICANT CHANGE UP (ref 2–14)
NEUTROPHILS # BLD AUTO: 2.71 K/UL — SIGNIFICANT CHANGE UP (ref 1.8–7.4)
NEUTROPHILS NFR BLD AUTO: 58.7 % — SIGNIFICANT CHANGE UP (ref 43–77)
NRBC # BLD: 0 /100 WBCS — SIGNIFICANT CHANGE UP (ref 0–0)
PLATELET # BLD AUTO: 90 K/UL — LOW (ref 150–400)
RBC # BLD: 3.36 M/UL — LOW (ref 3.8–5.2)
RBC # FLD: 13.2 % — SIGNIFICANT CHANGE UP (ref 10.3–14.5)
WBC # BLD: 4.62 K/UL — SIGNIFICANT CHANGE UP (ref 3.8–10.5)
WBC # FLD AUTO: 4.62 K/UL — SIGNIFICANT CHANGE UP (ref 3.8–10.5)

## 2024-01-19 PROCEDURE — 99214 OFFICE O/P EST MOD 30 MIN: CPT

## 2024-01-22 LAB
ALBUMIN SERPL ELPH-MCNC: 4.1 G/DL
ALP BLD-CCNC: 133 U/L
ALT SERPL-CCNC: 8 U/L
ANION GAP SERPL CALC-SCNC: 12 MMOL/L
AST SERPL-CCNC: 14 U/L
BILIRUB SERPL-MCNC: 0.3 MG/DL
BUN SERPL-MCNC: 19 MG/DL
CALCIUM SERPL-MCNC: 9.2 MG/DL
CANCER AG125 SERPL-ACNC: 242 U/ML
CHLORIDE SERPL-SCNC: 104 MMOL/L
CO2 SERPL-SCNC: 26 MMOL/L
CREAT SERPL-MCNC: 1.04 MG/DL
EGFR: 62 ML/MIN/1.73M2
GLUCOSE SERPL-MCNC: 179 MG/DL
MAGNESIUM SERPL-MCNC: 1.7 MG/DL
POTASSIUM SERPL-SCNC: 4.4 MMOL/L
PROT SERPL-MCNC: 7.4 G/DL
SODIUM SERPL-SCNC: 142 MMOL/L

## 2024-01-22 NOTE — HISTORY OF PRESENT ILLNESS
[Disease: _____________________] : Disease: [unfilled] [AJCC Stage: ____] : AJCC Stage: [unfilled] [de-identified] : Yazidism [de-identified] : Ms. Cassidy is a 57 yo  postmenopausal female who presents for initial consultation for NACT  for presumed GYN cancer. She reports a >5 year lapse in GYN care. She presented to [facility] ED on 5/31/23 c/o RECIO a 4 weeks. Imaging revealed a right pleural effusion (s/p thoracentesis positive for malignant cells with IHC of likely Mullerian origin), carcinomatosis, adnexal mass and a markedly elevated CA-125. IR guided core biopsy of peritoneal mass is pending.  Patient is a Jewish and does not accept blood products.  LABS on 6/6/23: CA-125 was 1,506 (ref<38)  was <2 (ref<35) CEA was <1.0 (ref<3.8)  CYTOLOGY/PATHOLOGY: 1) Right pleural thoracentesis, 6/1/23, To  a) positive for malignant cells, PAX-8 +, ER +, CK7 + - favor Mullerian origin 2) Core biopsy of peritoneal mass, 6/8/23, To  OMENTUM, CT GUIDED CORE BIOPSY POSITIVE FOR MALIGNANT CELLS. 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 The neoplastic cells are positive for Moc31, [place]amo-8, CK7, P53, P16,  WT1 and ER. P40 and CK 20 are negative  IMAGING: TVUS on 6/4/23: uterus 11 x 5.0 x 7.6 cm. Leiomyomatous uterus with scattered calcified uterine myomas including a 3.5 cm right intramural myoma and 5.4 cm posterior intramural myoma. EML not visualized.  RTO - 3.2 x 2.2 x 1.8 cm.  LTO - there is a 7.0 x 5.8 x 5.5 cm calcified mass in the left adnexa likely representing calcified pedunculated myoma.  Small FF.    CT C/A/P on 6/2/23: LOWER CHEST: Loculated moderate right pleural effusion and small left pleural effusion are unchanged. LIVER: Soft tissue nodularity along the inferior right hepatic lobe. BILE DUCTS: Normal caliber. GALLBLADDER: Cholelithiasis and contracted. SPLEEN: Within normal limits. PANCREAS: Within normal limits. ADRENALS: Within normal limits. KIDNEYS/URETERS: Bilateral cortical scarring. BLADDER: Within normal limits. REPRODUCTIVE ORGANS: Likely 2 intramural calcified fibroids. Another partially calcified mass in the left lower abdomen may represent a subserosal fibroid or an adnexal mass. The adnexa are not well visualized. BOWEL: No bowel obstruction. Appendix is normal. Colonic diverticulosis. PERITONEUM: Small to moderate ascites. Large amount of soft tissue in the greater omentum VESSELS: Within normal limits. RETROPERITONEUM/LYMPH NODES: No lymphadenopathy. ABDOMINAL WALL: Within normal limits. BONES: Degenerative changes. IMPRESSION: Extensive peritoneal carcinomatosis and small to moderate ascites. Fibroid uterus with either a left subserosal fibroid or a possible adnexal mass. Loculated moderate right pleural effusion and small left pleural effusion, unchanged.  Patient saw Dr. Huizar and had right pleurex catheter placed.   PMHx: DM, HTN, HLD, asthma PSHx: N/A Fam Hx: mother (colon cancer)   HCM: Mammogram: unsure Colonoscopy: 5/2019  Patient got one cycle of chemotherapy on 6/17/23 and then went to ED with purulent pleurex. catheter. PluerX placed 1 month prior)) currently undergoing chemotherapy presented to the ED complaining of purulent discharge from her PleurX catheter. PleurX catheter removed by Pulm on 7/19. Nephrology consulted for MARIANA.   [de-identified] : Patient here for a f/u visit. She feels well. Last treatment postponed due to thrombocytopenia. Neuropathy is stable.

## 2024-01-26 ENCOUNTER — APPOINTMENT (OUTPATIENT)
Dept: HEMATOLOGY ONCOLOGY | Facility: CLINIC | Age: 59
End: 2024-01-26

## 2024-01-26 ENCOUNTER — RESULT REVIEW (OUTPATIENT)
Age: 59
End: 2024-01-26

## 2024-01-26 ENCOUNTER — APPOINTMENT (OUTPATIENT)
Dept: INFUSION THERAPY | Facility: HOSPITAL | Age: 59
End: 2024-01-26

## 2024-01-26 LAB
BASOPHILS # BLD AUTO: 0.03 K/UL — SIGNIFICANT CHANGE UP (ref 0–0.2)
BASOPHILS NFR BLD AUTO: 0.7 % — SIGNIFICANT CHANGE UP (ref 0–2)
EOSINOPHIL # BLD AUTO: 0.3 K/UL — SIGNIFICANT CHANGE UP (ref 0–0.5)
EOSINOPHIL NFR BLD AUTO: 7.2 % — HIGH (ref 0–6)
HCT VFR BLD CALC: 31.3 % — LOW (ref 34.5–45)
HGB BLD-MCNC: 11.1 G/DL — LOW (ref 11.5–15.5)
IMM GRANULOCYTES NFR BLD AUTO: 2.9 % — HIGH (ref 0–0.9)
LYMPHOCYTES # BLD AUTO: 1.21 K/UL — SIGNIFICANT CHANGE UP (ref 1–3.3)
LYMPHOCYTES # BLD AUTO: 29.2 % — SIGNIFICANT CHANGE UP (ref 13–44)
MCHC RBC-ENTMCNC: 31.4 PG — SIGNIFICANT CHANGE UP (ref 27–34)
MCHC RBC-ENTMCNC: 35.5 G/DL — SIGNIFICANT CHANGE UP (ref 32–36)
MCV RBC AUTO: 88.4 FL — SIGNIFICANT CHANGE UP (ref 80–100)
MONOCYTES # BLD AUTO: 0.23 K/UL — SIGNIFICANT CHANGE UP (ref 0–0.9)
MONOCYTES NFR BLD AUTO: 5.6 % — SIGNIFICANT CHANGE UP (ref 2–14)
NEUTROPHILS # BLD AUTO: 2.25 K/UL — SIGNIFICANT CHANGE UP (ref 1.8–7.4)
NEUTROPHILS NFR BLD AUTO: 54.4 % — SIGNIFICANT CHANGE UP (ref 43–77)
NRBC # BLD: 0 /100 WBCS — SIGNIFICANT CHANGE UP (ref 0–0)
PLATELET # BLD AUTO: 125 K/UL — LOW (ref 150–400)
RBC # BLD: 3.54 M/UL — LOW (ref 3.8–5.2)
RBC # FLD: 12.7 % — SIGNIFICANT CHANGE UP (ref 10.3–14.5)
WBC # BLD: 4.14 K/UL — SIGNIFICANT CHANGE UP (ref 3.8–10.5)
WBC # FLD AUTO: 4.14 K/UL — SIGNIFICANT CHANGE UP (ref 3.8–10.5)

## 2024-01-29 DIAGNOSIS — R11.2 NAUSEA WITH VOMITING, UNSPECIFIED: ICD-10-CM

## 2024-01-29 DIAGNOSIS — Z51.11 ENCOUNTER FOR ANTINEOPLASTIC CHEMOTHERAPY: ICD-10-CM

## 2024-02-02 ENCOUNTER — OUTPATIENT (OUTPATIENT)
Dept: OUTPATIENT SERVICES | Facility: HOSPITAL | Age: 59
LOS: 1 days | End: 2024-02-02
Payer: COMMERCIAL

## 2024-02-02 ENCOUNTER — APPOINTMENT (OUTPATIENT)
Dept: CT IMAGING | Facility: IMAGING CENTER | Age: 59
End: 2024-02-02
Payer: COMMERCIAL

## 2024-02-02 DIAGNOSIS — Z98.89 OTHER SPECIFIED POSTPROCEDURAL STATES: Chronic | ICD-10-CM

## 2024-02-02 DIAGNOSIS — Z98.890 OTHER SPECIFIED POSTPROCEDURAL STATES: Chronic | ICD-10-CM

## 2024-02-02 DIAGNOSIS — C56.9 MALIGNANT NEOPLASM OF UNSPECIFIED OVARY: ICD-10-CM

## 2024-02-02 PROCEDURE — 71260 CT THORAX DX C+: CPT | Mod: 26

## 2024-02-02 PROCEDURE — 71260 CT THORAX DX C+: CPT

## 2024-02-02 PROCEDURE — 74177 CT ABD & PELVIS W/CONTRAST: CPT | Mod: 26

## 2024-02-02 PROCEDURE — 74177 CT ABD & PELVIS W/CONTRAST: CPT

## 2024-02-09 PROBLEM — C56.9 MALIGNANT NEOPLASM OF OVARY, UNSPECIFIED LATERALITY: Status: ACTIVE | Noted: 2023-09-05

## 2024-02-14 ENCOUNTER — APPOINTMENT (OUTPATIENT)
Dept: GYNECOLOGIC ONCOLOGY | Facility: CLINIC | Age: 59
End: 2024-02-14
Payer: COMMERCIAL

## 2024-02-14 VITALS
HEART RATE: 91 BPM | BODY MASS INDEX: 37.5 KG/M2 | WEIGHT: 198.38 LBS | DIASTOLIC BLOOD PRESSURE: 100 MMHG | SYSTOLIC BLOOD PRESSURE: 161 MMHG

## 2024-02-14 DIAGNOSIS — C56.9 MALIGNANT NEOPLASM OF UNSPECIFIED OVARY: ICD-10-CM

## 2024-02-14 PROCEDURE — 99214 OFFICE O/P EST MOD 30 MIN: CPT

## 2024-02-21 ENCOUNTER — RESULT REVIEW (OUTPATIENT)
Age: 59
End: 2024-02-21

## 2024-02-21 ENCOUNTER — APPOINTMENT (OUTPATIENT)
Dept: HEMATOLOGY ONCOLOGY | Facility: CLINIC | Age: 59
End: 2024-02-21

## 2024-02-21 LAB
BASOPHILS # BLD AUTO: 0.02 K/UL — SIGNIFICANT CHANGE UP (ref 0–0.2)
BASOPHILS NFR BLD AUTO: 0.5 % — SIGNIFICANT CHANGE UP (ref 0–2)
EOSINOPHIL # BLD AUTO: 0.06 K/UL — SIGNIFICANT CHANGE UP (ref 0–0.5)
EOSINOPHIL NFR BLD AUTO: 1.5 % — SIGNIFICANT CHANGE UP (ref 0–6)
HCT VFR BLD CALC: 31.2 % — LOW (ref 34.5–45)
HGB BLD-MCNC: 10.7 G/DL — LOW (ref 11.5–15.5)
IMM GRANULOCYTES NFR BLD AUTO: 0.5 % — SIGNIFICANT CHANGE UP (ref 0–0.9)
LYMPHOCYTES # BLD AUTO: 1.17 K/UL — SIGNIFICANT CHANGE UP (ref 1–3.3)
LYMPHOCYTES # BLD AUTO: 28.6 % — SIGNIFICANT CHANGE UP (ref 13–44)
MCHC RBC-ENTMCNC: 30.9 PG — SIGNIFICANT CHANGE UP (ref 27–34)
MCHC RBC-ENTMCNC: 34.3 G/DL — SIGNIFICANT CHANGE UP (ref 32–36)
MCV RBC AUTO: 90.2 FL — SIGNIFICANT CHANGE UP (ref 80–100)
MONOCYTES # BLD AUTO: 0.33 K/UL — SIGNIFICANT CHANGE UP (ref 0–0.9)
MONOCYTES NFR BLD AUTO: 8.1 % — SIGNIFICANT CHANGE UP (ref 2–14)
NEUTROPHILS # BLD AUTO: 2.49 K/UL — SIGNIFICANT CHANGE UP (ref 1.8–7.4)
NEUTROPHILS NFR BLD AUTO: 60.8 % — SIGNIFICANT CHANGE UP (ref 43–77)
NRBC # BLD: 0 /100 WBCS — SIGNIFICANT CHANGE UP (ref 0–0)
PLATELET # BLD AUTO: 99 K/UL — LOW (ref 150–400)
RBC # BLD: 3.46 M/UL — LOW (ref 3.8–5.2)
RBC # FLD: 13.1 % — SIGNIFICANT CHANGE UP (ref 10.3–14.5)
WBC # BLD: 4.09 K/UL — SIGNIFICANT CHANGE UP (ref 3.8–10.5)
WBC # FLD AUTO: 4.09 K/UL — SIGNIFICANT CHANGE UP (ref 3.8–10.5)

## 2024-02-21 NOTE — ED PROVIDER NOTE - NSCAREINITIATED _GEN_ER
-- DO NOT REPLY / DO NOT REPLY ALL --  -- Message is from Engagement Center Operations (EC[employer]) --    ONLY TO BE USED WITHIN A REFILL MEDICATION ENCOUNTER    Med Refill  Is the patient currently having any symptoms?: No/Non-Emergent symptoms    Name of medication requested: See pended med    Is this the first request for the medication in the last 48 hours?: Yes    Patient is requesting a medication refill - medication is on active medication list    Patient is currently OUT of the requested medication - sent as HIGH priority    Full name of the provider who ordered the medication: [facility] site name / Account # for provider: Shelby Jamison Pharmacy: Pharmacy  [facility] [address]     Patient confirmed the above pharmacy as correct?  Yes    Caller Information         Type Contact Phone/Fax    02/21/2024 09:48 AM CST Phone (Incoming) [facility] [address]  (Pharmacy) [phone]            Alternative phone number: none    Can a detailed message be left?: Yes         Anastasiya Martinez)

## 2024-02-22 LAB
ALBUMIN SERPL ELPH-MCNC: 4.1 G/DL
ALP BLD-CCNC: 118 U/L
ALT SERPL-CCNC: 6 U/L
ANION GAP SERPL CALC-SCNC: 13 MMOL/L
AST SERPL-CCNC: 13 U/L
BILIRUB SERPL-MCNC: 0.5 MG/DL
BUN SERPL-MCNC: 12 MG/DL
CALCIUM SERPL-MCNC: 9.4 MG/DL
CANCER AG125 SERPL-ACNC: 274 U/ML
CHLORIDE SERPL-SCNC: 104 MMOL/L
CO2 SERPL-SCNC: 26 MMOL/L
CREAT SERPL-MCNC: 0.99 MG/DL
EGFR: 65 ML/MIN/1.73M2
GLUCOSE SERPL-MCNC: 181 MG/DL
MAGNESIUM SERPL-MCNC: 1.5 MG/DL
POTASSIUM SERPL-SCNC: 4 MMOL/L
PROT SERPL-MCNC: 7.4 G/DL
SODIUM SERPL-SCNC: 143 MMOL/L

## 2024-02-23 ENCOUNTER — APPOINTMENT (OUTPATIENT)
Dept: INFUSION THERAPY | Facility: HOSPITAL | Age: 59
End: 2024-02-23

## 2024-02-23 ENCOUNTER — RESULT REVIEW (OUTPATIENT)
Age: 59
End: 2024-02-23

## 2024-02-23 ENCOUNTER — APPOINTMENT (OUTPATIENT)
Dept: HEMATOLOGY ONCOLOGY | Facility: CLINIC | Age: 59
End: 2024-02-23

## 2024-02-23 LAB
BASOPHILS # BLD AUTO: 0.01 K/UL — SIGNIFICANT CHANGE UP (ref 0–0.2)
BASOPHILS NFR BLD AUTO: 0.2 % — SIGNIFICANT CHANGE UP (ref 0–2)
EOSINOPHIL # BLD AUTO: 0.04 K/UL — SIGNIFICANT CHANGE UP (ref 0–0.5)
EOSINOPHIL NFR BLD AUTO: 1 % — SIGNIFICANT CHANGE UP (ref 0–6)
HCT VFR BLD CALC: 30 % — LOW (ref 34.5–45)
HGB BLD-MCNC: 10.4 G/DL — LOW (ref 11.5–15.5)
IMM GRANULOCYTES NFR BLD AUTO: 0.5 % — SIGNIFICANT CHANGE UP (ref 0–0.9)
LYMPHOCYTES # BLD AUTO: 1.03 K/UL — SIGNIFICANT CHANGE UP (ref 1–3.3)
LYMPHOCYTES # BLD AUTO: 25.2 % — SIGNIFICANT CHANGE UP (ref 13–44)
MCHC RBC-ENTMCNC: 30.4 PG — SIGNIFICANT CHANGE UP (ref 27–34)
MCHC RBC-ENTMCNC: 34.7 G/DL — SIGNIFICANT CHANGE UP (ref 32–36)
MCV RBC AUTO: 87.7 FL — SIGNIFICANT CHANGE UP (ref 80–100)
MONOCYTES # BLD AUTO: 0.31 K/UL — SIGNIFICANT CHANGE UP (ref 0–0.9)
MONOCYTES NFR BLD AUTO: 7.6 % — SIGNIFICANT CHANGE UP (ref 2–14)
NEUTROPHILS # BLD AUTO: 2.67 K/UL — SIGNIFICANT CHANGE UP (ref 1.8–7.4)
NEUTROPHILS NFR BLD AUTO: 65.5 % — SIGNIFICANT CHANGE UP (ref 43–77)
NRBC # BLD: 0 /100 WBCS — SIGNIFICANT CHANGE UP (ref 0–0)
PLATELET # BLD AUTO: 88 K/UL — LOW (ref 150–400)
RBC # BLD: 3.42 M/UL — LOW (ref 3.8–5.2)
RBC # FLD: 13 % — SIGNIFICANT CHANGE UP (ref 10.3–14.5)
WBC # BLD: 4.08 K/UL — SIGNIFICANT CHANGE UP (ref 3.8–10.5)
WBC # FLD AUTO: 4.08 K/UL — SIGNIFICANT CHANGE UP (ref 3.8–10.5)

## 2024-02-27 RX ORDER — INSULIN LISPRO 100 [IU]/ML
100 INJECTION, SOLUTION INTRAVENOUS; SUBCUTANEOUS 3 TIMES DAILY
Qty: 10 | Refills: 0 | Status: ACTIVE | COMMUNITY
Start: 2023-10-10 | End: 1900-01-01

## 2024-03-01 ENCOUNTER — RESULT REVIEW (OUTPATIENT)
Age: 59
End: 2024-03-01

## 2024-03-01 ENCOUNTER — APPOINTMENT (OUTPATIENT)
Dept: INFUSION THERAPY | Facility: HOSPITAL | Age: 59
End: 2024-03-01

## 2024-03-01 ENCOUNTER — APPOINTMENT (OUTPATIENT)
Dept: HEMATOLOGY ONCOLOGY | Facility: CLINIC | Age: 59
End: 2024-03-01

## 2024-03-01 DIAGNOSIS — E86.0 DEHYDRATION: ICD-10-CM

## 2024-03-01 LAB
APPEARANCE UR: CLEAR — SIGNIFICANT CHANGE UP
BASOPHILS # BLD AUTO: 0.01 K/UL — SIGNIFICANT CHANGE UP (ref 0–0.2)
BASOPHILS NFR BLD AUTO: 0.2 % — SIGNIFICANT CHANGE UP (ref 0–2)
BILIRUB UR-MCNC: NEGATIVE — SIGNIFICANT CHANGE UP
COLOR SPEC: YELLOW — SIGNIFICANT CHANGE UP
DIFF PNL FLD: NEGATIVE — SIGNIFICANT CHANGE UP
EOSINOPHIL # BLD AUTO: 0.15 K/UL — SIGNIFICANT CHANGE UP (ref 0–0.5)
EOSINOPHIL NFR BLD AUTO: 3.5 % — SIGNIFICANT CHANGE UP (ref 0–6)
GLUCOSE UR QL: NEGATIVE MG/DL — SIGNIFICANT CHANGE UP
HCT VFR BLD CALC: 30 % — LOW (ref 34.5–45)
HGB BLD-MCNC: 10.5 G/DL — LOW (ref 11.5–15.5)
IMM GRANULOCYTES NFR BLD AUTO: 0.5 % — SIGNIFICANT CHANGE UP (ref 0–0.9)
KETONES UR-MCNC: NEGATIVE MG/DL — SIGNIFICANT CHANGE UP
LEUKOCYTE ESTERASE UR-ACNC: NEGATIVE — SIGNIFICANT CHANGE UP
LYMPHOCYTES # BLD AUTO: 0.98 K/UL — LOW (ref 1–3.3)
LYMPHOCYTES # BLD AUTO: 22.9 % — SIGNIFICANT CHANGE UP (ref 13–44)
MCHC RBC-ENTMCNC: 30.6 PG — SIGNIFICANT CHANGE UP (ref 27–34)
MCHC RBC-ENTMCNC: 35 G/DL — SIGNIFICANT CHANGE UP (ref 32–36)
MCV RBC AUTO: 87.5 FL — SIGNIFICANT CHANGE UP (ref 80–100)
MONOCYTES # BLD AUTO: 0.23 K/UL — SIGNIFICANT CHANGE UP (ref 0–0.9)
MONOCYTES NFR BLD AUTO: 5.4 % — SIGNIFICANT CHANGE UP (ref 2–14)
NEUTROPHILS # BLD AUTO: 2.89 K/UL — SIGNIFICANT CHANGE UP (ref 1.8–7.4)
NEUTROPHILS NFR BLD AUTO: 67.5 % — SIGNIFICANT CHANGE UP (ref 43–77)
NITRITE UR-MCNC: NEGATIVE — SIGNIFICANT CHANGE UP
NRBC # BLD: 0 /100 WBCS — SIGNIFICANT CHANGE UP (ref 0–0)
PH UR: 6 — SIGNIFICANT CHANGE UP (ref 5–8)
PLATELET # BLD AUTO: 113 K/UL — LOW (ref 150–400)
PROT UR-MCNC: NEGATIVE MG/DL — SIGNIFICANT CHANGE UP
RBC # BLD: 3.43 M/UL — LOW (ref 3.8–5.2)
RBC # FLD: 13.1 % — SIGNIFICANT CHANGE UP (ref 10.3–14.5)
SP GR SPEC: 1.01 — SIGNIFICANT CHANGE UP (ref 1–1.03)
UROBILINOGEN FLD QL: 0.2 MG/DL — SIGNIFICANT CHANGE UP (ref 0.2–1)
WBC # BLD: 4.28 K/UL — SIGNIFICANT CHANGE UP (ref 3.8–10.5)
WBC # FLD AUTO: 4.28 K/UL — SIGNIFICANT CHANGE UP (ref 3.8–10.5)

## 2024-03-11 ENCOUNTER — EMERGENCY (EMERGENCY)
Facility: HOSPITAL | Age: 59
LOS: 1 days | Discharge: HOME | End: 2024-03-11
Attending: EMERGENCY MEDICINE | Admitting: EMERGENCY MEDICINE
Payer: COMMERCIAL

## 2024-03-11 ENCOUNTER — OUTPATIENT (OUTPATIENT)
Dept: OUTPATIENT SERVICES | Facility: HOSPITAL | Age: 59
LOS: 1 days | Discharge: HOME | End: 2024-03-11

## 2024-03-11 VITALS
DIASTOLIC BLOOD PRESSURE: 62 MMHG | HEART RATE: 75 BPM | TEMPERATURE: 98 F | RESPIRATION RATE: 18 BRPM | OXYGEN SATURATION: 98 % | SYSTOLIC BLOOD PRESSURE: 146 MMHG

## 2024-03-11 VITALS
SYSTOLIC BLOOD PRESSURE: 161 MMHG | HEIGHT: 60.98 IN | DIASTOLIC BLOOD PRESSURE: 86 MMHG | TEMPERATURE: 98 F | RESPIRATION RATE: 17 BRPM | HEART RATE: 96 BPM | OXYGEN SATURATION: 100 %

## 2024-03-11 DIAGNOSIS — Z98.89 OTHER SPECIFIED POSTPROCEDURAL STATES: Chronic | ICD-10-CM

## 2024-03-11 DIAGNOSIS — C57.4 MALIGNANT NEOPLASM OF UTERINE ADNEXA, UNSPECIFIED: ICD-10-CM

## 2024-03-11 DIAGNOSIS — Z98.890 OTHER SPECIFIED POSTPROCEDURAL STATES: Chronic | ICD-10-CM

## 2024-03-11 LAB
ALBUMIN SERPL ELPH-MCNC: 4.2 G/DL — SIGNIFICANT CHANGE UP (ref 3.3–5)
ALP SERPL-CCNC: 121 U/L — HIGH (ref 40–120)
ALT FLD-CCNC: 8 U/L — SIGNIFICANT CHANGE UP (ref 4–33)
ANION GAP SERPL CALC-SCNC: 13 MMOL/L — SIGNIFICANT CHANGE UP (ref 7–14)
AST SERPL-CCNC: 14 U/L — SIGNIFICANT CHANGE UP (ref 4–32)
BASOPHILS # BLD AUTO: 0.01 K/UL — SIGNIFICANT CHANGE UP (ref 0–0.2)
BASOPHILS NFR BLD AUTO: 0.2 % — SIGNIFICANT CHANGE UP (ref 0–2)
BILIRUB SERPL-MCNC: 0.6 MG/DL — SIGNIFICANT CHANGE UP (ref 0.2–1.2)
BUN SERPL-MCNC: 16 MG/DL — SIGNIFICANT CHANGE UP (ref 7–23)
CALCIUM SERPL-MCNC: 9.6 MG/DL — SIGNIFICANT CHANGE UP (ref 8.4–10.5)
CHLORIDE SERPL-SCNC: 102 MMOL/L — SIGNIFICANT CHANGE UP (ref 98–107)
CO2 SERPL-SCNC: 26 MMOL/L — SIGNIFICANT CHANGE UP (ref 22–31)
CREAT SERPL-MCNC: 0.95 MG/DL — SIGNIFICANT CHANGE UP (ref 0.5–1.3)
EGFR: 69 ML/MIN/1.73M2 — SIGNIFICANT CHANGE UP
EOSINOPHIL # BLD AUTO: 0.04 K/UL — SIGNIFICANT CHANGE UP (ref 0–0.5)
EOSINOPHIL NFR BLD AUTO: 0.8 % — SIGNIFICANT CHANGE UP (ref 0–6)
GLUCOSE SERPL-MCNC: 203 MG/DL — HIGH (ref 70–99)
HCT VFR BLD CALC: 29.1 % — LOW (ref 34.5–45)
HGB BLD-MCNC: 9.9 G/DL — LOW (ref 11.5–15.5)
IANC: 3.36 K/UL — SIGNIFICANT CHANGE UP (ref 1.8–7.4)
IMM GRANULOCYTES NFR BLD AUTO: 0.4 % — SIGNIFICANT CHANGE UP (ref 0–0.9)
LYMPHOCYTES # BLD AUTO: 1.28 K/UL — SIGNIFICANT CHANGE UP (ref 1–3.3)
LYMPHOCYTES # BLD AUTO: 25.6 % — SIGNIFICANT CHANGE UP (ref 13–44)
MAGNESIUM SERPL-MCNC: 1.7 MG/DL — SIGNIFICANT CHANGE UP (ref 1.6–2.6)
MCHC RBC-ENTMCNC: 30.2 PG — SIGNIFICANT CHANGE UP (ref 27–34)
MCHC RBC-ENTMCNC: 34 GM/DL — SIGNIFICANT CHANGE UP (ref 32–36)
MCV RBC AUTO: 88.7 FL — SIGNIFICANT CHANGE UP (ref 80–100)
MONOCYTES # BLD AUTO: 0.29 K/UL — SIGNIFICANT CHANGE UP (ref 0–0.9)
MONOCYTES NFR BLD AUTO: 5.8 % — SIGNIFICANT CHANGE UP (ref 2–14)
NEUTROPHILS # BLD AUTO: 3.36 K/UL — SIGNIFICANT CHANGE UP (ref 1.8–7.4)
NEUTROPHILS NFR BLD AUTO: 67.2 % — SIGNIFICANT CHANGE UP (ref 43–77)
NRBC # BLD: 0 /100 WBCS — SIGNIFICANT CHANGE UP (ref 0–0)
NRBC # FLD: 0 K/UL — SIGNIFICANT CHANGE UP (ref 0–0)
PHOSPHATE SERPL-MCNC: 3.2 MG/DL — SIGNIFICANT CHANGE UP (ref 2.5–4.5)
PLATELET # BLD AUTO: 123 K/UL — LOW (ref 150–400)
POTASSIUM SERPL-MCNC: 3.7 MMOL/L — SIGNIFICANT CHANGE UP (ref 3.5–5.3)
POTASSIUM SERPL-SCNC: 3.7 MMOL/L — SIGNIFICANT CHANGE UP (ref 3.5–5.3)
PROT SERPL-MCNC: 8.3 G/DL — SIGNIFICANT CHANGE UP (ref 6–8.3)
RBC # BLD: 3.28 M/UL — LOW (ref 3.8–5.2)
RBC # FLD: 12.6 % — SIGNIFICANT CHANGE UP (ref 10.3–14.5)
SODIUM SERPL-SCNC: 141 MMOL/L — SIGNIFICANT CHANGE UP (ref 135–145)
WBC # BLD: 5 K/UL — SIGNIFICANT CHANGE UP (ref 3.8–10.5)
WBC # FLD AUTO: 5 K/UL — SIGNIFICANT CHANGE UP (ref 3.8–10.5)

## 2024-03-11 PROCEDURE — 72170 X-RAY EXAM OF PELVIS: CPT | Mod: 26

## 2024-03-11 PROCEDURE — 73502 X-RAY EXAM HIP UNI 2-3 VIEWS: CPT | Mod: 26,LT

## 2024-03-11 PROCEDURE — 99284 EMERGENCY DEPT VISIT MOD MDM: CPT

## 2024-03-11 RX ORDER — ACETAMINOPHEN 500 MG
1000 TABLET ORAL ONCE
Refills: 0 | Status: COMPLETED | OUTPATIENT
Start: 2024-03-11 | End: 2024-03-11

## 2024-03-11 RX ORDER — LIDOCAINE 4 G/100G
1 CREAM TOPICAL
Qty: 1 | Refills: 0
Start: 2024-03-11

## 2024-03-11 RX ORDER — LIDOCAINE 4 G/100G
1 CREAM TOPICAL ONCE
Refills: 0 | Status: COMPLETED | OUTPATIENT
Start: 2024-03-11 | End: 2024-03-11

## 2024-03-11 RX ORDER — KETOROLAC TROMETHAMINE 30 MG/ML
15 SYRINGE (ML) INJECTION ONCE
Refills: 0 | Status: DISCONTINUED | OUTPATIENT
Start: 2024-03-11 | End: 2024-03-11

## 2024-03-11 RX ADMIN — LIDOCAINE 1 PATCH: 4 CREAM TOPICAL at 10:56

## 2024-03-11 RX ADMIN — Medication 15 MILLIGRAM(S): at 10:56

## 2024-03-11 RX ADMIN — Medication 400 MILLIGRAM(S): at 10:56

## 2024-03-11 NOTE — ED ADULT NURSE NOTE - OBJECTIVE STATEMENT
Patient presented to ED with a chief complaint of left lower back pain radiating into her buttock 5/10 radiating to buttock, alleviated with movement exacerbated by rest. Patient states that the pain began last Saturday and has progressively getting better. Patient states that she has a hx of ovarian cancer and started a new treatment last friday with the possible side effects of kidney and liver damage and is concerned about her kidney functions. Denies  fever, chills, dysuria, abdominal pain, discolored urine, malodorous urine, numbness or tingling in lower extremities, trauma.    Patient is A/O x 4, NAD, skin intact, PERRLA, speech clear, neurologically intact with no deficits, strength b/l upper and lower extremities 5/5, sensation intact b/l upper and lower extremities, rate and rhythm regular S1 and S2 heard with no murmurs radial and pedal pulses present, capillary refill <3 , lungs clear b/l lobes throughout with no adventitious sounds or accessory muscle use, even and unlabored, abdomen soft and non-tender, bowel sounds heard x 4 quadrant, no edema noted. Safety maintained, bed in lowest position, call bell within reach, plan of care reviewed with patient , addressed all questions and concern, will continue to monitor patient.

## 2024-03-11 NOTE — ED PROVIDER NOTE - PHYSICAL EXAMINATION
Constitutional: Well-appearing, well-nourished, comfortable appearing.   Head: Normocephalic, atraumatic.   Eyes: PERRL. EOMI. No conjunctival pallor.   ENT: Moist mucous membranes. Uvula midline. No pharyngeal erythema or exudates.  Neck: No LAD. Supple.  CVS: Regular rate, regular rhythm. Normal S1, S2. No murmurs, rubs, or gallops. No peripheral edema noted.   Respiratory: No respiratory distress. Clear to auscultation bilaterally. No wheezing, rales, or rhonchi.   Abdomen: Abd is soft and non-distended. No tenderness. No rebound, guarding, or rigidity.   MSK: Left SI tenderness. Negative straight leg raise bilaterally. No midline thoracic or lumbar tenderness. Normal gait.   Neuro: Alert and oriented to person, place, and time. Follows commands. Moves all extremities.   Skin: Warm and dry. No rashes.   Psych: Normal affect, cooperative.

## 2024-03-11 NOTE — ED PROVIDER NOTE - NSFOLLOWUPINSTRUCTIONS_ED_ALL_ED_FT
Please follow up with your PMD within 1-2 weeks time.     Return to the ED if symptoms worsen.     Hip Pain    WHAT YOU NEED TO KNOW:    Hip pain can be caused by a number of conditions, such as bursitis, arthritis, or muscle or tendon strain. You may have swelling in the fluid-filled sacs that protect your muscles and tendons. Hip pain can also be caused by a lower back problem. Hip pain may be caused by trauma, playing sports, or running. Pain may start in your hip and go to your thigh, buttock, or groin.  Hip and Pelvis    DISCHARGE INSTRUCTIONS:    Medicines:    NSAIDs, such as ibuprofen, help decrease swelling, pain, and fever. This medicine is available with or without a doctor's order. NSAIDs can cause stomach bleeding or kidney problems in certain people. If you take blood thinner medicine, always ask your healthcare provider if NSAIDs are safe for you. Always read the medicine label and follow directions.    Take your medicine as directed. Contact your healthcare provider if you think your medicine is not helping or if you have side effects. Tell your provider if you are allergic to any medicine. Keep a list of the medicines, vitamins, and herbs you take. Include the amounts, and when and why you take them. Bring the list or the pill bottles to follow-up visits. Carry your medicine list with you in case of an emergency.  Return to the emergency department if:    Your pain gets worse.    You have numbness in your leg or toes.    You cannot put any weight on or move your hip.  Contact your healthcare provider if:    You have a fever.    Your pain does not decrease, even after treatment.    You have questions or concerns about your condition or care.  Follow up with your healthcare provider as directed: You may need physical therapy, an injection, or more testing. You may need to see an orthopedic specialist. Write down your questions so you remember to ask them during your visits.    Manage your hip pain:    Rest your injured hip so that it can heal. You may need to avoid putting any weight on your hip for at least 48 hours. Return to normal activities as directed.    Ice the injury for 20 minutes every 4 hours, or as directed. Use an ice pack, or put crushed ice in a plastic bag. Cover it with a towel to protect your skin. Ice helps prevent tissue damage and decreases swelling and pain.    Elevate your injured hip above the level of your heart as often as you can. This will help decrease swelling and pain. If possible, prop your hip and leg on pillows or blankets to keep the area elevated comfortably.    Maintain a healthy weight. Extra body weight can cause pressure and pain in your hip, knee, and ankle joints. Ask your healthcare provider how much you should weigh. Ask him or her to help you create a weight loss plan if you are overweight.    Use assistive devices as directed. You may need to use a cane or crutches. Assistive devices help decrease pain and pressure on your hip when you walk. Ask your healthcare provider for more information about assistive devices and how to use them correctly.

## 2024-03-11 NOTE — ED PROVIDER NOTE - PATIENT PORTAL LINK FT
You can access the FollowMyHealth Patient Portal offered by [facility] by registering at the following website: [email or web address]. By joining [facility]’s FollowMyHealth portal, you will also be able to view your health information using other applications (apps) compatible with our system.

## 2024-03-11 NOTE — ED PROVIDER NOTE - CLINICAL SUMMARY MEDICAL DECISION MAKING FREE TEXT BOX
58 yo F with PMhx of ovarian CA on chemo, IDDM, here with left hip pain x 2 days. No trauma or injury. Has not taken anything at home for the pain. Exam shows left SI joint tenderness without incontinence, saddle anesthesia, fever, chills, numbness, or weakness. Normal gait. Vitals show hypertension. Plan to treat pain and obtain XR imaging.

## 2024-03-11 NOTE — ED ADULT TRIAGE NOTE - CHIEF COMPLAINT QUOTE
patient c/o L hip pain x3 days, patient denies any injury to area. patient states she is on a new chemotherapy that is known to cause liver and kidney damage and is unsure if this is a side effect, last treatment was on 2/8. past medical history of diabetes mellitus type 2, ovarian cancer.

## 2024-03-11 NOTE — ED PROVIDER NOTE - PROGRESS NOTE DETAILS
Fellow MD Chad Ward:Patient reevaluate bedside in no acute distress at this time.  Patient noted to be ambulating without difficulty and stating that her pain has resolved.  I advised her to follow-up with her primary care physician within 1 to 2 weeks time.  Take lidocaine topically 3 times as needed for pain as well as naproxen twice daily as needed for pain.  Return to the ED if symptoms worsen.

## 2024-03-11 NOTE — ED ADULT NURSE REASSESSMENT NOTE - NS ED NURSE REASSESS COMMENT FT1
Break RN: Patient awake and resting in stretcher; respirations even and unlabored, no signs/symptoms of acute distress. Safety measures in place, will provide care as needed.

## 2024-03-11 NOTE — ED PROVIDER NOTE - OBJECTIVE STATEMENT
59-year-old female with past medical history of ovarian CA on chemotherapy, IDDM, presenting to the ED for evaluation of left hip pain over the past 2 days.  As per patient, she states that the pain is sharp in nature, nonradiating, exacerbated by sitting and improved by walking.  She denies any recent trauma or injury.  Patient states that she did have her first infusion of a new chemotherapeutic on Friday.  She denies any fever, chills, nausea, vomiting, diarrhea, abdominal pain, urinary incontinence, saddle anesthesia, weakness, numbness, and any additional complaints.

## 2024-03-11 NOTE — ED PROVIDER NOTE - ATTENDING CONTRIBUTION TO CARE
Gong: I have seen and examined the patient face to face, have reviewed and addended the HPI, PE and a/p as necessary.    58 yo F with ovarian Ca on chemo, IDDM, a/w L hip pain x 2 days.  Pt reports pain is sharp in nature, non-radiating worsens when sitting.  Reports pain is lateral in nature.  Noted to have no recent falls or truama, or injury.  Noted to recently started new chemo agent and supposed to get repeat blood work for kidney function and blood counts.  No fevers, no chills, no recent spinal procedures, no bowel/bladder incontinence, no IVDU,  no recent weight loss, no trauma, no weakness no numbness, no tingling, no dysuria, no hematuria.      GEN - NAD; well appearing; A+O x3; non-toxic appearing  CARD -s1s2, RRR, no M,G,R;   PULM - CTA b/l, symmetric breath sounds;   ABD -  +BS, ND, NT, soft, no guarding, no rebound, no masses;   BACK - no CVA tenderness, Normal  spine;   EXT - symmetric pulses, 2+ dp, capillary refill < 2 seconds, no cyanosis, no edema; TTP in posterior iliac spine, no spinal tenderness.   NEURO - no focal neuro deficits, no slurred speech      58 yo F with ovarian Ca on chemo, IDDM, a/w L hip pain x 2 days.  Pt reports pain is sharp in nature, non-radiating worsens when sitting.  No trauma, no falls, no numbness, no tingling no saddle anesthesia, no incontinence.  Given recent chemo and cancer history will obtain xray to r/o pathologic fracture, pain control and check cbc and cmp to eval for abnl after recent chemo infusion.  Likely d/c home if work up with out any acute findings.

## 2024-03-13 ENCOUNTER — APPOINTMENT (OUTPATIENT)
Dept: HEMATOLOGY ONCOLOGY | Facility: CLINIC | Age: 59
End: 2024-03-13
Payer: COMMERCIAL

## 2024-03-13 ENCOUNTER — RESULT REVIEW (OUTPATIENT)
Age: 59
End: 2024-03-13

## 2024-03-13 VITALS
OXYGEN SATURATION: 99 % | TEMPERATURE: 96.8 F | RESPIRATION RATE: 16 BRPM | WEIGHT: 193.98 LBS | DIASTOLIC BLOOD PRESSURE: 69 MMHG | HEART RATE: 80 BPM | BODY MASS INDEX: 36.67 KG/M2 | SYSTOLIC BLOOD PRESSURE: 109 MMHG

## 2024-03-13 LAB
BASOPHILS # BLD AUTO: 0 K/UL — SIGNIFICANT CHANGE UP (ref 0–0.2)
BASOPHILS NFR BLD AUTO: 0 % — SIGNIFICANT CHANGE UP (ref 0–2)
EOSINOPHIL # BLD AUTO: 0.04 K/UL — SIGNIFICANT CHANGE UP (ref 0–0.5)
EOSINOPHIL NFR BLD AUTO: 1.3 % — SIGNIFICANT CHANGE UP (ref 0–6)
HCT VFR BLD CALC: 26.5 % — LOW (ref 34.5–45)
HGB BLD-MCNC: 9.2 G/DL — LOW (ref 11.5–15.5)
IMM GRANULOCYTES NFR BLD AUTO: 0.3 % — SIGNIFICANT CHANGE UP (ref 0–0.9)
LYMPHOCYTES # BLD AUTO: 1.28 K/UL — SIGNIFICANT CHANGE UP (ref 1–3.3)
LYMPHOCYTES # BLD AUTO: 41.8 % — SIGNIFICANT CHANGE UP (ref 13–44)
MCHC RBC-ENTMCNC: 30.6 PG — SIGNIFICANT CHANGE UP (ref 27–34)
MCHC RBC-ENTMCNC: 34.7 G/DL — SIGNIFICANT CHANGE UP (ref 32–36)
MCV RBC AUTO: 88 FL — SIGNIFICANT CHANGE UP (ref 80–100)
MONOCYTES # BLD AUTO: 0.28 K/UL — SIGNIFICANT CHANGE UP (ref 0–0.9)
MONOCYTES NFR BLD AUTO: 9.2 % — SIGNIFICANT CHANGE UP (ref 2–14)
NEUTROPHILS # BLD AUTO: 1.45 K/UL — LOW (ref 1.8–7.4)
NEUTROPHILS NFR BLD AUTO: 47.4 % — SIGNIFICANT CHANGE UP (ref 43–77)
NRBC # BLD: 0 /100 WBCS — SIGNIFICANT CHANGE UP (ref 0–0)
PLATELET # BLD AUTO: 114 K/UL — LOW (ref 150–400)
RBC # BLD: 3.01 M/UL — LOW (ref 3.8–5.2)
RBC # FLD: 13.2 % — SIGNIFICANT CHANGE UP (ref 10.3–14.5)
WBC # BLD: 3.06 K/UL — LOW (ref 3.8–10.5)
WBC # FLD AUTO: 3.06 K/UL — LOW (ref 3.8–10.5)

## 2024-03-13 PROCEDURE — 99213 OFFICE O/P EST LOW 20 MIN: CPT

## 2024-03-13 NOTE — REVIEW OF SYSTEMS
[Diarrhea: Grade 0] : Diarrhea: Grade 0 [Negative] : Allergic/Immunologic [Fatigue] : fatigue [FreeTextEntry9] : left lower back pain

## 2024-03-13 NOTE — HISTORY OF PRESENT ILLNESS
[AJCC Stage: ____] : AJCC Stage: [unfilled] [Disease: _____________________] : Disease: [unfilled] [de-identified] : Ms. Cassidy is a 57 yo  postmenopausal female who presents for initial consultation for NACT  for presumed GYN cancer. She reports a >5 year lapse in GYN care. She presented to [facility] ED on 5/31/23 c/o RECIO a 4 weeks. Imaging revealed a right pleural effusion (s/p thoracentesis positive for malignant cells with IHC of likely Mullerian origin), carcinomatosis, adnexal mass and a markedly elevated CA-125. IR guided core biopsy of peritoneal mass is pending.  Patient is a Catholic and does not accept blood products.  LABS on 6/6/23: CA-125 was 1,506 (ref<38)  was <2 (ref<35) CEA was <1.0 (ref<3.8)  CYTOLOGY/PATHOLOGY: 1) Right pleural thoracentesis, 6/1/23, To  a) positive for malignant cells, PAX-8 +, ER +, CK7 + - favor Mullerian origin 2) Core biopsy of peritoneal mass, 6/8/23, To  OMENTUM, CT GUIDED CORE BIOPSY POSITIVE FOR MALIGNANT CELLS. 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 The neoplastic cells are positive for Moc31, [place]-8, CK7, P53, P16,  WT1 and ER. P40 and CK 20 are negative  IMAGING: TVUS on 6/4/23: uterus 11 x 5.0 x 7.6 cm. Leiomyomatous uterus with scattered calcified uterine myomas including a 3.5 cm right intramural myoma and 5.4 cm posterior intramural myoma. EML not visualized.  RTO - 3.2 x 2.2 x 1.8 cm.  LTO - there is a 7.0 x 5.8 x 5.5 cm calcified mass in the left adnexa likely representing calcified pedunculated myoma.  Small FF.    CT C/A/P on 6/2/23: LOWER CHEST: Loculated moderate right pleural effusion and small left pleural effusion are unchanged. LIVER: Soft tissue nodularity along the inferior right hepatic lobe. BILE DUCTS: Normal caliber. GALLBLADDER: Cholelithiasis and contracted. SPLEEN: Within normal limits. PANCREAS: Within normal limits. ADRENALS: Within normal limits. KIDNEYS/URETERS: Bilateral cortical scarring. BLADDER: Within normal limits. REPRODUCTIVE ORGANS: Likely 2 intramural calcified fibroids. Another partially calcified mass in the left lower abdomen may represent a subserosal fibroid or an adnexal mass. The adnexa are not well visualized. BOWEL: No bowel obstruction. Appendix is normal. Colonic diverticulosis. PERITONEUM: Small to moderate ascites. Large amount of soft tissue in the greater omentum VESSELS: Within normal limits. RETROPERITONEUM/LYMPH NODES: No lymphadenopathy. ABDOMINAL WALL: Within normal limits. BONES: Degenerative changes. IMPRESSION: Extensive peritoneal carcinomatosis and small to moderate ascites. Fibroid uterus with either a left subserosal fibroid or a possible adnexal mass. Loculated moderate right pleural effusion and small left pleural effusion, unchanged.  Patient saw Dr. Huizar and had right pleurex catheter placed.   PMHx: DM, HTN, HLD, asthma PSHx: N/A Fam Hx: mother (colon cancer)   HCM: Mammogram: unsure Colonoscopy: 5/2019  Patient got one cycle of chemotherapy on 6/17/23 and then went to ED with purulent pleurex. catheter. PluerX placed 1 month prior)) currently undergoing chemotherapy presented to the ED complaining of purulent discharge from her PleurX catheter. PleurX catheter removed by Pulm on 7/19. Nephrology consulted for MARIANA.   [de-identified] : Hinduism [FreeTextEntry1] : Carboplatin/Taxol/Zirabev [de-identified] : Patient is here for follow up, had more fatigue this cycle and decreased appetite.  She had sudden left lower back pain 2 days ago and went to ED because she was concerned about the Avastin effecting her kidneys. Labs and xrays were done which were normal, pain has subsided.  Denies SOB, chest pain, abdominal pain, bloating, constipation, diarrhea, bleeding, swelling.

## 2024-03-17 LAB
ALBUMIN SERPL ELPH-MCNC: 4.1 G/DL
ALP BLD-CCNC: 117 U/L
ALT SERPL-CCNC: 8 U/L
ANION GAP SERPL CALC-SCNC: 12 MMOL/L
AST SERPL-CCNC: 15 U/L
BILIRUB SERPL-MCNC: 0.3 MG/DL
BUN SERPL-MCNC: 15 MG/DL
CALCIUM SERPL-MCNC: 9.3 MG/DL
CANCER AG125 SERPL-ACNC: 237 U/ML
CHLORIDE SERPL-SCNC: 103 MMOL/L
CO2 SERPL-SCNC: 25 MMOL/L
CREAT SERPL-MCNC: 0.92 MG/DL
EGFR: 71 ML/MIN/1.73M2
GLUCOSE SERPL-MCNC: 182 MG/DL
MAGNESIUM SERPL-MCNC: 1.7 MG/DL
POTASSIUM SERPL-SCNC: 4.1 MMOL/L
PROT SERPL-MCNC: 7.3 G/DL
SODIUM SERPL-SCNC: 139 MMOL/L

## 2024-03-20 ENCOUNTER — TRANSCRIPTION ENCOUNTER (OUTPATIENT)
Age: 59
End: 2024-03-20

## 2024-03-22 ENCOUNTER — RESULT REVIEW (OUTPATIENT)
Age: 59
End: 2024-03-22

## 2024-03-22 ENCOUNTER — APPOINTMENT (OUTPATIENT)
Dept: INFUSION THERAPY | Facility: HOSPITAL | Age: 59
End: 2024-03-22

## 2024-03-22 ENCOUNTER — APPOINTMENT (OUTPATIENT)
Dept: HEMATOLOGY ONCOLOGY | Facility: CLINIC | Age: 59
End: 2024-03-22

## 2024-03-22 ENCOUNTER — NON-APPOINTMENT (OUTPATIENT)
Age: 59
End: 2024-03-22

## 2024-03-22 LAB
APPEARANCE UR: CLEAR — SIGNIFICANT CHANGE UP
BASOPHILS # BLD AUTO: 0 K/UL — SIGNIFICANT CHANGE UP (ref 0–0.2)
BASOPHILS NFR BLD AUTO: 0 % — SIGNIFICANT CHANGE UP (ref 0–2)
BILIRUB UR-MCNC: NEGATIVE — SIGNIFICANT CHANGE UP
COLOR SPEC: YELLOW — SIGNIFICANT CHANGE UP
DIFF PNL FLD: NEGATIVE — SIGNIFICANT CHANGE UP
EOSINOPHIL # BLD AUTO: 0.05 K/UL — SIGNIFICANT CHANGE UP (ref 0–0.5)
EOSINOPHIL NFR BLD AUTO: 1.5 % — SIGNIFICANT CHANGE UP (ref 0–6)
GLUCOSE UR QL: NEGATIVE MG/DL — SIGNIFICANT CHANGE UP
HCT VFR BLD CALC: 28.5 % — LOW (ref 34.5–45)
HGB BLD-MCNC: 10 G/DL — LOW (ref 11.5–15.5)
IMM GRANULOCYTES NFR BLD AUTO: 1.2 % — HIGH (ref 0–0.9)
KETONES UR-MCNC: NEGATIVE MG/DL — SIGNIFICANT CHANGE UP
LEUKOCYTE ESTERASE UR-ACNC: NEGATIVE — SIGNIFICANT CHANGE UP
LYMPHOCYTES # BLD AUTO: 1.03 K/UL — SIGNIFICANT CHANGE UP (ref 1–3.3)
LYMPHOCYTES # BLD AUTO: 30.3 % — SIGNIFICANT CHANGE UP (ref 13–44)
MCHC RBC-ENTMCNC: 30.6 PG — SIGNIFICANT CHANGE UP (ref 27–34)
MCHC RBC-ENTMCNC: 35.1 G/DL — SIGNIFICANT CHANGE UP (ref 32–36)
MCV RBC AUTO: 87.2 FL — SIGNIFICANT CHANGE UP (ref 80–100)
MONOCYTES # BLD AUTO: 0.3 K/UL — SIGNIFICANT CHANGE UP (ref 0–0.9)
MONOCYTES NFR BLD AUTO: 8.8 % — SIGNIFICANT CHANGE UP (ref 2–14)
NEUTROPHILS # BLD AUTO: 1.98 K/UL — SIGNIFICANT CHANGE UP (ref 1.8–7.4)
NEUTROPHILS NFR BLD AUTO: 58.2 % — SIGNIFICANT CHANGE UP (ref 43–77)
NITRITE UR-MCNC: NEGATIVE — SIGNIFICANT CHANGE UP
NRBC # BLD: 0 /100 WBCS — SIGNIFICANT CHANGE UP (ref 0–0)
PH UR: 5.5 — SIGNIFICANT CHANGE UP (ref 5–8)
PLATELET # BLD AUTO: 109 K/UL — LOW (ref 150–400)
PROT UR-MCNC: NEGATIVE MG/DL — SIGNIFICANT CHANGE UP
RBC # BLD: 3.27 M/UL — LOW (ref 3.8–5.2)
RBC # FLD: 13.6 % — SIGNIFICANT CHANGE UP (ref 10.3–14.5)
SP GR SPEC: 1.01 — SIGNIFICANT CHANGE UP (ref 1–1.03)
UROBILINOGEN FLD QL: 0.2 MG/DL — SIGNIFICANT CHANGE UP (ref 0.2–1)
WBC # BLD: 3.4 K/UL — LOW (ref 3.8–10.5)
WBC # FLD AUTO: 3.4 K/UL — LOW (ref 3.8–10.5)

## 2024-03-25 DIAGNOSIS — Z51.11 ENCOUNTER FOR ANTINEOPLASTIC CHEMOTHERAPY: ICD-10-CM

## 2024-03-25 DIAGNOSIS — R11.2 NAUSEA WITH VOMITING, UNSPECIFIED: ICD-10-CM

## 2024-03-27 NOTE — HISTORY OF PRESENT ILLNESS
[FreeTextEntry1] : Clinical stage IV ovarian cancer GYN - Dr. Deborah Cotter PCP - Dr. Daniela Gloria GI - Dr. Jin Lion - Dr. Goran Woo Onc - Dr. Whit Cuellar  Patient is a Episcopalian and does not accept blood products  Ms. Cassidy presents for follow up upon completion of NACT to discuss IDS.   Current Treatment Status: Carboplatin/Taxol s/p 6 cycles.  CA-125 was 242 on 1/22/24, previously 1,506 on 6/2023  She is a 59 yo  postmenopausal female who presents for initial consultation at the request for the management of presumed GYN cancer. She reports a >5 year lapse in GYN care. She presented to [facility] ED on 5/31/23 c/o RECIO a 4 weeks. Imaging revealed a right pleural effusion (s/p thoracentesis positive for malignant cells with IHC of likely Mullerian origin), carcinomatosis, adnexal mass and a markedly elevated CA-125.    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 The previously noted left lung base opacity has essentially resolved. The previously noted groundglass opacity in the right upper lobe is now poorly visualized. PLEURA: Small right pleural effusion with pleural thickening is decreased in size. A 1.7 x 1.0 cm right-sided pleural-based nodule on image 47 series 2 previously 2.9 x 1.5 cm. VESSELS: Within normal limits. HEART: Heart size is normal.  No pericardial effusion. MEDIASTINUM AND MELVINA: A few small nodes are decreased in size. A 1.5 x 0.6 cm subcarinal node previously 2 x 1.2 cm. A 1 x 0.8 cm right perihilar node previously 1.3 x 1.1 cm. CHEST WALL AND LOWER NECK: Right chest wall chemotherapy port. LIVER: Within normal limits. BILE DUCTS: Normal caliber. GALLBLADDER: Within normal limits. SPLEEN: Within normal limits. PANCREAS: Within normal limits. ADRENALS: Within normal limits. KIDNEYS/URETERS: Subcentimeter lesions too small to characterize. BLADDER: Within normal limits. REPRODUCTIVE ORGANS: Fibroid uterus. BOWEL: No bowel obstruction. PERITONEUM: No ascites.  Peritoneal carcinomatosis is mildly improved. A reference 1.2 x 1.1 cm right anterior implant on series 2 image 88 previously measured 1.6 x 1.6 cm. Trace soft tissue or fluid in the cul-de-sac unchanged in appearance. VESSELS:  Within normal limits. RETROPERITONEUM/LYMPH NODES: A 1.4 x 0.9 cm right external iliac node previously 1.4 x 0.9 cm. ABDOMINAL WALL: Within normal limits. BONES: Within normal limits. IMPRESSION: Peritoneal carcinomatosis is mildly improved. Right-sided pleural nodule decreased in size.   PMHx: DM, HTN, HLD, asthma PSHx: N/A Fam Hx: mother (colon cancer)   HCM: Mammogram: unsure Colonoscopy: 5/2019 COVID-19 vaccine series completed

## 2024-03-27 NOTE — DISCUSSION/SUMMARY
[Reviewed Clinical Lab Test(s)] : Results of clinical tests were reviewed. [Reviewed Radiology Report(s)] : Radiology reports were reviewed. [Reviewed Radiology Film/Image(s)] : Images from radiology studies were reviewed and examined. [FreeTextEntry1] : I sat down with Farida and reviewed her care to date.  While she appears to have experienced a clinical benefit to chemotherapy, recent imaging shows persistent measurable disease in the chest, retroperitoneum and considerable peritoneal carcinomatosis.   has fallen from 1451 - 237.  Given the presence of measurable disease in the chest as well as a persistent pleural effusion I would recommend against proceeding with surgery.  I have recommended continuation of chemotherapy and potential addition of bevacizumab.  We discussed risks and benefits of surgery and systemic therapy.  All questions answered and support provided.  Discussed with medical oncology team in detail.

## 2024-03-27 NOTE — PHYSICAL EXAM
[Chaperone Present] : A chaperone was present in the examining room during all aspects of the physical examination [Absent] : CVAT: absent [Normal] : Anus and perineum: Normal sphincter tone, no masses, no prolapse. [Abnormal] : Recto-Vaginal Exam: Abnormal [Fully active, able to carry on all pre-disease performance without restriction] : Status 0 - Fully active, able to carry on all pre-disease performance without restriction [FreeTextEntry1] : Tg [de-identified] : slightly distended [de-identified] : enlarged [de-identified] : pelvic mass, nodules in CDS

## 2024-04-03 ENCOUNTER — RESULT REVIEW (OUTPATIENT)
Age: 59
End: 2024-04-03

## 2024-04-03 ENCOUNTER — APPOINTMENT (OUTPATIENT)
Dept: HEMATOLOGY ONCOLOGY | Facility: CLINIC | Age: 59
End: 2024-04-03
Payer: COMMERCIAL

## 2024-04-03 VITALS
WEIGHT: 191.8 LBS | HEART RATE: 75 BPM | RESPIRATION RATE: 17 BRPM | OXYGEN SATURATION: 97 % | SYSTOLIC BLOOD PRESSURE: 112 MMHG | BODY MASS INDEX: 36.26 KG/M2 | TEMPERATURE: 97 F | DIASTOLIC BLOOD PRESSURE: 73 MMHG

## 2024-04-03 LAB
BASOPHILS # BLD AUTO: 0 K/UL — SIGNIFICANT CHANGE UP (ref 0–0.2)
BASOPHILS NFR BLD AUTO: 0 % — SIGNIFICANT CHANGE UP (ref 0–2)
EOSINOPHIL # BLD AUTO: 0.04 K/UL — SIGNIFICANT CHANGE UP (ref 0–0.5)
EOSINOPHIL NFR BLD AUTO: 1.4 % — SIGNIFICANT CHANGE UP (ref 0–6)
HCT VFR BLD CALC: 25.5 % — LOW (ref 34.5–45)
HGB BLD-MCNC: 8.8 G/DL — LOW (ref 11.5–15.5)
IMM GRANULOCYTES NFR BLD AUTO: 0 % — SIGNIFICANT CHANGE UP (ref 0–0.9)
LYMPHOCYTES # BLD AUTO: 1.34 K/UL — SIGNIFICANT CHANGE UP (ref 1–3.3)
LYMPHOCYTES # BLD AUTO: 47 % — HIGH (ref 13–44)
MCHC RBC-ENTMCNC: 30 PG — SIGNIFICANT CHANGE UP (ref 27–34)
MCHC RBC-ENTMCNC: 34.5 G/DL — SIGNIFICANT CHANGE UP (ref 32–36)
MCV RBC AUTO: 87 FL — SIGNIFICANT CHANGE UP (ref 80–100)
MONOCYTES # BLD AUTO: 0.25 K/UL — SIGNIFICANT CHANGE UP (ref 0–0.9)
MONOCYTES NFR BLD AUTO: 8.8 % — SIGNIFICANT CHANGE UP (ref 2–14)
NEUTROPHILS # BLD AUTO: 1.22 K/UL — LOW (ref 1.8–7.4)
NEUTROPHILS NFR BLD AUTO: 42.8 % — LOW (ref 43–77)
NRBC # BLD: 0 /100 WBCS — SIGNIFICANT CHANGE UP (ref 0–0)
PLATELET # BLD AUTO: 87 K/UL — LOW (ref 150–400)
RBC # BLD: 2.93 M/UL — LOW (ref 3.8–5.2)
RBC # FLD: 13.4 % — SIGNIFICANT CHANGE UP (ref 10.3–14.5)
WBC # BLD: 2.85 K/UL — LOW (ref 3.8–10.5)
WBC # FLD AUTO: 2.85 K/UL — LOW (ref 3.8–10.5)

## 2024-04-03 PROCEDURE — 99214 OFFICE O/P EST MOD 30 MIN: CPT

## 2024-04-04 LAB
ALBUMIN SERPL ELPH-MCNC: 4.4 G/DL
ALP BLD-CCNC: 121 U/L
ALT SERPL-CCNC: 10 U/L
ANION GAP SERPL CALC-SCNC: 12 MMOL/L
AST SERPL-CCNC: 18 U/L
BILIRUB SERPL-MCNC: 0.3 MG/DL
BUN SERPL-MCNC: 14 MG/DL
CALCIUM SERPL-MCNC: 9.1 MG/DL
CANCER AG125 SERPL-ACNC: 147 U/ML
CHLORIDE SERPL-SCNC: 103 MMOL/L
CO2 SERPL-SCNC: 25 MMOL/L
CREAT SERPL-MCNC: 0.97 MG/DL
EGFR: 67 ML/MIN/1.73M2
GLUCOSE SERPL-MCNC: 174 MG/DL
MAGNESIUM SERPL-MCNC: 1.6 MG/DL
POTASSIUM SERPL-SCNC: 4 MMOL/L
PROT SERPL-MCNC: 7.5 G/DL
SODIUM SERPL-SCNC: 140 MMOL/L

## 2024-04-04 NOTE — HISTORY OF PRESENT ILLNESS
[Disease: _____________________] : Disease: [unfilled] [AJCC Stage: ____] : AJCC Stage: [unfilled] [de-identified] : Ms. Cassidy is a 57 yo  postmenopausal female who presents for initial consultation for NACT  for presumed GYN cancer. She reports a >5 year lapse in GYN care. She presented to [facility] ED on 5/31/23 c/o RECIO a 4 weeks. Imaging revealed a right pleural effusion (s/p thoracentesis positive for malignant cells with IHC of likely Mullerian origin), carcinomatosis, adnexal mass and a markedly elevated CA-125. IR guided core biopsy of peritoneal mass is pending.  Patient is a Religious and does not accept blood products.  LABS on 6/6/23: CA-125 was 1,506 (ref<38)  was <2 (ref<35) CEA was <1.0 (ref<3.8)  CYTOLOGY/PATHOLOGY: 1) Right pleural thoracentesis, 6/1/23, To  a) positive for malignant cells, PAX-8 +, ER +, CK7 + - favor Mullerian origin 2) Core biopsy of peritoneal mass, 6/8/23, To  OMENTUM, CT GUIDED CORE BIOPSY POSITIVE FOR MALIGNANT CELLS. 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 The neoplastic cells are positive for Moc31, [place]-8, CK7, P53, P16,  WT1 and ER. P40 and CK 20 are negative  IMAGING: TVUS on 6/4/23: uterus 11 x 5.0 x 7.6 cm. Leiomyomatous uterus with scattered calcified uterine myomas including a 3.5 cm right intramural myoma and 5.4 cm posterior intramural myoma. EML not visualized.  RTO - 3.2 x 2.2 x 1.8 cm.  LTO - there is a 7.0 x 5.8 x 5.5 cm calcified mass in the left adnexa likely representing calcified pedunculated myoma.  Small FF.    CT C/A/P on 6/2/23: LOWER CHEST: Loculated moderate right pleural effusion and small left pleural effusion are unchanged. LIVER: Soft tissue nodularity along the inferior right hepatic lobe. BILE DUCTS: Normal caliber. GALLBLADDER: Cholelithiasis and contracted. SPLEEN: Within normal limits. PANCREAS: Within normal limits. ADRENALS: Within normal limits. KIDNEYS/URETERS: Bilateral cortical scarring. BLADDER: Within normal limits. REPRODUCTIVE ORGANS: Likely 2 intramural calcified fibroids. Another partially calcified mass in the left lower abdomen may represent a subserosal fibroid or an adnexal mass. The adnexa are not well visualized. BOWEL: No bowel obstruction. Appendix is normal. Colonic diverticulosis. PERITONEUM: Small to moderate ascites. Large amount of soft tissue in the greater omentum VESSELS: Within normal limits. RETROPERITONEUM/LYMPH NODES: No lymphadenopathy. ABDOMINAL WALL: Within normal limits. BONES: Degenerative changes. IMPRESSION: Extensive peritoneal carcinomatosis and small to moderate ascites. Fibroid uterus with either a left subserosal fibroid or a possible adnexal mass. Loculated moderate right pleural effusion and small left pleural effusion, unchanged.  Patient saw Dr. Huizar and had right pleurex catheter placed.   PMHx: DM, HTN, HLD, asthma PSHx: N/A Fam Hx: mother (colon cancer)   HCM: Mammogram: unsure Colonoscopy: 5/2019  Patient got one cycle of chemotherapy on 6/17/23 and then went to ED with purulent pleurex. catheter. PluerX placed 1 month prior)) currently undergoing chemotherapy presented to the ED complaining of purulent discharge from her PleurX catheter. PleurX catheter removed by Pulm on 7/19. Nephrology consulted for MARIANA.   [de-identified] : Anabaptist [de-identified] : She has left sided jaw pain and swelling for a week. No bowel or bladder issues.

## 2024-04-12 ENCOUNTER — RESULT REVIEW (OUTPATIENT)
Age: 59
End: 2024-04-12

## 2024-04-12 ENCOUNTER — APPOINTMENT (OUTPATIENT)
Dept: INFUSION THERAPY | Facility: HOSPITAL | Age: 59
End: 2024-04-12

## 2024-04-12 ENCOUNTER — APPOINTMENT (OUTPATIENT)
Dept: HEMATOLOGY ONCOLOGY | Facility: CLINIC | Age: 59
End: 2024-04-12

## 2024-04-12 LAB
APPEARANCE UR: CLEAR — SIGNIFICANT CHANGE UP
BASOPHILS # BLD AUTO: 0.01 K/UL — SIGNIFICANT CHANGE UP (ref 0–0.2)
BASOPHILS NFR BLD AUTO: 0.2 % — SIGNIFICANT CHANGE UP (ref 0–2)
BILIRUB UR-MCNC: NEGATIVE — SIGNIFICANT CHANGE UP
COLOR SPEC: YELLOW — SIGNIFICANT CHANGE UP
DIFF PNL FLD: NEGATIVE — SIGNIFICANT CHANGE UP
EOSINOPHIL # BLD AUTO: 0.06 K/UL — SIGNIFICANT CHANGE UP (ref 0–0.5)
EOSINOPHIL NFR BLD AUTO: 1.5 % — SIGNIFICANT CHANGE UP (ref 0–6)
GLUCOSE UR QL: 500 MG/DL
HCT VFR BLD CALC: 28.2 % — LOW (ref 34.5–45)
HGB BLD-MCNC: 10 G/DL — LOW (ref 11.5–15.5)
IMM GRANULOCYTES NFR BLD AUTO: 1.2 % — HIGH (ref 0–0.9)
KETONES UR-MCNC: NEGATIVE MG/DL — SIGNIFICANT CHANGE UP
LEUKOCYTE ESTERASE UR-ACNC: NEGATIVE — SIGNIFICANT CHANGE UP
LYMPHOCYTES # BLD AUTO: 1.11 K/UL — SIGNIFICANT CHANGE UP (ref 1–3.3)
LYMPHOCYTES # BLD AUTO: 27.4 % — SIGNIFICANT CHANGE UP (ref 13–44)
MCHC RBC-ENTMCNC: 30.8 PG — SIGNIFICANT CHANGE UP (ref 27–34)
MCHC RBC-ENTMCNC: 35.5 G/DL — SIGNIFICANT CHANGE UP (ref 32–36)
MCV RBC AUTO: 86.8 FL — SIGNIFICANT CHANGE UP (ref 80–100)
MONOCYTES # BLD AUTO: 0.3 K/UL — SIGNIFICANT CHANGE UP (ref 0–0.9)
MONOCYTES NFR BLD AUTO: 7.4 % — SIGNIFICANT CHANGE UP (ref 2–14)
NEUTROPHILS # BLD AUTO: 2.52 K/UL — SIGNIFICANT CHANGE UP (ref 1.8–7.4)
NEUTROPHILS NFR BLD AUTO: 62.3 % — SIGNIFICANT CHANGE UP (ref 43–77)
NITRITE UR-MCNC: NEGATIVE — SIGNIFICANT CHANGE UP
NRBC # BLD: 0 /100 WBCS — SIGNIFICANT CHANGE UP (ref 0–0)
PH UR: 5.5 — SIGNIFICANT CHANGE UP (ref 5–8)
PLATELET # BLD AUTO: 111 K/UL — LOW (ref 150–400)
PROT UR-MCNC: NEGATIVE MG/DL — SIGNIFICANT CHANGE UP
RBC # BLD: 3.25 M/UL — LOW (ref 3.8–5.2)
RBC # FLD: 14.4 % — SIGNIFICANT CHANGE UP (ref 10.3–14.5)
SP GR SPEC: 1.02 — SIGNIFICANT CHANGE UP (ref 1–1.03)
UROBILINOGEN FLD QL: 0.2 MG/DL — SIGNIFICANT CHANGE UP (ref 0.2–1)
WBC # BLD: 4.05 K/UL — SIGNIFICANT CHANGE UP (ref 3.8–10.5)
WBC # FLD AUTO: 4.05 K/UL — SIGNIFICANT CHANGE UP (ref 3.8–10.5)

## 2024-04-24 ENCOUNTER — RESULT REVIEW (OUTPATIENT)
Age: 59
End: 2024-04-24

## 2024-04-24 ENCOUNTER — APPOINTMENT (OUTPATIENT)
Dept: HEMATOLOGY ONCOLOGY | Facility: CLINIC | Age: 59
End: 2024-04-24
Payer: COMMERCIAL

## 2024-04-24 VITALS
SYSTOLIC BLOOD PRESSURE: 142 MMHG | OXYGEN SATURATION: 97 % | RESPIRATION RATE: 16 BRPM | BODY MASS INDEX: 35.26 KG/M2 | DIASTOLIC BLOOD PRESSURE: 77 MMHG | TEMPERATURE: 97.8 F | WEIGHT: 186.51 LBS | HEART RATE: 100 BPM

## 2024-04-24 DIAGNOSIS — G89.29 LOW BACK PAIN, UNSPECIFIED: ICD-10-CM

## 2024-04-24 DIAGNOSIS — M54.50 LOW BACK PAIN, UNSPECIFIED: ICD-10-CM

## 2024-04-24 LAB
BASOPHILS # BLD AUTO: 0 K/UL — SIGNIFICANT CHANGE UP (ref 0–0.2)
BASOPHILS NFR BLD AUTO: 0 % — SIGNIFICANT CHANGE UP (ref 0–2)
DACRYOCYTES BLD QL SMEAR: SLIGHT — SIGNIFICANT CHANGE UP
EOSINOPHIL # BLD AUTO: 0.01 K/UL — SIGNIFICANT CHANGE UP (ref 0–0.5)
EOSINOPHIL NFR BLD AUTO: 0.3 % — SIGNIFICANT CHANGE UP (ref 0–6)
HCT VFR BLD CALC: 26.6 % — LOW (ref 34.5–45)
HGB BLD-MCNC: 8.9 G/DL — LOW (ref 11.5–15.5)
HYPOCHROMIA BLD QL: SLIGHT — SIGNIFICANT CHANGE UP
IMM GRANULOCYTES NFR BLD AUTO: 0.3 % — SIGNIFICANT CHANGE UP (ref 0–0.9)
LYMPHOCYTES # BLD AUTO: 0.95 K/UL — LOW (ref 1–3.3)
LYMPHOCYTES # BLD AUTO: 31.9 % — SIGNIFICANT CHANGE UP (ref 13–44)
MCHC RBC-ENTMCNC: 29.7 PG — SIGNIFICANT CHANGE UP (ref 27–34)
MCHC RBC-ENTMCNC: 33.5 G/DL — SIGNIFICANT CHANGE UP (ref 32–36)
MCV RBC AUTO: 88.7 FL — SIGNIFICANT CHANGE UP (ref 80–100)
MONOCYTES # BLD AUTO: 0.28 K/UL — SIGNIFICANT CHANGE UP (ref 0–0.9)
MONOCYTES NFR BLD AUTO: 9.4 % — SIGNIFICANT CHANGE UP (ref 2–14)
NEUTROPHILS # BLD AUTO: 1.73 K/UL — LOW (ref 1.8–7.4)
NEUTROPHILS NFR BLD AUTO: 58.1 % — SIGNIFICANT CHANGE UP (ref 43–77)
NRBC # BLD: 0 /100 WBCS — SIGNIFICANT CHANGE UP (ref 0–0)
PLAT MORPH BLD: NORMAL — SIGNIFICANT CHANGE UP
PLATELET # BLD AUTO: 99 K/UL — LOW (ref 150–400)
POIKILOCYTOSIS BLD QL AUTO: SLIGHT — SIGNIFICANT CHANGE UP
RBC # BLD: 3 M/UL — LOW (ref 3.8–5.2)
RBC # FLD: 14.6 % — HIGH (ref 10.3–14.5)
RBC BLD AUTO: ABNORMAL
SCHISTOCYTES BLD QL AUTO: SLIGHT — SIGNIFICANT CHANGE UP
WBC # BLD: 2.98 K/UL — LOW (ref 3.8–10.5)
WBC # FLD AUTO: 2.98 K/UL — LOW (ref 3.8–10.5)

## 2024-04-24 PROCEDURE — 99213 OFFICE O/P EST LOW 20 MIN: CPT

## 2024-04-24 RX ORDER — LIDOCAINE 5 G/100G
5 OINTMENT TOPICAL
Qty: 1 | Refills: 2 | Status: ACTIVE | COMMUNITY
Start: 2024-04-24 | End: 1900-01-01

## 2024-04-24 NOTE — REVIEW OF SYSTEMS
[Diarrhea: Grade 0] : Diarrhea: Grade 0 [Negative] : Allergic/Immunologic [Fatigue] : fatigue [FreeTextEntry2] : decreased appetite, weight loss [FreeTextEntry7] : nausea

## 2024-04-24 NOTE — HISTORY OF PRESENT ILLNESS
[Disease: _____________________] : Disease: [unfilled] [AJCC Stage: ____] : AJCC Stage: [unfilled] [de-identified] : Ms. Cassidy is a 59 yo  postmenopausal female who presents for initial consultation for NACT  for presumed GYN cancer. She reports a >5 year lapse in GYN care. She presented to [facility] ED on 5/31/23 c/o RECIO a 4 weeks. Imaging revealed a right pleural effusion (s/p thoracentesis positive for malignant cells with IHC of likely Mullerian origin), carcinomatosis, adnexal mass and a markedly elevated CA-125. IR guided core biopsy of peritoneal mass is pending.  Patient is a Hindu and does not accept blood products.  LABS on 6/6/23: CA-125 was 1,506 (ref<38)  was <2 (ref<35) CEA was <1.0 (ref<3.8)  CYTOLOGY/PATHOLOGY: 1) Right pleural thoracentesis, 6/1/23, To  a) positive for malignant cells, PAX-8 +, ER +, CK7 + - favor Mullerian origin 2) Core biopsy of peritoneal mass, 6/8/23, To  OMENTUM, CT GUIDED CORE BIOPSY POSITIVE FOR MALIGNANT CELLS. 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 The neoplastic cells are positive for Moc31, [place]-8, CK7, P53, P16,  WT1 and ER. P40 and CK 20 are negative  IMAGING: TVUS on 6/4/23: uterus 11 x 5.0 x 7.6 cm. Leiomyomatous uterus with scattered calcified uterine myomas including a 3.5 cm right intramural myoma and 5.4 cm posterior intramural myoma. EML not visualized.  RTO - 3.2 x 2.2 x 1.8 cm.  LTO - there is a 7.0 x 5.8 x 5.5 cm calcified mass in the left adnexa likely representing calcified pedunculated myoma.  Small FF.    CT C/A/P on 6/2/23: LOWER CHEST: Loculated moderate right pleural effusion and small left pleural effusion are unchanged. LIVER: Soft tissue nodularity along the inferior right hepatic lobe. BILE DUCTS: Normal caliber. GALLBLADDER: Cholelithiasis and contracted. SPLEEN: Within normal limits. PANCREAS: Within normal limits. ADRENALS: Within normal limits. KIDNEYS/URETERS: Bilateral cortical scarring. BLADDER: Within normal limits. REPRODUCTIVE ORGANS: Likely 2 intramural calcified fibroids. Another partially calcified mass in the left lower abdomen may represent a subserosal fibroid or an adnexal mass. The adnexa are not well visualized. BOWEL: No bowel obstruction. Appendix is normal. Colonic diverticulosis. PERITONEUM: Small to moderate ascites. Large amount of soft tissue in the greater omentum VESSELS: Within normal limits. RETROPERITONEUM/LYMPH NODES: No lymphadenopathy. ABDOMINAL WALL: Within normal limits. BONES: Degenerative changes. IMPRESSION: Extensive peritoneal carcinomatosis and small to moderate ascites. Fibroid uterus with either a left subserosal fibroid or a possible adnexal mass. Loculated moderate right pleural effusion and small left pleural effusion, unchanged.  Patient saw Dr. Huizar and had right pleurex catheter placed.   PMHx: DM, HTN, HLD, asthma PSHx: N/A Fam Hx: mother (colon cancer)   HCM: Mammogram: unsure Colonoscopy: 5/2019  Patient got one cycle of chemotherapy on 6/17/23 and then went to ED with purulent pleurex. catheter. PluerX placed 1 month prior)) currently undergoing chemotherapy presented to the ED complaining of purulent discharge from her PleurX catheter. PleurX catheter removed by Pulm on 7/19. Nephrology consulted for MARIANA.   [de-identified] : Christianity [FreeTextEntry1] : Carboplatin/Taxol/Zirabev (Zirabev added 7th cycle) s/p 9 cycles [de-identified] : Patient is here for follow up after 9 cycles of treatment. She had nausea after treatment for about a week with decreased appetite. She has had weight loss over the past 2 months. She still has some lower back pain, using topical lidocaine as needed. She denies chest pain, SOB, abdominal pain.

## 2024-04-25 ENCOUNTER — RESULT REVIEW (OUTPATIENT)
Age: 59
End: 2024-04-25

## 2024-04-25 LAB
ALBUMIN SERPL ELPH-MCNC: 4.2 G/DL
ALP BLD-CCNC: 123 U/L
ALT SERPL-CCNC: 10 U/L
ANION GAP SERPL CALC-SCNC: 12 MMOL/L
AST SERPL-CCNC: 17 U/L
BILIRUB SERPL-MCNC: 0.4 MG/DL
BUN SERPL-MCNC: 12 MG/DL
CALCIUM SERPL-MCNC: 9.2 MG/DL
CANCER AG125 SERPL-ACNC: 110 U/ML
CHLORIDE SERPL-SCNC: 102 MMOL/L
CO2 SERPL-SCNC: 25 MMOL/L
CREAT SERPL-MCNC: 0.94 MG/DL
EGFR: 69 ML/MIN/1.73M2
GLUCOSE SERPL-MCNC: 219 MG/DL
MAGNESIUM SERPL-MCNC: 1.4 MG/DL
POTASSIUM SERPL-SCNC: 4.1 MMOL/L
PROT SERPL-MCNC: 7.2 G/DL
SODIUM SERPL-SCNC: 140 MMOL/L

## 2024-04-30 NOTE — ASU PATIENT PROFILE, ADULT - NSICDXPASTMEDICALHX_GEN_ALL_CORE_FT
PAST MEDICAL HISTORY:  Abnormal uterine and vaginal bleeding, unspecified     Asthma     Hair loss     History of hay fever     HTN (hypertension)     Hyperlipidemia (diet control)    Knee pain, left     Magnesium deficiency     Malignant pleural effusion     Morbid obesity with body mass index (BMI) of 40.0 to 44.9 in adult     Obesity     Osteopenia     Type 2 diabetes mellitus      METS>4 without CP/palpitations/sob. Limited by neuropathy, ambulates with walker  Denies orthopnea  last seizure >3 years ago  TIA >10 years ago  severe 3 vessel CAD, but not candidate for surgery. being medically managed

## 2024-05-02 ENCOUNTER — APPOINTMENT (OUTPATIENT)
Dept: CT IMAGING | Facility: CLINIC | Age: 59
End: 2024-05-02
Payer: COMMERCIAL

## 2024-05-02 ENCOUNTER — OUTPATIENT (OUTPATIENT)
Dept: OUTPATIENT SERVICES | Facility: HOSPITAL | Age: 59
LOS: 1 days | End: 2024-05-02
Payer: COMMERCIAL

## 2024-05-02 DIAGNOSIS — C56.9 MALIGNANT NEOPLASM OF UNSPECIFIED OVARY: ICD-10-CM

## 2024-05-02 DIAGNOSIS — Z98.890 OTHER SPECIFIED POSTPROCEDURAL STATES: Chronic | ICD-10-CM

## 2024-05-02 DIAGNOSIS — Z98.89 OTHER SPECIFIED POSTPROCEDURAL STATES: Chronic | ICD-10-CM

## 2024-05-02 PROCEDURE — 71260 CT THORAX DX C+: CPT | Mod: 26

## 2024-05-02 PROCEDURE — 74177 CT ABD & PELVIS W/CONTRAST: CPT | Mod: 26

## 2024-05-02 PROCEDURE — 74177 CT ABD & PELVIS W/CONTRAST: CPT

## 2024-05-02 PROCEDURE — 71260 CT THORAX DX C+: CPT

## 2024-05-08 ENCOUNTER — OUTPATIENT (OUTPATIENT)
Dept: OUTPATIENT SERVICES | Facility: HOSPITAL | Age: 59
LOS: 1 days | Discharge: HOME | End: 2024-05-08

## 2024-05-08 DIAGNOSIS — Z98.89 OTHER SPECIFIED POSTPROCEDURAL STATES: Chronic | ICD-10-CM

## 2024-05-08 DIAGNOSIS — Z98.890 OTHER SPECIFIED POSTPROCEDURAL STATES: Chronic | ICD-10-CM

## 2024-05-08 DIAGNOSIS — C57.4 MALIGNANT NEOPLASM OF UTERINE ADNEXA, UNSPECIFIED: ICD-10-CM

## 2024-05-12 ENCOUNTER — NON-APPOINTMENT (OUTPATIENT)
Age: 59
End: 2024-05-12

## 2024-05-13 ENCOUNTER — APPOINTMENT (OUTPATIENT)
Dept: ENDOCRINOLOGY | Facility: CLINIC | Age: 59
End: 2024-05-13

## 2024-05-13 ENCOUNTER — APPOINTMENT (OUTPATIENT)
Dept: HEMATOLOGY ONCOLOGY | Facility: CLINIC | Age: 59
End: 2024-05-13
Payer: COMMERCIAL

## 2024-05-13 VITALS
TEMPERATURE: 97.3 F | RESPIRATION RATE: 16 BRPM | OXYGEN SATURATION: 99 % | DIASTOLIC BLOOD PRESSURE: 79 MMHG | HEART RATE: 73 BPM | WEIGHT: 188.49 LBS | SYSTOLIC BLOOD PRESSURE: 146 MMHG | BODY MASS INDEX: 35.63 KG/M2

## 2024-05-13 PROCEDURE — 99214 OFFICE O/P EST MOD 30 MIN: CPT

## 2024-05-13 NOTE — HISTORY OF PRESENT ILLNESS
[Disease: _____________________] : Disease: [unfilled] [AJCC Stage: ____] : AJCC Stage: [unfilled] [de-identified] : Ms. Cassidy is a 57 yo  postmenopausal female who presents for initial consultation for NACT  for presumed GYN cancer. She reports a >5 year lapse in GYN care. She presented to [facility] ED on 5/31/23 c/o RECIO a 4 weeks. Imaging revealed a right pleural effusion (s/p thoracentesis positive for malignant cells with IHC of likely Mullerian origin), carcinomatosis, adnexal mass and a markedly elevated CA-125. IR guided core biopsy of peritoneal mass is pending.  Patient is a Orthodoxy and does not accept blood products.  LABS on 6/6/23: CA-125 was 1,506 (ref<38)  was <2 (ref<35) CEA was <1.0 (ref<3.8)  CYTOLOGY/PATHOLOGY: 1) Right pleural thoracentesis, 6/1/23, To  a) positive for malignant cells, PAX-8 +, ER +, CK7 + - favor Mullerian origin 2) Core biopsy of peritoneal mass, 6/8/23, To  OMENTUM, CT GUIDED CORE BIOPSY POSITIVE FOR MALIGNANT CELLS. 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 The neoplastic cells are positive for Moc31, [place]-8, CK7, P53, P16,  WT1 and ER. P40 and CK 20 are negative  IMAGING: TVUS on 6/4/23: uterus 11 x 5.0 x 7.6 cm. Leiomyomatous uterus with scattered calcified uterine myomas including a 3.5 cm right intramural myoma and 5.4 cm posterior intramural myoma. EML not visualized.  RTO - 3.2 x 2.2 x 1.8 cm.  LTO - there is a 7.0 x 5.8 x 5.5 cm calcified mass in the left adnexa likely representing calcified pedunculated myoma.  Small FF.    CT C/A/P on 6/2/23: LOWER CHEST: Loculated moderate right pleural effusion and small left pleural effusion are unchanged. LIVER: Soft tissue nodularity along the inferior right hepatic lobe. BILE DUCTS: Normal caliber. GALLBLADDER: Cholelithiasis and contracted. SPLEEN: Within normal limits. PANCREAS: Within normal limits. ADRENALS: Within normal limits. KIDNEYS/URETERS: Bilateral cortical scarring. BLADDER: Within normal limits. REPRODUCTIVE ORGANS: Likely 2 intramural calcified fibroids. Another partially calcified mass in the left lower abdomen may represent a subserosal fibroid or an adnexal mass. The adnexa are not well visualized. BOWEL: No bowel obstruction. Appendix is normal. Colonic diverticulosis. PERITONEUM: Small to moderate ascites. Large amount of soft tissue in the greater omentum VESSELS: Within normal limits. RETROPERITONEUM/LYMPH NODES: No lymphadenopathy. ABDOMINAL WALL: Within normal limits. BONES: Degenerative changes. IMPRESSION: Extensive peritoneal carcinomatosis and small to moderate ascites. Fibroid uterus with either a left subserosal fibroid or a possible adnexal mass. Loculated moderate right pleural effusion and small left pleural effusion, unchanged.  Patient saw Dr. Huizar and had right pleurex catheter placed.   PMHx: DM, HTN, HLD, asthma PSHx: N/A Fam Hx: mother (colon cancer)   HCM: Mammogram: unsure Colonoscopy: 5/2019  Patient got one cycle of chemotherapy on 6/17/23 and then went to ED with purulent pleurex. catheter. PluerX placed 1 month prior)) currently undergoing chemotherapy presented to the ED complaining of purulent discharge from her PleurX catheter. PleurX catheter removed by Pulm on 7/19. Nephrology consulted for MARIANA.   [de-identified] : Hindu [de-identified] : Farida is here to discuss scan results. She feels well. Neuropathy is stable. Scan showed stable disease.

## 2024-05-24 ENCOUNTER — RESULT REVIEW (OUTPATIENT)
Age: 59
End: 2024-05-24

## 2024-05-24 ENCOUNTER — APPOINTMENT (OUTPATIENT)
Dept: INFUSION THERAPY | Facility: HOSPITAL | Age: 59
End: 2024-05-24

## 2024-05-24 LAB
BASOPHILS # BLD AUTO: 0.01 K/UL — SIGNIFICANT CHANGE UP (ref 0–0.2)
BASOPHILS NFR BLD AUTO: 0.2 % — SIGNIFICANT CHANGE UP (ref 0–2)
EOSINOPHIL # BLD AUTO: 0.17 K/UL — SIGNIFICANT CHANGE UP (ref 0–0.5)
EOSINOPHIL NFR BLD AUTO: 4.1 % — SIGNIFICANT CHANGE UP (ref 0–6)
HCT VFR BLD CALC: 29.3 % — LOW (ref 34.5–45)
HGB BLD-MCNC: 10 G/DL — LOW (ref 11.5–15.5)
IMM GRANULOCYTES NFR BLD AUTO: 0.5 % — SIGNIFICANT CHANGE UP (ref 0–0.9)
LYMPHOCYTES # BLD AUTO: 1.4 K/UL — SIGNIFICANT CHANGE UP (ref 1–3.3)
LYMPHOCYTES # BLD AUTO: 34.1 % — SIGNIFICANT CHANGE UP (ref 13–44)
MCHC RBC-ENTMCNC: 30.3 PG — SIGNIFICANT CHANGE UP (ref 27–34)
MCHC RBC-ENTMCNC: 34.1 G/DL — SIGNIFICANT CHANGE UP (ref 32–36)
MCV RBC AUTO: 88.8 FL — SIGNIFICANT CHANGE UP (ref 80–100)
MONOCYTES # BLD AUTO: 0.29 K/UL — SIGNIFICANT CHANGE UP (ref 0–0.9)
MONOCYTES NFR BLD AUTO: 7.1 % — SIGNIFICANT CHANGE UP (ref 2–14)
NEUTROPHILS # BLD AUTO: 2.21 K/UL — SIGNIFICANT CHANGE UP (ref 1.8–7.4)
NEUTROPHILS NFR BLD AUTO: 54 % — SIGNIFICANT CHANGE UP (ref 43–77)
NRBC # BLD: 0 /100 WBCS — SIGNIFICANT CHANGE UP (ref 0–0)
PLATELET # BLD AUTO: 141 K/UL — LOW (ref 150–400)
RBC # BLD: 3.3 M/UL — LOW (ref 3.8–5.2)
RBC # FLD: 14.8 % — HIGH (ref 10.3–14.5)
WBC # BLD: 4.1 K/UL — SIGNIFICANT CHANGE UP (ref 3.8–10.5)
WBC # FLD AUTO: 4.1 K/UL — SIGNIFICANT CHANGE UP (ref 3.8–10.5)

## 2024-05-25 LAB
ALBUMIN SERPL ELPH-MCNC: 4 G/DL — SIGNIFICANT CHANGE UP (ref 3.3–5)
ALP SERPL-CCNC: 102 U/L — SIGNIFICANT CHANGE UP (ref 40–120)
ALT FLD-CCNC: 7 U/L — LOW (ref 10–45)
ANION GAP SERPL CALC-SCNC: 15 MMOL/L — SIGNIFICANT CHANGE UP (ref 5–17)
APPEARANCE UR: CLEAR — SIGNIFICANT CHANGE UP
AST SERPL-CCNC: 17 U/L — SIGNIFICANT CHANGE UP (ref 10–40)
BILIRUB SERPL-MCNC: 0.2 MG/DL — SIGNIFICANT CHANGE UP (ref 0.2–1.2)
BILIRUB UR-MCNC: NEGATIVE — SIGNIFICANT CHANGE UP
BUN SERPL-MCNC: 20 MG/DL — SIGNIFICANT CHANGE UP (ref 7–23)
CALCIUM SERPL-MCNC: 9.3 MG/DL — SIGNIFICANT CHANGE UP (ref 8.4–10.5)
CANCER AG125 SERPL-ACNC: 89 U/ML — HIGH
CHLORIDE SERPL-SCNC: 103 MMOL/L — SIGNIFICANT CHANGE UP (ref 96–108)
CO2 SERPL-SCNC: 23 MMOL/L — SIGNIFICANT CHANGE UP (ref 22–31)
COLOR SPEC: YELLOW — SIGNIFICANT CHANGE UP
CREAT SERPL-MCNC: 1 MG/DL — SIGNIFICANT CHANGE UP (ref 0.5–1.3)
DIFF PNL FLD: NEGATIVE — SIGNIFICANT CHANGE UP
EGFR: 64 ML/MIN/1.73M2 — SIGNIFICANT CHANGE UP
GLUCOSE SERPL-MCNC: 145 MG/DL — HIGH (ref 70–99)
GLUCOSE UR QL: NEGATIVE MG/DL — SIGNIFICANT CHANGE UP
KETONES UR-MCNC: NEGATIVE MG/DL — SIGNIFICANT CHANGE UP
LEUKOCYTE ESTERASE UR-ACNC: NEGATIVE — SIGNIFICANT CHANGE UP
MAGNESIUM SERPL-MCNC: 1.6 MG/DL — SIGNIFICANT CHANGE UP (ref 1.6–2.6)
NITRITE UR-MCNC: NEGATIVE — SIGNIFICANT CHANGE UP
PH UR: 6 — SIGNIFICANT CHANGE UP (ref 5–8)
POTASSIUM SERPL-MCNC: 3.8 MMOL/L — SIGNIFICANT CHANGE UP (ref 3.5–5.3)
POTASSIUM SERPL-SCNC: 3.8 MMOL/L — SIGNIFICANT CHANGE UP (ref 3.5–5.3)
PROT SERPL-MCNC: 7.6 G/DL — SIGNIFICANT CHANGE UP (ref 6–8.3)
PROT UR-MCNC: NEGATIVE MG/DL — SIGNIFICANT CHANGE UP
SODIUM SERPL-SCNC: 141 MMOL/L — SIGNIFICANT CHANGE UP (ref 135–145)
SP GR SPEC: 1.02 — SIGNIFICANT CHANGE UP (ref 1–1.03)
UROBILINOGEN FLD QL: 0.2 MG/DL — SIGNIFICANT CHANGE UP (ref 0.2–1)

## 2024-05-28 DIAGNOSIS — R11.2 NAUSEA WITH VOMITING, UNSPECIFIED: ICD-10-CM

## 2024-05-28 DIAGNOSIS — Z51.11 ENCOUNTER FOR ANTINEOPLASTIC CHEMOTHERAPY: ICD-10-CM

## 2024-05-28 DIAGNOSIS — E86.0 DEHYDRATION: ICD-10-CM

## 2024-06-14 ENCOUNTER — APPOINTMENT (OUTPATIENT)
Dept: HEMATOLOGY ONCOLOGY | Facility: CLINIC | Age: 59
End: 2024-06-14
Payer: COMMERCIAL

## 2024-06-14 ENCOUNTER — RESULT REVIEW (OUTPATIENT)
Age: 59
End: 2024-06-14

## 2024-06-14 ENCOUNTER — APPOINTMENT (OUTPATIENT)
Dept: INFUSION THERAPY | Facility: HOSPITAL | Age: 59
End: 2024-06-14

## 2024-06-14 VITALS
HEART RATE: 84 BPM | SYSTOLIC BLOOD PRESSURE: 134 MMHG | RESPIRATION RATE: 17 BRPM | TEMPERATURE: 96.8 F | WEIGHT: 193.76 LBS | OXYGEN SATURATION: 97 % | DIASTOLIC BLOOD PRESSURE: 81 MMHG | BODY MASS INDEX: 36.63 KG/M2

## 2024-06-14 DIAGNOSIS — C56.9 MALIGNANT NEOPLASM OF UNSPECIFIED OVARY: ICD-10-CM

## 2024-06-14 LAB
ALBUMIN SERPL ELPH-MCNC: 4 G/DL — SIGNIFICANT CHANGE UP (ref 3.3–5)
ALP SERPL-CCNC: 94 U/L — SIGNIFICANT CHANGE UP (ref 40–120)
ALT FLD-CCNC: 6 U/L — LOW (ref 10–45)
ANION GAP SERPL CALC-SCNC: 10 MMOL/L — SIGNIFICANT CHANGE UP (ref 5–17)
AST SERPL-CCNC: 17 U/L — SIGNIFICANT CHANGE UP (ref 10–40)
BASOPHILS # BLD AUTO: 0 K/UL — SIGNIFICANT CHANGE UP (ref 0–0.2)
BASOPHILS NFR BLD AUTO: 0 % — SIGNIFICANT CHANGE UP (ref 0–2)
BILIRUB SERPL-MCNC: 0.3 MG/DL — SIGNIFICANT CHANGE UP (ref 0.2–1.2)
BUN SERPL-MCNC: 16 MG/DL — SIGNIFICANT CHANGE UP (ref 7–23)
CALCIUM SERPL-MCNC: 9.5 MG/DL — SIGNIFICANT CHANGE UP (ref 8.4–10.5)
CHLORIDE SERPL-SCNC: 104 MMOL/L — SIGNIFICANT CHANGE UP (ref 96–108)
CO2 SERPL-SCNC: 26 MMOL/L — SIGNIFICANT CHANGE UP (ref 22–31)
CREAT SERPL-MCNC: 0.91 MG/DL — SIGNIFICANT CHANGE UP (ref 0.5–1.3)
EGFR: 72 ML/MIN/1.73M2 — SIGNIFICANT CHANGE UP
EOSINOPHIL # BLD AUTO: 0.22 K/UL — SIGNIFICANT CHANGE UP (ref 0–0.5)
EOSINOPHIL NFR BLD AUTO: 5.2 % — SIGNIFICANT CHANGE UP (ref 0–6)
GLUCOSE SERPL-MCNC: 214 MG/DL — HIGH (ref 70–99)
HCT VFR BLD CALC: 32.4 % — LOW (ref 34.5–45)
HGB BLD-MCNC: 10.9 G/DL — LOW (ref 11.5–15.5)
IMM GRANULOCYTES NFR BLD AUTO: 0.7 % — SIGNIFICANT CHANGE UP (ref 0–0.9)
LYMPHOCYTES # BLD AUTO: 1.22 K/UL — SIGNIFICANT CHANGE UP (ref 1–3.3)
LYMPHOCYTES # BLD AUTO: 29 % — SIGNIFICANT CHANGE UP (ref 13–44)
MCHC RBC-ENTMCNC: 29.4 PG — SIGNIFICANT CHANGE UP (ref 27–34)
MCHC RBC-ENTMCNC: 33.6 G/DL — SIGNIFICANT CHANGE UP (ref 32–36)
MCV RBC AUTO: 87.3 FL — SIGNIFICANT CHANGE UP (ref 80–100)
MONOCYTES # BLD AUTO: 0.27 K/UL — SIGNIFICANT CHANGE UP (ref 0–0.9)
MONOCYTES NFR BLD AUTO: 6.4 % — SIGNIFICANT CHANGE UP (ref 2–14)
NEUTROPHILS # BLD AUTO: 2.47 K/UL — SIGNIFICANT CHANGE UP (ref 1.8–7.4)
NEUTROPHILS NFR BLD AUTO: 58.7 % — SIGNIFICANT CHANGE UP (ref 43–77)
NRBC # BLD: 0 /100 WBCS — SIGNIFICANT CHANGE UP (ref 0–0)
PLATELET # BLD AUTO: 108 K/UL — LOW (ref 150–400)
POTASSIUM SERPL-MCNC: 4 MMOL/L — SIGNIFICANT CHANGE UP (ref 3.5–5.3)
POTASSIUM SERPL-SCNC: 4 MMOL/L — SIGNIFICANT CHANGE UP (ref 3.5–5.3)
PROT SERPL-MCNC: 7.4 G/DL — SIGNIFICANT CHANGE UP (ref 6–8.3)
RBC # BLD: 3.71 M/UL — LOW (ref 3.8–5.2)
RBC # FLD: 13.8 % — SIGNIFICANT CHANGE UP (ref 10.3–14.5)
SODIUM SERPL-SCNC: 141 MMOL/L — SIGNIFICANT CHANGE UP (ref 135–145)
WBC # BLD: 4.21 K/UL — SIGNIFICANT CHANGE UP (ref 3.8–10.5)
WBC # FLD AUTO: 4.21 K/UL — SIGNIFICANT CHANGE UP (ref 3.8–10.5)

## 2024-06-14 PROCEDURE — 99213 OFFICE O/P EST LOW 20 MIN: CPT

## 2024-06-14 NOTE — HISTORY OF PRESENT ILLNESS
[Disease: _____________________] : Disease: [unfilled] [AJCC Stage: ____] : AJCC Stage: [unfilled] [de-identified] : Ms. Cassidy is a 57 yo  postmenopausal female who presents for initial consultation for NACT  for presumed GYN cancer. She reports a >5 year lapse in GYN care. She presented to [facility] ED on 5/31/23 c/o RECIO a 4 weeks. Imaging revealed a right pleural effusion (s/p thoracentesis positive for malignant cells with IHC of likely Mullerian origin), carcinomatosis, adnexal mass and a markedly elevated CA-125. IR guided core biopsy of peritoneal mass is pending.  Patient is a [employer] and does not accept blood products.  LABS on 6/6/23: CA-125 was 1,506 (ref<38)  was <2 (ref<35) CEA was <1.0 (ref<3.8)  CYTOLOGY/PATHOLOGY: 1) Right pleural thoracentesis, 6/1/23, To  a) positive for malignant cells, PAX-8 +, ER +, CK7 + - favor Mullerian origin 2) Core biopsy of peritoneal mass, 6/8/23, To  OMENTUM, CT GUIDED CORE BIOPSY POSITIVE FOR MALIGNANT CELLS. 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 The neoplastic cells are positive for Moc31, [place]-8, CK7, P53, P16,  WT1 and ER. P40 and CK 20 are negative  IMAGING: TVUS on 6/4/23: uterus 11 x 5.0 x 7.6 cm. Leiomyomatous uterus with scattered calcified uterine myomas including a 3.5 cm right intramural myoma and 5.4 cm posterior intramural myoma. EML not visualized.  RTO - 3.2 x 2.2 x 1.8 cm.  LTO - there is a 7.0 x 5.8 x 5.5 cm calcified mass in the left adnexa likely representing calcified pedunculated myoma.  Small FF.    CT C/A/P on 6/2/23: LOWER CHEST: Loculated moderate right pleural effusion and small left pleural effusion are unchanged. LIVER: Soft tissue nodularity along the inferior right hepatic lobe. BILE DUCTS: Normal caliber. GALLBLADDER: Cholelithiasis and contracted. SPLEEN: Within normal limits. PANCREAS: Within normal limits. ADRENALS: Within normal limits. KIDNEYS/URETERS: Bilateral cortical scarring. BLADDER: Within normal limits. REPRODUCTIVE ORGANS: Likely 2 intramural calcified fibroids. Another partially calcified mass in the left lower abdomen may represent a subserosal fibroid or an adnexal mass. The adnexa are not well visualized. BOWEL: No bowel obstruction. Appendix is normal. Colonic diverticulosis. PERITONEUM: Small to moderate ascites. Large amount of soft tissue in the greater omentum VESSELS: Within normal limits. RETROPERITONEUM/LYMPH NODES: No lymphadenopathy. ABDOMINAL WALL: Within normal limits. BONES: Degenerative changes. IMPRESSION: Extensive peritoneal carcinomatosis and small to moderate ascites. Fibroid uterus with either a left subserosal fibroid or a possible adnexal mass. Loculated moderate right pleural effusion and small left pleural effusion, unchanged.  Patient saw Dr. Huizar and had right pleurex catheter placed.   PMHx: DM, HTN, HLD, asthma PSHx: N/A Fam Hx: mother (colon cancer)   HCM: Mammogram: unsure Colonoscopy: 5/2019  Patient got one cycle of chemotherapy on 6/17/23 and then went to ED with purulent pleurex. catheter. PluerX placed 1 month prior)) currently undergoing chemotherapy presented to the ED complaining of purulent discharge from her PleurX catheter. PleurX catheter removed by Pulm on 7/19. Nephrology consulted for MARIANA.   [de-identified] : [employer] Carboplatin/Taxol/Zirabev 6/27/23 - 4/12/24 (9 cycles, Zirabev added cycle 7) [FreeTextEntry1] : Zirabev maintenance C2 - 6/14/24 [de-identified] :  Patient is here for follow up and treatment. She is overall feeling better since stopping chemo but does have increased arthralgias, has not taken anything for symptom relief because she was not sure what to take. Appetite is good.  Denies SOB, abdominal pain, nausea, bowel/bladder issues, bleeding, swelling.  BP has been well controlled.

## 2024-06-14 NOTE — REASON FOR VISIT
[Follow-Up Visit] : a follow-up [Pre-Treatment Visit] : a pre-treatment [FreeTextEntry2] : gyn cancer

## 2024-06-15 LAB
APPEARANCE UR: CLEAR — SIGNIFICANT CHANGE UP
BILIRUB UR-MCNC: NEGATIVE — SIGNIFICANT CHANGE UP
CANCER AG125 SERPL-ACNC: 99 U/ML — HIGH
COLOR SPEC: YELLOW — SIGNIFICANT CHANGE UP
DIFF PNL FLD: NEGATIVE — SIGNIFICANT CHANGE UP
GLUCOSE UR QL: NEGATIVE MG/DL — SIGNIFICANT CHANGE UP
KETONES UR-MCNC: NEGATIVE MG/DL — SIGNIFICANT CHANGE UP
LEUKOCYTE ESTERASE UR-ACNC: NEGATIVE — SIGNIFICANT CHANGE UP
NITRITE UR-MCNC: NEGATIVE — SIGNIFICANT CHANGE UP
PH UR: 6 — SIGNIFICANT CHANGE UP (ref 5–8)
PROT UR-MCNC: NEGATIVE MG/DL — SIGNIFICANT CHANGE UP
SP GR SPEC: 1.01 — SIGNIFICANT CHANGE UP (ref 1–1.03)
UROBILINOGEN FLD QL: 0.2 MG/DL — SIGNIFICANT CHANGE UP (ref 0.2–1)

## 2024-07-05 ENCOUNTER — RESULT REVIEW (OUTPATIENT)
Age: 59
End: 2024-07-05

## 2024-07-05 ENCOUNTER — APPOINTMENT (OUTPATIENT)
Dept: INFUSION THERAPY | Facility: HOSPITAL | Age: 59
End: 2024-07-05

## 2024-07-05 ENCOUNTER — APPOINTMENT (OUTPATIENT)
Dept: HEMATOLOGY ONCOLOGY | Facility: CLINIC | Age: 59
End: 2024-07-05
Payer: COMMERCIAL

## 2024-07-05 VITALS
BODY MASS INDEX: 36.26 KG/M2 | SYSTOLIC BLOOD PRESSURE: 108 MMHG | TEMPERATURE: 96.6 F | OXYGEN SATURATION: 98 % | DIASTOLIC BLOOD PRESSURE: 73 MMHG | RESPIRATION RATE: 16 BRPM | HEART RATE: 78 BPM | WEIGHT: 191.8 LBS

## 2024-07-05 DIAGNOSIS — C56.9 MALIGNANT NEOPLASM OF UNSPECIFIED OVARY: ICD-10-CM

## 2024-07-05 LAB
ALBUMIN SERPL ELPH-MCNC: 4.1 G/DL — SIGNIFICANT CHANGE UP (ref 3.3–5)
ALP SERPL-CCNC: 93 U/L — SIGNIFICANT CHANGE UP (ref 40–120)
ALT FLD-CCNC: <5 U/L — LOW (ref 10–45)
ANION GAP SERPL CALC-SCNC: 11 MMOL/L — SIGNIFICANT CHANGE UP (ref 5–17)
AST SERPL-CCNC: 19 U/L — SIGNIFICANT CHANGE UP (ref 10–40)
BASOPHILS # BLD AUTO: 0 K/UL — SIGNIFICANT CHANGE UP (ref 0–0.2)
BASOPHILS NFR BLD AUTO: 0 % — SIGNIFICANT CHANGE UP (ref 0–2)
BILIRUB SERPL-MCNC: 0.5 MG/DL — SIGNIFICANT CHANGE UP (ref 0.2–1.2)
BUN SERPL-MCNC: 14 MG/DL — SIGNIFICANT CHANGE UP (ref 7–23)
CALCIUM SERPL-MCNC: 9.6 MG/DL — SIGNIFICANT CHANGE UP (ref 8.4–10.5)
CANCER AG125 SERPL-ACNC: 139 U/ML — HIGH
CHLORIDE SERPL-SCNC: 106 MMOL/L — SIGNIFICANT CHANGE UP (ref 96–108)
CO2 SERPL-SCNC: 25 MMOL/L — SIGNIFICANT CHANGE UP (ref 22–31)
CREAT SERPL-MCNC: 1 MG/DL — SIGNIFICANT CHANGE UP (ref 0.5–1.3)
EGFR: 64 ML/MIN/1.73M2 — SIGNIFICANT CHANGE UP
EOSINOPHIL # BLD AUTO: 0.23 K/UL — SIGNIFICANT CHANGE UP (ref 0–0.5)
EOSINOPHIL NFR BLD AUTO: 4.7 % — SIGNIFICANT CHANGE UP (ref 0–6)
GLUCOSE SERPL-MCNC: 133 MG/DL — HIGH (ref 70–99)
HCT VFR BLD CALC: 34.8 % — SIGNIFICANT CHANGE UP (ref 34.5–45)
HGB BLD-MCNC: 11.6 G/DL — SIGNIFICANT CHANGE UP (ref 11.5–15.5)
IMM GRANULOCYTES NFR BLD AUTO: 0.2 % — SIGNIFICANT CHANGE UP (ref 0–0.9)
LYMPHOCYTES # BLD AUTO: 1.34 K/UL — SIGNIFICANT CHANGE UP (ref 1–3.3)
LYMPHOCYTES # BLD AUTO: 27.5 % — SIGNIFICANT CHANGE UP (ref 13–44)
MCHC RBC-ENTMCNC: 29.3 PG — SIGNIFICANT CHANGE UP (ref 27–34)
MCHC RBC-ENTMCNC: 33.3 G/DL — SIGNIFICANT CHANGE UP (ref 32–36)
MCV RBC AUTO: 87.9 FL — SIGNIFICANT CHANGE UP (ref 80–100)
MONOCYTES # BLD AUTO: 0.31 K/UL — SIGNIFICANT CHANGE UP (ref 0–0.9)
MONOCYTES NFR BLD AUTO: 6.4 % — SIGNIFICANT CHANGE UP (ref 2–14)
NEUTROPHILS # BLD AUTO: 2.99 K/UL — SIGNIFICANT CHANGE UP (ref 1.8–7.4)
NEUTROPHILS NFR BLD AUTO: 61.2 % — SIGNIFICANT CHANGE UP (ref 43–77)
NRBC # BLD: 0 /100 WBCS — SIGNIFICANT CHANGE UP (ref 0–0)
PLATELET # BLD AUTO: 107 K/UL — LOW (ref 150–400)
POTASSIUM SERPL-MCNC: 3.9 MMOL/L — SIGNIFICANT CHANGE UP (ref 3.5–5.3)
POTASSIUM SERPL-SCNC: 3.9 MMOL/L — SIGNIFICANT CHANGE UP (ref 3.5–5.3)
PROT SERPL-MCNC: 8.1 G/DL — SIGNIFICANT CHANGE UP (ref 6–8.3)
RBC # BLD: 3.96 M/UL — SIGNIFICANT CHANGE UP (ref 3.8–5.2)
RBC # FLD: 13.2 % — SIGNIFICANT CHANGE UP (ref 10.3–14.5)
SODIUM SERPL-SCNC: 142 MMOL/L — SIGNIFICANT CHANGE UP (ref 135–145)
WBC # BLD: 4.88 K/UL — SIGNIFICANT CHANGE UP (ref 3.8–10.5)
WBC # FLD AUTO: 4.88 K/UL — SIGNIFICANT CHANGE UP (ref 3.8–10.5)

## 2024-07-05 PROCEDURE — G2211 COMPLEX E/M VISIT ADD ON: CPT

## 2024-07-05 PROCEDURE — 99213 OFFICE O/P EST LOW 20 MIN: CPT

## 2024-07-06 LAB
APPEARANCE UR: CLEAR — SIGNIFICANT CHANGE UP
BACTERIA # UR AUTO: ABNORMAL /HPF
BILIRUB UR-MCNC: NEGATIVE — SIGNIFICANT CHANGE UP
CAST: 0 /LPF — SIGNIFICANT CHANGE UP (ref 0–4)
COLOR SPEC: YELLOW — SIGNIFICANT CHANGE UP
CREAT ?TM UR-MCNC: 150 MG/DL — SIGNIFICANT CHANGE UP
DIFF PNL FLD: NEGATIVE — SIGNIFICANT CHANGE UP
GLUCOSE UR QL: NEGATIVE MG/DL — SIGNIFICANT CHANGE UP
KETONES UR-MCNC: NEGATIVE MG/DL — SIGNIFICANT CHANGE UP
LEUKOCYTE ESTERASE UR-ACNC: ABNORMAL
NITRITE UR-MCNC: NEGATIVE — SIGNIFICANT CHANGE UP
PH UR: 5.5 — SIGNIFICANT CHANGE UP (ref 5–8)
PROT ?TM UR-MCNC: 10 MG/DL — SIGNIFICANT CHANGE UP (ref 0–12)
PROT UR-MCNC: NEGATIVE MG/DL — SIGNIFICANT CHANGE UP
PROT/CREAT UR-RTO: 0.1 RATIO — SIGNIFICANT CHANGE UP (ref 0–0.2)
RBC CASTS # UR COMP ASSIST: 1 /HPF — SIGNIFICANT CHANGE UP (ref 0–4)
SP GR SPEC: 1.02 — SIGNIFICANT CHANGE UP (ref 1–1.03)
SQUAMOUS # UR AUTO: 5 /HPF — SIGNIFICANT CHANGE UP (ref 0–5)
UROBILINOGEN FLD QL: 0.2 MG/DL — SIGNIFICANT CHANGE UP (ref 0.2–1)
WBC UR QL: 1 /HPF — SIGNIFICANT CHANGE UP (ref 0–5)

## 2024-07-11 ENCOUNTER — APPOINTMENT (OUTPATIENT)
Dept: ENDOCRINOLOGY | Facility: CLINIC | Age: 59
End: 2024-07-11

## 2024-07-15 ENCOUNTER — RESULT REVIEW (OUTPATIENT)
Age: 59
End: 2024-07-15

## 2024-07-17 ENCOUNTER — OUTPATIENT (OUTPATIENT)
Dept: OUTPATIENT SERVICES | Facility: HOSPITAL | Age: 59
LOS: 1 days | Discharge: HOME | End: 2024-07-17

## 2024-07-17 DIAGNOSIS — C57.4 MALIGNANT NEOPLASM OF UTERINE ADNEXA, UNSPECIFIED: ICD-10-CM

## 2024-07-17 DIAGNOSIS — Z98.890 OTHER SPECIFIED POSTPROCEDURAL STATES: Chronic | ICD-10-CM

## 2024-07-22 ENCOUNTER — APPOINTMENT (OUTPATIENT)
Dept: HEMATOLOGY ONCOLOGY | Facility: CLINIC | Age: 59
End: 2024-07-22

## 2024-07-23 ENCOUNTER — APPOINTMENT (OUTPATIENT)
Dept: CT IMAGING | Facility: IMAGING CENTER | Age: 59
End: 2024-07-23
Payer: COMMERCIAL

## 2024-07-23 PROCEDURE — 71260 CT THORAX DX C+: CPT | Mod: 26

## 2024-07-23 PROCEDURE — 74177 CT ABD & PELVIS W/CONTRAST: CPT | Mod: 26

## 2024-07-26 ENCOUNTER — RESULT REVIEW (OUTPATIENT)
Age: 59
End: 2024-07-26

## 2024-07-26 ENCOUNTER — APPOINTMENT (OUTPATIENT)
Dept: INFUSION THERAPY | Facility: HOSPITAL | Age: 59
End: 2024-07-26

## 2024-07-26 LAB
ALBUMIN SERPL ELPH-MCNC: 4 G/DL — SIGNIFICANT CHANGE UP (ref 3.3–5)
ALP SERPL-CCNC: 100 U/L — SIGNIFICANT CHANGE UP (ref 40–120)
ALT FLD-CCNC: 5 U/L — LOW (ref 10–45)
ANION GAP SERPL CALC-SCNC: 10 MMOL/L — SIGNIFICANT CHANGE UP (ref 5–17)
AST SERPL-CCNC: 19 U/L — SIGNIFICANT CHANGE UP (ref 10–40)
BASOPHILS # BLD AUTO: 0 K/UL — SIGNIFICANT CHANGE UP (ref 0–0.2)
BASOPHILS NFR BLD AUTO: 0 % — SIGNIFICANT CHANGE UP (ref 0–2)
BILIRUB SERPL-MCNC: 0.3 MG/DL — SIGNIFICANT CHANGE UP (ref 0.2–1.2)
BUN SERPL-MCNC: 17 MG/DL — SIGNIFICANT CHANGE UP (ref 7–23)
CALCIUM SERPL-MCNC: 9.5 MG/DL — SIGNIFICANT CHANGE UP (ref 8.4–10.5)
CHLORIDE SERPL-SCNC: 103 MMOL/L — SIGNIFICANT CHANGE UP (ref 96–108)
CO2 SERPL-SCNC: 27 MMOL/L — SIGNIFICANT CHANGE UP (ref 22–31)
CREAT SERPL-MCNC: 1.08 MG/DL — SIGNIFICANT CHANGE UP (ref 0.5–1.3)
EGFR: 59 ML/MIN/1.73M2 — LOW
EOSINOPHIL # BLD AUTO: 0.11 K/UL — SIGNIFICANT CHANGE UP (ref 0–0.5)
EOSINOPHIL NFR BLD AUTO: 2.6 % — SIGNIFICANT CHANGE UP (ref 0–6)
GLUCOSE SERPL-MCNC: 162 MG/DL — HIGH (ref 70–99)
HCT VFR BLD CALC: 32.6 % — LOW (ref 34.5–45)
HGB BLD-MCNC: 11.2 G/DL — LOW (ref 11.5–15.5)
IMM GRANULOCYTES NFR BLD AUTO: 0.2 % — SIGNIFICANT CHANGE UP (ref 0–0.9)
LYMPHOCYTES # BLD AUTO: 1.55 K/UL — SIGNIFICANT CHANGE UP (ref 1–3.3)
LYMPHOCYTES # BLD AUTO: 36.2 % — SIGNIFICANT CHANGE UP (ref 13–44)
MCHC RBC-ENTMCNC: 29.1 PG — SIGNIFICANT CHANGE UP (ref 27–34)
MCHC RBC-ENTMCNC: 34.4 G/DL — SIGNIFICANT CHANGE UP (ref 32–36)
MCV RBC AUTO: 84.7 FL — SIGNIFICANT CHANGE UP (ref 80–100)
MONOCYTES # BLD AUTO: 0.26 K/UL — SIGNIFICANT CHANGE UP (ref 0–0.9)
MONOCYTES NFR BLD AUTO: 6.1 % — SIGNIFICANT CHANGE UP (ref 2–14)
NEUTROPHILS # BLD AUTO: 2.35 K/UL — SIGNIFICANT CHANGE UP (ref 1.8–7.4)
NEUTROPHILS NFR BLD AUTO: 54.9 % — SIGNIFICANT CHANGE UP (ref 43–77)
NRBC # BLD: 0 /100 WBCS — SIGNIFICANT CHANGE UP (ref 0–0)
PLATELET # BLD AUTO: 108 K/UL — LOW (ref 150–400)
POTASSIUM SERPL-MCNC: 3.8 MMOL/L — SIGNIFICANT CHANGE UP (ref 3.5–5.3)
POTASSIUM SERPL-SCNC: 3.8 MMOL/L — SIGNIFICANT CHANGE UP (ref 3.5–5.3)
PROT SERPL-MCNC: 7.6 G/DL — SIGNIFICANT CHANGE UP (ref 6–8.3)
RBC # BLD: 3.85 M/UL — SIGNIFICANT CHANGE UP (ref 3.8–5.2)
RBC # FLD: 13.2 % — SIGNIFICANT CHANGE UP (ref 10.3–14.5)
SODIUM SERPL-SCNC: 140 MMOL/L — SIGNIFICANT CHANGE UP (ref 135–145)
WBC # BLD: 4.28 K/UL — SIGNIFICANT CHANGE UP (ref 3.8–10.5)
WBC # FLD AUTO: 4.28 K/UL — SIGNIFICANT CHANGE UP (ref 3.8–10.5)

## 2024-07-27 ENCOUNTER — NON-APPOINTMENT (OUTPATIENT)
Age: 59
End: 2024-07-27

## 2024-07-27 LAB
APPEARANCE UR: CLEAR — SIGNIFICANT CHANGE UP
BACTERIA # UR AUTO: ABNORMAL /HPF
BILIRUB UR-MCNC: NEGATIVE — SIGNIFICANT CHANGE UP
CANCER AG125 SERPL-ACNC: 155 U/ML — HIGH
CAST: 0 /LPF — SIGNIFICANT CHANGE UP (ref 0–4)
COLOR SPEC: YELLOW — SIGNIFICANT CHANGE UP
CREAT ?TM UR-MCNC: 99 MG/DL — SIGNIFICANT CHANGE UP
DIFF PNL FLD: NEGATIVE — SIGNIFICANT CHANGE UP
GLUCOSE UR QL: NEGATIVE MG/DL — SIGNIFICANT CHANGE UP
KETONES UR-MCNC: NEGATIVE MG/DL — SIGNIFICANT CHANGE UP
LEUKOCYTE ESTERASE UR-ACNC: ABNORMAL
NITRITE UR-MCNC: NEGATIVE — SIGNIFICANT CHANGE UP
PH UR: 6.5 — SIGNIFICANT CHANGE UP (ref 5–8)
PROT ?TM UR-MCNC: 12 MG/DL — SIGNIFICANT CHANGE UP (ref 0–12)
PROT UR-MCNC: NEGATIVE MG/DL — SIGNIFICANT CHANGE UP
PROT/CREAT UR-RTO: 0.1 RATIO — SIGNIFICANT CHANGE UP (ref 0–0.2)
RBC CASTS # UR COMP ASSIST: 1 /HPF — SIGNIFICANT CHANGE UP (ref 0–4)
SP GR SPEC: 1.01 — SIGNIFICANT CHANGE UP (ref 1–1.03)
SQUAMOUS # UR AUTO: 12 /HPF — HIGH (ref 0–5)
UROBILINOGEN FLD QL: 1 MG/DL — SIGNIFICANT CHANGE UP (ref 0.2–1)
WBC UR QL: 5 /HPF — SIGNIFICANT CHANGE UP (ref 0–5)

## 2024-07-29 DIAGNOSIS — Z51.11 ENCOUNTER FOR ANTINEOPLASTIC CHEMOTHERAPY: ICD-10-CM

## 2024-08-16 ENCOUNTER — RESULT REVIEW (OUTPATIENT)
Age: 59
End: 2024-08-16

## 2024-08-16 ENCOUNTER — APPOINTMENT (OUTPATIENT)
Dept: HEMATOLOGY ONCOLOGY | Facility: CLINIC | Age: 59
End: 2024-08-16
Payer: COMMERCIAL

## 2024-08-16 ENCOUNTER — APPOINTMENT (OUTPATIENT)
Dept: INFUSION THERAPY | Facility: HOSPITAL | Age: 59
End: 2024-08-16

## 2024-08-16 VITALS
HEART RATE: 59 BPM | BODY MASS INDEX: 35.97 KG/M2 | OXYGEN SATURATION: 97 % | WEIGHT: 190.26 LBS | TEMPERATURE: 96.8 F | RESPIRATION RATE: 17 BRPM | SYSTOLIC BLOOD PRESSURE: 102 MMHG | DIASTOLIC BLOOD PRESSURE: 67 MMHG

## 2024-08-16 DIAGNOSIS — C56.9 MALIGNANT NEOPLASM OF UNSPECIFIED OVARY: ICD-10-CM

## 2024-08-16 LAB
ALBUMIN SERPL ELPH-MCNC: 3.9 G/DL — SIGNIFICANT CHANGE UP (ref 3.3–5)
ALP SERPL-CCNC: 84 U/L — SIGNIFICANT CHANGE UP (ref 40–120)
ALT FLD-CCNC: <5 U/L — LOW (ref 10–45)
ANION GAP SERPL CALC-SCNC: 12 MMOL/L — SIGNIFICANT CHANGE UP (ref 5–17)
AST SERPL-CCNC: 18 U/L — SIGNIFICANT CHANGE UP (ref 10–40)
BASOPHILS # BLD AUTO: 0 K/UL — SIGNIFICANT CHANGE UP (ref 0–0.2)
BASOPHILS NFR BLD AUTO: 0 % — SIGNIFICANT CHANGE UP (ref 0–2)
BILIRUB SERPL-MCNC: 0.4 MG/DL — SIGNIFICANT CHANGE UP (ref 0.2–1.2)
BUN SERPL-MCNC: 16 MG/DL — SIGNIFICANT CHANGE UP (ref 7–23)
CALCIUM SERPL-MCNC: 9.7 MG/DL — SIGNIFICANT CHANGE UP (ref 8.4–10.5)
CHLORIDE SERPL-SCNC: 105 MMOL/L — SIGNIFICANT CHANGE UP (ref 96–108)
CO2 SERPL-SCNC: 24 MMOL/L — SIGNIFICANT CHANGE UP (ref 22–31)
CREAT SERPL-MCNC: 0.98 MG/DL — SIGNIFICANT CHANGE UP (ref 0.5–1.3)
EGFR: 66 ML/MIN/1.73M2 — SIGNIFICANT CHANGE UP
EOSINOPHIL # BLD AUTO: 0.09 K/UL — SIGNIFICANT CHANGE UP (ref 0–0.5)
EOSINOPHIL NFR BLD AUTO: 2.2 % — SIGNIFICANT CHANGE UP (ref 0–6)
GLUCOSE SERPL-MCNC: 130 MG/DL — HIGH (ref 70–99)
HCT VFR BLD CALC: 33 % — LOW (ref 34.5–45)
HGB BLD-MCNC: 11 G/DL — LOW (ref 11.5–15.5)
IMM GRANULOCYTES NFR BLD AUTO: 0.2 % — SIGNIFICANT CHANGE UP (ref 0–0.9)
LYMPHOCYTES # BLD AUTO: 1.57 K/UL — SIGNIFICANT CHANGE UP (ref 1–3.3)
LYMPHOCYTES # BLD AUTO: 39.1 % — SIGNIFICANT CHANGE UP (ref 13–44)
MCHC RBC-ENTMCNC: 28.4 PG — SIGNIFICANT CHANGE UP (ref 27–34)
MCHC RBC-ENTMCNC: 33.3 G/DL — SIGNIFICANT CHANGE UP (ref 32–36)
MCV RBC AUTO: 85.3 FL — SIGNIFICANT CHANGE UP (ref 80–100)
MONOCYTES # BLD AUTO: 0.29 K/UL — SIGNIFICANT CHANGE UP (ref 0–0.9)
MONOCYTES NFR BLD AUTO: 7.2 % — SIGNIFICANT CHANGE UP (ref 2–14)
NEUTROPHILS # BLD AUTO: 2.06 K/UL — SIGNIFICANT CHANGE UP (ref 1.8–7.4)
NEUTROPHILS NFR BLD AUTO: 51.3 % — SIGNIFICANT CHANGE UP (ref 43–77)
NRBC # BLD: 0 /100 WBCS — SIGNIFICANT CHANGE UP (ref 0–0)
PLATELET # BLD AUTO: 127 K/UL — LOW (ref 150–400)
POTASSIUM SERPL-MCNC: 3.6 MMOL/L — SIGNIFICANT CHANGE UP (ref 3.5–5.3)
POTASSIUM SERPL-SCNC: 3.6 MMOL/L — SIGNIFICANT CHANGE UP (ref 3.5–5.3)
PROT SERPL-MCNC: 7.9 G/DL — SIGNIFICANT CHANGE UP (ref 6–8.3)
RBC # BLD: 3.87 M/UL — SIGNIFICANT CHANGE UP (ref 3.8–5.2)
RBC # FLD: 13.6 % — SIGNIFICANT CHANGE UP (ref 10.3–14.5)
SODIUM SERPL-SCNC: 141 MMOL/L — SIGNIFICANT CHANGE UP (ref 135–145)
WBC # BLD: 4.02 K/UL — SIGNIFICANT CHANGE UP (ref 3.8–10.5)
WBC # FLD AUTO: 4.02 K/UL — SIGNIFICANT CHANGE UP (ref 3.8–10.5)

## 2024-08-16 PROCEDURE — 99214 OFFICE O/P EST MOD 30 MIN: CPT

## 2024-08-17 LAB
APPEARANCE UR: CLEAR — SIGNIFICANT CHANGE UP
BACTERIA # UR AUTO: ABNORMAL /HPF
BILIRUB UR-MCNC: NEGATIVE — SIGNIFICANT CHANGE UP
CANCER AG125 SERPL-ACNC: 165 U/ML — HIGH
CAST: 0 /LPF — SIGNIFICANT CHANGE UP (ref 0–4)
COLOR SPEC: YELLOW — SIGNIFICANT CHANGE UP
CREAT ?TM UR-MCNC: 100 MG/DL — SIGNIFICANT CHANGE UP
DIFF PNL FLD: NEGATIVE — SIGNIFICANT CHANGE UP
GLUCOSE UR QL: NEGATIVE MG/DL — SIGNIFICANT CHANGE UP
KETONES UR-MCNC: NEGATIVE MG/DL — SIGNIFICANT CHANGE UP
LEUKOCYTE ESTERASE UR-ACNC: ABNORMAL
NITRITE UR-MCNC: NEGATIVE — SIGNIFICANT CHANGE UP
PH UR: 6 — SIGNIFICANT CHANGE UP (ref 5–8)
PROT ?TM UR-MCNC: 12 MG/DL — SIGNIFICANT CHANGE UP (ref 0–12)
PROT UR-MCNC: NEGATIVE MG/DL — SIGNIFICANT CHANGE UP
PROT/CREAT UR-RTO: 0.1 RATIO — SIGNIFICANT CHANGE UP (ref 0–0.2)
RBC CASTS # UR COMP ASSIST: 2 /HPF — SIGNIFICANT CHANGE UP (ref 0–4)
SP GR SPEC: 1.02 — SIGNIFICANT CHANGE UP (ref 1–1.03)
SQUAMOUS # UR AUTO: 6 /HPF — HIGH (ref 0–5)
UROBILINOGEN FLD QL: 1 MG/DL — SIGNIFICANT CHANGE UP (ref 0.2–1)
WBC UR QL: 4 /HPF — SIGNIFICANT CHANGE UP (ref 0–5)

## 2024-08-18 NOTE — HISTORY OF PRESENT ILLNESS
[Disease: _____________________] : Disease: [unfilled] [AJCC Stage: ____] : AJCC Stage: [unfilled] [de-identified] : Ms. Cassidy is a 57 yo  postmenopausal female who presents for initial consultation for NACT  for presumed GYN cancer. She reports a >5 year lapse in GYN care. She presented to [facility] ED on 5/31/23 c/o RECIO a 4 weeks. Imaging revealed a right pleural effusion (s/p thoracentesis positive for malignant cells with IHC of likely Mullerian origin), carcinomatosis, adnexal mass and a markedly elevated CA-125. IR guided core biopsy of peritoneal mass is pending.  Patient is a [employer] and does not accept blood products.  LABS on 6/6/23: CA-125 was 1,506 (ref<38)  was <2 (ref<35) CEA was <1.0 (ref<3.8)  CYTOLOGY/PATHOLOGY: 1) Right pleural thoracentesis, 6/1/23, To  a) positive for malignant cells, PAX-8 +, ER +, CK7 + - favor Mullerian origin 2) Core biopsy of peritoneal mass, 6/8/23, To  OMENTUM, CT GUIDED CORE BIOPSY POSITIVE FOR MALIGNANT CELLS. 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 The neoplastic cells are positive for Moc31, [place]-8, CK7, P53, P16,  WT1 and ER. P40 and CK 20 are negative  IMAGING: TVUS on 6/4/23: uterus 11 x 5.0 x 7.6 cm. Leiomyomatous uterus with scattered calcified uterine myomas including a 3.5 cm right intramural myoma and 5.4 cm posterior intramural myoma. EML not visualized.  RTO - 3.2 x 2.2 x 1.8 cm.  LTO - there is a 7.0 x 5.8 x 5.5 cm calcified mass in the left adnexa likely representing calcified pedunculated myoma.  Small FF.    CT C/A/P on 6/2/23: LOWER CHEST: Loculated moderate right pleural effusion and small left pleural effusion are unchanged. LIVER: Soft tissue nodularity along the inferior right hepatic lobe. BILE DUCTS: Normal caliber. GALLBLADDER: Cholelithiasis and contracted. SPLEEN: Within normal limits. PANCREAS: Within normal limits. ADRENALS: Within normal limits. KIDNEYS/URETERS: Bilateral cortical scarring. BLADDER: Within normal limits. REPRODUCTIVE ORGANS: Likely 2 intramural calcified fibroids. Another partially calcified mass in the left lower abdomen may represent a subserosal fibroid or an adnexal mass. The adnexa are not well visualized. BOWEL: No bowel obstruction. Appendix is normal. Colonic diverticulosis. PERITONEUM: Small to moderate ascites. Large amount of soft tissue in the greater omentum VESSELS: Within normal limits. RETROPERITONEUM/LYMPH NODES: No lymphadenopathy. ABDOMINAL WALL: Within normal limits. BONES: Degenerative changes. IMPRESSION: Extensive peritoneal carcinomatosis and small to moderate ascites. Fibroid uterus with either a left subserosal fibroid or a possible adnexal mass. Loculated moderate right pleural effusion and small left pleural effusion, unchanged.  Patient saw Dr. Huizar and had right pleurex catheter placed.   PMHx: DM, HTN, HLD, asthma PSHx: N/A Fam Hx: mother (colon cancer)   HCM: Mammogram: unsure Colonoscopy: 5/2019  Patient got one cycle of chemotherapy on 6/17/23 and then went to ED with purulent pleurex. catheter. PluerX placed 1 month prior)) currently undergoing chemotherapy presented to the ED complaining of purulent discharge from her PleurX catheter. PleurX catheter removed by Pulm on 7/19. Nephrology consulted for MARIANA.   [de-identified] : Sikhism [FreeTextEntry1] : Zirabev maintenance. [de-identified] : Farida is here for a follow up. She feels well. She is working full time. Tolerating treatment well.

## 2024-09-03 ENCOUNTER — EMERGENCY (EMERGENCY)
Facility: HOSPITAL | Age: 59
LOS: 1 days | Discharge: HOME | End: 2024-09-03
Attending: STUDENT IN AN ORGANIZED HEALTH CARE EDUCATION/TRAINING PROGRAM | Admitting: STUDENT IN AN ORGANIZED HEALTH CARE EDUCATION/TRAINING PROGRAM
Payer: COMMERCIAL

## 2024-09-03 VITALS
TEMPERATURE: 98 F | DIASTOLIC BLOOD PRESSURE: 84 MMHG | OXYGEN SATURATION: 98 % | WEIGHT: 190.04 LBS | SYSTOLIC BLOOD PRESSURE: 144 MMHG | HEART RATE: 83 BPM | HEIGHT: 64 IN | RESPIRATION RATE: 18 BRPM

## 2024-09-03 DIAGNOSIS — Z98.89 OTHER SPECIFIED POSTPROCEDURAL STATES: Chronic | ICD-10-CM

## 2024-09-03 DIAGNOSIS — Z98.890 OTHER SPECIFIED POSTPROCEDURAL STATES: Chronic | ICD-10-CM

## 2024-09-03 PROCEDURE — 99053 MED SERV 10PM-8AM 24 HR FAC: CPT

## 2024-09-03 PROCEDURE — 99284 EMERGENCY DEPT VISIT MOD MDM: CPT

## 2024-09-03 NOTE — ED ADULT TRIAGE NOTE - CHIEF COMPLAINT QUOTE
Pt with history of ovarian cancer on chemo , last chemo was two weeks ago, comes in c/o left knee pain and swelling. Oncologist  asked her to come in to see if it is caused by side effect of chemo drugs. Pt has had similar knee swelling in the past and had fluid drained from the knee.

## 2024-09-04 VITALS
TEMPERATURE: 98 F | OXYGEN SATURATION: 98 % | DIASTOLIC BLOOD PRESSURE: 70 MMHG | RESPIRATION RATE: 18 BRPM | HEART RATE: 88 BPM | SYSTOLIC BLOOD PRESSURE: 115 MMHG

## 2024-09-04 PROCEDURE — 73630 X-RAY EXAM OF FOOT: CPT | Mod: 26,LT

## 2024-09-04 PROCEDURE — 73610 X-RAY EXAM OF ANKLE: CPT | Mod: 26,LT

## 2024-09-04 PROCEDURE — 93970 EXTREMITY STUDY: CPT | Mod: 26

## 2024-09-04 PROCEDURE — 73562 X-RAY EXAM OF KNEE 3: CPT | Mod: 26,LT

## 2024-09-04 PROCEDURE — 73590 X-RAY EXAM OF LOWER LEG: CPT | Mod: 26,LT

## 2024-09-04 RX ORDER — KETOROLAC TROMETHAMINE 30 MG/ML
15 INJECTION, SOLUTION INTRAMUSCULAR ONCE
Refills: 0 | Status: DISCONTINUED | OUTPATIENT
Start: 2024-09-04 | End: 2024-09-04

## 2024-09-04 RX ADMIN — KETOROLAC TROMETHAMINE 15 MILLIGRAM(S): 30 INJECTION, SOLUTION INTRAMUSCULAR at 02:18

## 2024-09-04 NOTE — ED PROVIDER NOTE - PHYSICAL EXAMINATION
Gen: AAOx3, non-toxic  Head: NCAT  HEENT: EOMI, oral mucosa moist, normal conjunctiva  Lung: CTAB, no respiratory distress, no wheezes/rhonchi/rales B/L,   CV: RRR, no murmurs, rubs or gallops  Abd: soft, NTND, no guarding, no CVA tenderness  MSK:  dorsum of foot w mild associated swelling, not flucutuant, full ROM of b/l ankles, LLE moderately swollen w moderate ttp to calf/popliteal area, limited ROM of L ankle 2/2 pain however able to range.   Neuro: No focal sensory or motor deficits  Skin: Warm, well perfused, no rash  Psych: normal affect.

## 2024-09-04 NOTE — ED PROVIDER NOTE - CLINICAL SUMMARY MEDICAL DECISION MAKING FREE TEXT BOX
59-year-old female past medical history of ovarian cancer on chemotherapy, recently started in June, started on Zaribev presents ED complaining of anterior dorsal foot pain with associated left lower extremity swelling.  Patient spoke to oncologist and stated it may be side effect of her chemo drugs.  Over the last few days the patient noticed worsening left lower extremity pain causing difficulty with ambulation prompting visit to the ED.  Denies history of blood clots, denies recent long travel.  Patient spoke to oncologist about anterior dorsal foot pain, was told to see an orthopedist however was unable to see orthopedist last week.  Patient has history of left knee fracture, intermittently gets knee drained every severeal years by ortho.  Denies fevers chills nausea vomiting diarrhea chest pain shortness of breath abdominal pain dysuria.    VS. Clinically stable. PE, well appearing, no acute distress, AAOx3. NCAT, EOMI, normal conjunctiva, mucous membranes moist, LCTAB no w/r/c, no MRG, RRR, abd NDNT, no rebound tenderness or guarding, no CVA ttp, no focal neuro deficits, neurovascularly intact, dp 2+, no bruising, rashes, or erythema, dorsum of foot w mild associated swelling, not flucutuant, full ROM of b/l ankles, LLE moderately swollen w moderate ttp to calf/popliteal area, limited ROM of L ankle 2/2 pain however able to range. Suspicion for dvt vs effusion vs chemo side effect vslow suspicion for bakers cyst vs muscle strain. will assess w labs, duplex, Xrs dispo pending Roxi,

## 2024-09-04 NOTE — ED ADULT NURSE REASSESSMENT NOTE - NS ED NURSE REASSESS COMMENT FT1
Pt lying in stretcher, endorsing discomfort to left leg. Respirations are even and unlabored. Pt endorsing pain to IV site. IV not flushing. IV removed. Bed in lowest position, comfort measures provided, safety maintained.

## 2024-09-04 NOTE — ED PROVIDER NOTE - PATIENT PORTAL LINK FT
You can access the FollowMyHealth Patient Portal offered by [facility] by registering at the following website: [email or web address]. By joining [facility]’s FollowMyHealth portal, you will also be able to view your health information using other applications (apps) compatible with our system.

## 2024-09-04 NOTE — ED ADULT NURSE NOTE - NSFALLLASTSIX_ED_ALL_ED
No. O-T Advancement Flap Text: The defect edges were debeveled with a #15 scalpel blade.  Given the location of the defect, shape of the defect and the proximity to free margins an O-T advancement flap was deemed most appropriate.  Using a sterile surgical marker, an appropriate advancement flap was drawn incorporating the defect and placing the expected incisions within the relaxed skin tension lines where possible.    The area thus outlined was incised deep to adipose tissue with a #15 scalpel blade.  The skin margins were undermined to an appropriate distance in all directions utilizing iris scissors.

## 2024-09-04 NOTE — ED PROVIDER NOTE - NSFOLLOWUPINSTRUCTIONS_ED_ALL_ED_FT
You are seen in the ED for leg swelling, your evaluated with x-rays and ultrasound which showed no acute findings.  No further acute interventions required in the ED.  You should follow-up with the orthopedist as discussed please return to the ED for worsening symptoms.  This includes worsening difficulty with ambulation, worsening leg pain, fevers, chills.  You should also follow-up with your oncologist within the next 48hours    Leg Edema    WHAT YOU NEED TO KNOW:    Leg edema is swelling caused by fluid buildup. Your legs may swell if you sit or stand for long periods of time, are pregnant, or are injured. Swelling may also occur if you have heart failure or circulation problems. This means that your heart does not pump blood through your body as it should.  Lower Leg Edema    DISCHARGE INSTRUCTIONS:    Call your local emergency number (911 in the [place]) for any of the following:    You cannot walk.    You have chest pain or trouble breathing that is worse when you lie down.    You suddenly feel lightheaded and have trouble breathing.    You have new and sudden chest pain. You may have more pain when you take deep breaths or cough.    You cough up blood.  Return to the emergency department if:    You feel faint or confused.    Your skin turns blue or gray.    Your leg feels warm, tender, and painful. It may be swollen and red.  Call your doctor if:    You have a fever or feel more tired than usual.    The veins in your legs look larger than usual. They may look full or bulging.    Your legs itch or feel heavy.    You have red or white areas or sores on your legs. The skin may also appear dimpled or have indentations.    You are gaining weight.    You have trouble moving your ankles.    The swelling does not go away, or other parts of your body swell.    You have questions or concerns about your condition or care.  Self-care:    Elevate your legs. Raise your legs above the level of your heart as often as you can. This will help decrease swelling and pain. Prop your legs on pillows or blankets to keep them elevated comfortably.  Elevate Leg      Wear pressure stockings, if directed. These tight stockings put pressure on your legs to promote blood flow and prevent blood clots. Put them on before you get out of bed. Wear the stockings during the day. Do not wear them while you sleep.  Pressure Stockings       Stay active. Do not stand or sit for long periods of time. Ask your healthcare provider about the best exercise plan for you.  Walking for Exercise      Eat healthy foods. Healthy foods include fruits, vegetables, whole-grain breads, low-fat dairy products, beans, lean meats, and fish. Ask if you need to be on a special diet.  Healthy Foods      Limit sodium (salt). Salt will make your body hold even more fluid. Your healthcare provider will tell you how many milligrams (mg) of salt you can have each day.    Follow up with your doctor as directed: Write down your questions so you remember to ask them during your visits.

## 2024-09-04 NOTE — ED PROVIDER NOTE - PROGRESS NOTE DETAILS
MD Puga: US negative. XR negative for fracture, but significant for joint arthrosis. Pt was re-evaluated at bedside, VSS, feeling better overall. Results were discussed with patient as well as return precautions and follow up plan with PCP and/or orthopedic surgery. Time was taken to answer any questions that the patient had before providing them with discharge paperwork.

## 2024-09-04 NOTE — ED PROVIDER NOTE - OBJECTIVE STATEMENT
59-year-old female past medical history of ovarian cancer on chemotherapy, recently started in June, started on Zaribev presents ED complaining of anterior dorsal foot pain with associated left lower extremity swelling.  Patient spoke to oncologist and stated it may be side effect of her chemo drugs.  Over the last few days the patient noticed worsening left lower extremity pain causing difficulty with ambulation prompting visit to the ED.  Denies history of blood clots, denies recent long travel.  Patient spoke to oncologist about anterior dorsal foot pain, was told to see an orthopedist however was unable to see orthopedist last week.  Patient has history of left knee fracture, intermittently gets knee drained every severeal years by ortho.  Denies fevers chills nausea vomiting diarrhea chest pain shortness of breath abdominal pain dysuria.

## 2024-09-04 NOTE — ED ADULT NURSE NOTE - OBJECTIVE STATEMENT
Pt arrives to ED A&Ox4, ambulatory at baseline. Pt C/O left knee pain and swelling. PMHx of ovarian cancer, on chemo, last chemo treatment was last week. Pt has a port but prefers for it not to be accessed. Pt sent by oncologist to determine if pain/ swelling is cuased by chemo. Respirations are even and unlabored, abdomen is soft and nondistended, capillary refill is 2 seconds bilaterally. Attempted IV placement twice. 20G IV placed by CHETAN Greene. Labs drawn and sent. Medicated as per EMAR. Bed in lowest position, comfort measures provided, safety maintained.

## 2024-09-06 ENCOUNTER — APPOINTMENT (OUTPATIENT)
Dept: INFUSION THERAPY | Facility: HOSPITAL | Age: 59
End: 2024-09-06

## 2024-09-06 ENCOUNTER — RESULT REVIEW (OUTPATIENT)
Age: 59
End: 2024-09-06

## 2024-09-06 ENCOUNTER — APPOINTMENT (OUTPATIENT)
Dept: HEMATOLOGY ONCOLOGY | Facility: CLINIC | Age: 59
End: 2024-09-06
Payer: COMMERCIAL

## 2024-09-06 VITALS
OXYGEN SATURATION: 97 % | HEART RATE: 60 BPM | DIASTOLIC BLOOD PRESSURE: 77 MMHG | SYSTOLIC BLOOD PRESSURE: 117 MMHG | WEIGHT: 189.99 LBS | BODY MASS INDEX: 35.92 KG/M2 | RESPIRATION RATE: 16 BRPM | TEMPERATURE: 97.2 F

## 2024-09-06 DIAGNOSIS — C56.9 MALIGNANT NEOPLASM OF UNSPECIFIED OVARY: ICD-10-CM

## 2024-09-06 LAB
ALBUMIN SERPL ELPH-MCNC: 3.6 G/DL — SIGNIFICANT CHANGE UP (ref 3.3–5)
ALP SERPL-CCNC: 99 U/L — SIGNIFICANT CHANGE UP (ref 40–120)
ALT FLD-CCNC: 14 U/L — SIGNIFICANT CHANGE UP (ref 10–45)
ANION GAP SERPL CALC-SCNC: 9 MMOL/L — SIGNIFICANT CHANGE UP (ref 5–17)
AST SERPL-CCNC: 23 U/L — SIGNIFICANT CHANGE UP (ref 10–40)
BASOPHILS # BLD AUTO: 0 K/UL — SIGNIFICANT CHANGE UP (ref 0–0.2)
BASOPHILS NFR BLD AUTO: 0 % — SIGNIFICANT CHANGE UP (ref 0–2)
BILIRUB SERPL-MCNC: 0.3 MG/DL — SIGNIFICANT CHANGE UP (ref 0.2–1.2)
BUN SERPL-MCNC: 14 MG/DL — SIGNIFICANT CHANGE UP (ref 7–23)
CALCIUM SERPL-MCNC: 9.6 MG/DL — SIGNIFICANT CHANGE UP (ref 8.4–10.5)
CHLORIDE SERPL-SCNC: 104 MMOL/L — SIGNIFICANT CHANGE UP (ref 96–108)
CO2 SERPL-SCNC: 27 MMOL/L — SIGNIFICANT CHANGE UP (ref 22–31)
CREAT SERPL-MCNC: 0.82 MG/DL — SIGNIFICANT CHANGE UP (ref 0.5–1.3)
EGFR: 82 ML/MIN/1.73M2 — SIGNIFICANT CHANGE UP
EOSINOPHIL # BLD AUTO: 0.07 K/UL — SIGNIFICANT CHANGE UP (ref 0–0.5)
EOSINOPHIL NFR BLD AUTO: 1.5 % — SIGNIFICANT CHANGE UP (ref 0–6)
GLUCOSE SERPL-MCNC: 112 MG/DL — HIGH (ref 70–99)
HCT VFR BLD CALC: 32.4 % — LOW (ref 34.5–45)
HGB BLD-MCNC: 10.9 G/DL — LOW (ref 11.5–15.5)
IMM GRANULOCYTES NFR BLD AUTO: 0.4 % — SIGNIFICANT CHANGE UP (ref 0–0.9)
LYMPHOCYTES # BLD AUTO: 1.57 K/UL — SIGNIFICANT CHANGE UP (ref 1–3.3)
LYMPHOCYTES # BLD AUTO: 33.6 % — SIGNIFICANT CHANGE UP (ref 13–44)
MCHC RBC-ENTMCNC: 28.7 PG — SIGNIFICANT CHANGE UP (ref 27–34)
MCHC RBC-ENTMCNC: 33.6 G/DL — SIGNIFICANT CHANGE UP (ref 32–36)
MCV RBC AUTO: 85.3 FL — SIGNIFICANT CHANGE UP (ref 80–100)
MONOCYTES # BLD AUTO: 0.3 K/UL — SIGNIFICANT CHANGE UP (ref 0–0.9)
MONOCYTES NFR BLD AUTO: 6.4 % — SIGNIFICANT CHANGE UP (ref 2–14)
NEUTROPHILS # BLD AUTO: 2.71 K/UL — SIGNIFICANT CHANGE UP (ref 1.8–7.4)
NEUTROPHILS NFR BLD AUTO: 58.1 % — SIGNIFICANT CHANGE UP (ref 43–77)
NRBC # BLD: 0 /100 WBCS — SIGNIFICANT CHANGE UP (ref 0–0)
PLATELET # BLD AUTO: 129 K/UL — LOW (ref 150–400)
POTASSIUM SERPL-MCNC: 3.6 MMOL/L — SIGNIFICANT CHANGE UP (ref 3.5–5.3)
POTASSIUM SERPL-SCNC: 3.6 MMOL/L — SIGNIFICANT CHANGE UP (ref 3.5–5.3)
PROT SERPL-MCNC: 7.8 G/DL — SIGNIFICANT CHANGE UP (ref 6–8.3)
RBC # BLD: 3.8 M/UL — SIGNIFICANT CHANGE UP (ref 3.8–5.2)
RBC # FLD: 13.6 % — SIGNIFICANT CHANGE UP (ref 10.3–14.5)
SODIUM SERPL-SCNC: 140 MMOL/L — SIGNIFICANT CHANGE UP (ref 135–145)
WBC # BLD: 4.67 K/UL — SIGNIFICANT CHANGE UP (ref 3.8–10.5)
WBC # FLD AUTO: 4.67 K/UL — SIGNIFICANT CHANGE UP (ref 3.8–10.5)

## 2024-09-06 PROCEDURE — 99213 OFFICE O/P EST LOW 20 MIN: CPT

## 2024-09-06 PROCEDURE — G2211 COMPLEX E/M VISIT ADD ON: CPT

## 2024-09-06 NOTE — HISTORY OF PRESENT ILLNESS
[Disease: _____________________] : Disease: [unfilled] [AJCC Stage: ____] : AJCC Stage: [unfilled] [de-identified] : Ms. Cassidy is a 57 yo  postmenopausal female who presents for initial consultation for NACT  for presumed GYN cancer. She reports a >5 year lapse in GYN care. She presented to [facility] ED on 5/31/23 c/o RECIO a 4 weeks. Imaging revealed a right pleural effusion (s/p thoracentesis positive for malignant cells with IHC of likely Mullerian origin), carcinomatosis, adnexal mass and a markedly elevated CA-125. IR guided core biopsy of peritoneal mass is pending.  Patient is a [employer]ism and does not accept blood products.  LABS on 6/6/23: CA-125 was 1,506 (ref<38)  was <2 (ref<35) CEA was <1.0 (ref<3.8)  CYTOLOGY/PATHOLOGY: 1) Right pleural thoracentesis, 6/1/23, To  a) positive for malignant cells, PAX-8 +, ER +, CK7 + - favor Mullerian origin 2) Core biopsy of peritoneal mass, 6/8/23, To  OMENTUM, CT GUIDED CORE BIOPSY POSITIVE FOR MALIGNANT CELLS. 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 The neoplastic cells are positive for Moc31, [place]-8, CK7, P53, P16,  WT1 and ER. P40 and CK 20 are negative  IMAGING: TVUS on 6/4/23: uterus 11 x 5.0 x 7.6 cm. Leiomyomatous uterus with scattered calcified uterine myomas including a 3.5 cm right intramural myoma and 5.4 cm posterior intramural myoma. EML not visualized.  RTO - 3.2 x 2.2 x 1.8 cm.  LTO - there is a 7.0 x 5.8 x 5.5 cm calcified mass in the left adnexa likely representing calcified pedunculated myoma.  Small FF.    CT C/A/P on 6/2/23: LOWER CHEST: Loculated moderate right pleural effusion and small left pleural effusion are unchanged. LIVER: Soft tissue nodularity along the inferior right hepatic lobe. BILE DUCTS: Normal caliber. GALLBLADDER: Cholelithiasis and contracted. SPLEEN: Within normal limits. PANCREAS: Within normal limits. ADRENALS: Within normal limits. KIDNEYS/URETERS: Bilateral cortical scarring. BLADDER: Within normal limits. REPRODUCTIVE ORGANS: Likely 2 intramural calcified fibroids. Another partially calcified mass in the left lower abdomen may represent a subserosal fibroid or an adnexal mass. The adnexa are not well visualized. BOWEL: No bowel obstruction. Appendix is normal. Colonic diverticulosis. PERITONEUM: Small to moderate ascites. Large amount of soft tissue in the greater omentum VESSELS: Within normal limits. RETROPERITONEUM/LYMPH NODES: No lymphadenopathy. ABDOMINAL WALL: Within normal limits. BONES: Degenerative changes. IMPRESSION: Extensive peritoneal carcinomatosis and small to moderate ascites. Fibroid uterus with either a left subserosal fibroid or a possible adnexal mass. Loculated moderate right pleural effusion and small left pleural effusion, unchanged.  Patient saw Dr. Huizar and had right pleurex catheter placed.   PMHx: DM, HTN, HLD, asthma PSHx: N/A Fam Hx: mother (colon cancer)   HCM: Mammogram: unsure Colonoscopy: 5/2019  Patient got one cycle of chemotherapy on 6/17/23 and then went to ED with purulent pleurex. catheter. PluerX placed 1 month prior)) currently undergoing chemotherapy presented to the ED complaining of purulent discharge from her PleurX catheter. PleurX catheter removed by Pulm on 7/19. Nephrology consulted for MARIANA.   [de-identified] : Scientologist [FreeTextEntry1] : Zirabev maintenance. [de-identified] : Patient is here for follow up, complains of severe left knee pain, using crutches to walk due to pain. She also continues to have b/l ankle/foot pain and some hand pain. She was in ED on Tuesday for knee pain. She had xrays and US, has left knee effusion. Has ortho f/up in 2 weeks, trying to get sooner appt. Appetite is good and she is otherwise feeling well. Denies chest pain, SOB, abdominal pain, nausea, vomiting, bowel/bladder issues, bleeding.

## 2024-09-06 NOTE — PHYSICAL EXAM
[Fully active, able to carry on all pre-disease performance without restriction] : Status 0 - Fully active, able to carry on all pre-disease performance without restriction [Normal] : affect appropriate [de-identified] : left knee and b/l ankle swelling, walking with crutches

## 2024-09-06 NOTE — REASON FOR VISIT
[Follow-Up Visit] : a follow-up [Pre-Treatment Visit] : a pre-treatment [FreeTextEntry2] : ovarian cancer.

## 2024-09-07 LAB
APPEARANCE UR: CLEAR — SIGNIFICANT CHANGE UP
BILIRUB UR-MCNC: NEGATIVE — SIGNIFICANT CHANGE UP
CANCER AG125 SERPL-ACNC: 174 U/ML — HIGH
COLOR SPEC: YELLOW — SIGNIFICANT CHANGE UP
CREAT ?TM UR-MCNC: 118 MG/DL — SIGNIFICANT CHANGE UP
DIFF PNL FLD: NEGATIVE — SIGNIFICANT CHANGE UP
GLUCOSE UR QL: NEGATIVE MG/DL — SIGNIFICANT CHANGE UP
KETONES UR-MCNC: NEGATIVE MG/DL — SIGNIFICANT CHANGE UP
LEUKOCYTE ESTERASE UR-ACNC: NEGATIVE — SIGNIFICANT CHANGE UP
NITRITE UR-MCNC: NEGATIVE — SIGNIFICANT CHANGE UP
PH UR: 5.5 — SIGNIFICANT CHANGE UP (ref 5–8)
PROT ?TM UR-MCNC: 16 MG/DL — HIGH (ref 0–12)
PROT UR-MCNC: NEGATIVE MG/DL — SIGNIFICANT CHANGE UP
PROT/CREAT UR-RTO: 0.1 RATIO — SIGNIFICANT CHANGE UP (ref 0–0.2)
SP GR SPEC: 1.02 — SIGNIFICANT CHANGE UP (ref 1–1.03)
UROBILINOGEN FLD QL: 1 MG/DL — SIGNIFICANT CHANGE UP (ref 0.2–1)

## 2024-09-10 ENCOUNTER — APPOINTMENT (OUTPATIENT)
Dept: ORTHOPEDIC SURGERY | Facility: CLINIC | Age: 59
End: 2024-09-10
Payer: COMMERCIAL

## 2024-09-10 VITALS — HEIGHT: 60 IN | BODY MASS INDEX: 37.11 KG/M2 | WEIGHT: 189 LBS

## 2024-09-10 DIAGNOSIS — M17.12 UNILATERAL PRIMARY OSTEOARTHRITIS, LEFT KNEE: ICD-10-CM

## 2024-09-10 DIAGNOSIS — M25.462 EFFUSION, LEFT KNEE: ICD-10-CM

## 2024-09-10 PROCEDURE — J3490M: CUSTOM

## 2024-09-10 PROCEDURE — 20611 DRAIN/INJ JOINT/BURSA W/US: CPT | Mod: LT

## 2024-09-10 PROCEDURE — 99214 OFFICE O/P EST MOD 30 MIN: CPT | Mod: 25

## 2024-09-10 NOTE — HISTORY OF PRESENT ILLNESS
[de-identified] : 09/10/2024 Ms. ALO RAMIREZ, a 59 year old female, presents today for L knee pain since 8/26/24.Pain gradually got worse, and went to [facility] on 9/3/24 where XR and US were done with negative results. States undergoing ovarian cancer treatment, and medication (zaribev) cause extreme joint pain. Oncologist Dr Whit Cuellar concerned with continuing maintenance chemo treatment until knee pain is assessed and resolved.  PMH aspiration done with Dr Voss 9/5/23 to L knee

## 2024-09-10 NOTE — DISCUSSION/SUMMARY
[de-identified] : 59f with acute exacerbation of left knee djd with effusion. Spoke with pt's oncologist on the phone today re: clearance for csi  1) csi left knee today - tolerated well 2) cryotherapy, rest and activity modification  3) hep as tolerated 4) rtc prn   Entered by Emelia Bee acting as scribe. Dr. Voss- The documentation recorded by the scribe accurately reflects the service I personally performed and the decisions made by me.

## 2024-09-18 ENCOUNTER — OUTPATIENT (OUTPATIENT)
Dept: OUTPATIENT SERVICES | Facility: HOSPITAL | Age: 59
LOS: 1 days | Discharge: HOME | End: 2024-09-18

## 2024-09-18 DIAGNOSIS — C57.4 MALIGNANT NEOPLASM OF UTERINE ADNEXA, UNSPECIFIED: ICD-10-CM

## 2024-09-18 DIAGNOSIS — Z98.89 OTHER SPECIFIED POSTPROCEDURAL STATES: Chronic | ICD-10-CM

## 2024-09-18 DIAGNOSIS — Z98.890 OTHER SPECIFIED POSTPROCEDURAL STATES: Chronic | ICD-10-CM

## 2024-09-20 ENCOUNTER — RESULT REVIEW (OUTPATIENT)
Age: 59
End: 2024-09-20

## 2024-09-20 ENCOUNTER — APPOINTMENT (OUTPATIENT)
Dept: INFUSION THERAPY | Facility: HOSPITAL | Age: 59
End: 2024-09-20

## 2024-09-20 ENCOUNTER — APPOINTMENT (OUTPATIENT)
Dept: HEMATOLOGY ONCOLOGY | Facility: CLINIC | Age: 59
End: 2024-09-20
Payer: COMMERCIAL

## 2024-09-20 DIAGNOSIS — C56.9 MALIGNANT NEOPLASM OF UNSPECIFIED OVARY: ICD-10-CM

## 2024-09-20 DIAGNOSIS — Z51.11 ENCOUNTER FOR ANTINEOPLASTIC CHEMOTHERAPY: ICD-10-CM

## 2024-09-20 PROCEDURE — 99213 OFFICE O/P EST LOW 20 MIN: CPT

## 2024-09-20 PROCEDURE — G2211 COMPLEX E/M VISIT ADD ON: CPT

## 2024-09-20 NOTE — PHYSICAL EXAM
[Fully active, able to carry on all pre-disease performance without restriction] : Status 0 - Fully active, able to carry on all pre-disease performance without restriction [Normal] : affect appropriate [de-identified] : left knee and b/l ankle swelling, walking with crutches

## 2024-09-20 NOTE — HISTORY OF PRESENT ILLNESS
[Disease: _____________________] : Disease: [unfilled] [AJCC Stage: ____] : AJCC Stage: [unfilled] [de-identified] : Ms. Cassidy is a 59 yo  postmenopausal female who presents for initial consultation for NACT  for presumed GYN cancer. She reports a >5 year lapse in GYN care. She presented to [facility] ED on 5/31/23 c/o RECIO a 4 weeks. Imaging revealed a right pleural effusion (s/p thoracentesis positive for malignant cells with IHC of likely Mullerian origin), carcinomatosis, adnexal mass and a markedly elevated CA-125. IR guided core biopsy of peritoneal mass is pending.  Patient is a [employer] and does not accept blood products.  LABS on 6/6/23: CA-125 was 1,506 (ref<38)  was <2 (ref<35) CEA was <1.0 (ref<3.8)  CYTOLOGY/PATHOLOGY: 1) Right pleural thoracentesis, 6/1/23, To  a) positive for malignant cells, PAX-8 +, ER +, CK7 + - favor Mullerian origin 2) Core biopsy of peritoneal mass, 6/8/23, To  OMENTUM, CT GUIDED CORE BIOPSY POSITIVE FOR MALIGNANT CELLS. 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 The neoplastic cells are positive for Moc31, [place]-8, CK7, P53, P16,  WT1 and ER. P40 and CK 20 are negative  IMAGING: TVUS on 6/4/23: uterus 11 x 5.0 x 7.6 cm. Leiomyomatous uterus with scattered calcified uterine myomas including a 3.5 cm right intramural myoma and 5.4 cm posterior intramural myoma. EML not visualized.  RTO - 3.2 x 2.2 x 1.8 cm.  LTO - there is a 7.0 x 5.8 x 5.5 cm calcified mass in the left adnexa likely representing calcified pedunculated myoma.  Small FF.    CT C/A/P on 6/2/23: LOWER CHEST: Loculated moderate right pleural effusion and small left pleural effusion are unchanged. LIVER: Soft tissue nodularity along the inferior right hepatic lobe. BILE DUCTS: Normal caliber. GALLBLADDER: Cholelithiasis and contracted. SPLEEN: Within normal limits. PANCREAS: Within normal limits. ADRENALS: Within normal limits. KIDNEYS/URETERS: Bilateral cortical scarring. BLADDER: Within normal limits. REPRODUCTIVE ORGANS: Likely 2 intramural calcified fibroids. Another partially calcified mass in the left lower abdomen may represent a subserosal fibroid or an adnexal mass. The adnexa are not well visualized. BOWEL: No bowel obstruction. Appendix is normal. Colonic diverticulosis. PERITONEUM: Small to moderate ascites. Large amount of soft tissue in the greater omentum VESSELS: Within normal limits. RETROPERITONEUM/LYMPH NODES: No lymphadenopathy. ABDOMINAL WALL: Within normal limits. BONES: Degenerative changes. IMPRESSION: Extensive peritoneal carcinomatosis and small to moderate ascites. Fibroid uterus with either a left subserosal fibroid or a possible adnexal mass. Loculated moderate right pleural effusion and small left pleural effusion, unchanged.  Patient saw Dr. Huizar and had right pleurex catheter placed.   PMHx: DM, HTN, HLD, asthma PSHx: N/A Fam Hx: mother (colon cancer)   HCM: Mammogram: unsure Colonoscopy: 5/2019  Patient got one cycle of chemotherapy on 6/17/23 and then went to ED with purulent pleurex. catheter. PluerX placed 1 month prior)) currently undergoing chemotherapy presented to the ED complaining of purulent discharge from her PleurX catheter. PleurX catheter removed by Pulm on 7/19. Nephrology consulted for MARIANA.   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 She is feeling well.  Knee and ankle pain is much better. Good appetite and energy levels. She denies fever, chills, chest pain, SOB, abdominal pain, bloating, nausea, vomiting, bowel/bladder issues, bleeding, swelling.

## 2024-09-21 LAB
ALBUMIN SERPL ELPH-MCNC: 3.7 G/DL — SIGNIFICANT CHANGE UP (ref 3.3–5)
ALP SERPL-CCNC: 98 U/L — SIGNIFICANT CHANGE UP (ref 40–120)
ALT FLD-CCNC: 9 U/L — LOW (ref 10–45)
ANION GAP SERPL CALC-SCNC: 12 MMOL/L — SIGNIFICANT CHANGE UP (ref 5–17)
APPEARANCE UR: CLEAR — SIGNIFICANT CHANGE UP
AST SERPL-CCNC: 17 U/L — SIGNIFICANT CHANGE UP (ref 10–40)
BACTERIA # UR AUTO: ABNORMAL /HPF
BILIRUB SERPL-MCNC: 0.2 MG/DL — SIGNIFICANT CHANGE UP (ref 0.2–1.2)
BILIRUB UR-MCNC: NEGATIVE — SIGNIFICANT CHANGE UP
BUN SERPL-MCNC: 19 MG/DL — SIGNIFICANT CHANGE UP (ref 7–23)
CALCIUM SERPL-MCNC: 9.3 MG/DL — SIGNIFICANT CHANGE UP (ref 8.4–10.5)
CANCER AG125 SERPL-ACNC: 175 U/ML — HIGH
CAST: 0 /LPF — SIGNIFICANT CHANGE UP (ref 0–4)
CHLORIDE SERPL-SCNC: 104 MMOL/L — SIGNIFICANT CHANGE UP (ref 96–108)
CO2 SERPL-SCNC: 24 MMOL/L — SIGNIFICANT CHANGE UP (ref 22–31)
COLOR SPEC: YELLOW — SIGNIFICANT CHANGE UP
CREAT SERPL-MCNC: 1.06 MG/DL — SIGNIFICANT CHANGE UP (ref 0.5–1.3)
DIFF PNL FLD: NEGATIVE — SIGNIFICANT CHANGE UP
EGFR: 60 ML/MIN/1.73M2 — SIGNIFICANT CHANGE UP
GLUCOSE SERPL-MCNC: 153 MG/DL — HIGH (ref 70–99)
GLUCOSE UR QL: NEGATIVE MG/DL — SIGNIFICANT CHANGE UP
KETONES UR-MCNC: NEGATIVE MG/DL — SIGNIFICANT CHANGE UP
LEUKOCYTE ESTERASE UR-ACNC: ABNORMAL
MAGNESIUM SERPL-MCNC: 1.8 MG/DL — SIGNIFICANT CHANGE UP (ref 1.6–2.6)
NITRITE UR-MCNC: NEGATIVE — SIGNIFICANT CHANGE UP
PH UR: 7 — SIGNIFICANT CHANGE UP (ref 5–8)
POTASSIUM SERPL-MCNC: 4.7 MMOL/L — SIGNIFICANT CHANGE UP (ref 3.5–5.3)
POTASSIUM SERPL-SCNC: 4.7 MMOL/L — SIGNIFICANT CHANGE UP (ref 3.5–5.3)
PROT SERPL-MCNC: 7.5 G/DL — SIGNIFICANT CHANGE UP (ref 6–8.3)
PROT UR-MCNC: NEGATIVE MG/DL — SIGNIFICANT CHANGE UP
RBC CASTS # UR COMP ASSIST: 1 /HPF — SIGNIFICANT CHANGE UP (ref 0–4)
SODIUM SERPL-SCNC: 139 MMOL/L — SIGNIFICANT CHANGE UP (ref 135–145)
SP GR SPEC: 1.01 — SIGNIFICANT CHANGE UP (ref 1–1.03)
SQUAMOUS # UR AUTO: 13 /HPF — HIGH (ref 0–5)
UROBILINOGEN FLD QL: 1 MG/DL — SIGNIFICANT CHANGE UP (ref 0.2–1)
WBC UR QL: 5 /HPF — SIGNIFICANT CHANGE UP (ref 0–5)

## 2024-10-02 NOTE — DIETITIAN INITIAL EVALUATION ADULT - PROBLEM SELECTOR PLAN 2
[FreeTextEntry1] : 73M with pmhx of gastric incisura HGD lesion presenting for follow-up. Had scheduled EGD with ESD tomorrow. Had additional questions today. All questions answered. Procedure discussed.  - Proceed with planned EGD/ESD of incisura lesion. Risks, benefits, and alternatives of procedure discussed at length with patient including but not limited to bleeding, perforation, pancreatitis, infection, anesthesia complication, etc. Patient acknowledged and agreeable for procedure.   
Exertional cp that is non-radiating  ECG with no acute ischemic changes on admission, Trops negative (9)  BNP of 60- less likely CHF related  - TTE Echo for further eval especially in setting of pleural effusions

## 2024-10-07 ENCOUNTER — APPOINTMENT (OUTPATIENT)
Dept: ENDOCRINOLOGY | Facility: CLINIC | Age: 59
End: 2024-10-07

## 2024-10-11 ENCOUNTER — RESULT REVIEW (OUTPATIENT)
Age: 59
End: 2024-10-11

## 2024-10-11 ENCOUNTER — APPOINTMENT (OUTPATIENT)
Dept: INFUSION THERAPY | Facility: HOSPITAL | Age: 59
End: 2024-10-11

## 2024-10-11 LAB
ALBUMIN SERPL ELPH-MCNC: 3.3 G/DL — SIGNIFICANT CHANGE UP (ref 3.3–5)
ALP SERPL-CCNC: 91 U/L — SIGNIFICANT CHANGE UP (ref 40–120)
ALT FLD-CCNC: 5 U/L — LOW (ref 10–45)
ANION GAP SERPL CALC-SCNC: 11 MMOL/L — SIGNIFICANT CHANGE UP (ref 5–17)
APPEARANCE UR: CLEAR — SIGNIFICANT CHANGE UP
AST SERPL-CCNC: 19 U/L — SIGNIFICANT CHANGE UP (ref 10–40)
BASOPHILS # BLD AUTO: 0 K/UL — SIGNIFICANT CHANGE UP (ref 0–0.2)
BASOPHILS NFR BLD AUTO: 0 % — SIGNIFICANT CHANGE UP (ref 0–2)
BILIRUB SERPL-MCNC: 0.3 MG/DL — SIGNIFICANT CHANGE UP (ref 0.2–1.2)
BILIRUB UR-MCNC: NEGATIVE — SIGNIFICANT CHANGE UP
BUN SERPL-MCNC: 15 MG/DL — SIGNIFICANT CHANGE UP (ref 7–23)
CALCIUM SERPL-MCNC: 9.1 MG/DL — SIGNIFICANT CHANGE UP (ref 8.4–10.5)
CANCER AG125 SERPL-ACNC: 161 U/ML — HIGH
CHLORIDE SERPL-SCNC: 104 MMOL/L — SIGNIFICANT CHANGE UP (ref 96–108)
CO2 SERPL-SCNC: 25 MMOL/L — SIGNIFICANT CHANGE UP (ref 22–31)
COLOR SPEC: YELLOW — SIGNIFICANT CHANGE UP
CREAT SERPL-MCNC: 0.93 MG/DL — SIGNIFICANT CHANGE UP (ref 0.5–1.3)
DIFF PNL FLD: NEGATIVE — SIGNIFICANT CHANGE UP
EGFR: 70 ML/MIN/1.73M2 — SIGNIFICANT CHANGE UP
EOSINOPHIL # BLD AUTO: 0.12 K/UL — SIGNIFICANT CHANGE UP (ref 0–0.5)
EOSINOPHIL NFR BLD AUTO: 2.6 % — SIGNIFICANT CHANGE UP (ref 0–6)
GLUCOSE SERPL-MCNC: 200 MG/DL — HIGH (ref 70–99)
GLUCOSE UR QL: NEGATIVE MG/DL — SIGNIFICANT CHANGE UP
HCT VFR BLD CALC: 33.5 % — LOW (ref 34.5–45)
HGB BLD-MCNC: 11.4 G/DL — LOW (ref 11.5–15.5)
IMM GRANULOCYTES NFR BLD AUTO: 0.4 % — SIGNIFICANT CHANGE UP (ref 0–0.9)
KETONES UR-MCNC: NEGATIVE MG/DL — SIGNIFICANT CHANGE UP
LEUKOCYTE ESTERASE UR-ACNC: NEGATIVE — SIGNIFICANT CHANGE UP
LYMPHOCYTES # BLD AUTO: 1.84 K/UL — SIGNIFICANT CHANGE UP (ref 1–3.3)
LYMPHOCYTES # BLD AUTO: 40.3 % — SIGNIFICANT CHANGE UP (ref 13–44)
MCHC RBC-ENTMCNC: 29.2 PG — SIGNIFICANT CHANGE UP (ref 27–34)
MCHC RBC-ENTMCNC: 34 G/DL — SIGNIFICANT CHANGE UP (ref 32–36)
MCV RBC AUTO: 85.9 FL — SIGNIFICANT CHANGE UP (ref 80–100)
MONOCYTES # BLD AUTO: 0.28 K/UL — SIGNIFICANT CHANGE UP (ref 0–0.9)
MONOCYTES NFR BLD AUTO: 6.1 % — SIGNIFICANT CHANGE UP (ref 2–14)
NEUTROPHILS # BLD AUTO: 2.31 K/UL — SIGNIFICANT CHANGE UP (ref 1.8–7.4)
NEUTROPHILS NFR BLD AUTO: 50.6 % — SIGNIFICANT CHANGE UP (ref 43–77)
NITRITE UR-MCNC: NEGATIVE — SIGNIFICANT CHANGE UP
NRBC # BLD: 0 /100 WBCS — SIGNIFICANT CHANGE UP (ref 0–0)
PH UR: 7 — SIGNIFICANT CHANGE UP (ref 5–8)
PLATELET # BLD AUTO: 98 K/UL — LOW (ref 150–400)
POTASSIUM SERPL-MCNC: 3.9 MMOL/L — SIGNIFICANT CHANGE UP (ref 3.5–5.3)
POTASSIUM SERPL-SCNC: 3.9 MMOL/L — SIGNIFICANT CHANGE UP (ref 3.5–5.3)
PROT SERPL-MCNC: 6.6 G/DL — SIGNIFICANT CHANGE UP (ref 6–8.3)
PROT UR-MCNC: NEGATIVE MG/DL — SIGNIFICANT CHANGE UP
RBC # BLD: 3.9 M/UL — SIGNIFICANT CHANGE UP (ref 3.8–5.2)
RBC # FLD: 14.4 % — SIGNIFICANT CHANGE UP (ref 10.3–14.5)
SODIUM SERPL-SCNC: 140 MMOL/L — SIGNIFICANT CHANGE UP (ref 135–145)
SP GR SPEC: 1.02 — SIGNIFICANT CHANGE UP (ref 1–1.03)
UROBILINOGEN FLD QL: 1 MG/DL — SIGNIFICANT CHANGE UP (ref 0.2–1)
WBC # BLD: 4.57 K/UL — SIGNIFICANT CHANGE UP (ref 3.8–10.5)
WBC # FLD AUTO: 4.57 K/UL — SIGNIFICANT CHANGE UP (ref 3.8–10.5)

## 2024-10-12 LAB
CREAT ?TM UR-MCNC: 97 MG/DL — SIGNIFICANT CHANGE UP
PROT ?TM UR-MCNC: 11 MG/DL — SIGNIFICANT CHANGE UP (ref 0–12)
PROT/CREAT UR-RTO: 0.1 RATIO — SIGNIFICANT CHANGE UP (ref 0–0.2)

## 2024-10-15 ENCOUNTER — APPOINTMENT (OUTPATIENT)
Dept: CT IMAGING | Facility: IMAGING CENTER | Age: 59
End: 2024-10-15
Payer: COMMERCIAL

## 2024-10-15 ENCOUNTER — OUTPATIENT (OUTPATIENT)
Dept: OUTPATIENT SERVICES | Facility: HOSPITAL | Age: 59
LOS: 1 days | End: 2024-10-15
Payer: COMMERCIAL

## 2024-10-15 DIAGNOSIS — Z98.890 OTHER SPECIFIED POSTPROCEDURAL STATES: Chronic | ICD-10-CM

## 2024-10-15 DIAGNOSIS — C56.9 MALIGNANT NEOPLASM OF UNSPECIFIED OVARY: ICD-10-CM

## 2024-10-15 DIAGNOSIS — Z98.89 OTHER SPECIFIED POSTPROCEDURAL STATES: Chronic | ICD-10-CM

## 2024-10-15 DIAGNOSIS — Z00.8 ENCOUNTER FOR OTHER GENERAL EXAMINATION: ICD-10-CM

## 2024-10-15 PROCEDURE — 74177 CT ABD & PELVIS W/CONTRAST: CPT

## 2024-10-15 PROCEDURE — 71260 CT THORAX DX C+: CPT

## 2024-10-15 PROCEDURE — 74177 CT ABD & PELVIS W/CONTRAST: CPT | Mod: 26

## 2024-10-15 PROCEDURE — 71260 CT THORAX DX C+: CPT | Mod: 26

## 2024-10-22 ENCOUNTER — APPOINTMENT (OUTPATIENT)
Dept: HEMATOLOGY ONCOLOGY | Facility: CLINIC | Age: 59
End: 2024-10-22
Payer: COMMERCIAL

## 2024-10-22 VITALS
TEMPERATURE: 96.6 F | RESPIRATION RATE: 17 BRPM | SYSTOLIC BLOOD PRESSURE: 143 MMHG | OXYGEN SATURATION: 98 % | BODY MASS INDEX: 36.81 KG/M2 | WEIGHT: 188.49 LBS | HEART RATE: 83 BPM | DIASTOLIC BLOOD PRESSURE: 82 MMHG

## 2024-10-22 DIAGNOSIS — C56.9 MALIGNANT NEOPLASM OF UNSPECIFIED OVARY: ICD-10-CM

## 2024-10-22 PROCEDURE — 99214 OFFICE O/P EST MOD 30 MIN: CPT

## 2024-10-29 ENCOUNTER — APPOINTMENT (OUTPATIENT)
Dept: CARDIOLOGY | Facility: CLINIC | Age: 59
End: 2024-10-29
Payer: COMMERCIAL

## 2024-10-29 PROCEDURE — 93306 TTE W/DOPPLER COMPLETE: CPT

## 2024-11-01 ENCOUNTER — NON-APPOINTMENT (OUTPATIENT)
Age: 59
End: 2024-11-01

## 2024-11-01 ENCOUNTER — RESULT REVIEW (OUTPATIENT)
Age: 59
End: 2024-11-01

## 2024-11-01 ENCOUNTER — APPOINTMENT (OUTPATIENT)
Dept: INFUSION THERAPY | Facility: HOSPITAL | Age: 59
End: 2024-11-01

## 2024-11-01 LAB
BASOPHILS # BLD AUTO: 0.01 K/UL — SIGNIFICANT CHANGE UP (ref 0–0.2)
BASOPHILS NFR BLD AUTO: 0.2 % — SIGNIFICANT CHANGE UP (ref 0–2)
EOSINOPHIL # BLD AUTO: 0.24 K/UL — SIGNIFICANT CHANGE UP (ref 0–0.5)
EOSINOPHIL NFR BLD AUTO: 4.5 % — SIGNIFICANT CHANGE UP (ref 0–6)
HCT VFR BLD CALC: 35.5 % — SIGNIFICANT CHANGE UP (ref 34.5–45)
HGB BLD-MCNC: 12.3 G/DL — SIGNIFICANT CHANGE UP (ref 11.5–15.5)
IMM GRANULOCYTES NFR BLD AUTO: 0.8 % — SIGNIFICANT CHANGE UP (ref 0–0.9)
LYMPHOCYTES # BLD AUTO: 1.65 K/UL — SIGNIFICANT CHANGE UP (ref 1–3.3)
LYMPHOCYTES # BLD AUTO: 31 % — SIGNIFICANT CHANGE UP (ref 13–44)
MCHC RBC-ENTMCNC: 29.4 PG — SIGNIFICANT CHANGE UP (ref 27–34)
MCHC RBC-ENTMCNC: 34.6 G/DL — SIGNIFICANT CHANGE UP (ref 32–36)
MCV RBC AUTO: 84.7 FL — SIGNIFICANT CHANGE UP (ref 80–100)
MONOCYTES # BLD AUTO: 0.28 K/UL — SIGNIFICANT CHANGE UP (ref 0–0.9)
MONOCYTES NFR BLD AUTO: 5.3 % — SIGNIFICANT CHANGE UP (ref 2–14)
NEUTROPHILS # BLD AUTO: 3.1 K/UL — SIGNIFICANT CHANGE UP (ref 1.8–7.4)
NEUTROPHILS NFR BLD AUTO: 58.2 % — SIGNIFICANT CHANGE UP (ref 43–77)
NRBC # BLD: 0 /100 WBCS — SIGNIFICANT CHANGE UP (ref 0–0)
PLATELET # BLD AUTO: 128 K/UL — LOW (ref 150–400)
RBC # BLD: 4.19 M/UL — SIGNIFICANT CHANGE UP (ref 3.8–5.2)
RBC # FLD: 14.4 % — SIGNIFICANT CHANGE UP (ref 10.3–14.5)
WBC # BLD: 5.32 K/UL — SIGNIFICANT CHANGE UP (ref 3.8–10.5)
WBC # FLD AUTO: 5.32 K/UL — SIGNIFICANT CHANGE UP (ref 3.8–10.5)

## 2024-11-02 LAB
CREAT ?TM UR-MCNC: 74 MG/DL — SIGNIFICANT CHANGE UP
PROT ?TM UR-MCNC: 7 MG/DL — SIGNIFICANT CHANGE UP (ref 0–12)
PROT/CREAT UR-RTO: 0.1 RATIO — SIGNIFICANT CHANGE UP (ref 0–0.2)

## 2024-11-04 DIAGNOSIS — Z51.89 ENCOUNTER FOR OTHER SPECIFIED AFTERCARE: ICD-10-CM

## 2024-11-04 DIAGNOSIS — R11.2 NAUSEA WITH VOMITING, UNSPECIFIED: ICD-10-CM

## 2024-11-15 ENCOUNTER — RESULT REVIEW (OUTPATIENT)
Age: 59
End: 2024-11-15

## 2024-11-15 ENCOUNTER — APPOINTMENT (OUTPATIENT)
Dept: HEMATOLOGY ONCOLOGY | Facility: CLINIC | Age: 59
End: 2024-11-15
Payer: COMMERCIAL

## 2024-11-15 ENCOUNTER — APPOINTMENT (OUTPATIENT)
Dept: INFUSION THERAPY | Facility: HOSPITAL | Age: 59
End: 2024-11-15

## 2024-11-15 VITALS
RESPIRATION RATE: 16 BRPM | BODY MASS INDEX: 36.38 KG/M2 | DIASTOLIC BLOOD PRESSURE: 87 MMHG | WEIGHT: 186.29 LBS | SYSTOLIC BLOOD PRESSURE: 151 MMHG | OXYGEN SATURATION: 97 % | HEART RATE: 90 BPM | TEMPERATURE: 96.4 F

## 2024-11-15 DIAGNOSIS — C56.9 MALIGNANT NEOPLASM OF UNSPECIFIED OVARY: ICD-10-CM

## 2024-11-15 LAB
ALBUMIN SERPL ELPH-MCNC: 3.6 G/DL — SIGNIFICANT CHANGE UP (ref 3.3–5)
ALP SERPL-CCNC: 86 U/L — SIGNIFICANT CHANGE UP (ref 40–120)
ALT FLD-CCNC: <5 U/L — LOW (ref 10–45)
ANION GAP SERPL CALC-SCNC: 12 MMOL/L — SIGNIFICANT CHANGE UP (ref 5–17)
AST SERPL-CCNC: 18 U/L — SIGNIFICANT CHANGE UP (ref 10–40)
BASOPHILS # BLD AUTO: 0 K/UL — SIGNIFICANT CHANGE UP (ref 0–0.2)
BASOPHILS # BLD AUTO: 0.01 K/UL — SIGNIFICANT CHANGE UP (ref 0–0.2)
BASOPHILS NFR BLD AUTO: 0 % — SIGNIFICANT CHANGE UP (ref 0–2)
BASOPHILS NFR BLD AUTO: 0.2 % — SIGNIFICANT CHANGE UP (ref 0–2)
BILIRUB SERPL-MCNC: 0.6 MG/DL — SIGNIFICANT CHANGE UP (ref 0.2–1.2)
BUN SERPL-MCNC: 14 MG/DL — SIGNIFICANT CHANGE UP (ref 7–23)
CALCIUM SERPL-MCNC: 9.9 MG/DL — SIGNIFICANT CHANGE UP (ref 8.4–10.5)
CANCER AG125 SERPL-ACNC: 406 U/ML — HIGH
CHLORIDE SERPL-SCNC: 102 MMOL/L — SIGNIFICANT CHANGE UP (ref 96–108)
CO2 SERPL-SCNC: 25 MMOL/L — SIGNIFICANT CHANGE UP (ref 22–31)
CREAT SERPL-MCNC: 0.84 MG/DL — SIGNIFICANT CHANGE UP (ref 0.5–1.3)
EGFR: 80 ML/MIN/1.73M2 — SIGNIFICANT CHANGE UP
EOSINOPHIL # BLD AUTO: 0.06 K/UL — SIGNIFICANT CHANGE UP (ref 0–0.5)
EOSINOPHIL # BLD AUTO: 0.07 K/UL — SIGNIFICANT CHANGE UP (ref 0–0.5)
EOSINOPHIL NFR BLD AUTO: 1.2 % — SIGNIFICANT CHANGE UP (ref 0–6)
EOSINOPHIL NFR BLD AUTO: 1.4 % — SIGNIFICANT CHANGE UP (ref 0–6)
GLUCOSE SERPL-MCNC: 133 MG/DL — HIGH (ref 70–99)
HCT VFR BLD CALC: 34.4 % — LOW (ref 34.5–45)
HCT VFR BLD CALC: 36.6 % — SIGNIFICANT CHANGE UP (ref 34.5–45)
HGB BLD-MCNC: 11.8 G/DL — SIGNIFICANT CHANGE UP (ref 11.5–15.5)
HGB BLD-MCNC: 12.6 G/DL — SIGNIFICANT CHANGE UP (ref 11.5–15.5)
IMM GRANULOCYTES NFR BLD AUTO: 0.4 % — SIGNIFICANT CHANGE UP (ref 0–0.9)
IMM GRANULOCYTES NFR BLD AUTO: 0.6 % — SIGNIFICANT CHANGE UP (ref 0–0.9)
LYMPHOCYTES # BLD AUTO: 1.01 K/UL — SIGNIFICANT CHANGE UP (ref 1–3.3)
LYMPHOCYTES # BLD AUTO: 1.16 K/UL — SIGNIFICANT CHANGE UP (ref 1–3.3)
LYMPHOCYTES # BLD AUTO: 20 % — SIGNIFICANT CHANGE UP (ref 13–44)
LYMPHOCYTES # BLD AUTO: 22.9 % — SIGNIFICANT CHANGE UP (ref 13–44)
MCHC RBC-ENTMCNC: 29.5 PG — SIGNIFICANT CHANGE UP (ref 27–34)
MCHC RBC-ENTMCNC: 29.7 PG — SIGNIFICANT CHANGE UP (ref 27–34)
MCHC RBC-ENTMCNC: 34.3 G/DL — SIGNIFICANT CHANGE UP (ref 32–36)
MCHC RBC-ENTMCNC: 34.4 G/DL — SIGNIFICANT CHANGE UP (ref 32–36)
MCV RBC AUTO: 85.7 FL — SIGNIFICANT CHANGE UP (ref 80–100)
MCV RBC AUTO: 86.6 FL — SIGNIFICANT CHANGE UP (ref 80–100)
MONOCYTES # BLD AUTO: 0.15 K/UL — SIGNIFICANT CHANGE UP (ref 0–0.9)
MONOCYTES # BLD AUTO: 0.19 K/UL — SIGNIFICANT CHANGE UP (ref 0–0.9)
MONOCYTES NFR BLD AUTO: 3 % — SIGNIFICANT CHANGE UP (ref 2–14)
MONOCYTES NFR BLD AUTO: 3.8 % — SIGNIFICANT CHANGE UP (ref 2–14)
NEUTROPHILS # BLD AUTO: 3.65 K/UL — SIGNIFICANT CHANGE UP (ref 1.8–7.4)
NEUTROPHILS # BLD AUTO: 3.77 K/UL — SIGNIFICANT CHANGE UP (ref 1.8–7.4)
NEUTROPHILS NFR BLD AUTO: 72.1 % — SIGNIFICANT CHANGE UP (ref 43–77)
NEUTROPHILS NFR BLD AUTO: 74.4 % — SIGNIFICANT CHANGE UP (ref 43–77)
NRBC # BLD: 0 /100 WBCS — SIGNIFICANT CHANGE UP (ref 0–0)
NRBC # BLD: 0 /100 WBCS — SIGNIFICANT CHANGE UP (ref 0–0)
PLATELET # BLD AUTO: 101 K/UL — LOW (ref 150–400)
PLATELET # BLD AUTO: 114 K/UL — LOW (ref 150–400)
POTASSIUM SERPL-MCNC: 3.6 MMOL/L — SIGNIFICANT CHANGE UP (ref 3.5–5.3)
POTASSIUM SERPL-SCNC: 3.6 MMOL/L — SIGNIFICANT CHANGE UP (ref 3.5–5.3)
PROT SERPL-MCNC: 7.5 G/DL — SIGNIFICANT CHANGE UP (ref 6–8.3)
RBC # BLD: 3.97 M/UL — SIGNIFICANT CHANGE UP (ref 3.8–5.2)
RBC # BLD: 4.27 M/UL — SIGNIFICANT CHANGE UP (ref 3.8–5.2)
RBC # FLD: 14.5 % — SIGNIFICANT CHANGE UP (ref 10.3–14.5)
RBC # FLD: 14.5 % — SIGNIFICANT CHANGE UP (ref 10.3–14.5)
SODIUM SERPL-SCNC: 139 MMOL/L — SIGNIFICANT CHANGE UP (ref 135–145)
WBC # BLD: 5.06 K/UL — SIGNIFICANT CHANGE UP (ref 3.8–10.5)
WBC # BLD: 5.06 K/UL — SIGNIFICANT CHANGE UP (ref 3.8–10.5)
WBC # FLD AUTO: 5.06 K/UL — SIGNIFICANT CHANGE UP (ref 3.8–10.5)
WBC # FLD AUTO: 5.06 K/UL — SIGNIFICANT CHANGE UP (ref 3.8–10.5)

## 2024-11-15 PROCEDURE — 99214 OFFICE O/P EST MOD 30 MIN: CPT

## 2024-11-16 LAB
APPEARANCE UR: CLEAR — SIGNIFICANT CHANGE UP
BACTERIA # UR AUTO: ABNORMAL /HPF
BILIRUB UR-MCNC: NEGATIVE — SIGNIFICANT CHANGE UP
CAST: 1 /LPF — SIGNIFICANT CHANGE UP (ref 0–4)
COLOR SPEC: YELLOW — SIGNIFICANT CHANGE UP
CREAT ?TM UR-MCNC: 110 MG/DL — SIGNIFICANT CHANGE UP
DIFF PNL FLD: NEGATIVE — SIGNIFICANT CHANGE UP
GLUCOSE UR QL: NEGATIVE MG/DL — SIGNIFICANT CHANGE UP
KETONES UR-MCNC: NEGATIVE MG/DL — SIGNIFICANT CHANGE UP
LEUKOCYTE ESTERASE UR-ACNC: ABNORMAL
NITRITE UR-MCNC: NEGATIVE — SIGNIFICANT CHANGE UP
PH UR: 6 — SIGNIFICANT CHANGE UP (ref 5–8)
PROT ?TM UR-MCNC: 20 MG/DL — HIGH (ref 0–12)
PROT UR-MCNC: SIGNIFICANT CHANGE UP MG/DL
PROT/CREAT UR-RTO: 0.2 RATIO — SIGNIFICANT CHANGE UP (ref 0–0.2)
RBC CASTS # UR COMP ASSIST: 1 /HPF — SIGNIFICANT CHANGE UP (ref 0–4)
SP GR SPEC: 1.01 — SIGNIFICANT CHANGE UP (ref 1–1.03)
SQUAMOUS # UR AUTO: 6 /HPF — HIGH (ref 0–5)
UROBILINOGEN FLD QL: 1 MG/DL — SIGNIFICANT CHANGE UP (ref 0.2–1)
WBC UR QL: 1 /HPF — SIGNIFICANT CHANGE UP (ref 0–5)

## 2024-11-18 DIAGNOSIS — C56.1 MALIGNANT NEOPLASM OF RIGHT OVARY: ICD-10-CM

## 2024-11-26 ENCOUNTER — OUTPATIENT (OUTPATIENT)
Dept: OUTPATIENT SERVICES | Facility: HOSPITAL | Age: 59
LOS: 1 days | Discharge: HOME | End: 2024-11-26

## 2024-11-26 DIAGNOSIS — Z98.890 OTHER SPECIFIED POSTPROCEDURAL STATES: Chronic | ICD-10-CM

## 2024-11-26 DIAGNOSIS — C57.4 MALIGNANT NEOPLASM OF UTERINE ADNEXA, UNSPECIFIED: ICD-10-CM

## 2024-11-29 ENCOUNTER — RESULT REVIEW (OUTPATIENT)
Age: 59
End: 2024-11-29

## 2024-11-29 ENCOUNTER — APPOINTMENT (OUTPATIENT)
Dept: INFUSION THERAPY | Facility: HOSPITAL | Age: 59
End: 2024-11-29

## 2024-11-29 LAB
BASOPHILS # BLD AUTO: 0.01 K/UL — SIGNIFICANT CHANGE UP (ref 0–0.2)
BASOPHILS NFR BLD AUTO: 0.2 % — SIGNIFICANT CHANGE UP (ref 0–2)
EOSINOPHIL # BLD AUTO: 0.06 K/UL — SIGNIFICANT CHANGE UP (ref 0–0.5)
EOSINOPHIL NFR BLD AUTO: 1.4 % — SIGNIFICANT CHANGE UP (ref 0–6)
HCT VFR BLD CALC: 34 % — LOW (ref 34.5–45)
HGB BLD-MCNC: 11.2 G/DL — LOW (ref 11.5–15.5)
IMM GRANULOCYTES NFR BLD AUTO: 0.7 % — SIGNIFICANT CHANGE UP (ref 0–0.9)
LYMPHOCYTES # BLD AUTO: 1.4 K/UL — SIGNIFICANT CHANGE UP (ref 1–3.3)
LYMPHOCYTES # BLD AUTO: 33.5 % — SIGNIFICANT CHANGE UP (ref 13–44)
MCHC RBC-ENTMCNC: 29.3 PG — SIGNIFICANT CHANGE UP (ref 27–34)
MCHC RBC-ENTMCNC: 32.9 G/DL — SIGNIFICANT CHANGE UP (ref 32–36)
MCV RBC AUTO: 89 FL — SIGNIFICANT CHANGE UP (ref 80–100)
MONOCYTES # BLD AUTO: 0.41 K/UL — SIGNIFICANT CHANGE UP (ref 0–0.9)
MONOCYTES NFR BLD AUTO: 9.8 % — SIGNIFICANT CHANGE UP (ref 2–14)
NEUTROPHILS # BLD AUTO: 2.27 K/UL — SIGNIFICANT CHANGE UP (ref 1.8–7.4)
NEUTROPHILS NFR BLD AUTO: 54.4 % — SIGNIFICANT CHANGE UP (ref 43–77)
NRBC # BLD: 0 /100 WBCS — SIGNIFICANT CHANGE UP (ref 0–0)
NRBC BLD-RTO: 0 /100 WBCS — SIGNIFICANT CHANGE UP (ref 0–0)
PLATELET # BLD AUTO: 160 K/UL — SIGNIFICANT CHANGE UP (ref 150–400)
RBC # BLD: 3.82 M/UL — SIGNIFICANT CHANGE UP (ref 3.8–5.2)
RBC # FLD: 14.7 % — HIGH (ref 10.3–14.5)
WBC # BLD: 4.18 K/UL — SIGNIFICANT CHANGE UP (ref 3.8–10.5)
WBC # FLD AUTO: 4.18 K/UL — SIGNIFICANT CHANGE UP (ref 3.8–10.5)

## 2024-11-30 LAB
CREAT ?TM UR-MCNC: 56 MG/DL — SIGNIFICANT CHANGE UP
PROT ?TM UR-MCNC: 8 MG/DL — SIGNIFICANT CHANGE UP (ref 0–12)
PROT/CREAT UR-RTO: 0.2 RATIO — SIGNIFICANT CHANGE UP (ref 0–0.2)

## 2024-12-02 DIAGNOSIS — Z51.11 ENCOUNTER FOR ANTINEOPLASTIC CHEMOTHERAPY: ICD-10-CM

## 2024-12-02 DIAGNOSIS — R11.2 NAUSEA WITH VOMITING, UNSPECIFIED: ICD-10-CM

## 2024-12-10 RX ORDER — DIPHENHYDRAMINE HYDROCHLORIDE AND LIDOCAINE HYDROCHLORIDE AND ALUMINUM HYDROXIDE AND MAGNESIUM HYDRO
KIT
Qty: 1 | Refills: 2 | Status: ACTIVE | COMMUNITY
Start: 2024-12-10 | End: 1900-01-01

## 2024-12-13 ENCOUNTER — APPOINTMENT (OUTPATIENT)
Dept: HEMATOLOGY ONCOLOGY | Facility: CLINIC | Age: 59
End: 2024-12-13
Payer: COMMERCIAL

## 2024-12-13 ENCOUNTER — APPOINTMENT (OUTPATIENT)
Dept: INFUSION THERAPY | Facility: HOSPITAL | Age: 59
End: 2024-12-13

## 2024-12-13 ENCOUNTER — RESULT REVIEW (OUTPATIENT)
Age: 59
End: 2024-12-13

## 2024-12-13 DIAGNOSIS — C56.9 MALIGNANT NEOPLASM OF UNSPECIFIED OVARY: ICD-10-CM

## 2024-12-13 DIAGNOSIS — K12.30 ORAL MUCOSITIS (ULCERATIVE), UNSPECIFIED: ICD-10-CM

## 2024-12-13 LAB
ALBUMIN SERPL ELPH-MCNC: 3.7 G/DL — SIGNIFICANT CHANGE UP (ref 3.3–5)
ALP SERPL-CCNC: 86 U/L — SIGNIFICANT CHANGE UP (ref 40–120)
ALT FLD-CCNC: 7 U/L — LOW (ref 10–45)
ANION GAP SERPL CALC-SCNC: 11 MMOL/L — SIGNIFICANT CHANGE UP (ref 5–17)
AST SERPL-CCNC: 19 U/L — SIGNIFICANT CHANGE UP (ref 10–40)
BASOPHILS # BLD AUTO: 0.01 K/UL — SIGNIFICANT CHANGE UP (ref 0–0.2)
BASOPHILS NFR BLD AUTO: 0.2 % — SIGNIFICANT CHANGE UP (ref 0–2)
BILIRUB SERPL-MCNC: 0.4 MG/DL — SIGNIFICANT CHANGE UP (ref 0.2–1.2)
BUN SERPL-MCNC: 15 MG/DL — SIGNIFICANT CHANGE UP (ref 7–23)
CALCIUM SERPL-MCNC: 9.6 MG/DL — SIGNIFICANT CHANGE UP (ref 8.4–10.5)
CHLORIDE SERPL-SCNC: 101 MMOL/L — SIGNIFICANT CHANGE UP (ref 96–108)
CO2 SERPL-SCNC: 27 MMOL/L — SIGNIFICANT CHANGE UP (ref 22–31)
CREAT SERPL-MCNC: 0.87 MG/DL — SIGNIFICANT CHANGE UP (ref 0.5–1.3)
EGFR: 76 ML/MIN/1.73M2 — SIGNIFICANT CHANGE UP
EOSINOPHIL # BLD AUTO: 0.09 K/UL — SIGNIFICANT CHANGE UP (ref 0–0.5)
EOSINOPHIL NFR BLD AUTO: 1.6 % — SIGNIFICANT CHANGE UP (ref 0–6)
GLUCOSE SERPL-MCNC: 165 MG/DL — HIGH (ref 70–99)
HCT VFR BLD CALC: 35.1 % — SIGNIFICANT CHANGE UP (ref 34.5–45)
HGB BLD-MCNC: 12.1 G/DL — SIGNIFICANT CHANGE UP (ref 11.5–15.5)
IMM GRANULOCYTES NFR BLD AUTO: 0.5 % — SIGNIFICANT CHANGE UP (ref 0–0.9)
LYMPHOCYTES # BLD AUTO: 1.19 K/UL — SIGNIFICANT CHANGE UP (ref 1–3.3)
LYMPHOCYTES # BLD AUTO: 21 % — SIGNIFICANT CHANGE UP (ref 13–44)
MCHC RBC-ENTMCNC: 29.7 PG — SIGNIFICANT CHANGE UP (ref 27–34)
MCHC RBC-ENTMCNC: 34.5 G/DL — SIGNIFICANT CHANGE UP (ref 32–36)
MCV RBC AUTO: 86 FL — SIGNIFICANT CHANGE UP (ref 80–100)
MONOCYTES # BLD AUTO: 0.34 K/UL — SIGNIFICANT CHANGE UP (ref 0–0.9)
MONOCYTES NFR BLD AUTO: 6 % — SIGNIFICANT CHANGE UP (ref 2–14)
NEUTROPHILS # BLD AUTO: 4 K/UL — SIGNIFICANT CHANGE UP (ref 1.8–7.4)
NEUTROPHILS NFR BLD AUTO: 70.7 % — SIGNIFICANT CHANGE UP (ref 43–77)
NRBC # BLD: 0 /100 WBCS — SIGNIFICANT CHANGE UP (ref 0–0)
NRBC BLD-RTO: 0 /100 WBCS — SIGNIFICANT CHANGE UP (ref 0–0)
PLATELET # BLD AUTO: 143 K/UL — LOW (ref 150–400)
POTASSIUM SERPL-MCNC: 3.7 MMOL/L — SIGNIFICANT CHANGE UP (ref 3.5–5.3)
POTASSIUM SERPL-SCNC: 3.7 MMOL/L — SIGNIFICANT CHANGE UP (ref 3.5–5.3)
PROT SERPL-MCNC: 7.1 G/DL — SIGNIFICANT CHANGE UP (ref 6–8.3)
RBC # BLD: 4.08 M/UL — SIGNIFICANT CHANGE UP (ref 3.8–5.2)
RBC # FLD: 15.3 % — HIGH (ref 10.3–14.5)
SODIUM SERPL-SCNC: 139 MMOL/L — SIGNIFICANT CHANGE UP (ref 135–145)
WBC # BLD: 5.66 K/UL — SIGNIFICANT CHANGE UP (ref 3.8–10.5)
WBC # FLD AUTO: 5.66 K/UL — SIGNIFICANT CHANGE UP (ref 3.8–10.5)

## 2024-12-13 PROCEDURE — 99213 OFFICE O/P EST LOW 20 MIN: CPT

## 2024-12-13 PROCEDURE — G2211 COMPLEX E/M VISIT ADD ON: CPT

## 2024-12-13 RX ORDER — SUCRALFATE 1 G/10ML
1 SUSPENSION ORAL
Qty: 420 | Refills: 1 | Status: ACTIVE | COMMUNITY
Start: 2024-12-13 | End: 1900-01-01

## 2024-12-14 LAB
APPEARANCE UR: ABNORMAL
BACTERIA # UR AUTO: ABNORMAL /HPF
BILIRUB UR-MCNC: NEGATIVE — SIGNIFICANT CHANGE UP
CANCER AG125 SERPL-ACNC: 396 U/ML — HIGH
CAST: 4 /LPF — SIGNIFICANT CHANGE UP (ref 0–4)
COLOR SPEC: YELLOW — SIGNIFICANT CHANGE UP
CREAT ?TM UR-MCNC: 124 MG/DL — SIGNIFICANT CHANGE UP
DIFF PNL FLD: NEGATIVE — SIGNIFICANT CHANGE UP
GLUCOSE UR QL: NEGATIVE MG/DL — SIGNIFICANT CHANGE UP
HYALINE CASTS # UR AUTO: PRESENT
KETONES UR-MCNC: NEGATIVE MG/DL — SIGNIFICANT CHANGE UP
LEUKOCYTE ESTERASE UR-ACNC: ABNORMAL
NITRITE UR-MCNC: NEGATIVE — SIGNIFICANT CHANGE UP
PH UR: 6.5 — SIGNIFICANT CHANGE UP (ref 5–8)
PROT ?TM UR-MCNC: 17 MG/DL — HIGH (ref 0–12)
PROT UR-MCNC: NEGATIVE MG/DL — SIGNIFICANT CHANGE UP
PROT/CREAT UR-RTO: 0.1 RATIO — SIGNIFICANT CHANGE UP (ref 0–0.2)
RBC CASTS # UR COMP ASSIST: 2 /HPF — SIGNIFICANT CHANGE UP (ref 0–4)
REVIEW: SIGNIFICANT CHANGE UP
SP GR SPEC: 1.02 — SIGNIFICANT CHANGE UP (ref 1–1.03)
SQUAMOUS # UR AUTO: >36 /HPF — HIGH (ref 0–5)
UROBILINOGEN FLD QL: 0.2 MG/DL — SIGNIFICANT CHANGE UP (ref 0.2–1)
WBC UR QL: 4 /HPF — SIGNIFICANT CHANGE UP (ref 0–5)

## 2024-12-27 ENCOUNTER — RESULT REVIEW (OUTPATIENT)
Age: 59
End: 2024-12-27

## 2024-12-27 ENCOUNTER — APPOINTMENT (OUTPATIENT)
Dept: INFUSION THERAPY | Facility: HOSPITAL | Age: 59
End: 2024-12-27

## 2024-12-27 LAB
BASOPHILS # BLD AUTO: 0.01 K/UL — SIGNIFICANT CHANGE UP (ref 0–0.2)
BASOPHILS NFR BLD AUTO: 0.2 % — SIGNIFICANT CHANGE UP (ref 0–2)
EOSINOPHIL # BLD AUTO: 0.13 K/UL — SIGNIFICANT CHANGE UP (ref 0–0.5)
EOSINOPHIL NFR BLD AUTO: 3 % — SIGNIFICANT CHANGE UP (ref 0–6)
HCT VFR BLD CALC: 34.3 % — LOW (ref 34.5–45)
HGB BLD-MCNC: 11.6 G/DL — SIGNIFICANT CHANGE UP (ref 11.5–15.5)
IMM GRANULOCYTES NFR BLD AUTO: 0.2 % — SIGNIFICANT CHANGE UP (ref 0–0.9)
LYMPHOCYTES # BLD AUTO: 1.38 K/UL — SIGNIFICANT CHANGE UP (ref 1–3.3)
LYMPHOCYTES # BLD AUTO: 32.2 % — SIGNIFICANT CHANGE UP (ref 13–44)
MCHC RBC-ENTMCNC: 30.1 PG — SIGNIFICANT CHANGE UP (ref 27–34)
MCHC RBC-ENTMCNC: 33.8 G/DL — SIGNIFICANT CHANGE UP (ref 32–36)
MCV RBC AUTO: 89.1 FL — SIGNIFICANT CHANGE UP (ref 80–100)
MONOCYTES # BLD AUTO: 0.52 K/UL — SIGNIFICANT CHANGE UP (ref 0–0.9)
MONOCYTES NFR BLD AUTO: 12.1 % — SIGNIFICANT CHANGE UP (ref 2–14)
NEUTROPHILS # BLD AUTO: 2.24 K/UL — SIGNIFICANT CHANGE UP (ref 1.8–7.4)
NEUTROPHILS NFR BLD AUTO: 52.3 % — SIGNIFICANT CHANGE UP (ref 43–77)
NRBC # BLD: 0 /100 WBCS — SIGNIFICANT CHANGE UP (ref 0–0)
NRBC BLD-RTO: 0 /100 WBCS — SIGNIFICANT CHANGE UP (ref 0–0)
PLATELET # BLD AUTO: 147 K/UL — LOW (ref 150–400)
RBC # BLD: 3.85 M/UL — SIGNIFICANT CHANGE UP (ref 3.8–5.2)
RBC # FLD: 14.9 % — HIGH (ref 10.3–14.5)
WBC # BLD: 4.29 K/UL — SIGNIFICANT CHANGE UP (ref 3.8–10.5)
WBC # FLD AUTO: 4.29 K/UL — SIGNIFICANT CHANGE UP (ref 3.8–10.5)

## 2024-12-28 LAB
CREAT ?TM UR-MCNC: 72 MG/DL — SIGNIFICANT CHANGE UP
PROT ?TM UR-MCNC: 12 MG/DL — SIGNIFICANT CHANGE UP (ref 0–12)
PROT/CREAT UR-RTO: 0.2 RATIO — SIGNIFICANT CHANGE UP (ref 0–0.2)

## 2025-01-10 ENCOUNTER — APPOINTMENT (OUTPATIENT)
Dept: INFUSION THERAPY | Facility: HOSPITAL | Age: 60
End: 2025-01-10

## 2025-01-10 ENCOUNTER — RESULT REVIEW (OUTPATIENT)
Age: 60
End: 2025-01-10

## 2025-01-10 ENCOUNTER — APPOINTMENT (OUTPATIENT)
Dept: HEMATOLOGY ONCOLOGY | Facility: CLINIC | Age: 60
End: 2025-01-10
Payer: COMMERCIAL

## 2025-01-10 DIAGNOSIS — C56.9 MALIGNANT NEOPLASM OF UNSPECIFIED OVARY: ICD-10-CM

## 2025-01-10 LAB
ALBUMIN SERPL ELPH-MCNC: 3.7 G/DL — SIGNIFICANT CHANGE UP (ref 3.3–5)
ALP SERPL-CCNC: 78 U/L — SIGNIFICANT CHANGE UP (ref 40–120)
ALT FLD-CCNC: 7 U/L — LOW (ref 10–45)
ANION GAP SERPL CALC-SCNC: 11 MMOL/L — SIGNIFICANT CHANGE UP (ref 5–17)
AST SERPL-CCNC: 23 U/L — SIGNIFICANT CHANGE UP (ref 10–40)
BASOPHILS # BLD AUTO: 0.01 K/UL — SIGNIFICANT CHANGE UP (ref 0–0.2)
BASOPHILS NFR BLD AUTO: 0.2 % — SIGNIFICANT CHANGE UP (ref 0–2)
BILIRUB SERPL-MCNC: 0.5 MG/DL — SIGNIFICANT CHANGE UP (ref 0.2–1.2)
BUN SERPL-MCNC: 11 MG/DL — SIGNIFICANT CHANGE UP (ref 7–23)
CALCIUM SERPL-MCNC: 9.5 MG/DL — SIGNIFICANT CHANGE UP (ref 8.4–10.5)
CHLORIDE SERPL-SCNC: 104 MMOL/L — SIGNIFICANT CHANGE UP (ref 96–108)
CO2 SERPL-SCNC: 25 MMOL/L — SIGNIFICANT CHANGE UP (ref 22–31)
CREAT SERPL-MCNC: 0.84 MG/DL — SIGNIFICANT CHANGE UP (ref 0.5–1.3)
EGFR: 80 ML/MIN/1.73M2 — SIGNIFICANT CHANGE UP
EOSINOPHIL # BLD AUTO: 0.14 K/UL — SIGNIFICANT CHANGE UP (ref 0–0.5)
EOSINOPHIL NFR BLD AUTO: 3.2 % — SIGNIFICANT CHANGE UP (ref 0–6)
GLUCOSE SERPL-MCNC: 133 MG/DL — HIGH (ref 70–99)
HCT VFR BLD CALC: 34.1 % — LOW (ref 34.5–45)
HGB BLD-MCNC: 11.5 G/DL — SIGNIFICANT CHANGE UP (ref 11.5–15.5)
IMM GRANULOCYTES NFR BLD AUTO: 0.2 % — SIGNIFICANT CHANGE UP (ref 0–0.9)
LYMPHOCYTES # BLD AUTO: 1.27 K/UL — SIGNIFICANT CHANGE UP (ref 1–3.3)
LYMPHOCYTES # BLD AUTO: 28.6 % — SIGNIFICANT CHANGE UP (ref 13–44)
MCHC RBC-ENTMCNC: 30 PG — SIGNIFICANT CHANGE UP (ref 27–34)
MCHC RBC-ENTMCNC: 33.7 G/DL — SIGNIFICANT CHANGE UP (ref 32–36)
MCV RBC AUTO: 89 FL — SIGNIFICANT CHANGE UP (ref 80–100)
MONOCYTES # BLD AUTO: 0.33 K/UL — SIGNIFICANT CHANGE UP (ref 0–0.9)
MONOCYTES NFR BLD AUTO: 7.4 % — SIGNIFICANT CHANGE UP (ref 2–14)
NEUTROPHILS # BLD AUTO: 2.68 K/UL — SIGNIFICANT CHANGE UP (ref 1.8–7.4)
NEUTROPHILS NFR BLD AUTO: 60.4 % — SIGNIFICANT CHANGE UP (ref 43–77)
NRBC # BLD: 0 /100 WBCS — SIGNIFICANT CHANGE UP (ref 0–0)
NRBC BLD-RTO: 0 /100 WBCS — SIGNIFICANT CHANGE UP (ref 0–0)
PLATELET # BLD AUTO: 120 K/UL — LOW (ref 150–400)
POTASSIUM SERPL-MCNC: 3.7 MMOL/L — SIGNIFICANT CHANGE UP (ref 3.5–5.3)
POTASSIUM SERPL-SCNC: 3.7 MMOL/L — SIGNIFICANT CHANGE UP (ref 3.5–5.3)
PROT SERPL-MCNC: 7 G/DL — SIGNIFICANT CHANGE UP (ref 6–8.3)
RBC # BLD: 3.83 M/UL — SIGNIFICANT CHANGE UP (ref 3.8–5.2)
RBC # FLD: 14.2 % — SIGNIFICANT CHANGE UP (ref 10.3–14.5)
SODIUM SERPL-SCNC: 141 MMOL/L — SIGNIFICANT CHANGE UP (ref 135–145)
WBC # BLD: 4.44 K/UL — SIGNIFICANT CHANGE UP (ref 3.8–10.5)
WBC # FLD AUTO: 4.44 K/UL — SIGNIFICANT CHANGE UP (ref 3.8–10.5)

## 2025-01-10 PROCEDURE — 99214 OFFICE O/P EST MOD 30 MIN: CPT

## 2025-01-11 LAB
APPEARANCE UR: CLEAR — SIGNIFICANT CHANGE UP
BACTERIA # UR AUTO: ABNORMAL /HPF
BILIRUB UR-MCNC: NEGATIVE — SIGNIFICANT CHANGE UP
CANCER AG125 SERPL-ACNC: 205 U/ML — HIGH
CAST: 0 /LPF — SIGNIFICANT CHANGE UP (ref 0–4)
COLOR SPEC: YELLOW — SIGNIFICANT CHANGE UP
CREAT ?TM UR-MCNC: 142 MG/DL — SIGNIFICANT CHANGE UP
DIFF PNL FLD: NEGATIVE — SIGNIFICANT CHANGE UP
GLUCOSE UR QL: NEGATIVE MG/DL — SIGNIFICANT CHANGE UP
KETONES UR-MCNC: NEGATIVE MG/DL — SIGNIFICANT CHANGE UP
LEUKOCYTE ESTERASE UR-ACNC: ABNORMAL
NITRITE UR-MCNC: NEGATIVE — SIGNIFICANT CHANGE UP
PH UR: 6.5 — SIGNIFICANT CHANGE UP (ref 5–8)
PROT ?TM UR-MCNC: 17 MG/DL — HIGH (ref 0–12)
PROT UR-MCNC: SIGNIFICANT CHANGE UP MG/DL
PROT/CREAT UR-RTO: 0.1 RATIO — SIGNIFICANT CHANGE UP (ref 0–0.2)
RBC CASTS # UR COMP ASSIST: 0 /HPF — SIGNIFICANT CHANGE UP (ref 0–4)
SP GR SPEC: 1.02 — SIGNIFICANT CHANGE UP (ref 1–1.03)
SQUAMOUS # UR AUTO: 6 /HPF — HIGH (ref 0–5)
UROBILINOGEN FLD QL: 1 MG/DL — SIGNIFICANT CHANGE UP (ref 0.2–1)
WBC UR QL: 1 /HPF — SIGNIFICANT CHANGE UP (ref 0–5)

## 2025-02-01 ENCOUNTER — APPOINTMENT (OUTPATIENT)
Dept: CT IMAGING | Facility: IMAGING CENTER | Age: 60
End: 2025-02-01
Payer: COMMERCIAL

## 2025-02-01 ENCOUNTER — OUTPATIENT (OUTPATIENT)
Dept: OUTPATIENT SERVICES | Facility: HOSPITAL | Age: 60
LOS: 1 days | End: 2025-02-01
Payer: COMMERCIAL

## 2025-02-01 DIAGNOSIS — Z00.8 ENCOUNTER FOR OTHER GENERAL EXAMINATION: ICD-10-CM

## 2025-02-01 DIAGNOSIS — C56.9 MALIGNANT NEOPLASM OF UNSPECIFIED OVARY: ICD-10-CM

## 2025-02-01 DIAGNOSIS — Z98.890 OTHER SPECIFIED POSTPROCEDURAL STATES: Chronic | ICD-10-CM

## 2025-02-01 DIAGNOSIS — Z98.89 OTHER SPECIFIED POSTPROCEDURAL STATES: Chronic | ICD-10-CM

## 2025-02-01 PROCEDURE — 74177 CT ABD & PELVIS W/CONTRAST: CPT | Mod: 26

## 2025-02-01 PROCEDURE — 71260 CT THORAX DX C+: CPT

## 2025-02-01 PROCEDURE — 74177 CT ABD & PELVIS W/CONTRAST: CPT

## 2025-02-01 PROCEDURE — 71260 CT THORAX DX C+: CPT | Mod: 26

## 2025-02-18 ENCOUNTER — OUTPATIENT (OUTPATIENT)
Dept: OUTPATIENT SERVICES | Facility: HOSPITAL | Age: 60
LOS: 1 days | Discharge: HOME | End: 2025-02-18

## 2025-02-18 DIAGNOSIS — C57.4 MALIGNANT NEOPLASM OF UTERINE ADNEXA, UNSPECIFIED: ICD-10-CM

## 2025-02-18 DIAGNOSIS — Z98.890 OTHER SPECIFIED POSTPROCEDURAL STATES: Chronic | ICD-10-CM

## 2025-02-18 DIAGNOSIS — Z98.89 OTHER SPECIFIED POSTPROCEDURAL STATES: Chronic | ICD-10-CM

## 2025-02-21 ENCOUNTER — APPOINTMENT (OUTPATIENT)
Dept: HEMATOLOGY ONCOLOGY | Facility: CLINIC | Age: 60
End: 2025-02-21

## 2025-02-21 ENCOUNTER — RESULT REVIEW (OUTPATIENT)
Age: 60
End: 2025-02-21

## 2025-02-21 ENCOUNTER — APPOINTMENT (OUTPATIENT)
Dept: INFUSION THERAPY | Facility: HOSPITAL | Age: 60
End: 2025-02-21

## 2025-02-21 LAB
BASOPHILS # BLD AUTO: 0.01 K/UL — SIGNIFICANT CHANGE UP (ref 0–0.2)
BASOPHILS NFR BLD AUTO: 0.2 % — SIGNIFICANT CHANGE UP (ref 0–2)
EOSINOPHIL # BLD AUTO: 0.2 K/UL — SIGNIFICANT CHANGE UP (ref 0–0.5)
EOSINOPHIL NFR BLD AUTO: 4.1 % — SIGNIFICANT CHANGE UP (ref 0–6)
HCT VFR BLD CALC: 36.5 % — SIGNIFICANT CHANGE UP (ref 34.5–45)
HGB BLD-MCNC: 12.5 G/DL — SIGNIFICANT CHANGE UP (ref 11.5–15.5)
IMM GRANULOCYTES NFR BLD AUTO: 0.4 % — SIGNIFICANT CHANGE UP (ref 0–0.9)
LYMPHOCYTES # BLD AUTO: 1.63 K/UL — SIGNIFICANT CHANGE UP (ref 1–3.3)
LYMPHOCYTES # BLD AUTO: 33.2 % — SIGNIFICANT CHANGE UP (ref 13–44)
MCHC RBC-ENTMCNC: 29.7 PG — SIGNIFICANT CHANGE UP (ref 27–34)
MCHC RBC-ENTMCNC: 34.2 G/DL — SIGNIFICANT CHANGE UP (ref 32–36)
MCV RBC AUTO: 86.7 FL — SIGNIFICANT CHANGE UP (ref 80–100)
MONOCYTES # BLD AUTO: 0.29 K/UL — SIGNIFICANT CHANGE UP (ref 0–0.9)
MONOCYTES NFR BLD AUTO: 5.9 % — SIGNIFICANT CHANGE UP (ref 2–14)
NEUTROPHILS # BLD AUTO: 2.76 K/UL — SIGNIFICANT CHANGE UP (ref 1.8–7.4)
NEUTROPHILS NFR BLD AUTO: 56.2 % — SIGNIFICANT CHANGE UP (ref 43–77)
NRBC BLD AUTO-RTO: 0 /100 WBCS — SIGNIFICANT CHANGE UP (ref 0–0)
PLATELET # BLD AUTO: 128 K/UL — LOW (ref 150–400)
RBC # BLD: 4.21 M/UL — SIGNIFICANT CHANGE UP (ref 3.8–5.2)
RBC # FLD: 13.2 % — SIGNIFICANT CHANGE UP (ref 10.3–14.5)
WBC # BLD: 4.91 K/UL — SIGNIFICANT CHANGE UP (ref 3.8–10.5)
WBC # FLD AUTO: 4.91 K/UL — SIGNIFICANT CHANGE UP (ref 3.8–10.5)

## 2025-02-24 DIAGNOSIS — R11.2 NAUSEA WITH VOMITING, UNSPECIFIED: ICD-10-CM

## 2025-02-24 DIAGNOSIS — Z51.11 ENCOUNTER FOR ANTINEOPLASTIC CHEMOTHERAPY: ICD-10-CM

## 2025-02-28 ENCOUNTER — APPOINTMENT (OUTPATIENT)
Dept: HEMATOLOGY ONCOLOGY | Facility: CLINIC | Age: 60
End: 2025-02-28

## 2025-03-05 ENCOUNTER — NON-APPOINTMENT (OUTPATIENT)
Age: 60
End: 2025-03-05

## 2025-03-05 ENCOUNTER — RESULT REVIEW (OUTPATIENT)
Age: 60
End: 2025-03-05

## 2025-03-05 ENCOUNTER — APPOINTMENT (OUTPATIENT)
Dept: HEMATOLOGY ONCOLOGY | Facility: CLINIC | Age: 60
End: 2025-03-05
Payer: COMMERCIAL

## 2025-03-05 VITALS
RESPIRATION RATE: 16 BRPM | BODY MASS INDEX: 35.52 KG/M2 | SYSTOLIC BLOOD PRESSURE: 121 MMHG | OXYGEN SATURATION: 98 % | TEMPERATURE: 97.4 F | WEIGHT: 181.88 LBS | DIASTOLIC BLOOD PRESSURE: 73 MMHG | HEART RATE: 64 BPM

## 2025-03-05 DIAGNOSIS — C56.9 MALIGNANT NEOPLASM OF UNSPECIFIED OVARY: ICD-10-CM

## 2025-03-05 LAB
BASOPHILS # BLD AUTO: 0.01 K/UL — SIGNIFICANT CHANGE UP (ref 0–0.2)
BASOPHILS NFR BLD AUTO: 0.2 % — SIGNIFICANT CHANGE UP (ref 0–2)
EOSINOPHIL # BLD AUTO: 0.06 K/UL — SIGNIFICANT CHANGE UP (ref 0–0.5)
EOSINOPHIL NFR BLD AUTO: 1.3 % — SIGNIFICANT CHANGE UP (ref 0–6)
HCT VFR BLD CALC: 36.9 % — SIGNIFICANT CHANGE UP (ref 34.5–45)
HGB BLD-MCNC: 12.6 G/DL — SIGNIFICANT CHANGE UP (ref 11.5–15.5)
IMM GRANULOCYTES NFR BLD AUTO: 0.4 % — SIGNIFICANT CHANGE UP (ref 0–0.9)
LYMPHOCYTES # BLD AUTO: 1.25 K/UL — SIGNIFICANT CHANGE UP (ref 1–3.3)
LYMPHOCYTES # BLD AUTO: 27 % — SIGNIFICANT CHANGE UP (ref 13–44)
MCHC RBC-ENTMCNC: 29.7 PG — SIGNIFICANT CHANGE UP (ref 27–34)
MCHC RBC-ENTMCNC: 34.1 G/DL — SIGNIFICANT CHANGE UP (ref 32–36)
MCV RBC AUTO: 87 FL — SIGNIFICANT CHANGE UP (ref 80–100)
MONOCYTES # BLD AUTO: 0.25 K/UL — SIGNIFICANT CHANGE UP (ref 0–0.9)
MONOCYTES NFR BLD AUTO: 5.4 % — SIGNIFICANT CHANGE UP (ref 2–14)
NEUTROPHILS # BLD AUTO: 3.04 K/UL — SIGNIFICANT CHANGE UP (ref 1.8–7.4)
NEUTROPHILS NFR BLD AUTO: 65.7 % — SIGNIFICANT CHANGE UP (ref 43–77)
NRBC BLD AUTO-RTO: 0 /100 WBCS — SIGNIFICANT CHANGE UP (ref 0–0)
PLATELET # BLD AUTO: 115 K/UL — LOW (ref 150–400)
RBC # BLD: 4.24 M/UL — SIGNIFICANT CHANGE UP (ref 3.8–5.2)
RBC # FLD: 13.2 % — SIGNIFICANT CHANGE UP (ref 10.3–14.5)
WBC # BLD: 4.63 K/UL — SIGNIFICANT CHANGE UP (ref 3.8–10.5)
WBC # FLD AUTO: 4.63 K/UL — SIGNIFICANT CHANGE UP (ref 3.8–10.5)

## 2025-03-05 PROCEDURE — G2211 COMPLEX E/M VISIT ADD ON: CPT

## 2025-03-05 PROCEDURE — 99213 OFFICE O/P EST LOW 20 MIN: CPT

## 2025-03-06 LAB
ALBUMIN SERPL ELPH-MCNC: 4.1 G/DL
ALP BLD-CCNC: 104 U/L
ALT SERPL-CCNC: 11 U/L
ANION GAP SERPL CALC-SCNC: 12 MMOL/L
AST SERPL-CCNC: 15 U/L
BILIRUB SERPL-MCNC: 0.4 MG/DL
BUN SERPL-MCNC: 14 MG/DL
CALCIUM SERPL-MCNC: 9.7 MG/DL
CANCER AG125 SERPL-ACNC: 155 U/ML
CHLORIDE SERPL-SCNC: 103 MMOL/L
CO2 SERPL-SCNC: 27 MMOL/L
CREAT SERPL-MCNC: 0.89 MG/DL
EGFR: 74 ML/MIN/1.73M2
GLUCOSE SERPL-MCNC: 184 MG/DL
MAGNESIUM SERPL-MCNC: 1.6 MG/DL
POTASSIUM SERPL-SCNC: 3.8 MMOL/L
PROT SERPL-MCNC: 6.9 G/DL
SODIUM SERPL-SCNC: 142 MMOL/L

## 2025-03-21 ENCOUNTER — APPOINTMENT (OUTPATIENT)
Dept: HEMATOLOGY ONCOLOGY | Facility: CLINIC | Age: 60
End: 2025-03-21

## 2025-03-21 ENCOUNTER — RESULT REVIEW (OUTPATIENT)
Age: 60
End: 2025-03-21

## 2025-03-21 ENCOUNTER — APPOINTMENT (OUTPATIENT)
Dept: INFUSION THERAPY | Facility: HOSPITAL | Age: 60
End: 2025-03-21

## 2025-03-21 LAB
BASOPHILS # BLD AUTO: 0.01 K/UL — SIGNIFICANT CHANGE UP (ref 0–0.2)
BASOPHILS NFR BLD AUTO: 0.3 % — SIGNIFICANT CHANGE UP (ref 0–2)
EOSINOPHIL # BLD AUTO: 0.08 K/UL — SIGNIFICANT CHANGE UP (ref 0–0.5)
EOSINOPHIL NFR BLD AUTO: 2.3 % — SIGNIFICANT CHANGE UP (ref 0–6)
HCT VFR BLD CALC: 35.7 % — SIGNIFICANT CHANGE UP (ref 34.5–45)
HGB BLD-MCNC: 12.4 G/DL — SIGNIFICANT CHANGE UP (ref 11.5–15.5)
IMM GRANULOCYTES NFR BLD AUTO: 0.9 % — SIGNIFICANT CHANGE UP (ref 0–0.9)
LYMPHOCYTES # BLD AUTO: 1.28 K/UL — SIGNIFICANT CHANGE UP (ref 1–3.3)
LYMPHOCYTES # BLD AUTO: 36.5 % — SIGNIFICANT CHANGE UP (ref 13–44)
MCHC RBC-ENTMCNC: 30.2 PG — SIGNIFICANT CHANGE UP (ref 27–34)
MCHC RBC-ENTMCNC: 34.7 G/DL — SIGNIFICANT CHANGE UP (ref 32–36)
MCV RBC AUTO: 87.1 FL — SIGNIFICANT CHANGE UP (ref 80–100)
MONOCYTES # BLD AUTO: 0.34 K/UL — SIGNIFICANT CHANGE UP (ref 0–0.9)
MONOCYTES NFR BLD AUTO: 9.7 % — SIGNIFICANT CHANGE UP (ref 2–14)
NEUTROPHILS # BLD AUTO: 1.77 K/UL — LOW (ref 1.8–7.4)
NEUTROPHILS NFR BLD AUTO: 50.3 % — SIGNIFICANT CHANGE UP (ref 43–77)
NRBC BLD AUTO-RTO: 0 /100 WBCS — SIGNIFICANT CHANGE UP (ref 0–0)
PLATELET # BLD AUTO: 143 K/UL — LOW (ref 150–400)
RBC # BLD: 4.1 M/UL — SIGNIFICANT CHANGE UP (ref 3.8–5.2)
RBC # FLD: 13.4 % — SIGNIFICANT CHANGE UP (ref 10.3–14.5)
WBC # BLD: 3.51 K/UL — LOW (ref 3.8–10.5)
WBC # FLD AUTO: 3.51 K/UL — LOW (ref 3.8–10.5)

## 2025-03-24 DIAGNOSIS — C56.1 MALIGNANT NEOPLASM OF RIGHT OVARY: ICD-10-CM

## 2025-04-04 ENCOUNTER — APPOINTMENT (OUTPATIENT)
Dept: HEMATOLOGY ONCOLOGY | Facility: CLINIC | Age: 60
End: 2025-04-04
Payer: COMMERCIAL

## 2025-04-04 ENCOUNTER — APPOINTMENT (OUTPATIENT)
Dept: INFUSION THERAPY | Facility: HOSPITAL | Age: 60
End: 2025-04-04

## 2025-04-04 DIAGNOSIS — C56.9 MALIGNANT NEOPLASM OF UNSPECIFIED OVARY: ICD-10-CM

## 2025-04-04 PROCEDURE — 99214 OFFICE O/P EST MOD 30 MIN: CPT | Mod: 95

## 2025-04-18 ENCOUNTER — RESULT REVIEW (OUTPATIENT)
Age: 60
End: 2025-04-18

## 2025-04-18 ENCOUNTER — APPOINTMENT (OUTPATIENT)
Dept: HEMATOLOGY ONCOLOGY | Facility: CLINIC | Age: 60
End: 2025-04-18

## 2025-04-18 ENCOUNTER — APPOINTMENT (OUTPATIENT)
Dept: INFUSION THERAPY | Facility: HOSPITAL | Age: 60
End: 2025-04-18

## 2025-04-18 LAB
ALBUMIN SERPL ELPH-MCNC: 3.8 G/DL — SIGNIFICANT CHANGE UP (ref 3.3–5)
ALP SERPL-CCNC: 86 U/L — SIGNIFICANT CHANGE UP (ref 40–120)
ALT FLD-CCNC: 7 U/L — LOW (ref 10–45)
ANION GAP SERPL CALC-SCNC: 10 MMOL/L — SIGNIFICANT CHANGE UP (ref 5–17)
APPEARANCE UR: CLEAR — SIGNIFICANT CHANGE UP
AST SERPL-CCNC: 20 U/L — SIGNIFICANT CHANGE UP (ref 10–40)
BASOPHILS # BLD AUTO: 0.03 K/UL — SIGNIFICANT CHANGE UP (ref 0–0.2)
BASOPHILS NFR BLD AUTO: 0.7 % — SIGNIFICANT CHANGE UP (ref 0–2)
BILIRUB SERPL-MCNC: 0.3 MG/DL — SIGNIFICANT CHANGE UP (ref 0.2–1.2)
BILIRUB UR-MCNC: NEGATIVE — SIGNIFICANT CHANGE UP
BUN SERPL-MCNC: 12 MG/DL — SIGNIFICANT CHANGE UP (ref 7–23)
CALCIUM SERPL-MCNC: 9.3 MG/DL — SIGNIFICANT CHANGE UP (ref 8.4–10.5)
CANCER AG125 SERPL-ACNC: 121 U/ML — HIGH
CHLORIDE SERPL-SCNC: 104 MMOL/L — SIGNIFICANT CHANGE UP (ref 96–108)
CO2 SERPL-SCNC: 27 MMOL/L — SIGNIFICANT CHANGE UP (ref 22–31)
COLOR SPEC: YELLOW — SIGNIFICANT CHANGE UP
CREAT SERPL-MCNC: 0.89 MG/DL — SIGNIFICANT CHANGE UP (ref 0.5–1.3)
DIFF PNL FLD: NEGATIVE — SIGNIFICANT CHANGE UP
EGFR: 74 ML/MIN/1.73M2 — SIGNIFICANT CHANGE UP
EGFR: 74 ML/MIN/1.73M2 — SIGNIFICANT CHANGE UP
EOSINOPHIL # BLD AUTO: 0.1 K/UL — SIGNIFICANT CHANGE UP (ref 0–0.5)
EOSINOPHIL NFR BLD AUTO: 2.4 % — SIGNIFICANT CHANGE UP (ref 0–6)
GLUCOSE SERPL-MCNC: 137 MG/DL — HIGH (ref 70–99)
GLUCOSE UR QL: NEGATIVE MG/DL — SIGNIFICANT CHANGE UP
HCT VFR BLD CALC: 35.8 % — SIGNIFICANT CHANGE UP (ref 34.5–45)
HGB BLD-MCNC: 12.3 G/DL — SIGNIFICANT CHANGE UP (ref 11.5–15.5)
IMM GRANULOCYTES NFR BLD AUTO: 1.2 % — HIGH (ref 0–0.9)
KETONES UR-MCNC: NEGATIVE MG/DL — SIGNIFICANT CHANGE UP
LEUKOCYTE ESTERASE UR-ACNC: ABNORMAL
LYMPHOCYTES # BLD AUTO: 1.54 K/UL — SIGNIFICANT CHANGE UP (ref 1–3.3)
LYMPHOCYTES # BLD AUTO: 36.8 % — SIGNIFICANT CHANGE UP (ref 13–44)
MAGNESIUM SERPL-MCNC: 1.6 MG/DL — SIGNIFICANT CHANGE UP (ref 1.6–2.6)
MCHC RBC-ENTMCNC: 29.8 PG — SIGNIFICANT CHANGE UP (ref 27–34)
MCHC RBC-ENTMCNC: 34.4 G/DL — SIGNIFICANT CHANGE UP (ref 32–36)
MCV RBC AUTO: 86.7 FL — SIGNIFICANT CHANGE UP (ref 80–100)
MONOCYTES # BLD AUTO: 0.48 K/UL — SIGNIFICANT CHANGE UP (ref 0–0.9)
MONOCYTES NFR BLD AUTO: 11.5 % — SIGNIFICANT CHANGE UP (ref 2–14)
NEUTROPHILS # BLD AUTO: 1.99 K/UL — SIGNIFICANT CHANGE UP (ref 1.8–7.4)
NEUTROPHILS NFR BLD AUTO: 47.4 % — SIGNIFICANT CHANGE UP (ref 43–77)
NITRITE UR-MCNC: NEGATIVE — SIGNIFICANT CHANGE UP
NRBC BLD AUTO-RTO: 0 /100 WBCS — SIGNIFICANT CHANGE UP (ref 0–0)
PH UR: 6.5 — SIGNIFICANT CHANGE UP (ref 5–8)
PLATELET # BLD AUTO: 134 K/UL — LOW (ref 150–400)
POTASSIUM SERPL-MCNC: 3.6 MMOL/L — SIGNIFICANT CHANGE UP (ref 3.5–5.3)
POTASSIUM SERPL-SCNC: 3.6 MMOL/L — SIGNIFICANT CHANGE UP (ref 3.5–5.3)
PROT SERPL-MCNC: 6.8 G/DL — SIGNIFICANT CHANGE UP (ref 6–8.3)
PROT UR-MCNC: NEGATIVE MG/DL — SIGNIFICANT CHANGE UP
RBC # BLD: 4.13 M/UL — SIGNIFICANT CHANGE UP (ref 3.8–5.2)
RBC # FLD: 13.9 % — SIGNIFICANT CHANGE UP (ref 10.3–14.5)
SODIUM SERPL-SCNC: 142 MMOL/L — SIGNIFICANT CHANGE UP (ref 135–145)
SP GR SPEC: 1.01 — SIGNIFICANT CHANGE UP (ref 1–1.03)
UROBILINOGEN FLD QL: 1 MG/DL — SIGNIFICANT CHANGE UP (ref 0.2–1)
WBC # BLD: 4.19 K/UL — SIGNIFICANT CHANGE UP (ref 3.8–10.5)
WBC # FLD AUTO: 4.19 K/UL — SIGNIFICANT CHANGE UP (ref 3.8–10.5)

## 2025-04-19 LAB
BACTERIA # UR AUTO: NEGATIVE /HPF — SIGNIFICANT CHANGE UP
CAST: 0 /LPF — SIGNIFICANT CHANGE UP (ref 0–4)
CREAT ?TM UR-MCNC: 43 MG/DL — SIGNIFICANT CHANGE UP
PROT ?TM UR-MCNC: 10 MG/DL — SIGNIFICANT CHANGE UP (ref 0–12)
PROT/CREAT UR-RTO: 0.2 RATIO — SIGNIFICANT CHANGE UP (ref 0–0.2)
RBC CASTS # UR COMP ASSIST: 0 /HPF — SIGNIFICANT CHANGE UP (ref 0–4)
SQUAMOUS # UR AUTO: 2 /HPF — SIGNIFICANT CHANGE UP (ref 0–5)
WBC UR QL: 0 /HPF — SIGNIFICANT CHANGE UP (ref 0–5)

## 2025-04-20 LAB
CREAT ?TM UR-MCNC: 43 MG/DL — SIGNIFICANT CHANGE UP
PROT ?TM UR-MCNC: 10 MG/DL — SIGNIFICANT CHANGE UP (ref 0–12)
PROT/CREAT UR-RTO: 0.2 RATIO — SIGNIFICANT CHANGE UP (ref 0–0.2)

## 2025-04-28 ENCOUNTER — OUTPATIENT (OUTPATIENT)
Dept: OUTPATIENT SERVICES | Facility: HOSPITAL | Age: 60
LOS: 1 days | Discharge: HOME | End: 2025-04-28

## 2025-04-28 DIAGNOSIS — C57.4 MALIGNANT NEOPLASM OF UTERINE ADNEXA, UNSPECIFIED: ICD-10-CM

## 2025-04-28 DIAGNOSIS — Z98.890 OTHER SPECIFIED POSTPROCEDURAL STATES: Chronic | ICD-10-CM

## 2025-04-28 DIAGNOSIS — Z98.89 OTHER SPECIFIED POSTPROCEDURAL STATES: Chronic | ICD-10-CM

## 2025-05-02 ENCOUNTER — RESULT REVIEW (OUTPATIENT)
Age: 60
End: 2025-05-02

## 2025-05-02 ENCOUNTER — APPOINTMENT (OUTPATIENT)
Dept: INFUSION THERAPY | Facility: HOSPITAL | Age: 60
End: 2025-05-02

## 2025-05-02 ENCOUNTER — APPOINTMENT (OUTPATIENT)
Dept: HEMATOLOGY ONCOLOGY | Facility: CLINIC | Age: 60
End: 2025-05-02
Payer: COMMERCIAL

## 2025-05-02 VITALS
SYSTOLIC BLOOD PRESSURE: 136 MMHG | HEART RATE: 92 BPM | RESPIRATION RATE: 18 BRPM | OXYGEN SATURATION: 94 % | WEIGHT: 178.57 LBS | TEMPERATURE: 96.4 F | HEIGHT: 63.78 IN | DIASTOLIC BLOOD PRESSURE: 83 MMHG | BODY MASS INDEX: 30.86 KG/M2

## 2025-05-02 DIAGNOSIS — Z51.11 ENCOUNTER FOR ANTINEOPLASTIC CHEMOTHERAPY: ICD-10-CM

## 2025-05-02 DIAGNOSIS — C56.9 MALIGNANT NEOPLASM OF UNSPECIFIED OVARY: ICD-10-CM

## 2025-05-02 DIAGNOSIS — R11.2 NAUSEA WITH VOMITING, UNSPECIFIED: ICD-10-CM

## 2025-05-02 LAB
ALBUMIN SERPL ELPH-MCNC: 3.8 G/DL — SIGNIFICANT CHANGE UP (ref 3.3–5)
ALP SERPL-CCNC: 79 U/L — SIGNIFICANT CHANGE UP (ref 40–120)
ALT FLD-CCNC: 7 U/L — LOW (ref 10–45)
ANION GAP SERPL CALC-SCNC: 13 MMOL/L — SIGNIFICANT CHANGE UP (ref 5–17)
AST SERPL-CCNC: 21 U/L — SIGNIFICANT CHANGE UP (ref 10–40)
BASOPHILS # BLD AUTO: 0.02 K/UL — SIGNIFICANT CHANGE UP (ref 0–0.2)
BASOPHILS NFR BLD AUTO: 0.4 % — SIGNIFICANT CHANGE UP (ref 0–2)
BILIRUB SERPL-MCNC: 0.5 MG/DL — SIGNIFICANT CHANGE UP (ref 0.2–1.2)
BUN SERPL-MCNC: 12 MG/DL — SIGNIFICANT CHANGE UP (ref 7–23)
CALCIUM SERPL-MCNC: 9.6 MG/DL — SIGNIFICANT CHANGE UP (ref 8.4–10.5)
CANCER AG125 SERPL-ACNC: 132 U/ML — HIGH
CHLORIDE SERPL-SCNC: 105 MMOL/L — SIGNIFICANT CHANGE UP (ref 96–108)
CO2 SERPL-SCNC: 22 MMOL/L — SIGNIFICANT CHANGE UP (ref 22–31)
CREAT SERPL-MCNC: 0.88 MG/DL — SIGNIFICANT CHANGE UP (ref 0.5–1.3)
EGFR: 75 ML/MIN/1.73M2 — SIGNIFICANT CHANGE UP
EGFR: 75 ML/MIN/1.73M2 — SIGNIFICANT CHANGE UP
EOSINOPHIL # BLD AUTO: 0.03 K/UL — SIGNIFICANT CHANGE UP (ref 0–0.5)
EOSINOPHIL NFR BLD AUTO: 0.6 % — SIGNIFICANT CHANGE UP (ref 0–6)
GLUCOSE SERPL-MCNC: 169 MG/DL — HIGH (ref 70–99)
HCT VFR BLD CALC: 36.3 % — SIGNIFICANT CHANGE UP (ref 34.5–45)
HGB BLD-MCNC: 12.5 G/DL — SIGNIFICANT CHANGE UP (ref 11.5–15.5)
IMM GRANULOCYTES NFR BLD AUTO: 0.4 % — SIGNIFICANT CHANGE UP (ref 0–0.9)
LYMPHOCYTES # BLD AUTO: 0.82 K/UL — LOW (ref 1–3.3)
LYMPHOCYTES # BLD AUTO: 17 % — SIGNIFICANT CHANGE UP (ref 13–44)
MAGNESIUM SERPL-MCNC: 1.6 MG/DL — SIGNIFICANT CHANGE UP (ref 1.6–2.6)
MCHC RBC-ENTMCNC: 29.9 PG — SIGNIFICANT CHANGE UP (ref 27–34)
MCHC RBC-ENTMCNC: 34.4 G/DL — SIGNIFICANT CHANGE UP (ref 32–36)
MCV RBC AUTO: 86.8 FL — SIGNIFICANT CHANGE UP (ref 80–100)
MONOCYTES # BLD AUTO: 0.31 K/UL — SIGNIFICANT CHANGE UP (ref 0–0.9)
MONOCYTES NFR BLD AUTO: 6.4 % — SIGNIFICANT CHANGE UP (ref 2–14)
NEUTROPHILS # BLD AUTO: 3.63 K/UL — SIGNIFICANT CHANGE UP (ref 1.8–7.4)
NEUTROPHILS NFR BLD AUTO: 75.2 % — SIGNIFICANT CHANGE UP (ref 43–77)
NRBC BLD AUTO-RTO: 0 /100 WBCS — SIGNIFICANT CHANGE UP (ref 0–0)
PLATELET # BLD AUTO: 106 K/UL — LOW (ref 150–400)
POTASSIUM SERPL-MCNC: 3.7 MMOL/L — SIGNIFICANT CHANGE UP (ref 3.5–5.3)
POTASSIUM SERPL-SCNC: 3.7 MMOL/L — SIGNIFICANT CHANGE UP (ref 3.5–5.3)
PROT SERPL-MCNC: 7 G/DL — SIGNIFICANT CHANGE UP (ref 6–8.3)
RBC # BLD: 4.18 M/UL — SIGNIFICANT CHANGE UP (ref 3.8–5.2)
RBC # FLD: 14 % — SIGNIFICANT CHANGE UP (ref 10.3–14.5)
SODIUM SERPL-SCNC: 140 MMOL/L — SIGNIFICANT CHANGE UP (ref 135–145)
WBC # BLD: 4.83 K/UL — SIGNIFICANT CHANGE UP (ref 3.8–10.5)
WBC # FLD AUTO: 4.83 K/UL — SIGNIFICANT CHANGE UP (ref 3.8–10.5)

## 2025-05-02 PROCEDURE — G2211 COMPLEX E/M VISIT ADD ON: CPT

## 2025-05-02 PROCEDURE — 99213 OFFICE O/P EST LOW 20 MIN: CPT

## 2025-05-03 LAB
CREAT ?TM UR-MCNC: 151 MG/DL — SIGNIFICANT CHANGE UP
PROT ?TM UR-MCNC: 21 MG/DL — HIGH (ref 0–12)
PROT/CREAT UR-RTO: 0.1 RATIO — SIGNIFICANT CHANGE UP (ref 0–0.2)

## 2025-05-19 NOTE — CONSULT NOTE ADULT - NS ATTEST RISK GEN_ALL_CORE
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 Common cancers that lead to brain metastases include lung, breast, colon, kidney, and melanoma. Symptoms may include headaches, seizures, and changes in behavior, but not everyone experiences symptoms. Your symptoms will be managed at the [facility].      SECONDARY DISCHARGE DIAGNOSES  Diagnosis: Chronic kidney disease, unspecified CKD stage  Assessment and Plan of Treatment: Chronic kidney disease, also called chronic kidney failure, involves a gradual loss of kidney function. Your kidneys filter wastes and excess fluids from your blood, which are then removed in your urine.    Diagnosis: Seizure disorder  Assessment and Plan of Treatment: A seizure disorder, often referred to as epilepsy, is a chronic brain condition characterized by recurrent seizures caused by abnormal electrical activity in the brain. Symptoms may include confusion, jerking movements of the arms and legs, loss of consciousness, and changes in emotions. There are various types of seizure disorders, including epileptic seizure disorder, psychogenic nonepileptic seizures, and nonepileptic seizures. Patient should continue on the keppra medication.    
Risk Statement (NON-critical care)

## 2025-06-10 ENCOUNTER — APPOINTMENT (OUTPATIENT)
Dept: CT IMAGING | Facility: IMAGING CENTER | Age: 60
End: 2025-06-10
Payer: COMMERCIAL

## 2025-06-10 ENCOUNTER — OUTPATIENT (OUTPATIENT)
Dept: OUTPATIENT SERVICES | Facility: HOSPITAL | Age: 60
LOS: 1 days | End: 2025-06-10
Payer: COMMERCIAL

## 2025-06-10 DIAGNOSIS — Z00.8 ENCOUNTER FOR OTHER GENERAL EXAMINATION: ICD-10-CM

## 2025-06-10 DIAGNOSIS — Z98.89 OTHER SPECIFIED POSTPROCEDURAL STATES: Chronic | ICD-10-CM

## 2025-06-10 DIAGNOSIS — Z98.890 OTHER SPECIFIED POSTPROCEDURAL STATES: Chronic | ICD-10-CM

## 2025-06-10 DIAGNOSIS — C56.9 MALIGNANT NEOPLASM OF UNSPECIFIED OVARY: ICD-10-CM

## 2025-06-10 PROCEDURE — 71260 CT THORAX DX C+: CPT | Mod: 26

## 2025-06-10 PROCEDURE — 71260 CT THORAX DX C+: CPT

## 2025-06-10 PROCEDURE — 74177 CT ABD & PELVIS W/CONTRAST: CPT

## 2025-06-10 PROCEDURE — 74177 CT ABD & PELVIS W/CONTRAST: CPT | Mod: 26

## 2025-06-25 NOTE — ED ADULT NURSE NOTE - CAS ELECT INFOMATION PROVIDED
[de-identified] : DC instructions

## 2025-06-27 ENCOUNTER — EMERGENCY (EMERGENCY)
Facility: HOSPITAL | Age: 60
LOS: 1 days | End: 2025-06-27
Attending: EMERGENCY MEDICINE | Admitting: EMERGENCY MEDICINE
Payer: COMMERCIAL

## 2025-06-27 VITALS
OXYGEN SATURATION: 98 % | WEIGHT: 184.97 LBS | HEIGHT: 63.78 IN | TEMPERATURE: 98 F | SYSTOLIC BLOOD PRESSURE: 177 MMHG | RESPIRATION RATE: 17 BRPM | HEART RATE: 62 BPM | DIASTOLIC BLOOD PRESSURE: 82 MMHG

## 2025-06-27 DIAGNOSIS — Z98.89 OTHER SPECIFIED POSTPROCEDURAL STATES: Chronic | ICD-10-CM

## 2025-06-27 DIAGNOSIS — Z98.890 OTHER SPECIFIED POSTPROCEDURAL STATES: Chronic | ICD-10-CM

## 2025-06-27 PROCEDURE — 76882 US LMTD JT/FCL EVL NVASC XTR: CPT | Mod: 26,LT

## 2025-06-27 PROCEDURE — 93970 EXTREMITY STUDY: CPT | Mod: 26

## 2025-06-27 PROCEDURE — 93010 ELECTROCARDIOGRAM REPORT: CPT | Mod: 76

## 2025-06-27 PROCEDURE — 99285 EMERGENCY DEPT VISIT HI MDM: CPT

## 2025-06-27 RX ORDER — ACETAMINOPHEN 500 MG/5ML
1000 LIQUID (ML) ORAL ONCE
Refills: 0 | Status: COMPLETED | OUTPATIENT
Start: 2025-06-27 | End: 2025-06-27

## 2025-06-27 RX ADMIN — Medication 400 MILLIGRAM(S): at 23:28

## 2025-06-27 NOTE — ED PROVIDER NOTE - CLINICAL SUMMARY MEDICAL DECISION MAKING FREE TEXT BOX
EM PGY-2/Wood, DO: 60-year-old female PMHx DM2, asthma, ovarian cancer currently on chemotherapy (last session in May 2025) followed by Dr. Whit Cuellar who presents to ED for evaluation of 2-day onset atraumatic bilateral ankle pain and swelling L > R.  Patient denies any obvious injuries or known inciting events.  She has been able to ambulate however endorses pain with range of motion particularly to her left leg.  She denies any IVDA, recent procedures or wounds to the feet.  She called Dr. Cuellar and reports that they sent her recent labs including her kidney function was WNL.  Patient does endorse overlying skin changes and states it appears more red and firm than usual.  She denies any recent travel, SOB, pleuritic pain, cough/hemoptysis, or history of blood clot before.  Patient otherwise denies any fevers, chills, red streaking, cough, congestion, N/V/D, chest pain, numbness, tingling.  No other complaints.    MDM: 59 yo F w/ above-mentioned history p/w atraumatic B/L ankle swelling L > R, L ankle that feels firm, warm to touch, overlying erythema present, pain with ROM; R ankle no appreciable overlying skin changes, ROM intact, no TTP, not warm to touch. Given h/o CA have concern for DVT. Not describing symptoms c/f PE and VSS. Concern for possible septic joint, gout, effusion, possible chemo side effect, cellulitis. Plan for US, labs including esr, crp, procalcitonin, XR to r/o occult fx, bedside ultrasound to assess effusion/underlying skin changes. Reeval to dispo.

## 2025-06-27 NOTE — ED PROVIDER NOTE - ATTENDING CONTRIBUTION TO CARE
60F HTN, HLD DM 2, Ov CA on chemo; last session May; p/w bilat ankle swelling L>R ankle swelling, firm, worse with ROM.  Pt called H/O who checked labs.  No fever.  Able to walk, but area swollen and painful and red.  No CP/SOB.  Leg is red, swollen, tender.  Plan check labs, xray, DVT study.  Redness and ankle swelling - POCUS done - no effusion - unlikely septic joint.  Most likely cellulitis, rx abx; rule out DVT or bone lesion with imaging.  If tests acceptable can d/c home on PO abx.  Later found to have DVT.  Rx eliquis.  No Cp/SOB.  VS:  unremarkable except bradycardia.  Sinus Ahmet on EKG.  Chronotropically competent.    GEN - mild discomfort L ankle pain, nontoxic appearing;   A+O x3.  Speaking in complete sentences.   HEAD - NC/AT     ENT - PEERL, EOMI, mucous membranes    moist , no discharge      NECK: Neck supple, non-tender without lymphadenopathy, no masses, no JVD  PULM - CTA b/l,  symmetric breath sounds  COR -  normal heart sounds    ABD - , ND, NT, soft,  BACK - no CVA tenderness, nontender spine     EXTREMS - no edema, no deformity, warm and well perfused  except mild bilat swelling about ankles L>R.  L anterior ankle area redness, warmth, tenderness, swelling.  Distal NVT intact.  Redness extends to lower tibial area and upper mid foot but not beyond.  No crepitus or purulence or ulceration.    SKIN - no rash    or bruising      NEUROLOGIC - alert, face symmetric, speech fluent, sensation nl, motor no focal deficit.

## 2025-06-27 NOTE — ED PROVIDER NOTE - PROGRESS NOTE DETAILS
EM PGY-2/DO Bruce: discussed all results with patient.  Informed of x-ray reviewed concerning for pleural effusion.  CT reviewed from 2 weeks ago which shows pleural effusions, which patient states that she is aware of.  She denies any shortness of breath, chest pain or pleuritic pain.  Patient states she feels fine and was seen ambulating to the bathroom.  Inform patient of DVT results and concern for cellulitis as well, discussed indications for Eliquis and clindamycin which she is amendable to.  Patient states she will follow-up with her oncologist and PMD next week.  Discussed all results, need for follow-up, answered all questions and explained discharge instructions.  Patient expresses understanding and agreement to plan is and amenable to discharge at this time. EM PGY-2/Bruce DO: EKG sinus bradycardia with no ischemic changes. Informed patient that her HR is low, she states she feels fine and will discuss with PMD upon follow up. discussed all results with patient.  Informed of x-ray reviewed concerning for pleural effusion.  CT reviewed from 2 weeks ago which shows pleural effusions, which patient states that she is aware of.  She denies any shortness of breath, chest pain, lightheadedness, or pleuritic pain.  Patient states she feels fine and was seen ambulating to the bathroom.  Inform patient of DVT results and concern for cellulitis as well, discussed indications for Eliquis and clindamycin which she is amendable to.  Given first dose of meds here. Patient states she will follow-up with her oncologist and PMD next week.  Discussed all results, need for follow-up, answered all questions and explained discharge instructions.  Patient expresses understanding and agreement to plan is and amenable to discharge at this time.

## 2025-06-27 NOTE — ED PROVIDER NOTE - PHYSICAL EXAMINATION
General: NAD. Nontoxic, well appearing. Speaking in full sentences with appropriate phonation.  Eyes: EOMI. Conjunctiva/sclera clear  Cardiac: RRR. No MGR appreciated.  Pulmonary: CTAB. No increased WOB  Abdominal: Soft, nontender, nondistended, no peritoneal signs.  Neurologic: No gross focal sensory or motor deficits  Musculoskeletal: Strength appropriate in all extremities for age with no limited ROM. B/L ankle swelling L > R. L ankle feels firm, warm to touch, overlying erythema present, pain with ROM; R ankle no appreciable overlying skin changes, ROM intact, no TTP, not warm to touch. Extremities grossly equal in size. No edema appreciated above either ankles. No streaking.  Skin: Color appropriate for race. Intact, warm, and well-perfused.

## 2025-06-27 NOTE — ED PROVIDER NOTE - NSICDXFAMILYHX_GEN_ALL_CORE_FT
DISCHARGE
FAMILY HISTORY:  Father  Still living? No  Family history of MI (myocardial infarction), Age at diagnosis: Age Unknown  Type 2 diabetes mellitus, Age at diagnosis: Age Unknown    Mother  Still living? No  Family history of colon cancer in mother, Age at diagnosis: Age Unknown

## 2025-06-27 NOTE — ED ADULT NURSE NOTE - OBJECTIVE STATEMENT
Pt A&Ox4 ambulatory, PMH Ovarian CA (last IV chemo May), T2DM, HLD, Asthma, presenting to the ED (RM 13) c/o swelling to BLE x 1 day. Pt states noticed swelling to BLE x 1 day, endorses has intermittent swelling of BLE since starting chemo. However, this time noticed worsening swelling and pain to touch. +1 Pitting edema noted to BLE and erythema noted to L anterior ankle, normal temp to touch. Denies pain or swelling  to b/l calves, No other complaints. Denies chest pain, SOB, dizziness, nausea/vomiting. fever/chills. Denies blood thinner use. Denies any falls/injuries. Respirations are even and unlabored, NAD, pt placed on CM, pending MD evaluation. Safety precautions implemented as per protocol, awaiting further MD orders, plan of care ongoing.

## 2025-06-27 NOTE — ED PROVIDER NOTE - OBJECTIVE STATEMENT
see MDM see MDM    DD ED ATTF HTN, HLD DM 2, Ov CA on chemo; last session May; p/w bilat ankle swelling L>R ankle swelling, firm, worse with ROM.  Pt called H/O who checked labs.  No fever.  Able to walk, but area swollen and painful and red.  No CP/SOB.  Leg is red, swollen, tender.  Plan check labs, xray, DVT study.  Redness and ankle swelling - POCUS done - no effusion - unlikely septic joint.  Most likely cellulitis, rx abx; rule out DVT or bone lesion with imaging.  If tests acceptable can d/c home on PO abx.  Later found to have DVT.  Rx eliquis.  No Cp/SOB.

## 2025-06-27 NOTE — ED ADULT NURSE NOTE - CAS EDN DISCHARGE ASSESSMENT
Antibiotic as directed.  Follow up with wound care.     Alert and oriented to person, place and time

## 2025-06-27 NOTE — ED PROVIDER NOTE - NSFOLLOWUPINSTRUCTIONS_ED_ALL_ED_FT
You were seen in the emergency department for ANKLE SWELLING.    The main treatment of this is blood thinners, which we have sent you a prescription for. Depending on the extent of the clot, some patients require IV blood thinners, however based on your evaluation here, your blood clots can be treated with oral medication as an outpatient. We gave you one dose here, so please  your prescription tomorrow and begin your regimen promptly.     I have sent the following prescription to [facility] [place] (at [facility]):  - Eliquis (blood thinner) STARTER PACK: take 1 pill per day. Please pay very close attention to the dosing instructions on the packaging, as the doses will increase.  *DAYS 1-7: take two 5mg tablets (10mg) in AM, then two 5mg tablets (10mg) in PM, for a total of 20 mg daily.  *DAYS 8-30: take one 5 mg tablet in AM, then one 5mg tablet in PM, for a total of 10 mg daily.  *AFTER DAY 30, you will need continue a lower dose of Eliquis for up to 6 months. You will need to follow up with your primary doctor for further prescriptions. It is extremely important that you continue this medication for the total duration of therapy, or your blood clots may be fully treated and/or worsen.    Schedule an appointment with your primary doctor in the next week for further evaluation, management and prescriptions. Bring all of your paperwork from today's visit with you to your appointment.     Be aware that this medication can put you at an increased risk of bleeding, which means that if you injure yourself, the bleeding may start easier and be harder to stop. If you experience any injuries with severe pain, severe bruising, uncontrolled bleeding, falls with injuries, head injuries, please go to the nearest emergency department.    Return to the ER if you experience any of the above mentioned, including chest pain, shortness of breath, pain with breathing, fevers, lightheadedness, room-spinning dizziness, passing out, or any other new or concerning symptoms. You were seen in the emergency department for ANKLE SWELLING. Your results show that you have a DEEP VENOUS THROMBOSIS "BLOOD CLOT" in your left leg. Based on your ultrasound, there is also concern for possible cellulitis (skin infection).    Cellulitis is an infection of the skin, which can be treated by oral antibiotics when uncomplicated.    The main treatment of a deep venous thrombosis/blood clot is blood thinners, which we have sent you a prescription for. Depending on the extent of the clot, some patients require IV blood thinners, however based on your evaluation here, your blood clots can be treated with oral medication as an outpatient. We gave you one dose here, so please  your prescription tomorrow and begin your regimen promptly.     I have sent the following prescriptions to your pharmacy:    1) Eliquis (blood thinner) STARTER PACK: take 1 pill per day. Please pay very close attention to the dosing instructions on the packaging, as the doses will increase.  *DAYS 1-7: take two 5mg tablets (10mg) in AM, then two 5mg tablets (10mg) in PM, for a total of 20 mg daily.  *DAYS 8-30: take one 5 mg tablet in AM, then one 5mg tablet in PM, for a total of 10 mg daily.  *AFTER DAY 30, you will need continue a lower dose of Eliquis for up to 6 months. You will need to follow up with your primary doctor for further prescriptions. It is extremely important that you continue this medication for the total duration of therapy, or your blood clots may be fully treated and/or worsen.    2) Clindamycin 300 mg: take 1 pill every 6 hours (total of 3 pills per day) for the next 7 days. Finish your entire prescription even if your symptoms improve. You received your first dose here, so you are due for your next dose tomorrow morning.    Schedule an appointment with your primary doctor and oncologist in the next week for further evaluation, management and prescriptions. Bring all of your paperwork from today's visit with you to your appointment.     Be aware that this medication can put you at an increased risk of bleeding, which means that if you injure yourself, the bleeding may start easier and be harder to stop. If you experience any injuries with severe pain, severe bruising, uncontrolled bleeding, falls with injuries, head injuries, please go to the nearest emergency department.    Return to the ER if you experience any of the above mentioned, including chest pain, shortness of breath, pain with breathing, fevers, lightheadedness, room-spinning dizziness, passing out, or any other new or concerning symptoms.

## 2025-06-27 NOTE — ED PROVIDER NOTE - PATIENT PORTAL LINK FT
You can access the FollowMyHealth Patient Portal offered by [facility] by registering at the following website: [email or web address]. By joining [employer] [facility]’s FollowMyHealth portal, you will also be able to view your health information using other applications (apps) compatible with our system.

## 2025-06-27 NOTE — ED ADULT TRIAGE NOTE - CHIEF COMPLAINT QUOTE
Pt arrives ambulatory to triage c/o bilat LE swelling x1 day. Denies chest pain/SOB. PHx: ovarian ca (on chemo), DM2, HLD, HTN, asthma.

## 2025-06-27 NOTE — ED ADULT NURSE REASSESSMENT NOTE - NS ED NURSE REASSESS COMMENT FT1
Pt axo x4, GCS 15, ambulatory without assistance; noted to be sinus bradycardia on monitor since ED arrival; notified via telephone by telemetry technician pt HR lowest 42; Pt asymptomatic, no dizziness, lightheadedness, nausea/vomiting/chest pain, LOC, diaphoresis, sob, chest pain. Vitally stable otherwise, states HR hasn't been low in the past; Dr. Hedrick made aware, states pt has been bradycardic, repeat EKG ordered and in progress. Safety is maintained. CCM is ongoing. Break RN; Pt axo x4, GCS 15, ambulatory without assistance; noted to be sinus bradycardia on monitor since ED arrival; notified via telephone by telemetry technician pt HR lowest 42; Pt asymptomatic, no dizziness, lightheadedness, nausea/vomiting/chest pain, LOC, diaphoresis, sob, chest pain. Vitally stable otherwise, states HR hasn't been low in the past; Dr. Hedrick made aware, states pt has been bradycardic, repeat EKG ordered and in progress. Safety is maintained. CCM is ongoing.

## 2025-06-28 VITALS
SYSTOLIC BLOOD PRESSURE: 147 MMHG | DIASTOLIC BLOOD PRESSURE: 85 MMHG | RESPIRATION RATE: 17 BRPM | HEART RATE: 51 BPM | TEMPERATURE: 98 F | OXYGEN SATURATION: 100 %

## 2025-06-28 LAB
ALBUMIN SERPL ELPH-MCNC: 3.8 G/DL — SIGNIFICANT CHANGE UP (ref 3.3–5)
ALP SERPL-CCNC: 118 U/L — SIGNIFICANT CHANGE UP (ref 40–120)
ALT FLD-CCNC: 9 U/L — SIGNIFICANT CHANGE UP (ref 4–33)
ANION GAP SERPL CALC-SCNC: 13 MMOL/L — SIGNIFICANT CHANGE UP (ref 7–14)
AST SERPL-CCNC: 16 U/L — SIGNIFICANT CHANGE UP (ref 4–32)
BASOPHILS # BLD AUTO: 0 K/UL — SIGNIFICANT CHANGE UP (ref 0–0.2)
BASOPHILS # BLD MANUAL: 0 K/UL — SIGNIFICANT CHANGE UP (ref 0–0.2)
BASOPHILS NFR BLD AUTO: 0 % — SIGNIFICANT CHANGE UP (ref 0–2)
BASOPHILS NFR BLD MANUAL: 0 % — SIGNIFICANT CHANGE UP (ref 0–2)
BILIRUB SERPL-MCNC: 0.4 MG/DL — SIGNIFICANT CHANGE UP (ref 0.2–1.2)
BUN SERPL-MCNC: 17 MG/DL — SIGNIFICANT CHANGE UP (ref 7–23)
CALCIUM SERPL-MCNC: 9.2 MG/DL — SIGNIFICANT CHANGE UP (ref 8.4–10.5)
CHLORIDE SERPL-SCNC: 106 MMOL/L — SIGNIFICANT CHANGE UP (ref 98–107)
CO2 SERPL-SCNC: 22 MMOL/L — SIGNIFICANT CHANGE UP (ref 22–31)
CREAT SERPL-MCNC: 0.98 MG/DL — SIGNIFICANT CHANGE UP (ref 0.5–1.3)
CRP SERPL-MCNC: 10.1 MG/L — HIGH
EGFR: 66 ML/MIN/1.73M2 — SIGNIFICANT CHANGE UP
EGFR: 66 ML/MIN/1.73M2 — SIGNIFICANT CHANGE UP
EOSINOPHIL # BLD AUTO: 0.18 K/UL — SIGNIFICANT CHANGE UP (ref 0–0.5)
EOSINOPHIL # BLD MANUAL: 0.05 K/UL — SIGNIFICANT CHANGE UP (ref 0–0.5)
EOSINOPHIL NFR BLD AUTO: 3.5 % — SIGNIFICANT CHANGE UP (ref 0–6)
EOSINOPHIL NFR BLD MANUAL: 0.9 % — SIGNIFICANT CHANGE UP (ref 0–6)
ERYTHROCYTE [SEDIMENTATION RATE] IN BLOOD: 45 MM/HR — HIGH (ref 0–20)
GLUCOSE SERPL-MCNC: 120 MG/DL — HIGH (ref 70–99)
HCT VFR BLD CALC: 37 % — SIGNIFICANT CHANGE UP (ref 34.5–45)
HGB BLD-MCNC: 12 G/DL — SIGNIFICANT CHANGE UP (ref 11.5–15.5)
IMM GRANULOCYTES # BLD AUTO: 0.02 K/UL — SIGNIFICANT CHANGE UP (ref 0–0.07)
IMM GRANULOCYTES NFR BLD AUTO: 0.4 % — SIGNIFICANT CHANGE UP (ref 0–0.9)
LYMPHOCYTES # BLD AUTO: 1.49 K/UL — SIGNIFICANT CHANGE UP (ref 1–3.3)
LYMPHOCYTES # BLD MANUAL: 1.33 K/UL — SIGNIFICANT CHANGE UP (ref 1–3.3)
LYMPHOCYTES NFR BLD AUTO: 29.3 % — SIGNIFICANT CHANGE UP (ref 13–44)
LYMPHOCYTES NFR BLD MANUAL: 26.1 % — SIGNIFICANT CHANGE UP (ref 13–44)
MAGNESIUM SERPL-MCNC: 1.6 MG/DL — SIGNIFICANT CHANGE UP (ref 1.6–2.6)
MANUAL NEUTROPHIL BANDS #: 0.05 K/UL — SIGNIFICANT CHANGE UP (ref 0–0.84)
MANUAL REACTIVE LYMPHOCYTES #: 0.05 K/UL — SIGNIFICANT CHANGE UP (ref 0–0.63)
MCHC RBC-ENTMCNC: 29.2 PG — SIGNIFICANT CHANGE UP (ref 27–34)
MCHC RBC-ENTMCNC: 32.4 G/DL — SIGNIFICANT CHANGE UP (ref 32–36)
MCV RBC AUTO: 90 FL — SIGNIFICANT CHANGE UP (ref 80–100)
MONOCYTES # BLD AUTO: 0.32 K/UL — SIGNIFICANT CHANGE UP (ref 0–0.9)
MONOCYTES # BLD MANUAL: 0.4 K/UL — SIGNIFICANT CHANGE UP (ref 0–0.9)
MONOCYTES NFR BLD AUTO: 6.3 % — SIGNIFICANT CHANGE UP (ref 2–14)
MONOCYTES NFR BLD MANUAL: 7.8 % — SIGNIFICANT CHANGE UP (ref 2–14)
NEUTROPHILS # BLD AUTO: 3.07 K/UL — SIGNIFICANT CHANGE UP (ref 1.8–7.4)
NEUTROPHILS # BLD MANUAL: 3.22 K/UL — SIGNIFICANT CHANGE UP (ref 1.8–7.4)
NEUTROPHILS NFR BLD AUTO: 60.5 % — SIGNIFICANT CHANGE UP (ref 43–77)
NEUTROPHILS NFR BLD MANUAL: 63.4 % — SIGNIFICANT CHANGE UP (ref 43–77)
NEUTS BAND # BLD: 0.9 % — SIGNIFICANT CHANGE UP (ref 0–8)
NEUTS BAND NFR BLD: 0.9 % — SIGNIFICANT CHANGE UP (ref 0–8)
NRBC # BLD AUTO: 0 K/UL — SIGNIFICANT CHANGE UP (ref 0–0)
NRBC # FLD: 0 K/UL — SIGNIFICANT CHANGE UP (ref 0–0)
NRBC BLD AUTO-RTO: 0 /100 WBCS — SIGNIFICANT CHANGE UP (ref 0–0)
PHOSPHATE SERPL-MCNC: 3.1 MG/DL — SIGNIFICANT CHANGE UP (ref 2.5–4.5)
PLAT MORPH BLD: NORMAL — SIGNIFICANT CHANGE UP
PLATELET # BLD AUTO: 109 K/UL — LOW (ref 150–400)
PLATELET COUNT - ESTIMATE: ABNORMAL
PMV BLD: 10.9 FL — SIGNIFICANT CHANGE UP (ref 7–13)
POTASSIUM SERPL-MCNC: 3.8 MMOL/L — SIGNIFICANT CHANGE UP (ref 3.5–5.3)
POTASSIUM SERPL-SCNC: 3.8 MMOL/L — SIGNIFICANT CHANGE UP (ref 3.5–5.3)
PROCALCITONIN SERPL-MCNC: 0.04 NG/ML — SIGNIFICANT CHANGE UP (ref 0.02–0.1)
PROT SERPL-MCNC: 7.7 G/DL — SIGNIFICANT CHANGE UP (ref 6–8.3)
RBC # BLD: 4.11 M/UL — SIGNIFICANT CHANGE UP (ref 3.8–5.2)
RBC # FLD: 13 % — SIGNIFICANT CHANGE UP (ref 10.3–14.5)
RBC BLD AUTO: NORMAL — SIGNIFICANT CHANGE UP
SMUDGE CELLS # BLD: PRESENT
SODIUM SERPL-SCNC: 141 MMOL/L — SIGNIFICANT CHANGE UP (ref 135–145)
VARIANT LYMPHS # BLD: 0.9 % — SIGNIFICANT CHANGE UP (ref 0–6)
VARIANT LYMPHS NFR BLD MANUAL: 0.9 % — SIGNIFICANT CHANGE UP (ref 0–6)
WBC # BLD: 5.08 K/UL — SIGNIFICANT CHANGE UP (ref 3.8–10.5)
WBC # FLD AUTO: 5.08 K/UL — SIGNIFICANT CHANGE UP (ref 3.8–10.5)

## 2025-06-28 PROCEDURE — 71045 X-RAY EXAM CHEST 1 VIEW: CPT | Mod: 26

## 2025-06-28 PROCEDURE — 73600 X-RAY EXAM OF ANKLE: CPT | Mod: 26,RT

## 2025-06-28 RX ORDER — CLINDAMYCIN PHOSPHATE 150 MG/ML
1 VIAL (ML) INJECTION
Qty: 28 | Refills: 0
Start: 2025-06-28 | End: 2025-07-04

## 2025-06-28 RX ORDER — APIXABAN 2.5 MG/1
1 TABLET, FILM COATED ORAL
Qty: 60 | Refills: 0
Start: 2025-06-28 | End: 2025-07-27

## 2025-06-28 RX ORDER — APIXABAN 2.5 MG/1
10 TABLET, FILM COATED ORAL ONCE
Refills: 0 | Status: COMPLETED | OUTPATIENT
Start: 2025-06-28 | End: 2025-06-28

## 2025-06-28 RX ORDER — CLINDAMYCIN PHOSPHATE 150 MG/ML
300 VIAL (ML) INJECTION ONCE
Refills: 0 | Status: COMPLETED | OUTPATIENT
Start: 2025-06-28 | End: 2025-06-28

## 2025-06-28 RX ADMIN — APIXABAN 10 MILLIGRAM(S): 2.5 TABLET, FILM COATED ORAL at 03:33

## 2025-06-28 RX ADMIN — Medication 300 MILLIGRAM(S): at 03:33

## 2025-06-28 NOTE — ED POST DISCHARGE NOTE - RESULT SUMMARY
Pt called ED; requests Rx for Eliquis and clindamycin be sent to [facility] as [facility] where Rx's were originally sent, is closed today.  Pt made aware that [facility] closes at 4 pm today; pt verbalized understanding.  Rx's sent to [facility] per same instructions as those originally sent; +successful e-Rx submission status noted.  [facility] pharmacy also called; spoke with pharmacist regarding pt's info and Rx's sent.

## 2025-06-28 NOTE — ED ADULT NURSE REASSESSMENT NOTE - NSFALLUNIVINTERV_ED_ALL_ED
Bed/Stretcher in lowest position, wheels locked, appropriate side rails in place/Call bell, personal items and telephone in reach/Instruct patient to call for assistance before getting out of bed/chair/stretcher/Non-slip footwear applied when patient is off stretcher/[place] to call system/Physically safe environment - no spills, clutter or unnecessary equipment/Purposeful proactive rounding/Room/bathroom lighting operational, light cord in reach

## 2025-06-28 NOTE — ED ADULT NURSE REASSESSMENT NOTE - NS ED NURSE REASSESS COMMENT FT1
Pt appears to be resting comfortably, NAD, respirations are even and unlabored, no complaints at this moment. VS noted. CM in progress. MD at bedside for reassessment. Safety precautions implemented as per protocol, awaiting further MD orders, plan of care ongoing.

## 2025-06-28 NOTE — ED ADULT NURSE REASSESSMENT NOTE - NSFALLRISKASMT_ED_ALL_ED_DT
Group Topic: BH DANIKA Activity Group    Date: 11/8/2024  Start Time:  9:00 AM  End Time:  9:50 AM  Facilitators: Monty Li; Devi Little LCSW    Focus: DANIKA Devi Little LCSW      28-Jun-2025 03:20

## 2025-07-01 ENCOUNTER — INPATIENT (INPATIENT)
Facility: HOSPITAL | Age: 60
LOS: 1 days | Discharge: HOME | End: 2025-07-03
Attending: STUDENT IN AN ORGANIZED HEALTH CARE EDUCATION/TRAINING PROGRAM | Admitting: STUDENT IN AN ORGANIZED HEALTH CARE EDUCATION/TRAINING PROGRAM
Payer: COMMERCIAL

## 2025-07-01 VITALS
TEMPERATURE: 98 F | SYSTOLIC BLOOD PRESSURE: 154 MMHG | WEIGHT: 184.97 LBS | HEART RATE: 92 BPM | OXYGEN SATURATION: 95 % | HEIGHT: 63.78 IN | DIASTOLIC BLOOD PRESSURE: 82 MMHG | RESPIRATION RATE: 16 BRPM

## 2025-07-01 DIAGNOSIS — L03.119 CELLULITIS OF UNSPECIFIED PART OF LIMB: ICD-10-CM

## 2025-07-01 DIAGNOSIS — J45.909 UNSPECIFIED ASTHMA, UNCOMPLICATED: ICD-10-CM

## 2025-07-01 DIAGNOSIS — C56.9 MALIGNANT NEOPLASM OF UNSPECIFIED OVARY: ICD-10-CM

## 2025-07-01 DIAGNOSIS — L03.90 CELLULITIS, UNSPECIFIED: ICD-10-CM

## 2025-07-01 DIAGNOSIS — Z29.9 ENCOUNTER FOR PROPHYLACTIC MEASURES, UNSPECIFIED: ICD-10-CM

## 2025-07-01 DIAGNOSIS — Z98.89 OTHER SPECIFIED POSTPROCEDURAL STATES: Chronic | ICD-10-CM

## 2025-07-01 DIAGNOSIS — Z98.890 OTHER SPECIFIED POSTPROCEDURAL STATES: Chronic | ICD-10-CM

## 2025-07-01 DIAGNOSIS — E11.9 TYPE 2 DIABETES MELLITUS WITHOUT COMPLICATIONS: ICD-10-CM

## 2025-07-01 DIAGNOSIS — I82.409 ACUTE EMBOLISM AND THROMBOSIS OF UNSPECIFIED DEEP VEINS OF UNSPECIFIED LOWER EXTREMITY: ICD-10-CM

## 2025-07-01 DIAGNOSIS — I10 ESSENTIAL (PRIMARY) HYPERTENSION: ICD-10-CM

## 2025-07-01 LAB
ADD ON TEST-SPECIMEN IN LAB: SIGNIFICANT CHANGE UP
ALBUMIN SERPL ELPH-MCNC: 3.8 G/DL — SIGNIFICANT CHANGE UP (ref 3.3–5)
ALP SERPL-CCNC: 104 U/L — SIGNIFICANT CHANGE UP (ref 40–120)
ALT FLD-CCNC: 6 U/L — SIGNIFICANT CHANGE UP (ref 4–33)
ANION GAP SERPL CALC-SCNC: 15 MMOL/L — HIGH (ref 7–14)
APTT BLD: 43.1 SEC — HIGH (ref 26.1–36.8)
AST SERPL-CCNC: 18 U/L — SIGNIFICANT CHANGE UP (ref 4–32)
BASOPHILS # BLD AUTO: 0.01 K/UL — SIGNIFICANT CHANGE UP (ref 0–0.2)
BASOPHILS NFR BLD AUTO: 0.2 % — SIGNIFICANT CHANGE UP (ref 0–2)
BILIRUB SERPL-MCNC: 0.8 MG/DL — SIGNIFICANT CHANGE UP (ref 0.2–1.2)
BUN SERPL-MCNC: 15 MG/DL — SIGNIFICANT CHANGE UP (ref 7–23)
CALCIUM SERPL-MCNC: 9.2 MG/DL — SIGNIFICANT CHANGE UP (ref 8.4–10.5)
CHLORIDE SERPL-SCNC: 104 MMOL/L — SIGNIFICANT CHANGE UP (ref 98–107)
CO2 SERPL-SCNC: 23 MMOL/L — SIGNIFICANT CHANGE UP (ref 22–31)
CREAT SERPL-MCNC: 1.07 MG/DL — SIGNIFICANT CHANGE UP (ref 0.5–1.3)
CRP SERPL-MCNC: 22.4 MG/L — HIGH
EGFR: 59 ML/MIN/1.73M2 — LOW
EGFR: 59 ML/MIN/1.73M2 — LOW
EOSINOPHIL # BLD AUTO: 0.06 K/UL — SIGNIFICANT CHANGE UP (ref 0–0.5)
EOSINOPHIL NFR BLD AUTO: 1.4 % — SIGNIFICANT CHANGE UP (ref 0–6)
ERYTHROCYTE [SEDIMENTATION RATE] IN BLOOD: 43 MM/HR — HIGH (ref 0–20)
GLUCOSE BLDC GLUCOMTR-MCNC: 110 MG/DL — HIGH (ref 70–99)
GLUCOSE SERPL-MCNC: 137 MG/DL — HIGH (ref 70–99)
HCT VFR BLD CALC: 38.5 % — SIGNIFICANT CHANGE UP (ref 34.5–45)
HGB BLD-MCNC: 12.7 G/DL — SIGNIFICANT CHANGE UP (ref 11.5–15.5)
IMM GRANULOCYTES # BLD AUTO: 0.01 K/UL — SIGNIFICANT CHANGE UP (ref 0–0.07)
IMM GRANULOCYTES NFR BLD AUTO: 0.2 % — SIGNIFICANT CHANGE UP (ref 0–0.9)
INR BLD: 1.77 RATIO — HIGH (ref 0.85–1.16)
LYMPHOCYTES # BLD AUTO: 0.78 K/UL — LOW (ref 1–3.3)
LYMPHOCYTES NFR BLD AUTO: 17.9 % — SIGNIFICANT CHANGE UP (ref 13–44)
MCHC RBC-ENTMCNC: 29.5 PG — SIGNIFICANT CHANGE UP (ref 27–34)
MCHC RBC-ENTMCNC: 33 G/DL — SIGNIFICANT CHANGE UP (ref 32–36)
MCV RBC AUTO: 89.3 FL — SIGNIFICANT CHANGE UP (ref 80–100)
MONOCYTES # BLD AUTO: 0.25 K/UL — SIGNIFICANT CHANGE UP (ref 0–0.9)
MONOCYTES NFR BLD AUTO: 5.7 % — SIGNIFICANT CHANGE UP (ref 2–14)
NEUTROPHILS # BLD AUTO: 3.24 K/UL — SIGNIFICANT CHANGE UP (ref 1.8–7.4)
NEUTROPHILS NFR BLD AUTO: 74.6 % — SIGNIFICANT CHANGE UP (ref 43–77)
NRBC # BLD AUTO: 0 K/UL — SIGNIFICANT CHANGE UP (ref 0–0)
NRBC # FLD: 0 K/UL — SIGNIFICANT CHANGE UP (ref 0–0)
NRBC BLD AUTO-RTO: 0 /100 WBCS — SIGNIFICANT CHANGE UP (ref 0–0)
PLATELET # BLD AUTO: 109 K/UL — LOW (ref 150–400)
PMV BLD: 11.1 FL — SIGNIFICANT CHANGE UP (ref 7–13)
POTASSIUM SERPL-MCNC: 3.8 MMOL/L — SIGNIFICANT CHANGE UP (ref 3.5–5.3)
POTASSIUM SERPL-SCNC: 3.8 MMOL/L — SIGNIFICANT CHANGE UP (ref 3.5–5.3)
PROT SERPL-MCNC: 7.5 G/DL — SIGNIFICANT CHANGE UP (ref 6–8.3)
PROTHROM AB SERPL-ACNC: 21 SEC — HIGH (ref 9.9–13.4)
RBC # BLD: 4.31 M/UL — SIGNIFICANT CHANGE UP (ref 3.8–5.2)
RBC # FLD: 12.8 % — SIGNIFICANT CHANGE UP (ref 10.3–14.5)
SODIUM SERPL-SCNC: 142 MMOL/L — SIGNIFICANT CHANGE UP (ref 135–145)
WBC # BLD: 4.35 K/UL — SIGNIFICANT CHANGE UP (ref 3.8–10.5)
WBC # FLD AUTO: 4.35 K/UL — SIGNIFICANT CHANGE UP (ref 3.8–10.5)

## 2025-07-01 PROCEDURE — G0545: CPT

## 2025-07-01 PROCEDURE — 99053 MED SERV 10PM-8AM 24 HR FAC: CPT

## 2025-07-01 PROCEDURE — 99223 1ST HOSP IP/OBS HIGH 75: CPT

## 2025-07-01 PROCEDURE — 76881 US COMPL JOINT R-T W/IMG: CPT | Mod: 26,LT

## 2025-07-01 PROCEDURE — 99222 1ST HOSP IP/OBS MODERATE 55: CPT

## 2025-07-01 PROCEDURE — 93971 EXTREMITY STUDY: CPT | Mod: 26,LT

## 2025-07-01 RX ORDER — DEXTROSE 50 % IN WATER 50 %
15 SYRINGE (ML) INTRAVENOUS ONCE
Refills: 0 | Status: DISCONTINUED | OUTPATIENT
Start: 2025-07-01 | End: 2025-07-03

## 2025-07-01 RX ORDER — DEXTROSE 50 % IN WATER 50 %
12.5 SYRINGE (ML) INTRAVENOUS ONCE
Refills: 0 | Status: DISCONTINUED | OUTPATIENT
Start: 2025-07-01 | End: 2025-07-03

## 2025-07-01 RX ORDER — APIXABAN 2.5 MG/1
10 TABLET, FILM COATED ORAL EVERY 12 HOURS
Refills: 0 | Status: DISCONTINUED | OUTPATIENT
Start: 2025-07-01 | End: 2025-07-03

## 2025-07-01 RX ORDER — GLUCAGON 3 MG/1
1 POWDER NASAL ONCE
Refills: 0 | Status: DISCONTINUED | OUTPATIENT
Start: 2025-07-01 | End: 2025-07-03

## 2025-07-01 RX ORDER — VANCOMYCIN HCL IN 5 % DEXTROSE 1.5G/250ML
1000 PLASTIC BAG, INJECTION (ML) INTRAVENOUS ONCE
Refills: 0 | Status: COMPLETED | OUTPATIENT
Start: 2025-07-01 | End: 2025-07-01

## 2025-07-01 RX ORDER — INSULIN LISPRO 100 U/ML
INJECTION, SOLUTION INTRAVENOUS; SUBCUTANEOUS
Refills: 0 | Status: DISCONTINUED | OUTPATIENT
Start: 2025-07-01 | End: 2025-07-03

## 2025-07-01 RX ORDER — DEXTROSE 50 % IN WATER 50 %
25 SYRINGE (ML) INTRAVENOUS ONCE
Refills: 0 | Status: DISCONTINUED | OUTPATIENT
Start: 2025-07-01 | End: 2025-07-03

## 2025-07-01 RX ORDER — CEFTRIAXONE 500 MG/1
1000 INJECTION, POWDER, FOR SOLUTION INTRAMUSCULAR; INTRAVENOUS ONCE
Refills: 0 | Status: COMPLETED | OUTPATIENT
Start: 2025-07-01 | End: 2025-07-01

## 2025-07-01 RX ORDER — SODIUM CHLORIDE 9 G/1000ML
1000 INJECTION, SOLUTION INTRAVENOUS
Refills: 0 | Status: DISCONTINUED | OUTPATIENT
Start: 2025-07-01 | End: 2025-07-03

## 2025-07-01 RX ORDER — CEFEPIME 2 G/20ML
2000 INJECTION, POWDER, FOR SOLUTION INTRAVENOUS EVERY 12 HOURS
Refills: 0 | Status: DISCONTINUED | OUTPATIENT
Start: 2025-07-01 | End: 2025-07-02

## 2025-07-01 RX ORDER — VANCOMYCIN HCL IN 5 % DEXTROSE 1.5G/250ML
1000 PLASTIC BAG, INJECTION (ML) INTRAVENOUS EVERY 12 HOURS
Refills: 0 | Status: DISCONTINUED | OUTPATIENT
Start: 2025-07-01 | End: 2025-07-02

## 2025-07-01 RX ADMIN — CEFTRIAXONE 100 MILLIGRAM(S): 500 INJECTION, POWDER, FOR SOLUTION INTRAMUSCULAR; INTRAVENOUS at 09:48

## 2025-07-01 RX ADMIN — Medication 250 MILLIGRAM(S): at 10:10

## 2025-07-01 RX ADMIN — Medication 250 MILLIGRAM(S): at 21:48

## 2025-07-01 RX ADMIN — APIXABAN 10 MILLIGRAM(S): 2.5 TABLET, FILM COATED ORAL at 12:28

## 2025-07-01 RX ADMIN — CEFEPIME 100 MILLIGRAM(S): 2 INJECTION, POWDER, FOR SOLUTION INTRAVENOUS at 17:39

## 2025-07-01 NOTE — ED PROVIDER NOTE - ATTENDING CONTRIBUTION TO CARE
I personally evaluated the patient. I reviewed the Resident’s or Physician Assistant’s note (as assigned above), and agree with the findings and plan except as documented in my note.    60yoF PMhx DM, asthma, ovarian cancer (on chemotherapy for the past 1 year, last treatment in May) initially presented 3 days ago for left ankle swelling and pain, was diagnosed with peroneal DVT and cellulitis, discharged on eliquis and clindamycin, now returning for worsening ankle redness and persistent pain on movement. Pt reports compliance with eliquis and clindamycin, denies any fever, chills, chest pain, sob. She has been able to ambulate slowly with crutches, but is still having pain on bearing weight or ROM. Also denies any calf, knee, thigh pain or swelling. Also denies new trauma to the area, falls, rashes.      CONSTITUTIONAL: awake, sitting up, in no acute distress  SKIN: Warm, dry  CARD:  Regular rate and rhythm, good pedal pulses  RESP: CTAB; No increased work of breathing  LEFT ANKLE:  (-) overlying lacerations (+) swelling and erythema to above the ankle, (+) pain on flexion or extension  LOWER EXTREM: Normal knee flexion/extension bilaterally, no knee swelling/tenderness  NEURO: A&O x3, speaking in full clear sentences, no facial asymmetry, moving all extremities. Strength and sensation intact and symmetric in bilateral lower extremities.    DDx includes superficial soft tissue infection, worsening DVT, soft tissue injury, unlikely septic arthritis given Bedside POCUS done with Dr. Hedrick at bedside shows no ankle effusion    Repeat duplex shows no DVT.    Given spread of erythema and persistent pain/swelling in immunocompromised patient, plan for blood cultures, starting abx, and consult ID for failure of PO abx

## 2025-07-01 NOTE — ED CDU PROVIDER INITIAL DAY NOTE - ATTENDING APP SHARED VISIT CONTRIBUTION OF CARE
60yoF PMhx DM, asthma, ovarian cancer (on chemotherapy for the past 1 year, last treatment in May) initially presented 3 days ago for left ankle swelling and pain, was diagnosed with peroneal DVT and cellulitis, discharged on eliquis and clindamycin, now returning for worsening ankle redness and persistent pain on movement. Pt reports compliance with eliquis and clindamycin, denies any fever, chills, chest pain, sob. She has been able to ambulate slowly with crutches, but is still having pain on bearing weight or ROM. Also denies any calf, knee, thigh pain or swelling. Also denies new trauma to the area, falls, rashes.      CONSTITUTIONAL: awake, sitting up, in no acute distress  SKIN: Warm, dry  CARD:  Regular rate and rhythm, good pedal pulses  RESP: CTAB; No increased work of breathing  LEFT ANKLE:  (-) overlying lacerations (+) swelling and erythema to above the ankle, (+) pain on flexion or extension  LOWER EXTREM: Normal knee flexion/extension bilaterally, no knee swelling/tenderness  NEURO: A&O x3, speaking in full clear sentences, no facial asymmetry, moving all extremities. Strength and sensation intact and symmetric in bilateral lower extremities.    DDx includes superficial soft tissue infection, worsening DVT, soft tissue injury, unlikely septic arthritis given Bedside POCUS done with Dr. Hedrick at bedside shows no ankle effusion    Repeat duplex shows no DVT.    Given spread of erythema and persistent pain/swelling in immunocompromised patient, plan for blood cultures, starting abx, and consult ID for failure of PO abx 60yoF PMhx DM, asthma, ovarian cancer (on chemotherapy for the past 1 year, last treatment in May) initially presented 3 days ago for left ankle swelling and pain, was diagnosed with peroneal DVT and cellulitis, discharged on eliquis and clindamycin, now returning for worsening ankle redness and persistent pain on movement. Pt reports compliance with eliquis and clindamycin, denies any fever, chills, chest pain, sob. She has been able to ambulate slowly with crutches, but is still having pain on bearing weight or ROM. Also denies any calf, knee, thigh pain or swelling. Also denies new trauma to the area, falls, rashes.      CONSTITUTIONAL: awake, sitting up, in no acute distress  SKIN: Warm, dry  CARD:  Regular rate and rhythm, good pedal pulses  RESP: CTAB; No increased work of breathing  LEFT ANKLE:  (-) overlying lacerations (+) swelling and erythema to above the ankle, (+) pain on flexion or extension  LOWER EXTREM: Normal knee flexion/extension bilaterally, no knee swelling/tenderness  NEURO: A&O x3, speaking in full clear sentences, no facial asymmetry, moving all extremities. Strength and sensation intact and symmetric in bilateral lower extremities.    DDx includes superficial soft tissue infection, worsening DVT, soft tissue injury, unlikely septic arthritis given Bedside POCUS done with Dr. Hedrick at bedside shows no ankle effusion  Repeat duplex shows no DVT.    Given spread of erythema and persistent pain/swelling in immunocompromised patient, plan for blood cultures, starting abx, and consult ID for failure of PO abx    ID ultimately recommended admission, given patient will likely need more than 24hrs of treatment with IV abx. Plan to admit.

## 2025-07-01 NOTE — ED ADULT NURSE NOTE - OBJECTIVE STATEMENT
Pt c/o left foot/ankle pain/ swelling, ambulates. Recent DVT and cellulitis. Pt   takes eliquis. PMH of DM, on Insulin, US in progress by the bedside. IV line placed, labs sent,  ABX to be started, continue to monitor.

## 2025-07-01 NOTE — CONSULT NOTE ADULT - ASSESSMENT
60 f with DM, HTN, asthma, ovarian ca with peritoneal carcinomatosis and malignant pleural effusion before last chemo 5/2025 came to ER with L ankle edema 6/27, xray negative, doppler showed DVT and she was discharged with eliquis and clinda for cellulitis but she came back as there was extending erythema above the ankle but no fever, chills or other symptoms  here afebrile, no WBC    metastatic ovarian ca on chemo (last chemo > 1month ago) with L foot/ankle cellulitis, was discharged with clinda and eliquis as the doppler showed DVT but now worsening erythema  doppler now with no DVT and u/s showedNo effusion L ankle joint.  Soft tissue swelling and hyperechoic change of skin     * s/p vanco, cefepime in ER pt has MSSA in PCR before and cellulitis more strep like, switch to cefazolin 2 q8  * if worsening edema and erythema will need the LLE CT with contrast  * monitor CBC/diff and CMP      The above assessment and plan was discussed with the primary team    Shandra Alexander MD  contact on teams  After 5pm and on weekends call [phone]

## 2025-07-01 NOTE — H&P ADULT - PROBLEM SELECTOR PLAN 8
F: oral intake  E: replete K>4, Mg >2  N: Regular Diet  GI: none  DVT: Lovenox 40mg  ASA 81    Code Status: Full Code    Dispo: Medical Floor

## 2025-07-01 NOTE — ED CDU PROVIDER INITIAL DAY NOTE - CLINICAL SUMMARY MEDICAL DECISION MAKING FREE TEXT BOX
60-year-old female PMHx DM2, asthma, ovarian cancer currently on chemotherapy (last session in May 2025) followed by Dr. Whit Cuellar.  Physical exam demonstrating erythema of the left ankle extending up above the anterior ankle.  Not warm to, not purulent, painful to touch.  Failure of outpatient patient cellulitis treatment suspected.  Will obtain ultrasound to rule out extension of DVT.
No

## 2025-07-01 NOTE — H&P ADULT - PROBLEM SELECTOR PLAN 1
recent visit to ED, diagnosed with cellulitis and DVT. given clinda for the cellulitis but patient returning with worsening erythema.   No SIRS criteria met    - ID recommendations appreciated  - c/w Cefazolin 2g q8h  - f/u blood cultures obtained in ED recent visit to ED, diagnosed with cellulitis and DVT. given clinda for the cellulitis but patient returning with worsening erythema.   No SIRS criteria met  fluctuance appreciated on medial aspect of ankle    - ID recommendations appreciated  - will continue with Vanc and Cefepime until ultrasound returns to rule out abscess  - f/u blood cultures obtained in ED recent visit to ED, diagnosed with cellulitis and DVT. given clinda for the cellulitis but patient returning with worsening erythema.   No SIRS criteria met  fluctuance appreciated on medial aspect of ankle    - ID recommendations appreciated  - will continue with Vanc and Cefepime until ultrasound returns to rule out abscess  - f/u blood cultures obtained in ED  - ESR/CRP

## 2025-07-01 NOTE — ED ADULT TRIAGE NOTE - HOW PATIENT ADDRESSED, PROFILE
OB Progress Note:  PPD#3    S: Patient seen at bedside. Patient feels well. Pain is well controlled, tolerating regular diet, passing flatus, voiding spontaneously, ambulating without difficulty. Denies heavy vaginal bleeding, CP/SOB, leadheadedness/dizziness, N/V. Patient denies HA, vision changes, or difficulty breathing. Does endorse swelling of both legs.    O:  Vitals:   Vital Signs Last 24 Hrs  T(C): 36.7 (2023 05:55), Max: 36.9 (2023 22:57)  T(F): 98 (2023 05:55), Max: 98.5 (2023 22:57)  HR: 83 (2023 05:55) (83 - 98)  BP: 151/77 (2023 05:55) (107/62 - 151/77)  BP(mean): --  RR: 18 (2023 05:55) (18 - 19)  SpO2: 100% (2023 05:55) (95% - 100%)    Parameters below as of 2023 05:55  Patient On (Oxygen Delivery Method): room air        MEDICATIONS  (STANDING):  diphtheria/tetanus/pertussis (acellular) Vaccine (Adacel) 0.5 milliLiter(s) IntraMuscular once  heparin   Injectable 5000 Unit(s) SubCutaneous every 12 hours  ibuprofen  Tablet. 600 milliGRAM(s) Oral every 6 hours  prenatal multivitamin 1 Tablet(s) Oral daily  sodium chloride 0.9% lock flush 3 milliLiter(s) IV Push every 8 hours    MEDICATIONS  (PRN):  benzocaine 20%/menthol 0.5% Spray 1 Spray(s) Topical every 6 hours PRN for Perineal discomfort  dibucaine 1% Ointment 1 Application(s) Topical every 6 hours PRN Perineal discomfort  diphenhydrAMINE 25 milliGRAM(s) Oral every 6 hours PRN Pruritus  hydrocortisone 1% Cream 1 Application(s) Topical every 6 hours PRN Moderate Pain (4-6)  lanolin Ointment 1 Application(s) Topical every 6 hours PRN nipple soreness  magnesium hydroxide Suspension 30 milliLiter(s) Oral two times a day PRN Constipation  oxyCODONE    IR 5 milliGRAM(s) Oral every 3 hours PRN Moderate to Severe Pain (4-10)  oxyCODONE    IR 5 milliGRAM(s) Oral once PRN Moderate to Severe Pain (4-10)  pramoxine 1%/zinc 5% Cream 1 Application(s) Topical every 4 hours PRN Moderate Pain (4-6)  simethicone 80 milliGRAM(s) Chew every 4 hours PRN Gas  witch hazel Pads 1 Application(s) Topical every 4 hours PRN Perineal discomfort      Labs:  Blood type: O Positive  Rubella IgG: RPR: Negative                          7.5<L>   14.20<H> >-----------< 132<L>    (  @ 06:48 )             22.9<L>                        7.4<L>   14.47<H> >-----------< 100<L>    (  @ 06:56 )             21.8<L>                        7.7<L>   18.09<H> >-----------< 89<L>    (  00:58 )             23.2<L>                        7.8<L>   17.10<H> >-----------< 113<L>    (  @ 18:00 )             23.2<L>                        9.2<L>   15.74<H> >-----------< 112<L>    (  @ 13:30 )             27.3<L>                        11.1<L>   12.89<H> >-----------< 176    (  @ 12:22 )             33.8<L>    23 17:19      138  |  106  |  19  ----------------------------<  113<H>  4.0   |  23  |  0.65    23 @ 06:48      137  |  104  |  17  ----------------------------<  89  3.8   |  22  |  0.69    23 @ 00:58      138  |  106  |  11  ----------------------------<  80  3.9   |  21<L>  |  0.71    23 @ 18:00      138  |  106  |  10  ----------------------------<  76  3.5   |  20<L>  |  0.79    23 @ 13:30      136  |  105  |  10  ----------------------------<  81  3.7   |  18<L>  |  0.78    23 @ 12:22      135  |  103  |  9   ----------------------------<  77  5.4<H>   |  18<L>  |  0.84        Ca    9.0      2023 17:19  Ca    8.9      2023 06:48  Ca    8.1<L>      2023 00:58  Ca    8.2<L>      2023 18:00  Ca    7.5<L>      2023 13:30  Ca    8.4      2023 12:22  Phos  3.2     02-02  Mg     1.80     02-03  Mg     1.30<L>     02-02    TPro  5.5<L>  /  Alb  3.1<L>  /  TBili  0.2  /  DBili  x   /  AST  129<H>  /  ALT  108<H>  /  AlkPhos  82  23 @ 17:19  TPro  5.6<L>  /  Alb  3.1<L>  /  TBili  0.2  /  DBili  x   /  AST  124<H>  /  ALT  82<H>  /  AlkPhos  77  23 @ 06:48  TPro  4.9<L>  /  Alb  2.8<L>  /  TBili  1.0  /  DBili  x   /  AST  50<H>  /  ALT  15  /  AlkPhos  59  23 @ 00:58  TPro  5.5<L>  /  Alb  2.7<L>  /  TBili  0.8  /  DBili  x   /  AST  47<H>  /  ALT  15  /  AlkPhos  113  23 @ 12:22          Physical Exam:  General: NAD  Abdomen: soft, non-tender, non-distended, fundus firm  Vaginal: No heavy vaginal bleeding  Extremities: Mild edema of BLE, below the knee. No calf tenderness, no erythema, no warmth clemente

## 2025-07-01 NOTE — H&P ADULT - NSHPPHYSICALEXAM_GEN_ALL_CORE
General: NAD  Head: NC/AT; MMM; PERRL; EOMI;  Neck: Supple; no JVD  Respiratory: CTAB; no wheezes/rales/rhonchi  Cardiovascular: Regular rhythm/rate; S1/S2+, no murmurs, rubs gallops   Gastrointestinal: Soft; NTND; bowel sounds normal and present  Extremities: WWP; no edema/cyanosis  Neurological: A&Ox3, CNII-XII grossly intact; no obvious focal deficits  Integument: intact; normal turgor, texture, temperature, color for age General: NAD  Head: NC/AT; MMM; PERRL; EOMI;  Neck: Supple; no JVD  Respiratory: CTAB; no wheezes/rales/rhonchi  Cardiovascular: Regular rhythm/rate; S1/S2+, no murmurs, rubs gallops   Gastrointestinal: Soft; NTND; bowel sounds normal and present  Extremities: WWP; no edema/cyanosis,fluctuance overlying medial malleolus  Neurological: A&Ox3, CNII-XII grossly intact; no obvious focal deficits  Integument: intact; normal turgor, texture, temperature, color for age

## 2025-07-01 NOTE — ED PROVIDER NOTE - CLINICAL SUMMARY MEDICAL DECISION MAKING FREE TEXT BOX
60-year-old female PMHx DM2, asthma, ovarian cancer currently on chemotherapy (last session in May 2025) followed by Dr. Whit Cuellar.  Physical exam demonstrating erythema of the left ankle extending up above the anterior ankle.  Not warm to, not purulent, painful to touch.  Failure of outpatient patient cellulitis treatment suspected.  Will obtain ultrasound to rule out extension of DVT.

## 2025-07-01 NOTE — ED ADULT TRIAGE NOTE - CHIEF COMPLAINT QUOTE
c/o worsening left ankle pain/swelling. pt diagnosed with cellulitis and DVT to left LE on friday, denies fevers. hx of DVT on eliquis, DM,

## 2025-07-01 NOTE — ED PROVIDER NOTE - PHYSICAL EXAMINATION
General: well appearing, interactive, well nourished, no apparent distress, ncat  HEENT: EOMI, PERRLA, normal mucosa, normal oropharynx, no lesions on the lips or on oral mucosa, normal external ear  Neck: supple, no lymphadenopathy, full range of motion, no nuchal rigidity  CV: RRR, normal S1 and S2 with no murmur, capillary refill less than two seconds  Resp: lungs CTA b/l, good aeration bilaterally, symmetric chest wall   Abd: non-distended, soft, non-tender  : no CVA tenderness  MSK: full range of motion, no cyanosis, no edema, no clubbing, no immobility  Neuro: CN II-XII grossly intact, muscle strength 5/5 in all extremities  , left ankle swelling with erythema extending up above the ankle. non circumfrential, not warm, non purulent, tender to palpation  Skin: no rashes, skin intact

## 2025-07-01 NOTE — H&P ADULT - PROBLEM SELECTOR PLAN 2
patient on Eliquis after peroneal dvt discovered on doppler on 6/27   repeat doppler w/o extension of DVT    - c/w Eliquis

## 2025-07-01 NOTE — H&P ADULT - PROBLEM SELECTOR PLAN 5
Humalog 3untis qAC. Semglee 8u qhs    - Lantus 8  - Lispro 3  - mISS  - a1c Humalog 3untis qAC. Semglee 8u qhs per outpatient documentation    - c/w sliding scale  - adjust insulin as necessary

## 2025-07-01 NOTE — H&P ADULT - TIME BILLING
Time-based billing (NON-critical care).     more than 50% of the visit was spent counseling and / or coordinating care by the attending physician.  The necessity of the time spent during the encounter on this date of service was due to:     review of laboratory data, radiology results, consultants' recommendations, documentation in [place], discussion with patient/ACP and interdisciplinary staff (such as , social workers, etc). Interventions were performed as documented above.

## 2025-07-01 NOTE — PATIENT PROFILE ADULT - FALL HARM RISK - HARM RISK INTERVENTIONS

## 2025-07-01 NOTE — ED CDU PROVIDER DISPOSITION NOTE - CLINICAL COURSE
60-year-old female PMHx DM2, asthma, ovarian cancer currently on chemotherapy (last session in May 2025) followed by Dr. Whit Cuellar.  Here for left ankle swelling.  Patient presented to the emergency department 3 days ago for bilateral ankle swelling.  Patient was diagnosed with a DVT as well as cellulitis of the left ankle.  Patient was sent home on clindamycin and Eliquis.  Patient coming in for progressive redness of her left ankle.  Denies fevers, chills, chest pain, shortness of breath. Pt was seen by ID while in CDU and recommends admission for continued IV antibiotics.

## 2025-07-01 NOTE — H&P ADULT - NSHPADDITIONALINFOADULT_GEN_ALL_CORE
Olmesartan Medoxomil 20 MG Oral Tablet; TAKE 1 TABLET BY MOUTH EVERY DAY  Aspirin 81 MG TABS    BD Pen Needle Janene 2nd Gen 32G X 4 MM; USE AS DIRECTED FOUR TIMES DAILY  BD Swab Single Use Regular Pad; APPLY TOPICALLY TO THE AFFECTED AREA FOURTIMES DAILY  FreeStyle Guera 2 [place] Device; AS DIRECTED  FreeStyle Guera 2 Sensor; EVERY 14 DAYS  FreeStyle Lite Test In Vitro Strip; USE 1 STRIP 4 TIMES DAILY  FreeStyle Lite w/Device Kit; TEST FOUR TIMES DAILY  HumaLOG KwikPen 100 UNIT/ML Subcutaneous Solution Pen-injector; INJECT 5 UNIT 3times daily  Lancets; 4 TIMES DAILYLancing Device; USE AS DIRECTED  Semglee (yfgn) 100 UNIT/ML Subcutaneous Solution Pen-injector; INJECT 8 UNIT Daily  OneTouch Ultra Blue STRP; TEST THREE TIMES A DAY  OneTouch Ultra In Vitro Strip; TEST 3 TIMES A DAY    Biotin 1000 MCG Oral Tablet  Vitamin D3 50 MCG (2000 UT) Oral Tablet      Lidocaine 5 % External Ointment; APPLY TO AFFECTED AREAS AS DIRECTED  Lidocaine-Prilocaine 2.5-2.5 % External Cream; APPLY TO AREA AND COVER WITHTAPE 1 HOUR BEFORE APPOINTMENT  Omega 3 CAPSOneTouch Delica Lancets 33G MISC; Use three times daily  Prochlorperazine Maleate 10 MG Oral Tablet; TAKE 1 TABLET EVERY 6 HOURS ASNEEDED FOR NAUSEA  Sucralfate 1 GM/10ML Oral Suspension; SHAKE WELL & TAKE 10ML EVERY 6 HOURS ASNEEDED  First-Mouthwash BLM Mouth/Throat Suspension; Swish, gargle, and spit one to two teaspoonfuls every six hours as needed

## 2025-07-01 NOTE — H&P ADULT - HISTORY OF PRESENT ILLNESS
60F PMH diabetes, asthma, hypertension, asthma, ovarian cancer (on chemo, last chemo session was May 2nd 2025) w/ peritoneal carcinomatosis and c/b malignant pleural effusion s/p pleurx catheter presents for c/o worsening left ankle pain/swelling. She recently presented for the same symptoms on June 27th and was found to have a Peroneal DVT and cellulitis. She was given clindamycin and eliquis and discharged from the ED at that time. Today she comes back because of worsening edema and rubor extending up the leg. Repeat Doppler w/o evidence of worsening DVT. Infectious disease was consulted in the ED and recommended cefazolin. On ROS patient states _________ 60F PMH diabetes, asthma, hypertension, asthma, ovarian cancer (on chemo, last chemo session was May 2nd 2025) w/ peritoneal carcinomatosis and c/b malignant pleural effusion s/p pleurx catheter presents for c/o worsening left ankle pain/swelling. She recently presented for the same symptoms on June 27th and was found to have a Peroneal DVT and cellulitis. She was given clindamycin and eliquis and discharged from the ED at that time. Today she comes back because of worsening edema and rubor extending up the leg. Repeat Doppler w/o evidence of worsening DVT. Infectious disease was consulted in the ED and recommended cefazolin. On ROS patient states no fevers, chills. only tenderness to left foot and ankle 60F PMH diabetes, asthma, asthma, ovarian cancer (on chemo, last chemo session was May 2nd 2025) w/ peritoneal carcinomatosis and c/b malignant pleural effusion s/p pleurx catheter presents for c/o worsening left ankle pain/swelling. She recently presented for the same symptoms on June 27th and was found to have a Peroneal DVT and cellulitis. She was given clindamycin and eliquis and discharged from the ED at that time. Today she comes back because of worsening edema and rubor extending up the leg. Repeat Doppler w/o evidence of worsening DVT. Infectious disease was consulted in the ED and recommended cefazolin. On ROS patient states no fevers, chills. only tenderness to left foot and ankle

## 2025-07-01 NOTE — H&P ADULT - PROBLEM SELECTOR PLAN 3
Metastatic Ovarian cancer with most recent infusion of Zirabev on May 2nd  c/b peritoneal mets and malignant pleural effusion s/p pleurx catheter  Follows with Whit Cuellar.

## 2025-07-01 NOTE — H&P ADULT - NSHPLABSRESULTS_GEN_ALL_CORE
LABS:                          12.7   4.35  )-----------( 109      ( 01 Jul 2025 08:57 )             38.5     07-01    142  |  104  |  15  ----------------------------<  137[H]  3.8   |  23  |  1.07    Ca    9.2      01 Jul 2025 08:57    TPro  7.5  /  Alb  3.8  /  TBili  0.8  /  DBili  x   /  AST  18  /  ALT  6   /  AlkPhos  104  07-01    PT/INR - ( 01 Jul 2025 08:57 )   PT: 21.0 sec;   INR: 1.77 ratio         PTT - ( 01 Jul 2025 08:57 )  PTT:43.1 sec        < from: US Duplex Venous Lower Ext Ltd, Left (07.01.25 @ 09:29) >    IMPRESSION:  No evidence of left lower extremity deep venous thrombosis.    < end of copied text >    < from: Xray Ankle 2 Views, Left (06.28.25 @ 00:56) >    IMPRESSION:  No acute fracture or dislocation. Mild degenerative changes bilaterally,   right greater than left.    Soft tissue swelling around the left ankle    < end of copied text >

## 2025-07-01 NOTE — H&P ADULT - ASSESSMENT
60F PMH diabetes, asthma, hypertension, asthma, ovarian cancer (on chemo, last chemo session was May 2nd 2025) w/ peritoneal carcinomatosis and c/b malignant pleural effusion s/p pleurx catheter presents for cellulitis

## 2025-07-01 NOTE — H&P ADULT - PROBLEM SELECTOR PLAN 6
home med: Olmesartan PATIENT STATES SHE DOES NOT HAVE HYPERTENSION AND THAT IT NEEDS TO BE STRICKEN FROM HER RECORD

## 2025-07-01 NOTE — ED CDU PROVIDER DISPOSITION NOTE - ATTENDING CONTRIBUTION TO CARE
I personally evaluated the patient. I reviewed the Resident’s or Physician Assistant’s note (as assigned above), and agree with the findings and plan except as documented in my note.    60yoF PMhx DM, asthma, ovarian cancer (on chemotherapy for the past 1 year, last treatment in May) initially presented 3 days ago for left ankle swelling and pain, was diagnosed with peroneal DVT and cellulitis, discharged on eliquis and clindamycin, now returning for worsening ankle redness and persistent pain on movement. Pt reports compliance with eliquis and clindamycin, denies any fever, chills, chest pain, sob. She has been able to ambulate slowly with crutches, but is still having pain on bearing weight or ROM. Also denies any calf, knee, thigh pain or swelling. Also denies new trauma to the area, falls, rashes.      CONSTITUTIONAL: awake, sitting up, in no acute distress  SKIN: Warm, dry  CARD:  Regular rate and rhythm, good pedal pulses  RESP: CTAB; No increased work of breathing  LEFT ANKLE:  (-) overlying lacerations (+) swelling and erythema to above the ankle, (+) pain on flexion or extension  LOWER EXTREM: Normal knee flexion/extension bilaterally, no knee swelling/tenderness  NEURO: A&O x3, speaking in full clear sentences, no facial asymmetry, moving all extremities. Strength and sensation intact and symmetric in bilateral lower extremities.    DDx includes superficial soft tissue infection, worsening DVT, soft tissue injury, unlikely septic arthritis given Bedside POCUS done with Dr. Hedrick at bedside shows no ankle effusion    Repeat duplex shows no DVT.    Given spread of erythema and persistent pain/swelling in immunocompromised patient, plan for blood cultures, starting abx, and consult ID for failure of PO abx    ID ultimately recommended admission, given patient will likely need more than 24hrs of treatment with IV abx. Plan to admit.

## 2025-07-01 NOTE — ED PROVIDER NOTE - OBJECTIVE STATEMENT
60-year-old female PMHx DM2, asthma, ovarian cancer currently on chemotherapy (last session in May 2025) followed by Dr. Whit Cuellar.  Here for left ankle swelling.  Patient presented to the emergency department 3 days ago for bilateral ankle swelling.  Patient was diagnosed with a DVT as well as cellulitis of the left ankle.  Patient was sent home on clindamycin and Eliquis.  Patient coming in for progressive redness of her left ankle.  Denies fevers, chills, chest pain, shortness of breath.

## 2025-07-01 NOTE — CONSULT NOTE ADULT - SUBJECTIVE AND OBJECTIVE BOX
HPI:  60 f with DM, HTN, asthma, ovarian ca with peritoneal carcinomatosis and malignant pleural effusion before last chemo 5/2025 came to ER with L ankle edema 6/27, xray negative, doppler showed DVT and she was discharged with eliquis and clinda for cellulitis but she came back as there was extending erythema above the ankle but no fever, chills or other symptoms  here afebrile, no WBC    PAST MEDICAL & SURGICAL HISTORY:  HTN (hypertension)      Type 2 diabetes mellitus      Hyperlipidemia  (diet control)      Asthma      Morbid obesity with body mass index (BMI) of 40.0 to 44.9 in adult      Abnormal uterine and vaginal bleeding, unspecified      Hair loss      Obesity      Magnesium deficiency      Malignant pleural effusion      Osteopenia      Knee pain, left      History of hay fever      S/P breast biopsy, left      History of myomectomy          Allergies    Proventil HFA (Hives)  plastic ,rash (Other)  latex (Hives)    Intolerances        ANTIMICROBIALS:  cefepime   IVPB 2000 every 12 hours  vancomycin  IVPB. 1000 every 12 hours      OTHER MEDS:  apixaban 10 milliGRAM(s) Oral every 12 hours  dextrose 5%. 1000 milliLiter(s) IV Continuous <Continuous>  dextrose 5%. 1000 milliLiter(s) IV Continuous <Continuous>  dextrose 50% Injectable 25 Gram(s) IV Push once  dextrose 50% Injectable 12.5 Gram(s) IV Push once  dextrose 50% Injectable 25 Gram(s) IV Push once  dextrose Oral Gel 15 Gram(s) Oral once PRN  glucagon  Injectable 1 milliGRAM(s) IntraMuscular once  insulin lispro (ADMELOG) corrective regimen sliding scale   SubCutaneous three times a day before meals      SOCIAL HISTORY:  lives with her son  no smoking, alcohol or drug abuse  no recent travel    FAMILY HISTORY:  Family history of colon cancer in mother (Mother)    Type 2 diabetes mellitus (Father)    Family history of MI (myocardial infarction) (Father)        ROS:    All other systems negative     Constitutional: no fever, no chills, no weight loss, no night sweats  Eye: no eye pain, no redness, no vision changes  ENT:  no sore throat, no rhinorrhea  Cardiovascular:  no chest pain, no palpitation  Respiratory:  no SOB, no cough  GI:  no abd pain, no vomiting, no diarrhea  urinary: no dysuria, no hematuria, no flank pain  : no vaginal discharge or bleeding  musculoskeletal:  L ankle edema, erythema  skin:  no rash  neurology:  no headache, no seizure, no change in mental status  psych: no anxiety, no depression     Physical Exam:    General:    NAD, non toxic  Head: atraumatic, normocephalic  Eyes: normal sclera and conjunctiva  ENT:   no oropharyngeal lesions, no LAD, neck supple  Cardio:    regular S1,S2  Respiratory:   clear b/l, no wheezing  abd:   soft, BS +, not tender  :     no CVAT, no suprapubic tenderness, no weeks  Musculoskeletal : L dorsal foot and ankle edema, erythema, warmth, erythema extending up the ankle  Skin:    no rash  vascular: chest port with no tenderness or erythema  Neurologic:     no focal deficits  psych: normal affect      Drug Dosing Weight  Height (cm): 162 (01 Jul 2025 07:35)  Weight (kg): 83.9 (01 Jul 2025 07:35)  BMI (kg/m2): 32 (01 Jul 2025 07:35)  BSA (m2): 1.89 (01 Jul 2025 07:35)    Vital Signs Last 24 Hrs  T(F): 97.7 (07-01-25 @ 07:35), Max: 98.3 (06-27-25 @ 19:29)    Vital Signs Last 24 Hrs  HR: 92 (07-01-25 @ 07:35) (92 - 92)  BP: 154/82 (07-01-25 @ 07:35) (154/82 - 154/82)  RR: 16 (07-01-25 @ 07:35)  SpO2: 95% (07-01-25 @ 07:35) (95% - 95%)  Wt(kg): --                          12.7   4.35  )-----------( 109      ( 01 Jul 2025 08:57 )             38.5       07-01    142  |  104  |  15  ----------------------------<  137[H]  3.8   |  23  |  1.07    Ca    9.2      01 Jul 2025 08:57    TPro  7.5  /  Alb  3.8  /  TBili  0.8  /  DBili  x   /  AST  18  /  ALT  6   /  AlkPhos  104  07-01      Urinalysis Basic - ( 01 Jul 2025 08:57 )    Color: x / Appearance: x / SG: x / pH: x  Gluc: 137 mg/dL / Ketone: x  / Bili: x / Urobili: x   Blood: x / Protein: x / Nitrite: x   Leuk Esterase: x / RBC: x / WBC x   Sq Epi: x / Non Sq Epi: x / Bacteria: x        MICROBIOLOGY:  v              RADIOLOGY:    Images independently visualized and reviewed personally,  findings as below    < from: US Duplex Venous Lower Ext Ltd, Left (07.01.25 @ 09:29) >    IMPRESSION:  No evidence of left lower extremity deep venous thrombosis.      < end of copied text >  < from: Xray Ankle 2 Views, Left (06.28.25 @ 00:56) >  IMPRESSION:  No acute fracture or dislocation. Mild degenerative changes bilaterally,   right greater than left.    Soft tissue swelling around the left ankle    < end of copied text >  < from: US Duplex Venous Lower Ext Complete, Bilateral (06.27.25 @ 23:13) >  IMPRESSION:  RIGHT: No evidence of acute deep venous thrombosis.  LEFT: Acute deep venous thrombosis: below the knee.      < end of copied text >

## 2025-07-01 NOTE — ED ADULT NURSE REASSESSMENT NOTE - NS ED NURSE REASSESS COMMENT FT1
Patient can be transferred to the floor as per floor staff bed is currently being cleaned and by the time patient is transported bed should be good as per staff.

## 2025-07-02 LAB
ALBUMIN SERPL ELPH-MCNC: 3.7 G/DL — SIGNIFICANT CHANGE UP (ref 3.3–5)
ALP SERPL-CCNC: 107 U/L — SIGNIFICANT CHANGE UP (ref 40–120)
ALT FLD-CCNC: 8 U/L — SIGNIFICANT CHANGE UP (ref 4–33)
ANION GAP SERPL CALC-SCNC: 13 MMOL/L — SIGNIFICANT CHANGE UP (ref 7–14)
AST SERPL-CCNC: 17 U/L — SIGNIFICANT CHANGE UP (ref 4–32)
BASOPHILS # BLD AUTO: 0.01 K/UL — SIGNIFICANT CHANGE UP (ref 0–0.2)
BASOPHILS NFR BLD AUTO: 0.2 % — SIGNIFICANT CHANGE UP (ref 0–2)
BILIRUB SERPL-MCNC: 0.5 MG/DL — SIGNIFICANT CHANGE UP (ref 0.2–1.2)
BUN SERPL-MCNC: 18 MG/DL — SIGNIFICANT CHANGE UP (ref 7–23)
CALCIUM SERPL-MCNC: 9.2 MG/DL — SIGNIFICANT CHANGE UP (ref 8.4–10.5)
CHLORIDE SERPL-SCNC: 103 MMOL/L — SIGNIFICANT CHANGE UP (ref 98–107)
CO2 SERPL-SCNC: 24 MMOL/L — SIGNIFICANT CHANGE UP (ref 22–31)
CREAT SERPL-MCNC: 0.93 MG/DL — SIGNIFICANT CHANGE UP (ref 0.5–1.3)
EGFR: 70 ML/MIN/1.73M2 — SIGNIFICANT CHANGE UP
EGFR: 70 ML/MIN/1.73M2 — SIGNIFICANT CHANGE UP
EOSINOPHIL # BLD AUTO: 0.16 K/UL — SIGNIFICANT CHANGE UP (ref 0–0.5)
EOSINOPHIL NFR BLD AUTO: 3.8 % — SIGNIFICANT CHANGE UP (ref 0–6)
GLUCOSE BLDC GLUCOMTR-MCNC: 118 MG/DL — HIGH (ref 70–99)
GLUCOSE BLDC GLUCOMTR-MCNC: 120 MG/DL — HIGH (ref 70–99)
GLUCOSE BLDC GLUCOMTR-MCNC: 137 MG/DL — HIGH (ref 70–99)
GLUCOSE BLDC GLUCOMTR-MCNC: 160 MG/DL — HIGH (ref 70–99)
GLUCOSE SERPL-MCNC: 163 MG/DL — HIGH (ref 70–99)
HCT VFR BLD CALC: 38.2 % — SIGNIFICANT CHANGE UP (ref 34.5–45)
HGB BLD-MCNC: 12.5 G/DL — SIGNIFICANT CHANGE UP (ref 11.5–15.5)
IMM GRANULOCYTES # BLD AUTO: 0.01 K/UL — SIGNIFICANT CHANGE UP (ref 0–0.07)
IMM GRANULOCYTES NFR BLD AUTO: 0.2 % — SIGNIFICANT CHANGE UP (ref 0–0.9)
LYMPHOCYTES # BLD AUTO: 1.08 K/UL — SIGNIFICANT CHANGE UP (ref 1–3.3)
LYMPHOCYTES NFR BLD AUTO: 25.6 % — SIGNIFICANT CHANGE UP (ref 13–44)
MAGNESIUM SERPL-MCNC: 1.6 MG/DL — SIGNIFICANT CHANGE UP (ref 1.6–2.6)
MCHC RBC-ENTMCNC: 29.4 PG — SIGNIFICANT CHANGE UP (ref 27–34)
MCHC RBC-ENTMCNC: 32.7 G/DL — SIGNIFICANT CHANGE UP (ref 32–36)
MCV RBC AUTO: 89.9 FL — SIGNIFICANT CHANGE UP (ref 80–100)
MONOCYTES # BLD AUTO: 0.27 K/UL — SIGNIFICANT CHANGE UP (ref 0–0.9)
MONOCYTES NFR BLD AUTO: 6.4 % — SIGNIFICANT CHANGE UP (ref 2–14)
NEUTROPHILS # BLD AUTO: 2.69 K/UL — SIGNIFICANT CHANGE UP (ref 1.8–7.4)
NEUTROPHILS NFR BLD AUTO: 63.8 % — SIGNIFICANT CHANGE UP (ref 43–77)
NRBC # BLD AUTO: 0 K/UL — SIGNIFICANT CHANGE UP (ref 0–0)
NRBC # FLD: 0 K/UL — SIGNIFICANT CHANGE UP (ref 0–0)
NRBC BLD AUTO-RTO: 0 /100 WBCS — SIGNIFICANT CHANGE UP (ref 0–0)
PHOSPHATE SERPL-MCNC: 3 MG/DL — SIGNIFICANT CHANGE UP (ref 2.5–4.5)
PLATELET # BLD AUTO: 113 K/UL — LOW (ref 150–400)
PMV BLD: 11.1 FL — SIGNIFICANT CHANGE UP (ref 7–13)
POTASSIUM SERPL-MCNC: 3.7 MMOL/L — SIGNIFICANT CHANGE UP (ref 3.5–5.3)
POTASSIUM SERPL-SCNC: 3.7 MMOL/L — SIGNIFICANT CHANGE UP (ref 3.5–5.3)
PROT SERPL-MCNC: 7.2 G/DL — SIGNIFICANT CHANGE UP (ref 6–8.3)
RBC # BLD: 4.25 M/UL — SIGNIFICANT CHANGE UP (ref 3.8–5.2)
RBC # FLD: 12.6 % — SIGNIFICANT CHANGE UP (ref 10.3–14.5)
SODIUM SERPL-SCNC: 140 MMOL/L — SIGNIFICANT CHANGE UP (ref 135–145)
WBC # BLD: 4.22 K/UL — SIGNIFICANT CHANGE UP (ref 3.8–10.5)
WBC # FLD AUTO: 4.22 K/UL — SIGNIFICANT CHANGE UP (ref 3.8–10.5)

## 2025-07-02 PROCEDURE — G0545: CPT

## 2025-07-02 PROCEDURE — 76882 US LMTD JT/FCL EVL NVASC XTR: CPT | Mod: 26,LT

## 2025-07-02 PROCEDURE — 99232 SBSQ HOSP IP/OBS MODERATE 35: CPT

## 2025-07-02 PROCEDURE — 99233 SBSQ HOSP IP/OBS HIGH 50: CPT

## 2025-07-02 RX ORDER — CEFAZOLIN SODIUM IN 0.9 % NACL 3 G/100 ML
2000 INTRAVENOUS SOLUTION, PIGGYBACK (ML) INTRAVENOUS EVERY 8 HOURS
Refills: 0 | Status: DISCONTINUED | OUTPATIENT
Start: 2025-07-02 | End: 2025-07-03

## 2025-07-02 RX ADMIN — Medication 100 MILLIGRAM(S): at 21:59

## 2025-07-02 RX ADMIN — CEFEPIME 100 MILLIGRAM(S): 2 INJECTION, POWDER, FOR SOLUTION INTRAVENOUS at 05:52

## 2025-07-02 RX ADMIN — APIXABAN 10 MILLIGRAM(S): 2.5 TABLET, FILM COATED ORAL at 23:36

## 2025-07-02 RX ADMIN — Medication 100 MILLIGRAM(S): at 13:30

## 2025-07-02 RX ADMIN — APIXABAN 10 MILLIGRAM(S): 2.5 TABLET, FILM COATED ORAL at 00:12

## 2025-07-02 RX ADMIN — APIXABAN 10 MILLIGRAM(S): 2.5 TABLET, FILM COATED ORAL at 12:29

## 2025-07-02 RX ADMIN — INSULIN LISPRO 1: 100 INJECTION, SOLUTION INTRAVENOUS; SUBCUTANEOUS at 17:44

## 2025-07-02 NOTE — PROGRESS NOTE ADULT - ASSESSMENT
60 f with DM, HTN, asthma, ovarian ca with peritoneal carcinomatosis and malignant pleural effusion before last chemo 5/2025 came to ER with L ankle edema 6/27, xray negative, doppler showed DVT and she was discharged with eliquis and clinda for cellulitis but she came back as there was extending erythema above the ankle but no fever, chills or other symptoms  here afebrile, no WBC    metastatic ovarian ca on chemo (last chemo > 1month ago) with L foot/ankle cellulitis, was discharged with clinda and eliquis as the doppler showed DVT but now worsening erythema  doppler now with no DVT and u/s showedNo effusion L ankle joint.  Soft tissue swelling and hyperechoic change of skin   u/s 7/2: soft tissue edema, no abscess    * s/p vanco, cefepime, pt has MSSA in PCR before  switch to cefazolin 2 q8  * if worsening edema and erythema will need the LLE MRI or CT with contrast  * monitor CBC/diff and CMP      The above assessment and plan was discussed with the primary team    Shandra Alexander MD  contact on teams  After 5pm and on weekends call [phone]

## 2025-07-02 NOTE — PROGRESS NOTE ADULT - ASSESSMENT
A 60-year-old female with a past medical history of diabetes, asthma, hypertension, ovarian cancer (on chemotherapy; last session on May 2, 2025) with peritoneal carcinomatosis complicated by malignant pleural effusion status post PleurX catheter placement presents with leg cellulitis.    Active Problems:  Ovarian cancer with peritoneal carcinomatosis and malignant pleural effusion  Leg cellulitis  Lower extremity deep vein thrombosis (DVT)  Hypercoagulable state  Type 2 diabetes mellitus  Hypertension (patient disputes this diagnosis)  Asthma      Problem: Leg cellulitis.  Plan: Recent visit to the Emergency Department; diagnosed with cellulitis and DVT. Given clindamycin for cellulitis, but the patient returned with worsening erythema. No SIRS criteria met. Fluctuance appreciated on the medial aspect of the ankle. Infectious Disease recommendations appreciated. Will continue vancomycin and cefepime until ultrasound results return to rule out abscess. Follow-up blood cultures obtained in the ED. ESR/CRP sent.    Problem: Lower extremity DVT.  Hypercoagulable state  Plan: Patient on apixaban (Eliquis) after a peroneal DVT was discovered on Doppler ultrasound on June 27th. Repeat Doppler showed no extension of the DVT. Continue Eliquis.    Problem: Ovarian cancer.  Plan: Metastatic ovarian cancer; on bevacizumab (Avastin) and pegylated liposomal doxorubicin (Doxil). Complicated by peritoneal metastases and malignant pleural effusion status post PleurX catheter placement. Followed by Whit Cuellar.    Problem: Malignant pleural effusion.  Plan: PleurX catheter placed in 2023. See above (Ovarian Cancer).    Problem: Type 2 diabetes mellitus.  Plan: Humalog 3 units with meals. Semglee 8 units qhs, per outpatient documentation. Continue insulin sliding scale. Adjust insulin as necessary.    Problem: Asthma.  Plan: No current medications listed on Surescripts. Monitor for respiratory signs and symptoms of asthma.    Problem: Preventative Measures/Supplementation.  Plan: Fluids: Encourage oral intake. Electrolytes: Replete; maintain potassium > 4 mEq/L and magnesium > 2 mEq/L. Nutrition: Regular diet. Gastrointestinal: None. DVT prophylaxis: Enoxaparin (Lovenox) 40mg. Aspirin 81mg. A 60-year-old female with a past medical history of diabetes, asthma, hypertension, ovarian cancer (on chemotherapy; last session on May 2, 2025) with peritoneal carcinomatosis complicated by malignant pleural effusion status post PleurX catheter placement presents with leg cellulitis.    Active Problems:  Ovarian cancer with peritoneal carcinomatosis and malignant pleural effusion  Leg cellulitis  Lower extremity deep vein thrombosis (DVT)  Hypercoagulable state  Type 2 diabetes mellitus  Hypertension (patient disputes this diagnosis)  Asthma      Problem: Leg cellulitis.  Plan: Recent visit to the Emergency Department; diagnosed with cellulitis and DVT. Given clindamycin for cellulitis, but the patient returned with worsening erythema. No SIRS criteria met. Fluctuance appreciated on the medial aspect of the ankle. Infectious Disease recommendations appreciated. F/U ESR/CRP, antibiotics as per ID, cefazolin.    Problem: Lower extremity DVT.  Hypercoagulable state  Plan: Patient on apixaban (Eliquis) after a peroneal DVT was discovered on Doppler ultrasound on June 27th. Repeat Doppler showed no extension of the DVT. Continue Eliquis.    Problem: Ovarian cancer.  Plan: Metastatic ovarian cancer; on bevacizumab (Avastin) and pegylated liposomal doxorubicin (Doxil). Complicated by peritoneal metastases and malignant pleural effusion status post PleurX catheter placement. Followed by Whit Cuellar.    Problem: Malignant pleural effusion.  Plan: PleurX catheter placed in 2023. See above (Ovarian Cancer).    Problem: Type 2 diabetes mellitus.  Plan: Humalog 3 units with meals. Semglee 8 units qhs, per outpatient documentation. Continue insulin sliding scale. Adjust insulin as necessary.    Problem: Asthma.  Plan: No current medications listed on Surescripts. Monitor for respiratory signs and symptoms of asthma.    Problem: Preventative Measures/Supplementation.  Plan: Fluids: Encourage oral intake. Electrolytes: Replete; maintain potassium > 4 mEq/L and magnesium > 2 mEq/L. Nutrition: Regular diet. Gastrointestinal: None. DVT prophylaxis: Enoxaparin (Lovenox) 40mg. Aspirin 81mg.

## 2025-07-02 NOTE — PROGRESS NOTE ADULT - TIME BILLING
minutes spent on total encounter; more than 50% of the visit was spent counseling and / or coordinating care by the attending physician.  The necessity of the time spent during the encounter on this date of service was due to:     review of laboratory data, radiology results, consultants' recommendations, documentation in [place], discussion with patient/ACP and interdisciplinary staff (such as , social workers, etc). Interventions were performed as documented above.

## 2025-07-03 ENCOUNTER — TRANSCRIPTION ENCOUNTER (OUTPATIENT)
Age: 60
End: 2025-07-03

## 2025-07-03 VITALS
DIASTOLIC BLOOD PRESSURE: 61 MMHG | OXYGEN SATURATION: 100 % | SYSTOLIC BLOOD PRESSURE: 137 MMHG | RESPIRATION RATE: 18 BRPM | HEART RATE: 69 BPM | TEMPERATURE: 98 F

## 2025-07-03 LAB
ALBUMIN SERPL ELPH-MCNC: 3.4 G/DL — SIGNIFICANT CHANGE UP (ref 3.3–5)
ALP SERPL-CCNC: 99 U/L — SIGNIFICANT CHANGE UP (ref 40–120)
ALT FLD-CCNC: 7 U/L — SIGNIFICANT CHANGE UP (ref 4–33)
ANION GAP SERPL CALC-SCNC: 11 MMOL/L — SIGNIFICANT CHANGE UP (ref 7–14)
AST SERPL-CCNC: 20 U/L — SIGNIFICANT CHANGE UP (ref 4–32)
BASOPHILS # BLD AUTO: 0 K/UL — SIGNIFICANT CHANGE UP (ref 0–0.2)
BASOPHILS NFR BLD AUTO: 0 % — SIGNIFICANT CHANGE UP (ref 0–2)
BILIRUB SERPL-MCNC: 0.3 MG/DL — SIGNIFICANT CHANGE UP (ref 0.2–1.2)
BUN SERPL-MCNC: 19 MG/DL — SIGNIFICANT CHANGE UP (ref 7–23)
CALCIUM SERPL-MCNC: 9.5 MG/DL — SIGNIFICANT CHANGE UP (ref 8.4–10.5)
CHLORIDE SERPL-SCNC: 106 MMOL/L — SIGNIFICANT CHANGE UP (ref 98–107)
CO2 SERPL-SCNC: 23 MMOL/L — SIGNIFICANT CHANGE UP (ref 22–31)
CREAT SERPL-MCNC: 0.97 MG/DL — SIGNIFICANT CHANGE UP (ref 0.5–1.3)
EGFR: 66 ML/MIN/1.73M2 — SIGNIFICANT CHANGE UP
EGFR: 66 ML/MIN/1.73M2 — SIGNIFICANT CHANGE UP
EOSINOPHIL # BLD AUTO: 0.14 K/UL — SIGNIFICANT CHANGE UP (ref 0–0.5)
EOSINOPHIL NFR BLD AUTO: 3.3 % — SIGNIFICANT CHANGE UP (ref 0–6)
GLUCOSE BLDC GLUCOMTR-MCNC: 151 MG/DL — HIGH (ref 70–99)
GLUCOSE BLDC GLUCOMTR-MCNC: 167 MG/DL — HIGH (ref 70–99)
GLUCOSE BLDC GLUCOMTR-MCNC: 183 MG/DL — HIGH (ref 70–99)
GLUCOSE SERPL-MCNC: 152 MG/DL — HIGH (ref 70–99)
HCT VFR BLD CALC: 37.9 % — SIGNIFICANT CHANGE UP (ref 34.5–45)
HGB BLD-MCNC: 12.2 G/DL — SIGNIFICANT CHANGE UP (ref 11.5–15.5)
IMM GRANULOCYTES # BLD AUTO: 0.02 K/UL — SIGNIFICANT CHANGE UP (ref 0–0.07)
IMM GRANULOCYTES NFR BLD AUTO: 0.5 % — SIGNIFICANT CHANGE UP (ref 0–0.9)
LYMPHOCYTES # BLD AUTO: 0.85 K/UL — LOW (ref 1–3.3)
LYMPHOCYTES NFR BLD AUTO: 19.9 % — SIGNIFICANT CHANGE UP (ref 13–44)
MAGNESIUM SERPL-MCNC: 1.7 MG/DL — SIGNIFICANT CHANGE UP (ref 1.6–2.6)
MCHC RBC-ENTMCNC: 28.9 PG — SIGNIFICANT CHANGE UP (ref 27–34)
MCHC RBC-ENTMCNC: 32.2 G/DL — SIGNIFICANT CHANGE UP (ref 32–36)
MCV RBC AUTO: 89.8 FL — SIGNIFICANT CHANGE UP (ref 80–100)
MONOCYTES # BLD AUTO: 0.34 K/UL — SIGNIFICANT CHANGE UP (ref 0–0.9)
MONOCYTES NFR BLD AUTO: 8 % — SIGNIFICANT CHANGE UP (ref 2–14)
NEUTROPHILS # BLD AUTO: 2.92 K/UL — SIGNIFICANT CHANGE UP (ref 1.8–7.4)
NEUTROPHILS NFR BLD AUTO: 68.3 % — SIGNIFICANT CHANGE UP (ref 43–77)
NRBC # BLD AUTO: 0 K/UL — SIGNIFICANT CHANGE UP (ref 0–0)
NRBC # FLD: 0 K/UL — SIGNIFICANT CHANGE UP (ref 0–0)
NRBC BLD AUTO-RTO: 0 /100 WBCS — SIGNIFICANT CHANGE UP (ref 0–0)
PHOSPHATE SERPL-MCNC: 3.2 MG/DL — SIGNIFICANT CHANGE UP (ref 2.5–4.5)
PLATELET # BLD AUTO: 113 K/UL — LOW (ref 150–400)
PMV BLD: 11.1 FL — SIGNIFICANT CHANGE UP (ref 7–13)
POTASSIUM SERPL-MCNC: 4.6 MMOL/L — SIGNIFICANT CHANGE UP (ref 3.5–5.3)
POTASSIUM SERPL-SCNC: 4.6 MMOL/L — SIGNIFICANT CHANGE UP (ref 3.5–5.3)
PROT SERPL-MCNC: 7.2 G/DL — SIGNIFICANT CHANGE UP (ref 6–8.3)
RBC # BLD: 4.22 M/UL — SIGNIFICANT CHANGE UP (ref 3.8–5.2)
RBC # FLD: 12.7 % — SIGNIFICANT CHANGE UP (ref 10.3–14.5)
SODIUM SERPL-SCNC: 140 MMOL/L — SIGNIFICANT CHANGE UP (ref 135–145)
WBC # BLD: 4.27 K/UL — SIGNIFICANT CHANGE UP (ref 3.8–10.5)
WBC # FLD AUTO: 4.27 K/UL — SIGNIFICANT CHANGE UP (ref 3.8–10.5)

## 2025-07-03 PROCEDURE — G0545: CPT

## 2025-07-03 PROCEDURE — 99239 HOSP IP/OBS DSCHRG MGMT >30: CPT

## 2025-07-03 PROCEDURE — 99232 SBSQ HOSP IP/OBS MODERATE 35: CPT | Mod: GC

## 2025-07-03 RX ORDER — ACETAMINOPHEN 500 MG/5ML
1000 LIQUID (ML) ORAL ONCE
Refills: 0 | Status: COMPLETED | OUTPATIENT
Start: 2025-07-03 | End: 2025-07-03

## 2025-07-03 RX ORDER — APIXABAN 2.5 MG/1
1 TABLET, FILM COATED ORAL
Qty: 60 | Refills: 0
Start: 2025-07-03 | End: 2025-08-01

## 2025-07-03 RX ORDER — CEPHALEXIN 250 MG/1
1 CAPSULE ORAL
Qty: 40 | Refills: 0
Start: 2025-07-03 | End: 2025-07-12

## 2025-07-03 RX ADMIN — Medication 100 MILLIGRAM(S): at 13:02

## 2025-07-03 RX ADMIN — Medication 1000 MILLIGRAM(S): at 09:26

## 2025-07-03 RX ADMIN — INSULIN LISPRO 1: 100 INJECTION, SOLUTION INTRAVENOUS; SUBCUTANEOUS at 12:31

## 2025-07-03 RX ADMIN — INSULIN LISPRO 1: 100 INJECTION, SOLUTION INTRAVENOUS; SUBCUTANEOUS at 08:26

## 2025-07-03 RX ADMIN — APIXABAN 10 MILLIGRAM(S): 2.5 TABLET, FILM COATED ORAL at 12:32

## 2025-07-03 RX ADMIN — Medication 400 MILLIGRAM(S): at 08:26

## 2025-07-03 RX ADMIN — Medication 100 MILLIGRAM(S): at 05:45

## 2025-07-03 NOTE — PROGRESS NOTE ADULT - SUBJECTIVE AND OBJECTIVE BOX
Follow Up:  cellulitis    Interval History/ROS: pt afebrile, feels better, improved pain and erythema of ankle          Allergies  Proventil HFA (Hives)  plastic ,rash (Other)  latex (Hives)        ANTIMICROBIALS:      OTHER MEDS:  apixaban 10 milliGRAM(s) Oral every 12 hours  dextrose 5%. 1000 milliLiter(s) IV Continuous <Continuous>  dextrose 5%. 1000 milliLiter(s) IV Continuous <Continuous>  dextrose 50% Injectable 25 Gram(s) IV Push once  dextrose 50% Injectable 12.5 Gram(s) IV Push once  dextrose 50% Injectable 25 Gram(s) IV Push once  dextrose Oral Gel 15 Gram(s) Oral once PRN  glucagon  Injectable 1 milliGRAM(s) IntraMuscular once  insulin lispro (ADMELOG) corrective regimen sliding scale   SubCutaneous three times a day before meals      Vital Signs Last 24 Hrs  T(C): 36.7 (02 Jul 2025 05:24), Max: 36.7 (01 Jul 2025 17:34)  T(F): 98.1 (02 Jul 2025 05:24), Max: 98.1 (01 Jul 2025 17:34)  HR: 93 (02 Jul 2025 05:24) (61 - 93)  BP: 142/77 (02 Jul 2025 05:24) (115/74 - 153/88)  BP(mean): --  RR: 18 (02 Jul 2025 05:24) (17 - 18)  SpO2: 100% (02 Jul 2025 05:24) (95% - 100%)    Parameters below as of 02 Jul 2025 05:24  Patient On (Oxygen Delivery Method): room air        Physical Exam:  General:    NAD, non toxic, sitting on a chair  Respiratory:   comfortable on RA  abd:   soft, not tender  :   no weeks  Musculoskeletal : slightly improved L dorsal foot and ankle edema, erythema, warmth  Skin:    no rash  vascular: chest port with no tenderness or erythema                        12.5   4.22  )-----------( 113      ( 02 Jul 2025 11:02 )             38.2       07-02    140  |  103  |  18  ----------------------------<  163[H]  3.7   |  24  |  0.93    Ca    9.2      02 Jul 2025 10:56  Phos  3.0     07-02  Mg     1.60     07-02    TPro  7.2  /  Alb  3.7  /  TBili  0.5  /  DBili  x   /  AST  17  /  ALT  8   /  AlkPhos  107  07-02      Urinalysis Basic - ( 02 Jul 2025 10:56 )    Color: x / Appearance: x / SG: x / pH: x  Gluc: 163 mg/dL / Ketone: x  / Bili: x / Urobili: x   Blood: x / Protein: x / Nitrite: x   Leuk Esterase: x / RBC: x / WBC x   Sq Epi: x / Non Sq Epi: x / Bacteria: x        MICROBIOLOGY:  v  Blood Blood-Peripheral  07-01-25   No growth at 24 hours  --  --      Blood Blood-Peripheral  07-01-25   No growth at 24 hours  --  --                RADIOLOGY:  Images independently visualized and reviewed personally, findings as below  < from: US Joint Nonvasc Extremity Limited, Left (07.02.25 @ 08:05) >    Limited sonographic survey of the medial and lateral aspect of the ankle   in the region of the patient's swelling shows nonspecific soft tissue   edema within the soft tissues along the medial and lateral aspect of the   ankle, which may be related to lower extremity edema and/or cellulitis.   No well organized abscess seen within the visualized soft tissues along   the medial and lateral aspect of the ankle.    Please note that assessment for an ankle joint effusion was not performed   on this study. If there is clinical concern for septic arthritis of the   ankle joint/tibiotalar joint, ankle joint/tibiotalar joint aspiration   should be performed.    < end of copied text >  < from: Xray Ankle 2 Views, Left (06.28.25 @ 00:56) >  IMPRESSION:  No acute fracture or dislocation. Mild degenerative changes bilaterally,   right greater than left.    Soft tissue swelling around the left ankle    < end of copied text >  
  Follow Up:  cellulitis    Interval History/ROS: pt afebrile, significant improvement in the L ankle edema, erythema and pain          Allergies  Proventil HFA (Hives)  plastic ,rash (Other)  latex (Hives)        ANTIMICROBIALS:  ceFAZolin   IVPB 2000 every 8 hours      OTHER MEDS:  apixaban 10 milliGRAM(s) Oral every 12 hours  dextrose 5%. 1000 milliLiter(s) IV Continuous <Continuous>  dextrose 5%. 1000 milliLiter(s) IV Continuous <Continuous>  dextrose 50% Injectable 25 Gram(s) IV Push once  dextrose 50% Injectable 12.5 Gram(s) IV Push once  dextrose 50% Injectable 25 Gram(s) IV Push once  dextrose Oral Gel 15 Gram(s) Oral once PRN  glucagon  Injectable 1 milliGRAM(s) IntraMuscular once  insulin lispro (ADMELOG) corrective regimen sliding scale   SubCutaneous three times a day before meals      Vital Signs Last 24 Hrs  T(C): 36.5 (03 Jul 2025 05:32), Max: 36.7 (02 Jul 2025 20:26)  T(F): 97.7 (03 Jul 2025 05:32), Max: 98 (02 Jul 2025 20:26)  HR: 62 (03 Jul 2025 05:32) (62 - 72)  BP: 132/73 (03 Jul 2025 05:32) (129/73 - 137/75)  BP(mean): --  RR: 18 (03 Jul 2025 05:32) (18 - 18)  SpO2: 100% (03 Jul 2025 05:32) (94% - 100%)    Parameters below as of 03 Jul 2025 05:32  Patient On (Oxygen Delivery Method): room air        Physical Exam:  General:    NAD, non toxic, sitting on a chair  Respiratory:   comfortable on RA  abd:   soft, not tender  :   no weeks  Musculoskeletal : sifnificanlty improved L ankle edema, erythema, warmth, foot still with some edema  Skin:    no rash  vascular: chest port with no tenderness or erythema                          12.2   4.27  )-----------( 113      ( 03 Jul 2025 05:54 )             37.9       07-03    140  |  106  |  19  ----------------------------<  152[H]  4.6   |  23  |  0.97    Ca    9.5      03 Jul 2025 05:54  Phos  3.2     07-03  Mg     1.70     07-03    TPro  7.2  /  Alb  3.4  /  TBili  0.3  /  DBili  x   /  AST  20  /  ALT  7   /  AlkPhos  99  07-03      Urinalysis Basic - ( 03 Jul 2025 05:54 )    Color: x / Appearance: x / SG: x / pH: x  Gluc: 152 mg/dL / Ketone: x  / Bili: x / Urobili: x   Blood: x / Protein: x / Nitrite: x   Leuk Esterase: x / RBC: x / WBC x   Sq Epi: x / Non Sq Epi: x / Bacteria: x        MICROBIOLOGY:  v  Blood Blood-Peripheral  07-01-25   No growth at 48 Hours  --  --      Blood Blood-Peripheral  07-01-25   No growth at 48 Hours  --  --                RADIOLOGY:  Images independently visualized and reviewed personally, findings as below  < from: US Joint Nonvasc Extremity Limited, Left (07.02.25 @ 08:05) >  IMPRESSION:    Limited sonographic survey of the medial and lateral aspect of the ankle   in the region of the patient's swelling shows nonspecific soft tissue   edema within the soft tissues along the medial and lateral aspect of the   ankle, which may be related to lower extremity edema and/or cellulitis.   No well organized abscess seen within the visualized soft tissues along   the medial and lateral aspect of the ankle.    Please note that assessment for an ankle joint effusion was not performed   on this study. If there is clinical concern for septic arthritis of the   ankle joint/tibiotalar joint, ankle joint/tibiotalar joint aspiration   should be performed.    Consider further evaluation of the above findings with dedicated left   ankle MRI without and with contrast.    < end of copied text >  < from: Xray Ankle 2 Views, Left (06.28.25 @ 00:56) >  IMPRESSION:  No acute fracture or dislocation. Mild degenerative changes bilaterally,   right greater than left.    Soft tissue swelling around the left ankle      < end of copied text >  
Jadiel Richards MD  [employer] Hospitalist  Pager [phone]  Email: [email or web address]  Available on [employer] Teams        PROGRESS NOTE:     Patient is a 60y old  Female who presents with a chief complaint of SSTI (02 Jul 2025 12:46)      SUBJECTIVE / OVERNIGHT EVENTS:  Patient seen and examined this morning. States that her foot is improving with antibiotics.  ADDITIONAL REVIEW OF SYSTEMS:  No f/c    MEDICATIONS  (STANDING):  apixaban 10 milliGRAM(s) Oral every 12 hours  ceFAZolin   IVPB 2000 milliGRAM(s) IV Intermittent every 8 hours  dextrose 5%. 1000 milliLiter(s) (50 mL/Hr) IV Continuous <Continuous>  dextrose 5%. 1000 milliLiter(s) (100 mL/Hr) IV Continuous <Continuous>  dextrose 50% Injectable 25 Gram(s) IV Push once  dextrose 50% Injectable 12.5 Gram(s) IV Push once  dextrose 50% Injectable 25 Gram(s) IV Push once  glucagon  Injectable 1 milliGRAM(s) IntraMuscular once  insulin lispro (ADMELOG) corrective regimen sliding scale   SubCutaneous three times a day before meals    MEDICATIONS  (PRN):  dextrose Oral Gel 15 Gram(s) Oral once PRN Blood Glucose LESS THAN 70 milliGRAM(s)/deciliter      CAPILLARY BLOOD GLUCOSE      POCT Blood Glucose.: 137 mg/dL (02 Jul 2025 12:11)  POCT Blood Glucose.: 118 mg/dL (02 Jul 2025 08:22)  POCT Blood Glucose.: 110 mg/dL (01 Jul 2025 16:57)    I&O's Summary      PHYSICAL EXAM:  Vital Signs Last 24 Hrs  T(C): 36.4 (02 Jul 2025 13:13), Max: 36.7 (01 Jul 2025 17:34)  T(F): 97.6 (02 Jul 2025 13:13), Max: 98.1 (01 Jul 2025 17:34)  HR: 65 (02 Jul 2025 13:13) (61 - 93)  BP: 133/79 (02 Jul 2025 13:13) (126/80 - 153/88)  BP(mean): --  RR: 18 (02 Jul 2025 13:13) (17 - 18)  SpO2: 94% (02 Jul 2025 13:13) (94% - 100%)    Parameters below as of 02 Jul 2025 13:13  Patient On (Oxygen Delivery Method): room air        CONSTITUTIONAL: NAD, pleasant  RESPIRATORY: Normal respiratory effort; no respiratory distress, CTAB  CARDIOVASCULAR: No visible JVD, No lower extremity edema; S1S2, no m,r,g  ABDOMEN: Not guarding, does not appear distended, BS+  MUSCLOSKELETAL: no clubbing or cyanosis of digits; no joint swelling. ,fluctuance overlying medial malleolus  PSYCH: AOx3    LABS:                        12.5   4.22  )-----------( 113      ( 02 Jul 2025 11:02 )             38.2     07-02    140  |  103  |  18  ----------------------------<  163[H]  3.7   |  24  |  0.93    Ca    9.2      02 Jul 2025 10:56  Phos  3.0     07-02  Mg     1.60     07-02    TPro  7.2  /  Alb  3.7  /  TBili  0.5  /  DBili  x   /  AST  17  /  ALT  8   /  AlkPhos  107  07-02    PT/INR - ( 01 Jul 2025 08:57 )   PT: 21.0 sec;   INR: 1.77 ratio         PTT - ( 01 Jul 2025 08:57 )  PTT:43.1 sec      Urinalysis Basic - ( 02 Jul 2025 10:56 )    Color: x / Appearance: x / SG: x / pH: x  Gluc: 163 mg/dL / Ketone: x  / Bili: x / Urobili: x   Blood: x / Protein: x / Nitrite: x   Leuk Esterase: x / RBC: x / WBC x   Sq Epi: x / Non Sq Epi: x / Bacteria: x        Culture - Blood (collected 01 Jul 2025 08:55)  Source: Blood Blood-Peripheral  Preliminary Report (02 Jul 2025 11:02):    No growth at 24 hours    Culture - Blood (collected 01 Jul 2025 08:40)  Source: Blood Blood-Peripheral  Preliminary Report (02 Jul 2025 11:02):    No growth at 24 hours        RADIOLOGY & ADDITIONAL TESTS:  Results Reviewed:   Imaging Personally Reviewed:  Electrocardiogram Personally Reviewed:    COORDINATION OF CARE:  Care Discussed with Consultants/Other Providers [Y/N]:  Prior or Outpatient Records Reviewed [Y/N]:

## 2025-07-03 NOTE — DISCHARGE NOTE NURSING/CASE MANAGEMENT/SOCIAL WORK - [id number]_GEN_ALL_CORE
For information on Fall & Injury Prevention, visit: [email or web address] OR  [email or web address] OR  https://www.cdc.gov/steadi/patient.html

## 2025-07-03 NOTE — DISCHARGE NOTE PROVIDER - NSDCCPCAREPLAN_GEN_ALL_CORE_FT
PRINCIPAL DISCHARGE DIAGNOSIS  Diagnosis: Cellulitis of skin  Assessment and Plan of Treatment: You came into the hospital due to a infection of your leg, Infectious disease was consulted and recommended IV cefazolin, but you can complete your course of antibiotics at home on Keflex for a total of 10 days. Please follow up with your primary care doctor in 1-2 week and have repeat blood work done. Please follow up with the infectious disease clinic in 2 weeks for additiional follow up.      SECONDARY DISCHARGE DIAGNOSES  Diagnosis: DVT, lower extremity  Assessment and Plan of Treatment: You were found to have a DVT on June 27th. Please continue to take your eliquis as instructed.    Diagnosis: Ovarian cancer  Assessment and Plan of Treatment: Please follow up with your regular oncology doctor at your scheduled appointment.    Diagnosis: Type 2 diabetes mellitus  Assessment and Plan of Treatment: Please follow up with your regular doctor and regularly check your blood sugar levels and your regular insulin regiment.     PRINCIPAL DISCHARGE DIAGNOSIS  Diagnosis: Cellulitis of skin  Assessment and Plan of Treatment: You came into the hospital due to a infection of your leg, Infectious disease was consulted and recommended IV cefazolin, but you can complete your course of antibiotics at home on Keflex for a total of 10 days. Please follow up with your primary care doctor in 1-2 week and have repeat blood work done. Please follow up with the infectious disease clinic in 2 weeks for additiional follow up.      SECONDARY DISCHARGE DIAGNOSES  Diagnosis: DVT, lower extremity  Assessment and Plan of Treatment: You were found to have a DVT on June 27th. Please continue to take your eliquis 10mg twice a day for total 7 days, after wards take eliquis 5mg BID. Follow up with your primary care doctor for additional followup.    Diagnosis: Ovarian cancer  Assessment and Plan of Treatment: Please follow up with your regular oncology doctor at your scheduled appointment.    Diagnosis: Type 2 diabetes mellitus  Assessment and Plan of Treatment: Please follow up with your regular doctor and regularly check your blood sugar levels and your regular insulin regiment. If you need a endocrine doctor please follow up at [address] [place] [address].

## 2025-07-03 NOTE — DISCHARGE NOTE NURSING/CASE MANAGEMENT/SOCIAL WORK - FINANCIAL ASSISTANCE
[facility] provides services at a reduced cost to those who are determined to be eligible through [facility]’s financial assistance program. Information regarding [facility]’s financial assistance program can be found by going to [email or web address] or by calling [phone].

## 2025-07-03 NOTE — PROGRESS NOTE ADULT - ASSESSMENT
60 f with DM, HTN, asthma, ovarian ca with peritoneal carcinomatosis and malignant pleural effusion before last chemo 5/2025 came to ER with L ankle edema 6/27, xray negative, doppler showed DVT and she was discharged with eliquis and clinda for cellulitis but she came back as there was extending erythema above the ankle but no fever, chills or other symptoms  here afebrile, no WBC    metastatic ovarian ca on chemo (last chemo > 1month ago) with L foot/ankle cellulitis, was discharged with clinda and eliquis as the doppler showed DVT but now worsening erythema  doppler now with no DVT and u/s showedNo effusion L ankle joint.  Soft tissue swelling and hyperechoic change of skin   u/s 7/2: soft tissue edema, no abscess    * s/p vanco, cefepime, and then switched to cefazolin 2 q8  * pt is significantly better, c/w cefazolin in the hospital and on discharge switch to keflex 500 q 6 for 10 days        The above assessment and plan was discussed with the primary team    Shandra Alexander MD  contact on teams  After 5pm and on weekends call [phone]

## 2025-07-03 NOTE — DISCHARGE NOTE PROVIDER - CARE PROVIDERS DIRECT ADDRESSES
[email or web address]

## 2025-07-03 NOTE — DIETITIAN INITIAL EVALUATION ADULT - OTHER INFO
Patient is currently ordered for a PO diet. Patient reports a continued decreased appetite and PO intake at meals during course of admission. Documented on RN flowsheet as consuming 51-75% of meals. Patient deferred writer's offer of an oral nutrition supplement (i.e. Glucerna Shakes) or documentation of food preferences. Patient denies any chewing or swallowing difficulty with current diet order. No report of GI distress (nausea, vomiting, diarrhea, constipation). Last BM 7/1/25 per RN flowsheet documentation. Not noted to be on a bowel regimen.     Writer provided verbal education regarding current diet order and nutrition recommendations for after discharge. Patient verbalized understanding to the discussion.

## 2025-07-03 NOTE — DISCHARGE NOTE PROVIDER - NSFOLLOWUPCLINICS_GEN_ALL_ED_FT
[facility] - Infectious Disease  Infectious Disease  [address], [place] [address]  Phone: [phone]  Fax:      [facility] - Infectious Disease  Infectious Disease  [address], [place] [address]  Phone: [phone]  Fax:     [facility]  Endocrinology  [address] [place] [id number]  Phone: [phone]  Fax:

## 2025-07-03 NOTE — DISCHARGE NOTE PROVIDER - HOSPITAL COURSE
60F PMH diabetes, asthma, hypertension, asthma, ovarian cancer (on chemo, last chemo session was May 2nd 2025) w/ peritoneal carcinomatosis and c/b malignant pleural effusion s/p pleurx catheter presents for cellulitis of leg.     Leg cellulitis.  -Recent visit to the Emergency Department; diagnosed with cellulitis and DVT. Given clindamycin for cellulitis, but the patient returned with worsening erythema.   -No SIRS criteria met.   -Fluctuance appreciated on the medial aspect of the ankle.   -Infectious Disease recommendations appreciated, per ID, cefazolin.  - per ID can complete ABX course on keflex 10 days.     Lower extremity DVT 2/2 Hypercoagulable state  -Patient on apixaban (Eliquis) after a peroneal DVT was discovered on Doppler ultrasound on June 27th.   -Repeat Doppler showed no extension of the DVT.   -Continue Eliquis.  - stable     Ovarian cancer.  -Metastatic ovarian cancer; on bevacizumab (Avastin) and pegylated liposomal doxorubicin (Doxil). Complicated by peritoneal metastases and malignant pleural effusion status post -PleurX catheter placement.   -Followed by Whit Cuellar.    Malignant pleural effusion.  -PleurX catheter placed in 2023. See above (Ovarian Cancer).     Type 2 diabetes mellitus.   Humalog 3 units with meals. Semglee 8 units qhs, per outpatient documentation. Continue insulin sliding scale. Adjust insulin as necessary.    On 7/3/25, case was discussed with Dr. Richards, patient is medically cleared and optimized for discharge today. All medications were reviewed with attending, and sent to mutually agreed upon pharmacy.     Active Problems:  Ovarian cancer with peritoneal carcinomatosis and malignant pleural effusion  Leg cellulitis  Lower extremity deep vein thrombosis (DVT)  Hypercoagulable state  Type 2 diabetes mellitus  Hypertension (patient disputes this diagnosis)  Asthma          60F PMH diabetes, asthma, hypertension, asthma, ovarian cancer (on chemo, last chemo session was May 2nd 2025) w/ peritoneal carcinomatosis and c/b malignant pleural effusion s/p pleurx catheter presents for cellulitis of leg.     Leg cellulitis.  -Recent visit to the Emergency Department; diagnosed with cellulitis and DVT. Given clindamycin for cellulitis, but the patient returned with worsening erythema.   -No SIRS criteria met.   -Fluctuance appreciated on the medial aspect of the ankle.   -Infectious Disease recommendations appreciated, per ID, cefazolin.  - per ID can complete ABX course on keflex 10 days.     Lower extremity DVT 2/2 Hypercoagulable state  -Patient on apixaban (Eliquis) after a peroneal DVT was discovered on Doppler ultrasound on June 27th.   -Repeat Doppler showed no extension of the DVT.   -Continue Eliquis.  - stable     Ovarian cancer.  -Metastatic ovarian cancer; on bevacizumab (Avastin) and pegylated liposomal doxorubicin (Doxil). Complicated by peritoneal metastases and malignant pleural effusion status post -PleurX catheter placement.   -Followed by Whit Cuellar.    Malignant pleural effusion.  -PleurX catheter placed in 2023. See above (Ovarian Cancer).     Type 2 diabetes mellitus.   Humalog 3 units with meals. Semglee 8 units qhs, per outpatient documentation. Continue insulin sliding scale. Adjust insulin as necessary.    On 7/3/25, case was discussed with Dr. Richards, patient is medically cleared and optimized for discharge today. All medications were reviewed with attending, and sent to mutually agreed upon pharmacy.

## 2025-07-03 NOTE — DIETITIAN INITIAL EVALUATION ADULT - ORAL INTAKE PTA/DIET HISTORY
Patient reports no known food allergies or food intolerances. Nutrition supplementation at home includes Glucerna Shakes on occasion. Patient reports a decreased appetite secondary to chemo treatment, likely meeting <75% estimated energy needs as a result.

## 2025-07-03 NOTE — DISCHARGE NOTE NURSING/CASE MANAGEMENT/SOCIAL WORK - PATIENT PORTAL LINK FT
You can access the FollowMyHealth Patient Portal offered by [facility] by registering at the following website: [email or web address]. By joining [facility]’s FollowMyHealth portal, you will also be able to view your health information using other applications (apps) compatible with our system.

## 2025-07-03 NOTE — DIETITIAN INITIAL EVALUATION ADULT - OTHER CALCULATIONS
IBW: 120 lb +/- 10%, %% - upper range IBW used to determine estimated energy needs   Per [facility], noted the following weight history: 83.9kg (7/1/25), 83.3kg (4/18/25), 83.5kg (1/10/25), 87kg (7/5/24)  Objective weight measurements suggest weight maintenance for >/=6 months, with net weight loss of 3.1kg (4%) x1 year (not significant). Of note, patient currently documented with severe edema which may be masking possible weight loss.

## 2025-07-03 NOTE — DIETITIAN INITIAL EVALUATION ADULT - PERTINENT LABORATORY DATA
07-03    140  |  106  |  19  ----------------------------<  152[H]  4.6   |  23  |  0.97    Ca    9.5      03 Jul 2025 05:54  Phos  3.2     07-03  Mg     1.70     07-03    TPro  7.2  /  Alb  3.4  /  TBili  0.3  /  DBili  x   /  AST  20  /  ALT  7   /  AlkPhos  99  07-03  POCT Blood Glucose.: 151 mg/dL (07-03-25 @ 08:18)  A1C with Estimated Average Glucose Result: 6.3 % (07-01-25 @ 08:57)

## 2025-07-03 NOTE — DISCHARGE NOTE PROVIDER - NSDCFUSCHEDAPPT_GEN_ALL_CORE_FT
[facility]  [facility]  Scheduled Appointment: 07/11/2025    Whit Cuellar  [facility]  [facility]  Scheduled Appointment: 07/11/2025    Daniela Gloria  [facility]  [facility] [address] [facility]  Scheduled Appointment: 07/21/2025    [facility]  [facility]  Scheduled Appointment: 08/01/2025    Whit Cuellar  [facility]  [facility]  Scheduled Appointment: 08/01/2025    Whit Cuellar  [facility]  [facility]  Scheduled Appointment: 08/22/2025    [facility]  [facility]  Scheduled Appointment: 08/22/2025

## 2025-07-03 NOTE — DISCHARGE NOTE PROVIDER - ATTENDING DISCHARGE PHYSICAL EXAMINATION:
CONSTITUTIONAL: NAD, pleasant  RESPIRATORY: Normal respiratory effort; no respiratory distress, CTAB  CARDIOVASCULAR: No visible JVD, No lower extremity edema; S1S2, no m,r,g  ABDOMEN: Not guarding, does not appear distended, BS+  MUSCLOSKELETAL: no clubbing or cyanosis of digits; no joint swelling. ,fluctuance overlying medial malleolus improved  PSYCH: AOx3

## 2025-07-03 NOTE — DIETITIAN NUTRITION RISK NOTIFICATION - ADDITIONAL COMMENTS/DIETITIAN RECOMMENDATIONS
Please see Dietitian Initial Assessment for complete recommendations.    Natalia Reyes MS RDN CDN  On [employer] Teams (Preferred), Pager #[phone]

## 2025-07-03 NOTE — DISCHARGE NOTE PROVIDER - NSDCMRMEDTOKEN_GEN_ALL_CORE_FT
alcohol swabs: Apply topically to affected area 4 times a day  dexAMETHasone 4 mg oral tablet: 1 tab(s) orally twice Take 5 tablets at bedtime the night before chemo and 5 tablets at 6 am the morning of chemo. Take it with food  Eliquis Starter Pack for Treatment of DVT and PE 5 mg oral tablet: 1 tab(s) orally 2 times a day  glucometer (per patient&#x27;s insurance): Test blood sugars four times a day. Dispense #1 glucometer.  Insulin Pen Needles, 4mm: 1 application subcutaneously 4 times a day. ** Use with insulin pen **  Insulin Sliding Scale: Sliding Scale three times a day before meals:  1 Unit if Glucose 151-200  2 Units if Glucose 201-250  3 Units if Glucose 251-300  4 Units if glucose 301-350  5 units if glucose 350-400  Contact MD if Glucose 400+, three times a day before meals    Sliding scale before bedtime  0 units if glucose 0-250  1 Unit if Glucose 250-300  2 unit if glucose 301-350  3 units if glucose 351-400  Contact MD if Glucose 400+, at bedtime  lancets: 1 application subcutaneously 4 times a day  lidocaine 5% topical ointment: Apply topically to affected area 3 times a day as needed for  moderate pain  naproxen 500 mg oral tablet: 1 tab(s) orally every 12 hours as needed for  moderate pain  omega-3 polyunsaturated fatty acids oral capsule:  orally once a day  last dose 9/30  prochlorperazine 10 mg oral tablet: 1 tab(s) orally every 6 hours as needed for  nausea take 1 tablet every 6 hours AS NEEDED for nausea  Semglee 100 units/mL subcutaneous solution: 15 unit(s) subcutaneous once a day (at bedtime)  test strips (per patient&#x27;s insurance): 1 application subcutaneously 4 times a day. ** Compatible with patient&#x27;s glucometer **  Vitamin D3 2000 iu orally daily:      alcohol swabs: Apply topically to affected area 4 times a day  cephalexin 500 mg oral capsule: 1 cap(s) orally every 6 hours  dexAMETHasone 4 mg oral tablet: 1 tab(s) orally twice Take 5 tablets at bedtime the night before chemo and 5 tablets at 6 am the morning of chemo. Take it with food  Eliquis Starter Pack for Treatment of DVT and PE 5 mg oral tablet: 1 tab(s) orally 2 times a day Begin taking after completing the 10mg course.  glucometer (per patient&#x27;s insurance): Test blood sugars four times a day. Dispense #1 glucometer.  Insulin Pen Needles, 4mm: 1 application subcutaneously 4 times a day. ** Use with insulin pen **  Insulin Sliding Scale: Sliding Scale three times a day before meals:  1 Unit if Glucose 151-200  2 Units if Glucose 201-250  3 Units if Glucose 251-300  4 Units if glucose 301-350  5 units if glucose 350-400  Contact MD if Glucose 400+, three times a day before meals    Sliding scale before bedtime  0 units if glucose 0-250  1 Unit if Glucose 250-300  2 unit if glucose 301-350  3 units if glucose 351-400  Contact MD if Glucose 400+, at bedtime  lancets: 1 application subcutaneously 4 times a day  lidocaine 5% topical ointment: Apply topically to affected area 3 times a day as needed for  moderate pain  naproxen 500 mg oral tablet: 1 tab(s) orally every 12 hours as needed for  moderate pain  omega-3 polyunsaturated fatty acids oral capsule:  orally once a day  last dose 9/30  prochlorperazine 10 mg oral tablet: 1 tab(s) orally every 6 hours as needed for  nausea take 1 tablet every 6 hours AS NEEDED for nausea  Semglee 100 units/mL subcutaneous solution: 15 unit(s) subcutaneous once a day (at bedtime)  test strips (per patient&#x27;s insurance): 1 application subcutaneously 4 times a day. ** Compatible with patient&#x27;s glucometer **  Vitamin D3 2000 iu orally daily:

## 2025-07-03 NOTE — DIETITIAN INITIAL EVALUATION ADULT - REASON INDICATOR FOR ASSESSMENT
Nutrition Risk Screen for Oncology    Per chart, patient is a 60y Female with PMH T2DM, HTN, HLD, asthma, ovarian cancer on chemo with peritoneal carcinomatosis and complicated by malignant pleural effusion s/p PleurX catheter who presented to [facility] for cellulitis.

## 2025-07-03 NOTE — DISCHARGE NOTE PROVIDER - CARE PROVIDER_API CALL
[facility]  Internal Medicine  [address] [place] [address] [address]  [place] [address]  Phone: [phone]  Fax: [phone]  Follow Up Time:     Whit Cuellar  Medical Oncology  [address] [place] [id number]  Phone: [phone]  Fax: [phone]  Follow Up Time:

## 2025-07-03 NOTE — DIETITIAN INITIAL EVALUATION ADULT - PERTINENT MEDS FT
MEDICATIONS  (STANDING):  apixaban 10 milliGRAM(s) Oral every 12 hours  ceFAZolin   IVPB 2000 milliGRAM(s) IV Intermittent every 8 hours  dextrose 5%. 1000 milliLiter(s) (50 mL/Hr) IV Continuous <Continuous>  dextrose 5%. 1000 milliLiter(s) (100 mL/Hr) IV Continuous <Continuous>  dextrose 50% Injectable 25 Gram(s) IV Push once  dextrose 50% Injectable 12.5 Gram(s) IV Push once  dextrose 50% Injectable 25 Gram(s) IV Push once  glucagon  Injectable 1 milliGRAM(s) IntraMuscular once  insulin lispro (ADMELOG) corrective regimen sliding scale   SubCutaneous three times a day before meals    MEDICATIONS  (PRN):  dextrose Oral Gel 15 Gram(s) Oral once PRN Blood Glucose LESS THAN 70 milliGRAM(s)/deciliter

## 2025-07-06 LAB
CULTURE RESULTS: SIGNIFICANT CHANGE UP
CULTURE RESULTS: SIGNIFICANT CHANGE UP
SPECIMEN SOURCE: SIGNIFICANT CHANGE UP
SPECIMEN SOURCE: SIGNIFICANT CHANGE UP

## 2025-07-08 ENCOUNTER — OUTPATIENT (OUTPATIENT)
Dept: OUTPATIENT SERVICES | Facility: HOSPITAL | Age: 60
LOS: 1 days | Discharge: HOME | End: 2025-07-08

## 2025-07-08 ENCOUNTER — TRANSCRIPTION ENCOUNTER (OUTPATIENT)
Age: 60
End: 2025-07-08

## 2025-07-08 DIAGNOSIS — C57.4 MALIGNANT NEOPLASM OF UTERINE ADNEXA, UNSPECIFIED: ICD-10-CM

## 2025-07-08 DIAGNOSIS — Z98.89 OTHER SPECIFIED POSTPROCEDURAL STATES: Chronic | ICD-10-CM

## 2025-07-08 DIAGNOSIS — Z98.890 OTHER SPECIFIED POSTPROCEDURAL STATES: Chronic | ICD-10-CM

## 2025-07-10 ENCOUNTER — TRANSCRIPTION ENCOUNTER (OUTPATIENT)
Age: 60
End: 2025-07-10

## 2025-07-11 ENCOUNTER — APPOINTMENT (OUTPATIENT)
Dept: HEMATOLOGY ONCOLOGY | Facility: CLINIC | Age: 60
End: 2025-07-11

## 2025-07-11 ENCOUNTER — APPOINTMENT (OUTPATIENT)
Dept: INFUSION THERAPY | Facility: HOSPITAL | Age: 60
End: 2025-07-11

## 2025-07-15 ENCOUNTER — APPOINTMENT (OUTPATIENT)
Dept: HEMATOLOGY ONCOLOGY | Facility: CLINIC | Age: 60
End: 2025-07-15
Payer: COMMERCIAL

## 2025-07-15 VITALS
WEIGHT: 176.81 LBS | RESPIRATION RATE: 16 BRPM | SYSTOLIC BLOOD PRESSURE: 119 MMHG | DIASTOLIC BLOOD PRESSURE: 77 MMHG | TEMPERATURE: 96.6 F | OXYGEN SATURATION: 99 % | HEART RATE: 56 BPM | BODY MASS INDEX: 30.56 KG/M2

## 2025-07-15 PROCEDURE — 99214 OFFICE O/P EST MOD 30 MIN: CPT

## 2025-07-21 ENCOUNTER — APPOINTMENT (OUTPATIENT)
Dept: INTERNAL MEDICINE | Facility: CLINIC | Age: 60
End: 2025-07-21

## 2025-07-21 VITALS
TEMPERATURE: 97.3 F | SYSTOLIC BLOOD PRESSURE: 162 MMHG | WEIGHT: 175 LBS | HEIGHT: 67 IN | HEART RATE: 62 BPM | OXYGEN SATURATION: 98 % | BODY MASS INDEX: 27.47 KG/M2 | DIASTOLIC BLOOD PRESSURE: 95 MMHG

## 2025-07-21 DIAGNOSIS — N76.0 ACUTE VAGINITIS: ICD-10-CM

## 2025-07-21 DIAGNOSIS — R63.4 ABNORMAL WEIGHT LOSS: ICD-10-CM

## 2025-07-21 DIAGNOSIS — D64.9 ANEMIA, UNSPECIFIED: ICD-10-CM

## 2025-07-21 DIAGNOSIS — K21.9 GASTRO-ESOPHAGEAL REFLUX DISEASE W/OUT ESOPHAGITIS: ICD-10-CM

## 2025-07-21 DIAGNOSIS — K29.70 GASTRITIS, UNSPECIFIED, W/OUT BLEEDING: ICD-10-CM

## 2025-07-21 RX ORDER — TERCONAZOLE 8 MG/G
0.8 CREAM VAGINAL
Qty: 1 | Refills: 3 | Status: ACTIVE | COMMUNITY
Start: 2025-07-21 | End: 1900-01-01

## 2025-07-22 LAB
ALBUMIN SERPL ELPH-MCNC: 3.9 G/DL
ALP BLD-CCNC: 103 U/L
ALT SERPL-CCNC: 6 U/L
ANION GAP SERPL CALC-SCNC: 13 MMOL/L
AST SERPL-CCNC: 23 U/L
BASOPHILS # BLD AUTO: 0.01 K/UL
BASOPHILS NFR BLD AUTO: 0.3 %
BILIRUB SERPL-MCNC: 0.4 MG/DL
BUN SERPL-MCNC: 14 MG/DL
CALCIUM SERPL-MCNC: 9.9 MG/DL
CHLORIDE SERPL-SCNC: 106 MMOL/L
CHOLEST SERPL-MCNC: 284 MG/DL
CO2 SERPL-SCNC: 23 MMOL/L
CREAT SERPL-MCNC: 0.99 MG/DL
EGFRCR SERPLBLD CKD-EPI 2021: 65 ML/MIN/1.73M2
EOSINOPHIL # BLD AUTO: 0.05 K/UL
EOSINOPHIL NFR BLD AUTO: 1.3 %
ESTIMATED AVERAGE GLUCOSE: 126 MG/DL
GLUCOSE SERPL-MCNC: 166 MG/DL
HBA1C MFR BLD HPLC: 6 %
HCT VFR BLD CALC: 37.8 %
HDLC SERPL-MCNC: 40 MG/DL
HGB BLD-MCNC: 12.5 G/DL
IMM GRANULOCYTES NFR BLD AUTO: 0.3 %
LDLC SERPL-MCNC: 219 MG/DL
LYMPHOCYTES # BLD AUTO: 1.33 K/UL
LYMPHOCYTES NFR BLD AUTO: 34.4 %
MAN DIFF?: NORMAL
MCHC RBC-ENTMCNC: 29.6 PG
MCHC RBC-ENTMCNC: 33.1 G/DL
MCV RBC AUTO: 89.6 FL
MONOCYTES # BLD AUTO: 0.22 K/UL
MONOCYTES NFR BLD AUTO: 5.7 %
NEUTROPHILS # BLD AUTO: 2.25 K/UL
NEUTROPHILS NFR BLD AUTO: 58 %
NONHDLC SERPL-MCNC: 244 MG/DL
PLATELET # BLD AUTO: 102 K/UL
POTASSIUM SERPL-SCNC: 3.7 MMOL/L
PROT SERPL-MCNC: 7.6 G/DL
RBC # BLD: 4.22 M/UL
RBC # FLD: 13.3 %
SODIUM SERPL-SCNC: 142 MMOL/L
T3FREE SERPL-MCNC: 2.4 PG/ML
T4 FREE SERPL-MCNC: 1.1 NG/DL
TRIGL SERPL-MCNC: 131 MG/DL
TSH SERPL-ACNC: 2.1 UIU/ML
URATE SERPL-MCNC: 8.5 MG/DL
WBC # FLD AUTO: 3.87 K/UL

## 2025-08-01 ENCOUNTER — RESULT REVIEW (OUTPATIENT)
Age: 60
End: 2025-08-01

## 2025-08-01 ENCOUNTER — APPOINTMENT (OUTPATIENT)
Dept: HEMATOLOGY ONCOLOGY | Facility: CLINIC | Age: 60
End: 2025-08-01

## 2025-08-01 ENCOUNTER — APPOINTMENT (OUTPATIENT)
Dept: HEMATOLOGY ONCOLOGY | Facility: CLINIC | Age: 60
End: 2025-08-01
Payer: COMMERCIAL

## 2025-08-01 ENCOUNTER — APPOINTMENT (OUTPATIENT)
Dept: INFUSION THERAPY | Facility: HOSPITAL | Age: 60
End: 2025-08-01

## 2025-08-01 DIAGNOSIS — Z51.11 ENCOUNTER FOR ANTINEOPLASTIC CHEMOTHERAPY: ICD-10-CM

## 2025-08-01 DIAGNOSIS — C56.9 MALIGNANT NEOPLASM OF UNSPECIFIED OVARY: ICD-10-CM

## 2025-08-01 PROCEDURE — 99214 OFFICE O/P EST MOD 30 MIN: CPT

## 2025-08-01 PROCEDURE — G2211 COMPLEX E/M VISIT ADD ON: CPT

## 2025-08-02 LAB
ALBUMIN SERPL ELPH-MCNC: 3.9 G/DL — SIGNIFICANT CHANGE UP (ref 3.3–5)
ALP SERPL-CCNC: 104 U/L — SIGNIFICANT CHANGE UP (ref 40–120)
ALT FLD-CCNC: 7 U/L — LOW (ref 10–40)
ANION GAP SERPL CALC-SCNC: 12 MMOL/L — SIGNIFICANT CHANGE UP (ref 5–17)
AST SERPL-CCNC: 23 U/L — SIGNIFICANT CHANGE UP (ref 10–35)
BASOPHILS # BLD AUTO: 0 K/UL — SIGNIFICANT CHANGE UP (ref 0–0.2)
BASOPHILS NFR BLD AUTO: 0 % — SIGNIFICANT CHANGE UP (ref 0–2)
BILIRUB SERPL-MCNC: 0.3 MG/DL — SIGNIFICANT CHANGE UP (ref 0.2–1.2)
BUN SERPL-MCNC: 14 MG/DL — SIGNIFICANT CHANGE UP (ref 7–23)
CALCIUM SERPL-MCNC: 9.3 MG/DL — SIGNIFICANT CHANGE UP (ref 8.4–10.5)
CANCER AG125 SERPL-ACNC: 192 U/ML — HIGH
CHLORIDE SERPL-SCNC: 105 MMOL/L — SIGNIFICANT CHANGE UP (ref 96–108)
CO2 SERPL-SCNC: 26 MMOL/L — SIGNIFICANT CHANGE UP (ref 22–31)
CREAT ?TM UR-MCNC: 103 MG/DL — SIGNIFICANT CHANGE UP
CREAT SERPL-MCNC: 0.97 MG/DL — SIGNIFICANT CHANGE UP (ref 0.5–1.3)
EGFR: 66 ML/MIN/1.73M2 — SIGNIFICANT CHANGE UP
EGFR: 66 ML/MIN/1.73M2 — SIGNIFICANT CHANGE UP
EOSINOPHIL # BLD AUTO: 0.09 K/UL — SIGNIFICANT CHANGE UP (ref 0–0.5)
EOSINOPHIL NFR BLD AUTO: 2.3 % — SIGNIFICANT CHANGE UP (ref 0–6)
GLUCOSE SERPL-MCNC: 99 MG/DL — SIGNIFICANT CHANGE UP (ref 70–99)
HCT VFR BLD CALC: 35.3 % — SIGNIFICANT CHANGE UP (ref 34.5–45)
HGB BLD-MCNC: 12 G/DL — SIGNIFICANT CHANGE UP (ref 11.5–15.5)
IMM GRANULOCYTES NFR BLD AUTO: 0 % — SIGNIFICANT CHANGE UP (ref 0–0.9)
LYMPHOCYTES # BLD AUTO: 1.4 K/UL — SIGNIFICANT CHANGE UP (ref 1–3.3)
LYMPHOCYTES # BLD AUTO: 36 % — SIGNIFICANT CHANGE UP (ref 13–44)
MCHC RBC-ENTMCNC: 29.9 PG — SIGNIFICANT CHANGE UP (ref 27–34)
MCHC RBC-ENTMCNC: 34 G/DL — SIGNIFICANT CHANGE UP (ref 32–36)
MCV RBC AUTO: 88 FL — SIGNIFICANT CHANGE UP (ref 80–100)
MONOCYTES # BLD AUTO: 0.23 K/UL — SIGNIFICANT CHANGE UP (ref 0–0.9)
MONOCYTES NFR BLD AUTO: 5.9 % — SIGNIFICANT CHANGE UP (ref 2–14)
NEUTROPHILS # BLD AUTO: 2.17 K/UL — SIGNIFICANT CHANGE UP (ref 1.8–7.4)
NEUTROPHILS NFR BLD AUTO: 55.8 % — SIGNIFICANT CHANGE UP (ref 43–77)
PLATELET # BLD AUTO: 121 K/UL — LOW (ref 150–400)
POTASSIUM SERPL-MCNC: 4 MMOL/L — SIGNIFICANT CHANGE UP (ref 3.5–5.3)
POTASSIUM SERPL-SCNC: 4 MMOL/L — SIGNIFICANT CHANGE UP (ref 3.5–5.3)
PROT ?TM UR-MCNC: 16 MG/DL — HIGH (ref 0–12)
PROT SERPL-MCNC: 7.4 G/DL — SIGNIFICANT CHANGE UP (ref 6–8.3)
PROT/CREAT UR-RTO: 0.2 RATIO — SIGNIFICANT CHANGE UP (ref 0–0.2)
RBC # BLD: 4.01 M/UL — SIGNIFICANT CHANGE UP (ref 3.8–5.2)
RBC # FLD: 13.2 % — SIGNIFICANT CHANGE UP (ref 10.3–14.5)
SODIUM SERPL-SCNC: 143 MMOL/L — SIGNIFICANT CHANGE UP (ref 135–145)
WBC # BLD: 3.89 K/UL — SIGNIFICANT CHANGE UP (ref 3.8–10.5)
WBC # FLD AUTO: 3.89 K/UL — SIGNIFICANT CHANGE UP (ref 3.8–10.5)

## 2025-08-22 ENCOUNTER — APPOINTMENT (OUTPATIENT)
Dept: HEMATOLOGY ONCOLOGY | Facility: CLINIC | Age: 60
End: 2025-08-22
Payer: COMMERCIAL

## 2025-08-22 ENCOUNTER — RESULT REVIEW (OUTPATIENT)
Age: 60
End: 2025-08-22

## 2025-08-22 ENCOUNTER — APPOINTMENT (OUTPATIENT)
Dept: INFUSION THERAPY | Facility: HOSPITAL | Age: 60
End: 2025-08-22

## 2025-08-22 VITALS
SYSTOLIC BLOOD PRESSURE: 147 MMHG | HEART RATE: 60 BPM | BODY MASS INDEX: 30.97 KG/M2 | WEIGHT: 177.01 LBS | DIASTOLIC BLOOD PRESSURE: 83 MMHG | HEIGHT: 63.39 IN | TEMPERATURE: 97.1 F | RESPIRATION RATE: 16 BRPM | OXYGEN SATURATION: 98 %

## 2025-08-22 DIAGNOSIS — C56.9 MALIGNANT NEOPLASM OF UNSPECIFIED OVARY: ICD-10-CM

## 2025-08-22 PROCEDURE — 99214 OFFICE O/P EST MOD 30 MIN: CPT

## 2025-08-22 RX ORDER — APIXABAN 5 MG/1
5 TABLET, FILM COATED ORAL
Qty: 60 | Refills: 3 | Status: ACTIVE | COMMUNITY
Start: 2025-08-22 | End: 1900-01-01

## 2025-08-23 LAB
ALBUMIN SERPL ELPH-MCNC: 3.9 G/DL — SIGNIFICANT CHANGE UP (ref 3.3–5)
ALP SERPL-CCNC: 109 U/L — SIGNIFICANT CHANGE UP (ref 40–120)
ALT FLD-CCNC: 9 U/L — LOW (ref 10–40)
ANION GAP SERPL CALC-SCNC: 12 MMOL/L — SIGNIFICANT CHANGE UP (ref 5–17)
AST SERPL-CCNC: 24 U/L — SIGNIFICANT CHANGE UP (ref 10–35)
BASOPHILS # BLD AUTO: 0 K/UL — SIGNIFICANT CHANGE UP (ref 0–0.2)
BASOPHILS NFR BLD AUTO: 0 % — SIGNIFICANT CHANGE UP (ref 0–2)
BILIRUB SERPL-MCNC: 0.2 MG/DL — SIGNIFICANT CHANGE UP (ref 0.2–1.2)
BUN SERPL-MCNC: 16 MG/DL — SIGNIFICANT CHANGE UP (ref 7–23)
CALCIUM SERPL-MCNC: 9.3 MG/DL — SIGNIFICANT CHANGE UP (ref 8.4–10.5)
CANCER AG125 SERPL-ACNC: 247 U/ML — HIGH
CHLORIDE SERPL-SCNC: 107 MMOL/L — SIGNIFICANT CHANGE UP (ref 96–108)
CO2 SERPL-SCNC: 25 MMOL/L — SIGNIFICANT CHANGE UP (ref 22–31)
CREAT ?TM UR-MCNC: 90 MG/DL — SIGNIFICANT CHANGE UP
CREAT SERPL-MCNC: 0.97 MG/DL — SIGNIFICANT CHANGE UP (ref 0.5–1.3)
EGFR: 66 ML/MIN/1.73M2 — SIGNIFICANT CHANGE UP
EGFR: 66 ML/MIN/1.73M2 — SIGNIFICANT CHANGE UP
EOSINOPHIL # BLD AUTO: 0.08 K/UL — SIGNIFICANT CHANGE UP (ref 0–0.5)
EOSINOPHIL NFR BLD AUTO: 2.1 % — SIGNIFICANT CHANGE UP (ref 0–6)
GLUCOSE SERPL-MCNC: 92 MG/DL — SIGNIFICANT CHANGE UP (ref 70–99)
HCT VFR BLD CALC: 35.4 % — SIGNIFICANT CHANGE UP (ref 34.5–45)
HGB BLD-MCNC: 11.9 G/DL — SIGNIFICANT CHANGE UP (ref 11.5–15.5)
IMM GRANULOCYTES NFR BLD AUTO: 0.3 % — SIGNIFICANT CHANGE UP (ref 0–0.9)
LYMPHOCYTES # BLD AUTO: 1.23 K/UL — SIGNIFICANT CHANGE UP (ref 1–3.3)
LYMPHOCYTES # BLD AUTO: 32.5 % — SIGNIFICANT CHANGE UP (ref 13–44)
MCHC RBC-ENTMCNC: 29.7 PG — SIGNIFICANT CHANGE UP (ref 27–34)
MCHC RBC-ENTMCNC: 33.6 G/DL — SIGNIFICANT CHANGE UP (ref 32–36)
MCV RBC AUTO: 88.3 FL — SIGNIFICANT CHANGE UP (ref 80–100)
MONOCYTES # BLD AUTO: 0.27 K/UL — SIGNIFICANT CHANGE UP (ref 0–0.9)
MONOCYTES NFR BLD AUTO: 7.1 % — SIGNIFICANT CHANGE UP (ref 2–14)
NEUTROPHILS # BLD AUTO: 2.19 K/UL — SIGNIFICANT CHANGE UP (ref 1.8–7.4)
NEUTROPHILS NFR BLD AUTO: 58 % — SIGNIFICANT CHANGE UP (ref 43–77)
PLATELET # BLD AUTO: 112 K/UL — LOW (ref 150–400)
POTASSIUM SERPL-MCNC: 4.3 MMOL/L — SIGNIFICANT CHANGE UP (ref 3.5–5.3)
POTASSIUM SERPL-SCNC: 4.3 MMOL/L — SIGNIFICANT CHANGE UP (ref 3.5–5.3)
PROT ?TM UR-MCNC: 22 MG/DL — HIGH (ref 0–12)
PROT SERPL-MCNC: 7.5 G/DL — SIGNIFICANT CHANGE UP (ref 6–8.3)
PROT/CREAT UR-RTO: 0.3 RATIO — HIGH (ref 0–0.2)
RBC # BLD: 4.01 M/UL — SIGNIFICANT CHANGE UP (ref 3.8–5.2)
RBC # FLD: 13.5 % — SIGNIFICANT CHANGE UP (ref 10.3–14.5)
SODIUM SERPL-SCNC: 143 MMOL/L — SIGNIFICANT CHANGE UP (ref 135–145)
WBC # BLD: 3.78 K/UL — LOW (ref 3.8–10.5)
WBC # FLD AUTO: 3.78 K/UL — LOW (ref 3.8–10.5)

## (undated) DEVICE — DRAPE 1/2 SHEET 40X57"

## (undated) DEVICE — PREP CHLORAPREP HI-LITE ORANGE 26ML

## (undated) DEVICE — TUBING CANNULA SALTER LABS NASAL ADULT 7FT

## (undated) DEVICE — NDL HYPO SAFE 18G X 1.5" (PINK)

## (undated) DEVICE — SUTURE REMOVAL KIT

## (undated) DEVICE — SENSOR O2 FINGER NEO/ADLT 24/BX 6BX/CA

## (undated) DEVICE — DRAPE TOWEL BLUE 17" X 24"

## (undated) DEVICE — SYR LUER LOK 10CC

## (undated) DEVICE — DRSG DERMABOND 0.7ML

## (undated) DEVICE — DRAIN PLEURX KIT WITH 500ML VACUUM BOTTLE

## (undated) DEVICE — WARMING BLANKET LOWER ADULT

## (undated) DEVICE — DRSG TEGADERM 4X4.75"

## (undated) DEVICE — NDL HYPO SAFE 25G X 5/8" (ORANGE)

## (undated) DEVICE — MASK SURGICAL WITH EYESHIELD ANTIFOG (ORANGE)

## (undated) DEVICE — TUBING SUCTION NONCONDUCTIVE 6MM X 12FT

## (undated) DEVICE — GOWN LG

## (undated) DEVICE — COVER PROBE W/GEL 18X120CM STRL 50/BX

## (undated) DEVICE — LABELS BLANK W PEN

## (undated) DEVICE — NDL HYPO REGULAR BEVEL 25G X 1.5" (BLUE)

## (undated) DEVICE — GLV 7 PROTEXIS (CREAM) MICRO

## (undated) DEVICE — GLV 7 PROTEXIS (WHITE)

## (undated) DEVICE — PREP ALCOHOL PAD MED

## (undated) DEVICE — SUT ETHILON 3-0 18" FS-1

## (undated) DEVICE — DRSG CURITY GAUZE SPONGE 4 X 4" 12-PLY

## (undated) DEVICE — MARKING PEN W RULER